# Patient Record
Sex: FEMALE | Race: WHITE | NOT HISPANIC OR LATINO | Employment: OTHER | URBAN - METROPOLITAN AREA
[De-identification: names, ages, dates, MRNs, and addresses within clinical notes are randomized per-mention and may not be internally consistent; named-entity substitution may affect disease eponyms.]

---

## 2017-01-11 ENCOUNTER — HOSPITAL ENCOUNTER (OUTPATIENT)
Dept: FAMILY MEDICINE CLINIC | Facility: CLINIC | Age: 67
Setting detail: SPECIMEN
Discharge: HOME | End: 2017-01-11
Attending: PREVENTIVE MEDICINE | Admitting: PREVENTIVE MEDICINE

## 2017-01-11 LAB
ALBUMIN SERPL-MCNC: 3.8 G/DL (ref 3.5–4.8)
ALBUMIN/GLOB SERPL: 1.2 {RATIO} (ref 1–1.7)
ALP SERPL-CCNC: 105 IU/L (ref 32–91)
ALT SERPL-CCNC: 42 IU/L (ref 14–54)
ANION GAP SERPL CALC-SCNC: 11.8 MMOL/L (ref 10–20)
AST SERPL-CCNC: 30 IU/L (ref 15–41)
BASOPHILS # BLD AUTO: 0 10*3/UL (ref 0–0.2)
BASOPHILS NFR BLD AUTO: 0 % (ref 0–2)
BILIRUB SERPL-MCNC: 0.6 MG/DL (ref 0.3–1.2)
BUN SERPL-MCNC: 10 MG/DL (ref 8–20)
BUN/CREAT SERPL: 14.3 (ref 5.4–26.2)
CALCIUM SERPL-MCNC: 9.3 MG/DL (ref 8.9–10.3)
CHLORIDE SERPL-SCNC: 103 MMOL/L (ref 101–111)
CHOLEST SERPL-MCNC: 154 MG/DL
CHOLEST/HDLC SERPL: 2.8 {RATIO}
CONV CO2: 30 MMOL/L (ref 22–32)
CONV LDL CHOLESTEROL DIRECT: 84 MG/DL (ref 0–100)
CONV MICROALBUM.,U,RANDOM: 5 MG/L
CONV TOTAL PROTEIN: 7 G/DL (ref 6.1–7.9)
CREAT 24H UR-MCNC: 49.7 MG/DL
CREAT UR-MCNC: 0.7 MG/DL (ref 0.4–1)
DIFFERENTIAL METHOD BLD: (no result)
EOSINOPHIL # BLD AUTO: 0.1 10*3/UL (ref 0–0.3)
EOSINOPHIL # BLD AUTO: 2 % (ref 0–3)
ERYTHROCYTE [DISTWIDTH] IN BLOOD BY AUTOMATED COUNT: 13.8 % (ref 11.5–14.5)
GLOBULIN UR ELPH-MCNC: 3.2 G/DL (ref 2.5–3.8)
GLUCOSE SERPL-MCNC: 159 MG/DL (ref 65–99)
HCT VFR BLD AUTO: 40.2 % (ref 35–49)
HDLC SERPL-MCNC: 55 MG/DL
HGB BLD-MCNC: 13.2 G/DL (ref 12–15)
LDLC/HDLC SERPL: 1.5 {RATIO}
LIPID INTERPRETATION: NORMAL
LYMPHOCYTES # BLD AUTO: 2.1 10*3/UL (ref 0.8–4.8)
LYMPHOCYTES NFR BLD AUTO: 40 % (ref 18–42)
MCH RBC QN AUTO: 30 PG (ref 26–32)
MCHC RBC AUTO-ENTMCNC: 32.9 G/DL (ref 32–36)
MCV RBC AUTO: 91.2 FL (ref 80–94)
MICROALBUMIN/CREAT UR: 10.1 UG/MG
MONOCYTES # BLD AUTO: 0.5 10*3/UL (ref 0.1–1.3)
MONOCYTES NFR BLD AUTO: 9 % (ref 2–11)
NEUTROPHILS # BLD AUTO: 2.6 10*3/UL (ref 2.3–8.6)
NEUTROPHILS NFR BLD AUTO: 49 % (ref 50–75)
NRBC BLD AUTO-RTO: 0 /100{WBCS}
NRBC/RBC NFR BLD MANUAL: 0 10*3/UL
PLATELET # BLD AUTO: 225 10*3/UL (ref 150–450)
PMV BLD AUTO: 8.6 FL (ref 7.4–10.4)
POTASSIUM SERPL-SCNC: 3.8 MMOL/L (ref 3.6–5.1)
RBC # BLD AUTO: 4.41 10*6/UL (ref 4–5.4)
SODIUM SERPL-SCNC: 141 MMOL/L (ref 136–144)
TRIGL SERPL-MCNC: 61 MG/DL
VIT B12 SERPL-MCNC: >1500 PG/ML (ref 180–914)
VLDLC SERPL CALC-MCNC: 14.9 MG/DL
WBC # BLD AUTO: 5.2 10*3/UL (ref 4.5–11.5)

## 2017-04-12 ENCOUNTER — HOSPITAL ENCOUNTER (OUTPATIENT)
Dept: FAMILY MEDICINE CLINIC | Facility: CLINIC | Age: 67
Setting detail: SPECIMEN
Discharge: HOME | End: 2017-04-12
Attending: PREVENTIVE MEDICINE | Admitting: PREVENTIVE MEDICINE

## 2017-04-12 LAB
ALBUMIN SERPL-MCNC: 3.8 G/DL (ref 3.5–4.8)
ALBUMIN/GLOB SERPL: 1.2 {RATIO} (ref 1–1.7)
ALP SERPL-CCNC: 80 IU/L (ref 32–91)
ALT SERPL-CCNC: 26 IU/L (ref 14–54)
ANION GAP SERPL CALC-SCNC: 14.7 MMOL/L (ref 10–20)
AST SERPL-CCNC: 21 IU/L (ref 15–41)
BASOPHILS # BLD AUTO: 0 10*3/UL (ref 0–0.2)
BASOPHILS NFR BLD AUTO: 1 % (ref 0–2)
BILIRUB SERPL-MCNC: 0.5 MG/DL (ref 0.3–1.2)
BUN SERPL-MCNC: 12 MG/DL (ref 8–20)
BUN/CREAT SERPL: 20 (ref 5.4–26.2)
CALCIUM SERPL-MCNC: 9.6 MG/DL (ref 8.9–10.3)
CHLORIDE SERPL-SCNC: 104 MMOL/L (ref 101–111)
CHOLEST SERPL-MCNC: 150 MG/DL
CHOLEST/HDLC SERPL: 2.9 {RATIO}
CONV CO2: 28 MMOL/L (ref 22–32)
CONV LDL CHOLESTEROL DIRECT: 89 MG/DL (ref 0–100)
CONV MICROALBUM.,U,RANDOM: 4 MG/L
CONV TOTAL PROTEIN: 6.9 G/DL (ref 6.1–7.9)
CREAT 24H UR-MCNC: 48.4 MG/DL
CREAT UR-MCNC: 0.6 MG/DL (ref 0.4–1)
DIFFERENTIAL METHOD BLD: (no result)
EOSINOPHIL # BLD AUTO: 0.1 10*3/UL (ref 0–0.3)
EOSINOPHIL # BLD AUTO: 2 % (ref 0–3)
ERYTHROCYTE [DISTWIDTH] IN BLOOD BY AUTOMATED COUNT: 13.5 % (ref 11.5–14.5)
GLOBULIN UR ELPH-MCNC: 3.1 G/DL (ref 2.5–3.8)
GLUCOSE SERPL-MCNC: 149 MG/DL (ref 65–99)
HCT VFR BLD AUTO: 40.2 % (ref 35–49)
HDLC SERPL-MCNC: 52 MG/DL
HGB BLD-MCNC: 13.5 G/DL (ref 12–15)
LDLC/HDLC SERPL: 1.7 {RATIO}
LIPID INTERPRETATION: NORMAL
LYMPHOCYTES # BLD AUTO: 2 10*3/UL (ref 0.8–4.8)
LYMPHOCYTES NFR BLD AUTO: 39 % (ref 18–42)
MCH RBC QN AUTO: 30.4 PG (ref 26–32)
MCHC RBC AUTO-ENTMCNC: 33.5 G/DL (ref 32–36)
MCV RBC AUTO: 90.8 FL (ref 80–94)
MICROALBUMIN/CREAT UR: 8.3 UG/MG
MONOCYTES # BLD AUTO: 0.5 10*3/UL (ref 0.1–1.3)
MONOCYTES NFR BLD AUTO: 9 % (ref 2–11)
NEUTROPHILS # BLD AUTO: 2.5 10*3/UL (ref 2.3–8.6)
NEUTROPHILS NFR BLD AUTO: 49 % (ref 50–75)
NRBC BLD AUTO-RTO: 0 /100{WBCS}
NRBC/RBC NFR BLD MANUAL: 0 10*3/UL
PLATELET # BLD AUTO: 264 10*3/UL (ref 150–450)
PMV BLD AUTO: 9.3 FL (ref 7.4–10.4)
POTASSIUM SERPL-SCNC: 3.7 MMOL/L (ref 3.6–5.1)
RBC # BLD AUTO: 4.43 10*6/UL (ref 4–5.4)
SODIUM SERPL-SCNC: 143 MMOL/L (ref 136–144)
TRIGL SERPL-MCNC: 53 MG/DL
TSH SERPL-ACNC: 1.31 UIU/ML (ref 0.34–5.6)
VIT B12 SERPL-MCNC: 1080 PG/ML (ref 180–914)
VLDLC SERPL CALC-MCNC: 9.6 MG/DL
WBC # BLD AUTO: 5 10*3/UL (ref 4.5–11.5)

## 2017-07-06 ENCOUNTER — HOSPITAL ENCOUNTER (OUTPATIENT)
Dept: OTHER | Facility: HOSPITAL | Age: 67
Discharge: HOME | End: 2017-07-06
Attending: PREVENTIVE MEDICINE | Admitting: PREVENTIVE MEDICINE

## 2017-08-01 ENCOUNTER — HOSPITAL ENCOUNTER (OUTPATIENT)
Dept: FAMILY MEDICINE CLINIC | Facility: CLINIC | Age: 67
Setting detail: SPECIMEN
Discharge: HOME | End: 2017-08-01
Attending: PREVENTIVE MEDICINE | Admitting: PREVENTIVE MEDICINE

## 2017-08-01 LAB
CHOLEST SERPL-MCNC: 145 MG/DL
CHOLEST/HDLC SERPL: 2.6 {RATIO}
CONV LDL CHOLESTEROL DIRECT: 79 MG/DL (ref 0–100)
GLUCOSE SERPL-MCNC: 155 MG/DL (ref 65–99)
HDLC SERPL-MCNC: 55 MG/DL
LDLC/HDLC SERPL: 1.4 {RATIO}
LIPID INTERPRETATION: NORMAL
TRIGL SERPL-MCNC: 48 MG/DL
VLDLC SERPL CALC-MCNC: 11.1 MG/DL

## 2017-11-13 ENCOUNTER — HOSPITAL ENCOUNTER (OUTPATIENT)
Dept: FAMILY MEDICINE CLINIC | Facility: CLINIC | Age: 67
Setting detail: SPECIMEN
Discharge: HOME | End: 2017-11-13
Attending: PREVENTIVE MEDICINE | Admitting: PREVENTIVE MEDICINE

## 2017-11-13 LAB
ALBUMIN SERPL-MCNC: 3.9 G/DL (ref 3.5–4.8)
ALBUMIN/GLOB SERPL: 1.1 {RATIO} (ref 1–1.7)
ALP SERPL-CCNC: 77 IU/L (ref 32–91)
ALT SERPL-CCNC: 27 IU/L (ref 14–54)
ANION GAP SERPL CALC-SCNC: 10.7 MMOL/L (ref 10–20)
AST SERPL-CCNC: 22 IU/L (ref 15–41)
BASOPHILS # BLD AUTO: 0 10*3/UL (ref 0–0.2)
BASOPHILS NFR BLD AUTO: 1 % (ref 0–2)
BILIRUB SERPL-MCNC: 0.7 MG/DL (ref 0.3–1.2)
BUN SERPL-MCNC: 13 MG/DL (ref 8–20)
BUN/CREAT SERPL: 21.7 (ref 5.4–26.2)
CALCIUM SERPL-MCNC: 9.4 MG/DL (ref 8.9–10.3)
CHLORIDE SERPL-SCNC: 104 MMOL/L (ref 101–111)
CHOLEST SERPL-MCNC: 155 MG/DL
CHOLEST/HDLC SERPL: 2.7 {RATIO}
CONV CO2: 30 MMOL/L (ref 22–32)
CONV LDL CHOLESTEROL DIRECT: 91 MG/DL (ref 0–100)
CONV MICROALBUM.,U,RANDOM: 4 MG/L
CONV TOTAL PROTEIN: 7.3 G/DL (ref 6.1–7.9)
CREAT 24H UR-MCNC: 37.5 MG/DL
CREAT UR-MCNC: 0.6 MG/DL (ref 0.4–1)
DIFFERENTIAL METHOD BLD: (no result)
EOSINOPHIL # BLD AUTO: 0.1 10*3/UL (ref 0–0.3)
EOSINOPHIL # BLD AUTO: 1 % (ref 0–3)
ERYTHROCYTE [DISTWIDTH] IN BLOOD BY AUTOMATED COUNT: 13.3 % (ref 11.5–14.5)
GLOBULIN UR ELPH-MCNC: 3.4 G/DL (ref 2.5–3.8)
GLUCOSE SERPL-MCNC: 156 MG/DL (ref 65–99)
HCT VFR BLD AUTO: 39.3 % (ref 35–49)
HDLC SERPL-MCNC: 57 MG/DL
HGB BLD-MCNC: 13.2 G/DL (ref 12–15)
LDLC/HDLC SERPL: 1.6 {RATIO}
LIPID INTERPRETATION: NORMAL
LYMPHOCYTES # BLD AUTO: 1.7 10*3/UL (ref 0.8–4.8)
LYMPHOCYTES NFR BLD AUTO: 29 % (ref 18–42)
MCH RBC QN AUTO: 31.3 PG (ref 26–32)
MCHC RBC AUTO-ENTMCNC: 33.4 G/DL (ref 32–36)
MCV RBC AUTO: 93.6 FL (ref 80–94)
MICROALBUMIN/CREAT UR: 10.7 UG/MG
MONOCYTES # BLD AUTO: 0.4 10*3/UL (ref 0.1–1.3)
MONOCYTES NFR BLD AUTO: 7 % (ref 2–11)
NEUTROPHILS # BLD AUTO: 3.6 10*3/UL (ref 2.3–8.6)
NEUTROPHILS NFR BLD AUTO: 62 % (ref 50–75)
NRBC BLD AUTO-RTO: 0 /100{WBCS}
NRBC/RBC NFR BLD MANUAL: 0 10*3/UL
PLATELET # BLD AUTO: 275 10*3/UL (ref 150–450)
PMV BLD AUTO: 9.2 FL (ref 7.4–10.4)
POTASSIUM SERPL-SCNC: 3.7 MMOL/L (ref 3.6–5.1)
RBC # BLD AUTO: 4.2 10*6/UL (ref 4–5.4)
SODIUM SERPL-SCNC: 141 MMOL/L (ref 136–144)
TRIGL SERPL-MCNC: 54 MG/DL
VLDLC SERPL CALC-MCNC: 7.1 MG/DL
WBC # BLD AUTO: 5.8 10*3/UL (ref 4.5–11.5)

## 2017-11-15 LAB
HCV AB SER DONR QL: NORMAL
HCV AB SER DONR QL: NORMAL

## 2018-02-16 ENCOUNTER — HOSPITAL ENCOUNTER (OUTPATIENT)
Dept: FAMILY MEDICINE CLINIC | Facility: CLINIC | Age: 68
Setting detail: SPECIMEN
Discharge: HOME | End: 2018-02-16
Attending: PREVENTIVE MEDICINE | Admitting: PREVENTIVE MEDICINE

## 2018-02-16 LAB
ALBUMIN SERPL-MCNC: 4 G/DL (ref 3.5–4.8)
ALBUMIN/GLOB SERPL: 1.3 {RATIO} (ref 1–1.7)
ALP SERPL-CCNC: 74 IU/L (ref 32–91)
ALT SERPL-CCNC: 28 IU/L (ref 14–54)
ANION GAP SERPL CALC-SCNC: 13 MMOL/L (ref 10–20)
AST SERPL-CCNC: 27 IU/L (ref 15–41)
BASOPHILS # BLD AUTO: 0 10*3/UL (ref 0–0.2)
BASOPHILS NFR BLD AUTO: 1 % (ref 0–2)
BILIRUB SERPL-MCNC: 0.7 MG/DL (ref 0.3–1.2)
BUN SERPL-MCNC: 17 MG/DL (ref 8–20)
BUN/CREAT SERPL: 24.3 (ref 5.4–26.2)
CALCIUM SERPL-MCNC: 9.5 MG/DL (ref 8.9–10.3)
CHLORIDE SERPL-SCNC: 105 MMOL/L (ref 101–111)
CHOLEST SERPL-MCNC: 140 MG/DL
CHOLEST/HDLC SERPL: 2.8 {RATIO}
CONV CO2: 26 MMOL/L (ref 22–32)
CONV LDL CHOLESTEROL DIRECT: 77 MG/DL (ref 0–100)
CONV MICROALBUM.,U,RANDOM: 8 MG/L
CONV TOTAL PROTEIN: 7.1 G/DL (ref 6.1–7.9)
CREAT 24H UR-MCNC: 32.4 MG/DL
CREAT UR-MCNC: 0.7 MG/DL (ref 0.4–1)
DIFFERENTIAL METHOD BLD: (no result)
EOSINOPHIL # BLD AUTO: 0.1 10*3/UL (ref 0–0.3)
EOSINOPHIL # BLD AUTO: 2 % (ref 0–3)
ERYTHROCYTE [DISTWIDTH] IN BLOOD BY AUTOMATED COUNT: 13.2 % (ref 11.5–14.5)
GLOBULIN UR ELPH-MCNC: 3.1 G/DL (ref 2.5–3.8)
GLUCOSE SERPL-MCNC: 150 MG/DL (ref 65–99)
HCT VFR BLD AUTO: 39.9 % (ref 35–49)
HDLC SERPL-MCNC: 51 MG/DL
HGB BLD-MCNC: 13.2 G/DL (ref 12–15)
LDLC/HDLC SERPL: 1.5 {RATIO}
LIPID INTERPRETATION: NORMAL
LYMPHOCYTES # BLD AUTO: 2.3 10*3/UL (ref 0.8–4.8)
LYMPHOCYTES NFR BLD AUTO: 41 % (ref 18–42)
MCH RBC QN AUTO: 30.8 PG (ref 26–32)
MCHC RBC AUTO-ENTMCNC: 33.2 G/DL (ref 32–36)
MCV RBC AUTO: 92.7 FL (ref 80–94)
MICROALBUMIN/CREAT UR: 24.7 UG/MG
MONOCYTES # BLD AUTO: 0.4 10*3/UL (ref 0.1–1.3)
MONOCYTES NFR BLD AUTO: 7 % (ref 2–11)
NEUTROPHILS # BLD AUTO: 2.7 10*3/UL (ref 2.3–8.6)
NEUTROPHILS NFR BLD AUTO: 49 % (ref 50–75)
NRBC BLD AUTO-RTO: 0 /100{WBCS}
NRBC/RBC NFR BLD MANUAL: 0 10*3/UL
PLATELET # BLD AUTO: 301 10*3/UL (ref 150–450)
PMV BLD AUTO: 9.1 FL (ref 7.4–10.4)
POTASSIUM SERPL-SCNC: 4 MMOL/L (ref 3.6–5.1)
RBC # BLD AUTO: 4.31 10*6/UL (ref 4–5.4)
SODIUM SERPL-SCNC: 140 MMOL/L (ref 136–144)
TRIGL SERPL-MCNC: 49 MG/DL
VLDLC SERPL CALC-MCNC: 12.4 MG/DL
WBC # BLD AUTO: 5.6 10*3/UL (ref 4.5–11.5)

## 2018-05-17 ENCOUNTER — HOSPITAL ENCOUNTER (OUTPATIENT)
Dept: FAMILY MEDICINE CLINIC | Facility: CLINIC | Age: 68
Setting detail: SPECIMEN
Discharge: HOME | End: 2018-05-17
Attending: PREVENTIVE MEDICINE | Admitting: PREVENTIVE MEDICINE

## 2018-05-17 LAB
ALBUMIN SERPL-MCNC: 3.9 G/DL (ref 3.5–4.8)
ALBUMIN/GLOB SERPL: 1.3 {RATIO} (ref 1–1.7)
ALP SERPL-CCNC: 80 IU/L (ref 32–91)
ALT SERPL-CCNC: 28 IU/L (ref 14–54)
ANION GAP SERPL CALC-SCNC: 9.5 MMOL/L (ref 10–20)
AST SERPL-CCNC: 24 IU/L (ref 15–41)
BASOPHILS # BLD AUTO: 0 10*3/UL (ref 0–0.2)
BASOPHILS NFR BLD AUTO: 1 % (ref 0–2)
BILIRUB SERPL-MCNC: 0.6 MG/DL (ref 0.3–1.2)
BUN SERPL-MCNC: 11 MG/DL (ref 8–20)
BUN/CREAT SERPL: 15.7 (ref 5.4–26.2)
CALCIUM SERPL-MCNC: 9.4 MG/DL (ref 8.9–10.3)
CHLORIDE SERPL-SCNC: 105 MMOL/L (ref 101–111)
CHOLEST SERPL-MCNC: 137 MG/DL
CHOLEST/HDLC SERPL: 2.5 {RATIO}
CONV CO2: 29 MMOL/L (ref 22–32)
CONV LDL CHOLESTEROL DIRECT: 68 MG/DL (ref 0–100)
CONV MICROALBUM.,U,RANDOM: 2 MG/L
CONV TOTAL PROTEIN: 7 G/DL (ref 6.1–7.9)
CREAT 24H UR-MCNC: 49.9 MG/DL
CREAT UR-MCNC: 0.7 MG/DL (ref 0.4–1)
DIFFERENTIAL METHOD BLD: (no result)
EOSINOPHIL # BLD AUTO: 0.1 10*3/UL (ref 0–0.3)
EOSINOPHIL # BLD AUTO: 2 % (ref 0–3)
ERYTHROCYTE [DISTWIDTH] IN BLOOD BY AUTOMATED COUNT: 13.4 % (ref 11.5–14.5)
GLOBULIN UR ELPH-MCNC: 3.1 G/DL (ref 2.5–3.8)
GLUCOSE SERPL-MCNC: 148 MG/DL (ref 65–99)
HCT VFR BLD AUTO: 39.4 % (ref 35–49)
HDLC SERPL-MCNC: 55 MG/DL
HGB BLD-MCNC: 13.1 G/DL (ref 12–15)
LDLC/HDLC SERPL: 1.2 {RATIO}
LIPID INTERPRETATION: NORMAL
LYMPHOCYTES # BLD AUTO: 2.6 10*3/UL (ref 0.8–4.8)
LYMPHOCYTES NFR BLD AUTO: 41 % (ref 18–42)
MCH RBC QN AUTO: 30.8 PG (ref 26–32)
MCHC RBC AUTO-ENTMCNC: 33.2 G/DL (ref 32–36)
MCV RBC AUTO: 92.9 FL (ref 80–94)
MICROALBUMIN/CREAT UR: 4 UG/MG
MONOCYTES # BLD AUTO: 0.5 10*3/UL (ref 0.1–1.3)
MONOCYTES NFR BLD AUTO: 7 % (ref 2–11)
NEUTROPHILS # BLD AUTO: 3.1 10*3/UL (ref 2.3–8.6)
NEUTROPHILS NFR BLD AUTO: 49 % (ref 50–75)
NRBC BLD AUTO-RTO: 0 /100{WBCS}
NRBC/RBC NFR BLD MANUAL: 0 10*3/UL
PLATELET # BLD AUTO: 290 10*3/UL (ref 150–450)
PMV BLD AUTO: 9.4 FL (ref 7.4–10.4)
POTASSIUM SERPL-SCNC: 3.5 MMOL/L (ref 3.6–5.1)
RBC # BLD AUTO: 4.24 10*6/UL (ref 4–5.4)
SODIUM SERPL-SCNC: 140 MMOL/L (ref 136–144)
TRIGL SERPL-MCNC: 45 MG/DL
VLDLC SERPL CALC-MCNC: 14.6 MG/DL
WBC # BLD AUTO: 6.3 10*3/UL (ref 4.5–11.5)

## 2018-05-31 ENCOUNTER — HOSPITAL ENCOUNTER (OUTPATIENT)
Dept: FAMILY MEDICINE CLINIC | Facility: CLINIC | Age: 68
Setting detail: SPECIMEN
Discharge: HOME | End: 2018-05-31
Attending: PREVENTIVE MEDICINE | Admitting: PREVENTIVE MEDICINE

## 2018-05-31 LAB — POTASSIUM SERPL-SCNC: 3.8 MMOL/L (ref 3.6–5.1)

## 2018-06-27 ENCOUNTER — OFFICE (AMBULATORY)
Dept: URBAN - METROPOLITAN AREA PATHOLOGY 4 | Facility: PATHOLOGY | Age: 68
End: 2018-06-27
Payer: MEDICARE

## 2018-06-27 ENCOUNTER — ON CAMPUS - OUTPATIENT (AMBULATORY)
Dept: URBAN - METROPOLITAN AREA HOSPITAL 2 | Facility: HOSPITAL | Age: 68
End: 2018-06-27
Payer: MEDICARE

## 2018-06-27 ENCOUNTER — HOSPITAL ENCOUNTER (OUTPATIENT)
Dept: OTHER | Facility: HOSPITAL | Age: 68
Setting detail: SPECIMEN
Discharge: HOME | End: 2018-06-27
Attending: INTERNAL MEDICINE | Admitting: INTERNAL MEDICINE

## 2018-06-27 VITALS
SYSTOLIC BLOOD PRESSURE: 125 MMHG | SYSTOLIC BLOOD PRESSURE: 133 MMHG | HEART RATE: 67 BPM | OXYGEN SATURATION: 98 % | RESPIRATION RATE: 16 BRPM | TEMPERATURE: 98.5 F | OXYGEN SATURATION: 94 % | DIASTOLIC BLOOD PRESSURE: 66 MMHG | SYSTOLIC BLOOD PRESSURE: 131 MMHG | SYSTOLIC BLOOD PRESSURE: 207 MMHG | WEIGHT: 159 LBS | HEART RATE: 74 BPM | HEART RATE: 72 BPM | OXYGEN SATURATION: 96 % | DIASTOLIC BLOOD PRESSURE: 72 MMHG | SYSTOLIC BLOOD PRESSURE: 139 MMHG | SYSTOLIC BLOOD PRESSURE: 135 MMHG | SYSTOLIC BLOOD PRESSURE: 123 MMHG | HEART RATE: 73 BPM | DIASTOLIC BLOOD PRESSURE: 96 MMHG | HEART RATE: 77 BPM | DIASTOLIC BLOOD PRESSURE: 77 MMHG | DIASTOLIC BLOOD PRESSURE: 93 MMHG | DIASTOLIC BLOOD PRESSURE: 73 MMHG | DIASTOLIC BLOOD PRESSURE: 69 MMHG | DIASTOLIC BLOOD PRESSURE: 63 MMHG | HEART RATE: 75 BPM | RESPIRATION RATE: 18 BRPM | SYSTOLIC BLOOD PRESSURE: 147 MMHG | HEART RATE: 71 BPM | HEIGHT: 66 IN | OXYGEN SATURATION: 99 % | OXYGEN SATURATION: 100 % | HEART RATE: 76 BPM | RESPIRATION RATE: 17 BRPM

## 2018-06-27 DIAGNOSIS — D12.0 BENIGN NEOPLASM OF CECUM: ICD-10-CM

## 2018-06-27 DIAGNOSIS — Z86.010 PERSONAL HISTORY OF COLONIC POLYPS: ICD-10-CM

## 2018-06-27 DIAGNOSIS — K63.5 POLYP OF COLON: ICD-10-CM

## 2018-06-27 DIAGNOSIS — K64.1 SECOND DEGREE HEMORRHOIDS: ICD-10-CM

## 2018-06-27 DIAGNOSIS — K57.30 DIVERTICULOSIS OF LARGE INTESTINE WITHOUT PERFORATION OR ABS: ICD-10-CM

## 2018-06-27 DIAGNOSIS — K62.1 RECTAL POLYP: ICD-10-CM

## 2018-06-27 PROBLEM — D12.5 BENIGN NEOPLASM OF SIGMOID COLON: Status: ACTIVE | Noted: 2018-06-27

## 2018-06-27 LAB
GI HISTOLOGY: A. UNSPECIFIED: (no result)
GI HISTOLOGY: B. SELECT: (no result)
GI HISTOLOGY: PDF REPORT: (no result)

## 2018-06-27 PROCEDURE — 45385 COLONOSCOPY W/LESION REMOVAL: CPT | Mod: PT | Performed by: INTERNAL MEDICINE

## 2018-06-27 PROCEDURE — 45380 COLONOSCOPY AND BIOPSY: CPT | Mod: 59,PT | Performed by: INTERNAL MEDICINE

## 2018-06-27 PROCEDURE — 88305 TISSUE EXAM BY PATHOLOGIST: CPT | Mod: 26 | Performed by: INTERNAL MEDICINE

## 2018-06-27 RX ADMIN — PROPOFOL: 10 INJECTION, EMULSION INTRAVENOUS at 10:28

## 2018-08-29 ENCOUNTER — HOSPITAL ENCOUNTER (OUTPATIENT)
Dept: FAMILY MEDICINE CLINIC | Facility: CLINIC | Age: 68
Setting detail: SPECIMEN
Discharge: HOME | End: 2018-08-29
Attending: PREVENTIVE MEDICINE | Admitting: PREVENTIVE MEDICINE

## 2018-08-29 LAB
ALBUMIN SERPL-MCNC: 3.8 G/DL (ref 3.5–4.8)
ALBUMIN/GLOB SERPL: 1.2 {RATIO} (ref 1–1.7)
ALP SERPL-CCNC: 74 IU/L (ref 32–91)
ALT SERPL-CCNC: 24 IU/L (ref 14–54)
ANION GAP SERPL CALC-SCNC: 12.5 MMOL/L (ref 10–20)
AST SERPL-CCNC: 20 IU/L (ref 15–41)
BASOPHILS # BLD AUTO: 0 10*3/UL (ref 0–0.2)
BASOPHILS NFR BLD AUTO: 1 % (ref 0–2)
BILIRUB SERPL-MCNC: 0.8 MG/DL (ref 0.3–1.2)
BUN SERPL-MCNC: 15 MG/DL (ref 8–20)
BUN/CREAT SERPL: 21.4 (ref 5.4–26.2)
CALCIUM SERPL-MCNC: 9.4 MG/DL (ref 8.9–10.3)
CHLORIDE SERPL-SCNC: 104 MMOL/L (ref 101–111)
CHOLEST SERPL-MCNC: 150 MG/DL
CHOLEST/HDLC SERPL: 2.9 {RATIO}
CONV CO2: 28 MMOL/L (ref 22–32)
CONV LDL CHOLESTEROL DIRECT: 86 MG/DL (ref 0–100)
CONV TOTAL PROTEIN: 6.9 G/DL (ref 6.1–7.9)
CREAT UR-MCNC: 0.7 MG/DL (ref 0.4–1)
DIFFERENTIAL METHOD BLD: (no result)
EOSINOPHIL # BLD AUTO: 0.1 10*3/UL (ref 0–0.3)
EOSINOPHIL # BLD AUTO: 2 % (ref 0–3)
ERYTHROCYTE [DISTWIDTH] IN BLOOD BY AUTOMATED COUNT: 13.3 % (ref 11.5–14.5)
GLOBULIN UR ELPH-MCNC: 3.1 G/DL (ref 2.5–3.8)
GLUCOSE SERPL-MCNC: 158 MG/DL (ref 65–99)
HCT VFR BLD AUTO: 38 % (ref 35–49)
HDLC SERPL-MCNC: 52 MG/DL
HGB BLD-MCNC: 12.6 G/DL (ref 12–15)
LDLC/HDLC SERPL: 1.7 {RATIO}
LIPID INTERPRETATION: NORMAL
LYMPHOCYTES # BLD AUTO: 2.1 10*3/UL (ref 0.8–4.8)
LYMPHOCYTES NFR BLD AUTO: 40 % (ref 18–42)
MCH RBC QN AUTO: 30.6 PG (ref 26–32)
MCHC RBC AUTO-ENTMCNC: 33 G/DL (ref 32–36)
MCV RBC AUTO: 92.7 FL (ref 80–94)
MONOCYTES # BLD AUTO: 0.4 10*3/UL (ref 0.1–1.3)
MONOCYTES NFR BLD AUTO: 7 % (ref 2–11)
NEUTROPHILS # BLD AUTO: 2.6 10*3/UL (ref 2.3–8.6)
NEUTROPHILS NFR BLD AUTO: 50 % (ref 50–75)
NRBC BLD AUTO-RTO: 0 /100{WBCS}
NRBC/RBC NFR BLD MANUAL: 0 10*3/UL
PLATELET # BLD AUTO: 273 10*3/UL (ref 150–450)
PMV BLD AUTO: 9.5 FL (ref 7.4–10.4)
POTASSIUM SERPL-SCNC: 3.5 MMOL/L (ref 3.6–5.1)
RBC # BLD AUTO: 4.1 10*6/UL (ref 4–5.4)
SODIUM SERPL-SCNC: 141 MMOL/L (ref 136–144)
TRIGL SERPL-MCNC: 57 MG/DL
VLDLC SERPL CALC-MCNC: 12.5 MG/DL
WBC # BLD AUTO: 5.3 10*3/UL (ref 4.5–11.5)

## 2018-08-31 ENCOUNTER — HOSPITAL ENCOUNTER (OUTPATIENT)
Dept: GENERAL RADIOLOGY | Facility: HOSPITAL | Age: 68
Discharge: HOME | End: 2018-08-31
Attending: PREVENTIVE MEDICINE | Admitting: PREVENTIVE MEDICINE

## 2018-12-04 ENCOUNTER — HOSPITAL ENCOUNTER (OUTPATIENT)
Dept: FAMILY MEDICINE CLINIC | Facility: CLINIC | Age: 68
Setting detail: SPECIMEN
Discharge: HOME | End: 2018-12-04
Attending: PREVENTIVE MEDICINE | Admitting: PREVENTIVE MEDICINE

## 2018-12-04 LAB
ALBUMIN SERPL-MCNC: 3.8 G/DL (ref 3.5–4.8)
ALBUMIN/GLOB SERPL: 1.3 {RATIO} (ref 1–1.7)
ALP SERPL-CCNC: 84 IU/L (ref 32–91)
ALT SERPL-CCNC: 24 IU/L (ref 14–54)
ANION GAP SERPL CALC-SCNC: 11.3 MMOL/L (ref 10–20)
AST SERPL-CCNC: 20 IU/L (ref 15–41)
BASOPHILS # BLD AUTO: 0 10*3/UL (ref 0–0.2)
BASOPHILS NFR BLD AUTO: 1 % (ref 0–2)
BILIRUB SERPL-MCNC: 1 MG/DL (ref 0.3–1.2)
BUN SERPL-MCNC: 12 MG/DL (ref 8–20)
BUN/CREAT SERPL: 24 (ref 5.4–26.2)
CALCIUM SERPL-MCNC: 8.9 MG/DL (ref 8.9–10.3)
CHLORIDE SERPL-SCNC: 98 MMOL/L (ref 101–111)
CHOLEST SERPL-MCNC: 146 MG/DL
CHOLEST/HDLC SERPL: 2.4 {RATIO}
CONV CO2: 31 MMOL/L (ref 22–32)
CONV LDL CHOLESTEROL DIRECT: 78 MG/DL (ref 0–100)
CONV MICROALBUM.,U,RANDOM: 4 MG/L
CONV TOTAL PROTEIN: 6.8 G/DL (ref 6.1–7.9)
CREAT 24H UR-MCNC: 16.1 MG/DL
CREAT UR-MCNC: 0.5 MG/DL (ref 0.4–1)
DIFFERENTIAL METHOD BLD: (no result)
EOSINOPHIL # BLD AUTO: 0.1 10*3/UL (ref 0–0.3)
EOSINOPHIL # BLD AUTO: 1 % (ref 0–3)
ERYTHROCYTE [DISTWIDTH] IN BLOOD BY AUTOMATED COUNT: 13.7 % (ref 11.5–14.5)
GLOBULIN UR ELPH-MCNC: 3 G/DL (ref 2.5–3.8)
GLUCOSE SERPL-MCNC: 156 MG/DL (ref 65–99)
HCT VFR BLD AUTO: 38.4 % (ref 35–49)
HDLC SERPL-MCNC: 61 MG/DL
HGB BLD-MCNC: 13.2 G/DL (ref 12–15)
LDLC/HDLC SERPL: 1.3 {RATIO}
LIPID INTERPRETATION: NORMAL
LYMPHOCYTES # BLD AUTO: 2.1 10*3/UL (ref 0.8–4.8)
LYMPHOCYTES NFR BLD AUTO: 37 % (ref 18–42)
MCH RBC QN AUTO: 32.3 PG (ref 26–32)
MCHC RBC AUTO-ENTMCNC: 34.4 G/DL (ref 32–36)
MCV RBC AUTO: 93.8 FL (ref 80–94)
MICROALBUMIN/CREAT UR: 24.8 UG/MG
MONOCYTES # BLD AUTO: 0.5 10*3/UL (ref 0.1–1.3)
MONOCYTES NFR BLD AUTO: 8 % (ref 2–11)
NEUTROPHILS # BLD AUTO: 3 10*3/UL (ref 2.3–8.6)
NEUTROPHILS NFR BLD AUTO: 53 % (ref 50–75)
NRBC BLD AUTO-RTO: 0 /100{WBCS}
NRBC/RBC NFR BLD MANUAL: 0 10*3/UL
PLATELET # BLD AUTO: 261 10*3/UL (ref 150–450)
PMV BLD AUTO: 9.2 FL (ref 7.4–10.4)
POTASSIUM SERPL-SCNC: 3.3 MMOL/L (ref 3.6–5.1)
RBC # BLD AUTO: 4.1 10*6/UL (ref 4–5.4)
SODIUM SERPL-SCNC: 137 MMOL/L (ref 136–144)
TRIGL SERPL-MCNC: 57 MG/DL
TSH SERPL-ACNC: 1.26 UIU/ML (ref 0.34–5.6)
VLDLC SERPL CALC-MCNC: 7.4 MG/DL
WBC # BLD AUTO: 5.7 10*3/UL (ref 4.5–11.5)

## 2018-12-05 LAB — HBA1C MFR BLD: 7.6 % (ref 0–5.6)

## 2018-12-19 ENCOUNTER — HOSPITAL ENCOUNTER (OUTPATIENT)
Dept: FAMILY MEDICINE CLINIC | Facility: CLINIC | Age: 68
Setting detail: SPECIMEN
Discharge: HOME | End: 2018-12-19
Attending: PREVENTIVE MEDICINE | Admitting: PREVENTIVE MEDICINE

## 2018-12-19 LAB — POTASSIUM SERPL-SCNC: 4 MMOL/L (ref 3.6–5.1)

## 2019-03-05 ENCOUNTER — HOSPITAL ENCOUNTER (OUTPATIENT)
Dept: FAMILY MEDICINE CLINIC | Facility: CLINIC | Age: 69
Setting detail: SPECIMEN
Discharge: HOME | End: 2019-03-05
Attending: PREVENTIVE MEDICINE | Admitting: PREVENTIVE MEDICINE

## 2019-03-05 LAB
ALBUMIN SERPL-MCNC: 3.9 G/DL (ref 3.5–4.8)
ALBUMIN/GLOB SERPL: 1.2 {RATIO} (ref 1–1.7)
ALP SERPL-CCNC: 70 IU/L (ref 32–91)
ALT SERPL-CCNC: 25 IU/L (ref 14–54)
ANION GAP SERPL CALC-SCNC: 15.1 MMOL/L (ref 10–20)
AST SERPL-CCNC: 22 IU/L (ref 15–41)
BASOPHILS # BLD AUTO: 0 10*3/UL (ref 0–0.2)
BASOPHILS NFR BLD AUTO: 1 % (ref 0–2)
BILIRUB SERPL-MCNC: 0.7 MG/DL (ref 0.3–1.2)
BUN SERPL-MCNC: 15 MG/DL (ref 8–20)
BUN/CREAT SERPL: 25 (ref 5.4–26.2)
CALCIUM SERPL-MCNC: 9.2 MG/DL (ref 8.9–10.3)
CHLORIDE SERPL-SCNC: 101 MMOL/L (ref 101–111)
CHOLEST SERPL-MCNC: 128 MG/DL
CHOLEST/HDLC SERPL: 2.6 {RATIO}
CONV CO2: 26 MMOL/L (ref 22–32)
CONV LDL CHOLESTEROL DIRECT: 70 MG/DL (ref 0–100)
CONV MICROALBUM.,U,RANDOM: 18 MG/L
CONV TOTAL PROTEIN: 7.1 G/DL (ref 6.1–7.9)
CREAT 24H UR-MCNC: 50.9 MG/DL
CREAT UR-MCNC: 0.6 MG/DL (ref 0.4–1)
DIFFERENTIAL METHOD BLD: (no result)
EOSINOPHIL # BLD AUTO: 0.1 10*3/UL (ref 0–0.3)
EOSINOPHIL # BLD AUTO: 2 % (ref 0–3)
ERYTHROCYTE [DISTWIDTH] IN BLOOD BY AUTOMATED COUNT: 13.5 % (ref 11.5–14.5)
GLOBULIN UR ELPH-MCNC: 3.2 G/DL (ref 2.5–3.8)
GLUCOSE SERPL-MCNC: 155 MG/DL (ref 65–99)
HCT VFR BLD AUTO: 39.3 % (ref 35–49)
HDLC SERPL-MCNC: 50 MG/DL
HGB BLD-MCNC: 13 G/DL (ref 12–15)
LDLC/HDLC SERPL: 1.4 {RATIO}
LIPID INTERPRETATION: NORMAL
LYMPHOCYTES # BLD AUTO: 2.1 10*3/UL (ref 0.8–4.8)
LYMPHOCYTES NFR BLD AUTO: 40 % (ref 18–42)
MCH RBC QN AUTO: 30.4 PG (ref 26–32)
MCHC RBC AUTO-ENTMCNC: 33 G/DL (ref 32–36)
MCV RBC AUTO: 92.2 FL (ref 80–94)
MICROALBUMIN/CREAT UR: 35.4 UG/MG
MONOCYTES # BLD AUTO: 0.5 10*3/UL (ref 0.1–1.3)
MONOCYTES NFR BLD AUTO: 9 % (ref 2–11)
NEUTROPHILS # BLD AUTO: 2.6 10*3/UL (ref 2.3–8.6)
NEUTROPHILS NFR BLD AUTO: 48 % (ref 50–75)
NRBC BLD AUTO-RTO: 0 /100{WBCS}
NRBC/RBC NFR BLD MANUAL: 0 10*3/UL
PLATELET # BLD AUTO: 271 10*3/UL (ref 150–450)
PMV BLD AUTO: 9.3 FL (ref 7.4–10.4)
POTASSIUM SERPL-SCNC: 3.1 MMOL/L (ref 3.6–5.1)
RBC # BLD AUTO: 4.26 10*6/UL (ref 4–5.4)
SODIUM SERPL-SCNC: 139 MMOL/L (ref 136–144)
TRIGL SERPL-MCNC: 41 MG/DL
VLDLC SERPL CALC-MCNC: 8.4 MG/DL
WBC # BLD AUTO: 5.3 10*3/UL (ref 4.5–11.5)

## 2019-03-22 ENCOUNTER — HOSPITAL ENCOUNTER (OUTPATIENT)
Dept: FAMILY MEDICINE CLINIC | Facility: CLINIC | Age: 69
Setting detail: SPECIMEN
Discharge: HOME | End: 2019-03-22
Attending: PREVENTIVE MEDICINE | Admitting: PREVENTIVE MEDICINE

## 2019-03-22 LAB — POTASSIUM SERPL-SCNC: 3.6 MMOL/L (ref 3.6–5.1)

## 2019-06-07 ENCOUNTER — HOSPITAL ENCOUNTER (OUTPATIENT)
Dept: FAMILY MEDICINE CLINIC | Facility: CLINIC | Age: 69
Setting detail: SPECIMEN
Discharge: HOME | End: 2019-06-07
Attending: PREVENTIVE MEDICINE | Admitting: PREVENTIVE MEDICINE

## 2019-06-07 LAB
ALBUMIN SERPL-MCNC: 3.8 G/DL (ref 3.5–4.8)
ALBUMIN/GLOB SERPL: 1.2 {RATIO} (ref 1–1.7)
ALP SERPL-CCNC: 69 IU/L (ref 32–91)
ALT SERPL-CCNC: 22 IU/L (ref 14–54)
ANION GAP SERPL CALC-SCNC: 15.5 MMOL/L (ref 10–20)
AST SERPL-CCNC: 20 IU/L (ref 15–41)
BASOPHILS # BLD AUTO: 0 10*3/UL (ref 0–0.2)
BASOPHILS NFR BLD AUTO: 0 % (ref 0–2)
BILIRUB SERPL-MCNC: 0.5 MG/DL (ref 0.3–1.2)
BUN SERPL-MCNC: 11 MG/DL (ref 8–20)
BUN/CREAT SERPL: 15.7 (ref 5.4–26.2)
CALCIUM SERPL-MCNC: 9.2 MG/DL (ref 8.9–10.3)
CHLORIDE SERPL-SCNC: 104 MMOL/L (ref 101–111)
CONV CO2: 24 MMOL/L (ref 22–32)
CONV MICROALBUM.,U,RANDOM: 3 MG/L
CONV TOTAL PROTEIN: 7 G/DL (ref 6.1–7.9)
CREAT 24H UR-MCNC: 18.7 MG/DL
CREAT UR-MCNC: 0.7 MG/DL (ref 0.4–1)
DIFFERENTIAL METHOD BLD: (no result)
EOSINOPHIL # BLD AUTO: 0.1 10*3/UL (ref 0–0.3)
EOSINOPHIL # BLD AUTO: 1 % (ref 0–3)
ERYTHROCYTE [DISTWIDTH] IN BLOOD BY AUTOMATED COUNT: 13.6 % (ref 11.5–14.5)
GLOBULIN UR ELPH-MCNC: 3.2 G/DL (ref 2.5–3.8)
GLUCOSE SERPL-MCNC: 168 MG/DL (ref 65–99)
HCT VFR BLD AUTO: 38 % (ref 35–49)
HGB BLD-MCNC: 12.5 G/DL (ref 12–15)
LYMPHOCYTES # BLD AUTO: 1.8 10*3/UL (ref 0.8–4.8)
LYMPHOCYTES NFR BLD AUTO: 33 % (ref 18–42)
MCH RBC QN AUTO: 30.9 PG (ref 26–32)
MCHC RBC AUTO-ENTMCNC: 33 G/DL (ref 32–36)
MCV RBC AUTO: 93.6 FL (ref 80–94)
MICROALBUMIN/CREAT UR: 16 UG/MG
MONOCYTES # BLD AUTO: 0.5 10*3/UL (ref 0.1–1.3)
MONOCYTES NFR BLD AUTO: 8 % (ref 2–11)
NEUTROPHILS # BLD AUTO: 3.2 10*3/UL (ref 2.3–8.6)
NEUTROPHILS NFR BLD AUTO: 58 % (ref 50–75)
NRBC BLD AUTO-RTO: 0 /100{WBCS}
NRBC/RBC NFR BLD MANUAL: 0 10*3/UL
PLATELET # BLD AUTO: 261 10*3/UL (ref 150–450)
PMV BLD AUTO: 9.8 FL (ref 7.4–10.4)
POTASSIUM SERPL-SCNC: 3.5 MMOL/L (ref 3.6–5.1)
RBC # BLD AUTO: 4.06 10*6/UL (ref 4–5.4)
SODIUM SERPL-SCNC: 140 MMOL/L (ref 136–144)
VIT B12 SERPL-MCNC: 1135 PG/ML (ref 180–914)
WBC # BLD AUTO: 5.5 10*3/UL (ref 4.5–11.5)

## 2019-06-10 LAB — HBA1C MFR BLD: 7.3 % (ref 0–5.6)

## 2019-06-20 RX ORDER — POTASSIUM CHLORIDE 750 MG/1
10 CAPSULE, EXTENDED RELEASE ORAL
Refills: 0 | COMMUNITY
Start: 2019-04-27 | End: 2019-06-20 | Stop reason: SDUPTHER

## 2019-06-20 RX ORDER — POTASSIUM CHLORIDE 750 MG/1
10 CAPSULE, EXTENDED RELEASE ORAL TAKE AS DIRECTED
Qty: 180 CAPSULE | Refills: 3 | Status: SHIPPED | OUTPATIENT
Start: 2019-06-20 | End: 2020-01-08 | Stop reason: SDUPTHER

## 2019-06-27 RX ORDER — POTASSIUM CHLORIDE 750 MG/1
10 CAPSULE, EXTENDED RELEASE ORAL TAKE AS DIRECTED
Qty: 180 CAPSULE | Refills: 3 | Status: CANCELLED | OUTPATIENT
Start: 2019-06-27

## 2019-08-29 RX ORDER — VERAPAMIL HYDROCHLORIDE 240 MG/1
TABLET, FILM COATED, EXTENDED RELEASE ORAL
Qty: 180 TABLET | Refills: 3 | Status: SHIPPED | OUTPATIENT
Start: 2019-08-29 | End: 2020-01-03

## 2019-09-09 PROBLEM — I10 HYPERTENSION: Status: ACTIVE | Noted: 2019-09-09

## 2019-09-09 PROBLEM — E11.9 TYPE 2 DIABETES MELLITUS: Status: ACTIVE | Noted: 2019-09-09

## 2019-09-09 PROBLEM — E53.8 B12 DEFICIENCY: Status: ACTIVE | Noted: 2018-05-17

## 2019-09-09 PROBLEM — R00.0 TACHYCARDIA: Status: ACTIVE | Noted: 2018-12-04

## 2019-09-09 PROBLEM — R59.9 ADENOPATHY: Status: ACTIVE | Noted: 2018-05-17

## 2019-09-09 PROBLEM — D22.9 MELANOCYTIC NEVUS: Status: ACTIVE | Noted: 2019-03-05

## 2019-09-10 ENCOUNTER — OFFICE VISIT (OUTPATIENT)
Dept: FAMILY MEDICINE CLINIC | Facility: CLINIC | Age: 69
End: 2019-09-10

## 2019-09-10 VITALS
TEMPERATURE: 98.4 F | DIASTOLIC BLOOD PRESSURE: 73 MMHG | WEIGHT: 163.2 LBS | RESPIRATION RATE: 16 BRPM | HEIGHT: 64 IN | SYSTOLIC BLOOD PRESSURE: 132 MMHG | OXYGEN SATURATION: 96 % | BODY MASS INDEX: 27.86 KG/M2 | HEART RATE: 71 BPM

## 2019-09-10 DIAGNOSIS — E55.9 VITAMIN D DEFICIENCY: ICD-10-CM

## 2019-09-10 DIAGNOSIS — Z78.0 POSTMENOPAUSAL STATUS: ICD-10-CM

## 2019-09-10 DIAGNOSIS — E78.5 HYPERLIPIDEMIA, UNSPECIFIED HYPERLIPIDEMIA TYPE: ICD-10-CM

## 2019-09-10 DIAGNOSIS — E11.9 TYPE 2 DIABETES MELLITUS WITHOUT COMPLICATION, WITHOUT LONG-TERM CURRENT USE OF INSULIN (HCC): ICD-10-CM

## 2019-09-10 DIAGNOSIS — R00.0 TACHYCARDIA: ICD-10-CM

## 2019-09-10 DIAGNOSIS — I10 ESSENTIAL HYPERTENSION: ICD-10-CM

## 2019-09-10 DIAGNOSIS — E53.8 B12 DEFICIENCY: Primary | ICD-10-CM

## 2019-09-10 LAB
ALBUMIN SERPL-MCNC: 3.9 G/DL (ref 3.5–4.8)
ALBUMIN UR-MCNC: 5 MG/L
ALBUMIN/GLOB SERPL: 1.3 G/DL (ref 1–1.7)
ALP SERPL-CCNC: 73 U/L (ref 32–91)
ALT SERPL W P-5'-P-CCNC: 25 U/L (ref 14–54)
ANION GAP SERPL CALCULATED.3IONS-SCNC: 13.8 MMOL/L (ref 5–15)
ARTICHOKE IGE QN: 88 MG/DL (ref 0–100)
AST SERPL-CCNC: 22 U/L (ref 15–41)
BASOPHILS # BLD AUTO: 0.1 10*3/MM3 (ref 0–0.2)
BASOPHILS NFR BLD AUTO: 1 % (ref 0–1.5)
BILIRUB SERPL-MCNC: 0.7 MG/DL (ref 0.3–1.2)
BUN BLD-MCNC: 13 MG/DL (ref 8–20)
BUN/CREAT SERPL: 16.3 (ref 5.4–26.2)
CALCIUM SPEC-SCNC: 9.7 MG/DL (ref 8.9–10.3)
CHLORIDE SERPL-SCNC: 100 MMOL/L (ref 101–111)
CHOLEST SERPL-MCNC: 155 MG/DL
CO2 SERPL-SCNC: 29 MMOL/L (ref 22–32)
CREAT BLD-MCNC: 0.8 MG/DL (ref 0.4–1)
CREAT UR-MCNC: 41.9 MG/DL
DEPRECATED RDW RBC AUTO: 43.3 FL (ref 37–54)
EOSINOPHIL # BLD AUTO: 0.1 10*3/MM3 (ref 0–0.4)
EOSINOPHIL NFR BLD AUTO: 1.5 % (ref 0.3–6.2)
ERYTHROCYTE [DISTWIDTH] IN BLOOD BY AUTOMATED COUNT: 13.3 % (ref 12.3–15.4)
GFR SERPL CREATININE-BSD FRML MDRD: 71 ML/MIN/1.73
GLOBULIN UR ELPH-MCNC: 2.9 GM/DL (ref 2.5–3.8)
GLUCOSE BLD-MCNC: 162 MG/DL (ref 65–99)
HCT VFR BLD AUTO: 40.3 % (ref 34–46.6)
HDLC SERPL QL: 2.77
HDLC SERPL-MCNC: 56 MG/DL
HGB BLD-MCNC: 13.2 G/DL (ref 12–15.9)
LDLC/HDLC SERPL: 1.56 {RATIO}
LYMPHOCYTES # BLD AUTO: 2.1 10*3/MM3 (ref 0.7–3.1)
LYMPHOCYTES NFR BLD AUTO: 35.8 % (ref 19.6–45.3)
MCH RBC QN AUTO: 30.4 PG (ref 26.6–33)
MCHC RBC AUTO-ENTMCNC: 32.8 G/DL (ref 31.5–35.7)
MCV RBC AUTO: 92.7 FL (ref 79–97)
MICROALBUMIN/CREAT UR: 11.9 UG/MG
MONOCYTES # BLD AUTO: 0.4 10*3/MM3 (ref 0.1–0.9)
MONOCYTES NFR BLD AUTO: 6.1 % (ref 5–12)
NEUTROPHILS # BLD AUTO: 3.3 10*3/MM3 (ref 1.7–7)
NEUTROPHILS NFR BLD AUTO: 55.6 % (ref 42.7–76)
NRBC BLD AUTO-RTO: 0.1 /100 WBC (ref 0–0.2)
PLATELET # BLD AUTO: 277 10*3/MM3 (ref 140–450)
PMV BLD AUTO: 9.7 FL (ref 6–12)
POTASSIUM BLD-SCNC: 3.8 MMOL/L (ref 3.6–5.1)
PROT SERPL-MCNC: 6.8 G/DL (ref 6.1–7.9)
RBC # BLD AUTO: 4.35 10*6/MM3 (ref 3.77–5.28)
SODIUM BLD-SCNC: 139 MMOL/L (ref 136–144)
TRIGL SERPL-MCNC: 57 MG/DL
TSH SERPL DL<=0.05 MIU/L-ACNC: 0.64 UIU/ML (ref 0.34–5.6)
VIT B12 BLD-MCNC: 825 PG/ML (ref 180–914)
VLDLC SERPL-MCNC: 11.4 MG/DL
WBC NRBC COR # BLD: 6 10*3/MM3 (ref 3.4–10.8)

## 2019-09-10 PROCEDURE — 82043 UR ALBUMIN QUANTITATIVE: CPT | Performed by: PREVENTIVE MEDICINE

## 2019-09-10 PROCEDURE — 82306 VITAMIN D 25 HYDROXY: CPT | Performed by: PREVENTIVE MEDICINE

## 2019-09-10 PROCEDURE — 82570 ASSAY OF URINE CREATININE: CPT | Performed by: PREVENTIVE MEDICINE

## 2019-09-10 PROCEDURE — 83036 HEMOGLOBIN GLYCOSYLATED A1C: CPT | Performed by: PREVENTIVE MEDICINE

## 2019-09-10 PROCEDURE — 80061 LIPID PANEL: CPT | Performed by: PREVENTIVE MEDICINE

## 2019-09-10 PROCEDURE — 99213 OFFICE O/P EST LOW 20 MIN: CPT | Performed by: PREVENTIVE MEDICINE

## 2019-09-10 PROCEDURE — 85025 COMPLETE CBC W/AUTO DIFF WBC: CPT | Performed by: PREVENTIVE MEDICINE

## 2019-09-10 PROCEDURE — 82607 VITAMIN B-12: CPT | Performed by: PREVENTIVE MEDICINE

## 2019-09-10 PROCEDURE — 80053 COMPREHEN METABOLIC PANEL: CPT | Performed by: PREVENTIVE MEDICINE

## 2019-09-10 PROCEDURE — 84443 ASSAY THYROID STIM HORMONE: CPT | Performed by: PREVENTIVE MEDICINE

## 2019-09-10 RX ORDER — LISINOPRIL 40 MG/1
40 TABLET ORAL DAILY
COMMUNITY
Start: 2019-07-11 | End: 2020-01-04 | Stop reason: SDUPTHER

## 2019-09-10 RX ORDER — METOPROLOL SUCCINATE 50 MG/1
50 TABLET, EXTENDED RELEASE ORAL DAILY
Refills: 0 | COMMUNITY
Start: 2019-08-01 | End: 2019-11-05 | Stop reason: SDUPTHER

## 2019-09-10 RX ORDER — LORATADINE 10 MG/1
10 TABLET ORAL DAILY
COMMUNITY
Start: 2016-10-07

## 2019-09-10 RX ORDER — METAPROTERENOL SULFATE 10 MG
500 TABLET ORAL DAILY
COMMUNITY
Start: 2016-10-07

## 2019-09-10 RX ORDER — PRAVASTATIN SODIUM 80 MG/1
80 TABLET ORAL NIGHTLY
COMMUNITY
Start: 2019-07-11 | End: 2019-11-17 | Stop reason: SDUPTHER

## 2019-09-10 RX ORDER — HYDROCHLOROTHIAZIDE 25 MG/1
25 TABLET ORAL DAILY
COMMUNITY
Start: 2019-07-11 | End: 2019-11-17 | Stop reason: SDUPTHER

## 2019-09-10 RX ORDER — MULTIVIT,IRON,MINERALS/LUTEIN
1 TABLET ORAL DAILY
COMMUNITY
Start: 2016-10-07

## 2019-09-10 RX ORDER — BLOOD SUGAR DIAGNOSTIC
STRIP MISCELLANEOUS
COMMUNITY
Start: 2019-07-11 | End: 2020-01-04 | Stop reason: SDUPTHER

## 2019-09-10 RX ORDER — PHENOL 1.4 %
10 AEROSOL, SPRAY (ML) MUCOUS MEMBRANE DAILY
COMMUNITY
Start: 2016-10-07

## 2019-09-10 RX ORDER — MELATONIN
1000 EVERY OTHER DAY
COMMUNITY
Start: 2016-10-07

## 2019-09-10 RX ORDER — GLIPIZIDE 10 MG/1
20 TABLET ORAL 2 TIMES DAILY
COMMUNITY
Start: 2019-07-11 | End: 2019-11-17 | Stop reason: SDUPTHER

## 2019-09-10 RX ORDER — ASPIRIN 81 MG/1
81 TABLET ORAL DAILY
COMMUNITY
Start: 2016-10-07

## 2019-09-10 RX ORDER — HYDRALAZINE HYDROCHLORIDE 100 MG/1
100 TABLET, FILM COATED ORAL 3 TIMES DAILY
COMMUNITY
Start: 2019-08-05 | End: 2020-01-04 | Stop reason: SDUPTHER

## 2019-09-10 NOTE — PROGRESS NOTES
"Subjective   Celsa Brown is a 69 y.o. female presents for   Chief Complaint   Patient presents with   • Diabetes     fasting    • Hypertension   • Hyperlipidemia     Blood sugar .  Not taking Glimepride in morning if knows being active and no more low blood sugars.  Health Maintenance Due   Topic Date Due   • TDAP/TD VACCINES (1 - Tdap) 08/02/2017   • INFLUENZA VACCINE  08/01/2019       History of Present Illness     Vitals:    09/10/19 0856 09/10/19 0908   BP: 141/89 132/73   BP Location: Right arm Left arm   Patient Position: Sitting Sitting   Cuff Size: Adult Adult   Pulse: 71    Resp: 16    Temp: 98.4 °F (36.9 °C)    TempSrc: Oral    SpO2: 96%    Weight: 74 kg (163 lb 3.2 oz)    Height: 162.6 cm (64\")        Current Outpatient Medications on File Prior to Visit   Medication Sig Dispense Refill   • ACCU-CHEK LINDA PLUS test strip      • aspirin 81 MG EC tablet Take 81 mg by mouth Daily.     • cholecalciferol (VITAMIN D3) 1000 units tablet Take 1,000 Units by mouth Daily.     • Cyanocobalamin (VITAMIN B12) 1000 MCG tablet controlled-release Take 1,000 mcg by mouth Every Other Day.     • glipiZIDE (GLUCOTROL) 10 MG tablet Take 20 mg by mouth 2 (Two) Times a Day.     • hydrALAZINE (APRESOLINE) 100 MG tablet Take 100 mg by mouth 3 (Three) Times a Day.     • hydrochlorothiazide (HYDRODIURIL) 25 MG tablet Take 25 mg by mouth Daily.       • lisinopril (PRINIVIL,ZESTRIL) 40 MG tablet Take 40 mg by mouth Daily.     • loratadine (CLARITIN) 10 MG tablet Take 10 mg by mouth Daily.     • Melatonin 10 MG tablet Take 10 mg by mouth Daily.     • metFORMIN (GLUCOPHAGE) 500 MG tablet Take 1,000 mg by mouth Every Morning.     • metoprolol succinate XL (TOPROL-XL) 50 MG 24 hr tablet Take 50 mg by mouth Daily.    0   • Multiple Vitamins-Minerals (CENTRUM SILVER ULTRA WOMENS) tablet Take 1 tablet by mouth Daily.     • Omega-3 Fatty Acids (OMEGA-3 FISH OIL) 500 MG capsule Take 500 mg by mouth Daily.     • potassium chloride " (MICRO-K) 10 MEQ CR capsule Take 1 capsule by mouth Take As Directed. Take one tablet in the am and pm and 2 tablets in the am on the weekend and one it the pm 180 capsule 3   • pravastatin (PRAVACHOL) 80 MG tablet Take 80 mg by mouth Every Night.     • verapamil SR (CALAN-SR) 240 MG CR tablet TAKE 1 TABLET TWICE DAILY 180 tablet 3     No current facility-administered medications on file prior to visit.        The following portions of the patient's history were reviewed and updated as appropriate: allergies, current medications, past family history, past medical history, past social history, past surgical history and problem list.    Review of Systems   Constitutional: Negative.    HENT: Negative.  Negative for sinus pressure and sore throat.    Eyes: Negative.    Respiratory: Negative.  Negative for cough.    Cardiovascular: Negative.    Gastrointestinal: Positive for constipation.   Endocrine: Negative.    Genitourinary: Negative.    Musculoskeletal: Negative.    Skin: Negative.    Allergic/Immunologic: Positive for environmental allergies.   Neurological: Negative.    Hematological: Negative.    Psychiatric/Behavioral: Negative.        Objective   Physical Exam   Constitutional: She is oriented to person, place, and time. She appears well-developed.   HENT:   Head: Normocephalic and atraumatic.   Eyes: Conjunctivae and EOM are normal. Pupils are equal, round, and reactive to light.   Neck: Normal range of motion.   Cardiovascular: Normal rate and regular rhythm.   Pulmonary/Chest: Effort normal and breath sounds normal.   Abdominal: Soft. Bowel sounds are normal.   Musculoskeletal: Normal range of motion.    Celsa had a diabetic foot exam performed today.  Skin Integrity  -  Her right foot skin is not intact.  Her left foot skin is not intact..  Lymphadenopathy:     She has no cervical adenopathy.   Neurological: She is alert and oriented to person, place, and time.   Skin: Skin is warm and dry.   Psychiatric:  She has a normal mood and affect.   Nursing note and vitals reviewed.    PHQ-9 Total Score: 1    Assessment/Plan   Celsa was seen today for diabetes, hypertension and hyperlipidemia.    Diagnoses and all orders for this visit:    B12 deficiency  -     CBC Auto Differential  -     Vitamin B12    Hyperlipidemia, unspecified hyperlipidemia type  -     Lipid Panel    Essential hypertension    Postmenopausal status    Type 2 diabetes mellitus without complication, without long-term current use of insulin (CMS/[facility])  -     Comprehensive Metabolic Panel  -     Hemoglobin A1c  -     Microalbumin / Creatinine Urine Ratio - Urine, Clean Catch    Tachycardia  -     TSH    Vitamin D deficiency  -     Vitamin D 25 Hydroxy        Patient Instructions   Get second SHINGRIX in end of October and get Td and high dose flu same day

## 2019-09-11 LAB
25(OH)D3 SERPL-MCNC: 53.6 NG/ML (ref 30–100)
HBA1C MFR BLD: 7.4 % (ref 3.5–5.6)

## 2019-09-18 ENCOUNTER — HOSPITAL ENCOUNTER (OUTPATIENT)
Dept: BONE DENSITY | Facility: HOSPITAL | Age: 69
Discharge: HOME | End: 2019-09-18
Admitting: PREVENTIVE MEDICINE

## 2019-09-18 DIAGNOSIS — N95.1 POST MENOPAUSAL SYNDROME: ICD-10-CM

## 2019-09-18 PROCEDURE — 77080 DXA BONE DENSITY AXIAL: CPT

## 2019-09-26 ENCOUNTER — HOSPITAL ENCOUNTER (OUTPATIENT)
Dept: MAMMOGRAPHY | Facility: HOSPITAL | Age: 69
Discharge: HOME | End: 2019-09-26
Admitting: PREVENTIVE MEDICINE

## 2019-09-26 DIAGNOSIS — Z12.31 SCREENING MAMMOGRAM, ENCOUNTER FOR: ICD-10-CM

## 2019-09-26 PROCEDURE — 77063 BREAST TOMOSYNTHESIS BI: CPT

## 2019-09-26 PROCEDURE — 77067 SCR MAMMO BI INCL CAD: CPT

## 2019-11-05 RX ORDER — METOPROLOL SUCCINATE 50 MG/1
50 TABLET, EXTENDED RELEASE ORAL DAILY
Qty: 90 TABLET | Refills: 1 | Status: SHIPPED | OUTPATIENT
Start: 2019-11-05 | End: 2019-11-07 | Stop reason: SDUPTHER

## 2019-11-07 RX ORDER — METOPROLOL SUCCINATE 50 MG/1
50 TABLET, EXTENDED RELEASE ORAL DAILY
Qty: 90 TABLET | Refills: 3 | Status: SHIPPED | OUTPATIENT
Start: 2019-11-07 | End: 2019-11-13 | Stop reason: SDUPTHER

## 2019-11-13 RX ORDER — METOPROLOL SUCCINATE 50 MG/1
50 TABLET, EXTENDED RELEASE ORAL DAILY
Qty: 90 TABLET | Refills: 3 | Status: SHIPPED | OUTPATIENT
Start: 2019-11-13 | End: 2020-01-03 | Stop reason: SDUPTHER

## 2019-11-17 RX ORDER — PRAVASTATIN SODIUM 80 MG/1
TABLET ORAL
Qty: 90 TABLET | Refills: 0 | Status: SHIPPED | OUTPATIENT
Start: 2019-11-17 | End: 2020-01-03 | Stop reason: SDUPTHER

## 2019-11-17 RX ORDER — HYDROCHLOROTHIAZIDE 25 MG/1
TABLET ORAL
Qty: 90 TABLET | Refills: 0 | Status: SHIPPED | OUTPATIENT
Start: 2019-11-17 | End: 2020-01-04 | Stop reason: SDUPTHER

## 2019-11-17 RX ORDER — GLIPIZIDE 10 MG/1
TABLET ORAL
Qty: 360 TABLET | Refills: 0 | Status: SHIPPED | OUTPATIENT
Start: 2019-11-17 | End: 2020-01-04 | Stop reason: SDUPTHER

## 2019-12-10 ENCOUNTER — OFFICE VISIT (OUTPATIENT)
Dept: FAMILY MEDICINE CLINIC | Facility: CLINIC | Age: 69
End: 2019-12-10

## 2019-12-10 VITALS
OXYGEN SATURATION: 98 % | HEART RATE: 76 BPM | TEMPERATURE: 98 F | DIASTOLIC BLOOD PRESSURE: 78 MMHG | WEIGHT: 164.8 LBS | HEIGHT: 64 IN | BODY MASS INDEX: 28.13 KG/M2 | SYSTOLIC BLOOD PRESSURE: 152 MMHG | RESPIRATION RATE: 16 BRPM

## 2019-12-10 DIAGNOSIS — R00.0 TACHYCARDIA: ICD-10-CM

## 2019-12-10 DIAGNOSIS — E11.9 TYPE 2 DIABETES MELLITUS WITHOUT COMPLICATION, WITHOUT LONG-TERM CURRENT USE OF INSULIN (HCC): ICD-10-CM

## 2019-12-10 DIAGNOSIS — E55.9 VITAMIN D DEFICIENCY: ICD-10-CM

## 2019-12-10 DIAGNOSIS — Z00.01 ENCOUNTER FOR ANNUAL GENERAL MEDICAL EXAMINATION WITH ABNORMAL FINDINGS IN ADULT: Primary | ICD-10-CM

## 2019-12-10 DIAGNOSIS — I10 ESSENTIAL HYPERTENSION: ICD-10-CM

## 2019-12-10 DIAGNOSIS — E78.5 HYPERLIPIDEMIA, UNSPECIFIED HYPERLIPIDEMIA TYPE: ICD-10-CM

## 2019-12-10 DIAGNOSIS — E53.8 B12 DEFICIENCY: ICD-10-CM

## 2019-12-10 DIAGNOSIS — R59.9 ADENOPATHY: ICD-10-CM

## 2019-12-10 LAB
25(OH)D3 SERPL-MCNC: 64.2 NG/ML (ref 30–100)
ALBUMIN SERPL-MCNC: 4.2 G/DL (ref 3.5–5.2)
ALBUMIN/GLOB SERPL: 1.3 G/DL
ALP SERPL-CCNC: 80 U/L (ref 39–117)
ALT SERPL W P-5'-P-CCNC: 21 U/L (ref 1–33)
ANION GAP SERPL CALCULATED.3IONS-SCNC: 13.3 MMOL/L (ref 5–15)
AST SERPL-CCNC: 17 U/L (ref 1–32)
BASOPHILS # BLD AUTO: 0.05 10*3/MM3 (ref 0–0.2)
BASOPHILS NFR BLD AUTO: 0.9 % (ref 0–1.5)
BILIRUB SERPL-MCNC: 0.4 MG/DL (ref 0.2–1.2)
BUN BLD-MCNC: 16 MG/DL (ref 8–23)
BUN/CREAT SERPL: 25.8 (ref 7–25)
CALCIUM SPEC-SCNC: 9.6 MG/DL (ref 8.6–10.5)
CHLORIDE SERPL-SCNC: 103 MMOL/L (ref 98–107)
CHOLEST SERPL-MCNC: 136 MG/DL (ref 0–200)
CO2 SERPL-SCNC: 29.7 MMOL/L (ref 22–29)
CREAT BLD-MCNC: 0.62 MG/DL (ref 0.57–1)
DEPRECATED RDW RBC AUTO: 41.5 FL (ref 37–54)
EOSINOPHIL # BLD AUTO: 0.06 10*3/MM3 (ref 0–0.4)
EOSINOPHIL NFR BLD AUTO: 1 % (ref 0.3–6.2)
ERYTHROCYTE [DISTWIDTH] IN BLOOD BY AUTOMATED COUNT: 12.4 % (ref 12.3–15.4)
GFR SERPL CREATININE-BSD FRML MDRD: 95 ML/MIN/1.73
GLOBULIN UR ELPH-MCNC: 3.2 GM/DL
GLUCOSE BLD-MCNC: 172 MG/DL (ref 65–99)
HBA1C MFR BLD: 7.4 % (ref 3.5–5.6)
HCT VFR BLD AUTO: 38.1 % (ref 34–46.6)
HDLC SERPL-MCNC: 55 MG/DL (ref 40–60)
HGB BLD-MCNC: 12.3 G/DL (ref 12–15.9)
IMM GRANULOCYTES # BLD AUTO: 0.01 10*3/MM3 (ref 0–0.05)
IMM GRANULOCYTES NFR BLD AUTO: 0.2 % (ref 0–0.5)
LDLC SERPL CALC-MCNC: 68 MG/DL (ref 0–100)
LDLC/HDLC SERPL: 1.23 {RATIO}
LYMPHOCYTES # BLD AUTO: 2.24 10*3/MM3 (ref 0.7–3.1)
LYMPHOCYTES NFR BLD AUTO: 38.4 % (ref 19.6–45.3)
MCH RBC QN AUTO: 29.4 PG (ref 26.6–33)
MCHC RBC AUTO-ENTMCNC: 32.3 G/DL (ref 31.5–35.7)
MCV RBC AUTO: 91.1 FL (ref 79–97)
MONOCYTES # BLD AUTO: 0.46 10*3/MM3 (ref 0.1–0.9)
MONOCYTES NFR BLD AUTO: 7.9 % (ref 5–12)
NEUTROPHILS # BLD AUTO: 3.01 10*3/MM3 (ref 1.7–7)
NEUTROPHILS NFR BLD AUTO: 51.6 % (ref 42.7–76)
NRBC BLD AUTO-RTO: 0 /100 WBC (ref 0–0.2)
PLATELET # BLD AUTO: 291 10*3/MM3 (ref 140–450)
PMV BLD AUTO: 11.2 FL (ref 6–12)
POTASSIUM BLD-SCNC: 4.2 MMOL/L (ref 3.5–5.2)
PROT SERPL-MCNC: 7.4 G/DL (ref 6–8.5)
RBC # BLD AUTO: 4.18 10*6/MM3 (ref 3.77–5.28)
SODIUM BLD-SCNC: 146 MMOL/L (ref 136–145)
TRIGL SERPL-MCNC: 67 MG/DL (ref 0–150)
TSH SERPL DL<=0.05 MIU/L-ACNC: 0.86 UIU/ML (ref 0.27–4.2)
VIT B12 BLD-MCNC: 1568 PG/ML (ref 211–946)
VLDLC SERPL-MCNC: 13.4 MG/DL (ref 5–40)
WBC NRBC COR # BLD: 5.83 10*3/MM3 (ref 3.4–10.8)

## 2019-12-10 PROCEDURE — 82043 UR ALBUMIN QUANTITATIVE: CPT | Performed by: PREVENTIVE MEDICINE

## 2019-12-10 PROCEDURE — 85025 COMPLETE CBC W/AUTO DIFF WBC: CPT | Performed by: PREVENTIVE MEDICINE

## 2019-12-10 PROCEDURE — 83036 HEMOGLOBIN GLYCOSYLATED A1C: CPT | Performed by: PREVENTIVE MEDICINE

## 2019-12-10 PROCEDURE — 82570 ASSAY OF URINE CREATININE: CPT | Performed by: PREVENTIVE MEDICINE

## 2019-12-10 PROCEDURE — G0439 PPPS, SUBSEQ VISIT: HCPCS | Performed by: PREVENTIVE MEDICINE

## 2019-12-10 PROCEDURE — 82607 VITAMIN B-12: CPT | Performed by: PREVENTIVE MEDICINE

## 2019-12-10 PROCEDURE — 82306 VITAMIN D 25 HYDROXY: CPT | Performed by: PREVENTIVE MEDICINE

## 2019-12-10 PROCEDURE — 96160 PT-FOCUSED HLTH RISK ASSMT: CPT | Performed by: PREVENTIVE MEDICINE

## 2019-12-10 PROCEDURE — 84443 ASSAY THYROID STIM HORMONE: CPT | Performed by: PREVENTIVE MEDICINE

## 2019-12-10 PROCEDURE — 99213 OFFICE O/P EST LOW 20 MIN: CPT | Performed by: PREVENTIVE MEDICINE

## 2019-12-10 PROCEDURE — 80061 LIPID PANEL: CPT | Performed by: PREVENTIVE MEDICINE

## 2019-12-10 PROCEDURE — 80053 COMPREHEN METABOLIC PANEL: CPT | Performed by: PREVENTIVE MEDICINE

## 2019-12-10 NOTE — PROGRESS NOTES
"Subjective   Celsa Brown is a 69 y.o. female presents for   Chief Complaint   Patient presents with   • Medicare Wellness-subsequent     fasting   • Diabetes   • Hypertension   • Hyperlipidemia   Blood sugars running good.  No more lows.  Some visual blurring and going to eye doctor Friday. No chest pain or headaches.    Health Maintenance Due   Topic Date Due   • MEDICARE ANNUAL WELLNESS  12/04/2019       History of Present Illness     Vitals:    12/10/19 0829 12/10/19 0833   BP: 135/72 152/78   BP Location: Left arm Right arm   Patient Position: Sitting Sitting   Cuff Size: Adult Adult   Pulse: 76    Resp: 16    Temp: 98 °F (36.7 °C)    TempSrc: Oral    SpO2: 98%    Weight: 74.8 kg (164 lb 12.8 oz)    Height: 162.6 cm (64\")      Body mass index is 28.29 kg/m².    Current Outpatient Medications on File Prior to Visit   Medication Sig Dispense Refill   • ACCU-CHEK LINDA PLUS test strip      • aspirin 81 MG EC tablet Take 81 mg by mouth Daily.     • cholecalciferol (VITAMIN D3) 1000 units tablet Take 1,000 Units by mouth Daily.     • Cyanocobalamin (VITAMIN B12) 1000 MCG tablet controlled-release Take 1,000 mcg by mouth Every Other Day.     • glipizide (GLUCOTROL) 10 MG tablet TAKE 2 TABLETS TWICE DAILY 360 tablet 0   • hydrALAZINE (APRESOLINE) 100 MG tablet Take 100 mg by mouth 3 (Three) Times a Day.     • hydroCHLOROthiazide (HYDRODIURIL) 25 MG tablet TAKE 1 TABLET EVERY DAY 90 tablet 0   • lisinopril (PRINIVIL,ZESTRIL) 40 MG tablet Take 40 mg by mouth Daily.     • loratadine (CLARITIN) 10 MG tablet Take 10 mg by mouth Daily.     • Melatonin 10 MG tablet Take 10 mg by mouth Daily.     • metFORMIN (GLUCOPHAGE) 500 MG tablet TAKE 2 TABLETS IN THE MORNING AND EVENING  AND TAKE 1 TABLET AT LUNCH 450 tablet 3   • metoprolol succinate XL (TOPROL-XL) 50 MG 24 hr tablet Take 1 tablet by mouth Daily. 90 tablet 3   • Multiple Vitamins-Minerals (CENTRUM SILVER ULTRA WOMENS) tablet Take 1 tablet by mouth Daily.     • Omega-3 " Fatty Acids (OMEGA-3 FISH OIL) 500 MG capsule Take 500 mg by mouth Daily.     • potassium chloride (MICRO-K) 10 MEQ CR capsule Take 1 capsule by mouth Take As Directed. Take one tablet in the am and pm and 2 tablets in the am on the weekend and one it the pm 180 capsule 3   • pravastatin (PRAVACHOL) 80 MG tablet TAKE 1 TABLET AT BEDTIME 90 tablet 0   • verapamil SR (CALAN-SR) 240 MG CR tablet TAKE 1 TABLET TWICE DAILY 180 tablet 3     No current facility-administered medications on file prior to visit.        The following portions of the patient's history were reviewed and updated as appropriate: allergies, current medications, past family history, past medical history, past social history, past surgical history and problem list.    Review of Systems   Constitutional: Negative.    HENT: Negative.  Negative for sinus pressure and sore throat.    Eyes: Positive for blurred vision.   Respiratory: Negative.  Negative for cough.    Cardiovascular: Negative.    Gastrointestinal: Negative.    Endocrine: Negative.    Genitourinary: Negative.    Musculoskeletal: Negative.    Skin: Negative.    Allergic/Immunologic: Positive for environmental allergies.   Neurological: Negative.    Hematological: Negative.    Psychiatric/Behavioral: Negative.        Objective   Physical Exam   Constitutional: She is oriented to person, place, and time. She appears well-developed.   HENT:   Head: Normocephalic and atraumatic.   Eyes: Pupils are equal, round, and reactive to light. Conjunctivae and EOM are normal.   Neck: Normal range of motion.   Cardiovascular: Normal rate and regular rhythm.   Pulmonary/Chest: Effort normal and breath sounds normal.   Abdominal: Soft. Bowel sounds are normal.   Musculoskeletal: Normal range of motion.     Skin Integrity  -  Right foot skin intact: r 2nd and 3rd.-no infection..  Lymphadenopathy:     She has no cervical adenopathy.   Neurological: She is alert and oriented to person, place, and time.   Skin:  Skin is warm and dry.   Psychiatric: She has a normal mood and affect.   Nursing note and vitals reviewed.    PHQ-9 Total Score: 0    Assessment/Plan   Celsa was seen today for medicare wellness-subsequent, diabetes, hypertension and hyperlipidemia.    Diagnoses and all orders for this visit:    Encounter for annual general medical examination with abnormal findings in adult/eye doctor Friday.  Vaccinations UTD    Adenopathy    B12 deficiency  -     CBC Auto Differential  -     Vitamin B12    Hyperlipidemia, unspecified hyperlipidemia type  -     Lipid Panel    Essential hypertension    Tachycardia  -     TSH    Type 2 diabetes mellitus without complication, without long-term current use of insulin (CMS/[facility])  -     Comprehensive Metabolic Panel  -     Hemoglobin A1c  -     Microalbumin / Creatinine Urine Ratio - Urine, Clean Catch    Vitamin D deficiency  -     Vitamin D 25 Hydroxy        Patient Instructions   Preventing Osteoporosis, Adult  Osteoporosis is a condition that causes the bones to get weaker. With osteoporosis, the bones become thinner, and the normal spaces in bone tissue become larger. This can make the bones weak and cause them to break more easily. People who have osteoporosis are more likely to break their wrist, spine, or hip. Even a minor accident or injury can be enough to break weak bones.  Osteoporosis can occur with aging. Your body constantly replaces old bone tissue with new tissue. As you get older, you may lose bone tissue more quickly, or it may be replaced more slowly. Osteoporosis is more likely to develop if you have poor nutrition or do not get enough calcium or vitamin D. Other lifestyle factors can also play a role. By making some diet and lifestyle changes, you can help to keep your bones healthy and help to prevent osteoporosis.  What nutrition changes can be made?  Nutrition plays an important role in maintaining healthy, strong bones.  · Make sure you get enough calcium every  day from food or from calcium supplements.  ? If you are age 50 or younger, aim to get 1,000 mg of calcium every day.  ? If you are older than age 50, aim to get 1,200 mg of calcium every day.  · Try to get enough vitamin D every day.  ? If you are age 70 or younger, aim to get 600 international units (IU) every day.  ? If you are older than age 70, aim to get 800 international units every day.  · Follow a healthy diet. Eat plenty of foods that contain calcium and vitamin D.  ? Calcium is in milk, cheese, yogurt, and other dairy products. Some fish and vegetables are also good sources of calcium. Many foods such as cereals and breads have had calcium added to them (are fortified). Check nutrition labels to see how much calcium is in a food or drink.  ? Foods that contain vitamin D include milk, cereals, salmon, and tuna. Your body also makes vitamin D when you are out in the sun. Bare skin exposure to the sun on your face, arms, legs, or back for no more than 30 minutes a day, 2 times per week is more than enough. Beyond that, it is important to use sunblock to protect your skin from sunburn, which increases your risk for skin cancer.  What lifestyle changes can be made?  Making changes in your everyday life can also play an important role in preventing osteoporosis.  · Stay active and get exercise every day. Ask your health care provider what types of exercise are best for you.  · Do not use any products that contain nicotine or tobacco, such as cigarettes and e-cigarettes. If you need help quitting, ask your health care provider.  · Limit alcohol intake to no more than 1 drink a day for nonpregnant women and 2 drinks a day for men. One drink equals 12 oz of beer, 5 oz of wine, or 1½ oz of hard liquor.  Why are these changes important?  Making these nutrition and lifestyle changes can:  · Help you develop and maintain healthy, strong bones.  · Prevent loss of bone mass and the problems that are caused by that loss,  such as broken bones and delayed healing.  · Make you feel better mentally and physically.  What can happen if changes are not made?  Problems that can result from osteoporosis can be very serious. These may include:  · A higher risk of broken bones that are painful and do not heal well.  · Physical malformations, such as a collapsed spine or a hunched back.  · Problems with movement.  Where to find support  If you need help making changes to prevent osteoporosis, talk with your health care provider. You can ask for a referral to a diet and nutrition specialist (dietitian) and a physical therapist.  Where to find more information  Learn more about osteoporosis from:  · NIH Osteoporosis and Related Bone Diseases National Resource Center: www.niams.nih.gov/health_info/bone/osteoporosis/osteoporosis_ff.asp  · U.S. Office on Women’s Health: www.womenshealth.gov/publications/our-publications/fact-sheet/osteoporosis.html  · National Osteoporosis Foundation: www.nof.org/patients/what-is-osteoporosis/  Summary  · Osteoporosis is a condition that causes weak bones that are more likely to break.  · Eating a healthy diet and making sure you get enough calcium and vitamin D can help prevent osteoporosis.  · Other ways to reduce your risk of osteoporosis include getting regular exercise and avoiding alcohol and products that contain nicotine or tobacco.  This information is not intended to replace advice given to you by your health care provider. Make sure you discuss any questions you have with your health care provider.  Document Released: 01/01/2017 Document Revised: 09/25/2018 Document Reviewed: 08/28/2017  "[employer] Interactive Patient Education © 2019 [employer]

## 2019-12-10 NOTE — PROGRESS NOTES
The ABCs of the Annual Wellness Visit  Subsequent Medicare Wellness Visit    Chief Complaint   Patient presents with   • Medicare Wellness-subsequent     fasting   • Diabetes   • Hypertension   • Hyperlipidemia       Subjective   History of Present Illness:  Celsa Brown is a 69 y.o. female who presents for a Subsequent Medicare Wellness Visit.    HEALTH RISK ASSESSMENT    Recent Hospitalizations:  No hospitalization(s) within the last year.    Current Medical Providers:  Patient Care Team:  Jaelyn Iverson MD as PCP - General (Family Medicine)    Smoking Status:  Social History     Tobacco Use   Smoking Status Never Smoker   Smokeless Tobacco Never Used       Alcohol Consumption:  Social History     Substance and Sexual Activity   Alcohol Use No   • Frequency: Never       Depression Screen:   PHQ-2/PHQ-9 Depression Screening 12/10/2019   Little interest or pleasure in doing things 0   Feeling down, depressed, or hopeless 0   Total Score 0       Fall Risk Screen:  STEADI Fall Risk Assessment was completed, and patient is at LOW risk for falls.Assessment completed on:9/10/2019    Health Habits and Functional and Cognitive Screening:  Functional & Cognitive Status 12/10/2019   Do you have difficulty preparing food and eating? No   Do you have difficulty bathing yourself, getting dressed or grooming yourself? No   Do you have difficulty using the toilet? No   Do you have difficulty moving around from place to place? No   Do you have trouble with steps or getting out of a bed or a chair? No   Current Diet Well Balanced Diet   Dental Exam Up to date   Eye Exam Up to date   Exercise (times per week) 7 times per week   Current Exercise Activities Include Walking   Do you need help using the phone?  No   Are you deaf or do you have serious difficulty hearing?  No   Do you need help with transportation? No   Do you need help shopping? No   Do you need help preparing meals?  No   Do you need help with housework?  No    Do you need help with laundry? No   Do you need help taking your medications? No   Do you need help managing money? No   Do you ever drive or ride in a car without wearing a seat belt? No   Have you felt unusual stress, anger or loneliness in the last month? No   Who do you live with? Spouse   If you need help, do you have trouble finding someone available to you? No   Have you been bothered in the last four weeks by sexual problems? No   Do you have difficulty concentrating, remembering or making decisions? No         Does the patient have evidence of cognitive impairment? Yes    Asprin use counseling:Taking ASA appropriately as indicated    Age-appropriate Screening Schedule:  Refer to the list below for future screening recommendations based on patient's age, sex and/or medical conditions. Orders for these recommended tests are listed in the plan section. The patient has been provided with a written plan.    Health Maintenance   Topic Date Due   • TDAP/TD VACCINES (1 - Tdap) 08/02/2027 (Originally 5/20/1961)   • DIABETIC EYE EXAM  12/14/2019   • HEMOGLOBIN A1C  03/10/2020   • DIABETIC FOOT EXAM  09/10/2020   • LIPID PANEL  09/10/2020   • URINE MICROALBUMIN  09/10/2020   • MAMMOGRAM  09/26/2020   • DXA SCAN  09/18/2021   • COLONOSCOPY  06/27/2023   • INFLUENZA VACCINE  Completed   • PNEUMOCOCCAL VACCINES (65+ LOW/MEDIUM RISK)  Completed   • ZOSTER VACCINE  Completed          The following portions of the patient's history were reviewed and updated as appropriate: allergies, current medications, past family history, past medical history, past social history, past surgical history and problem list.    Outpatient Medications Prior to Visit   Medication Sig Dispense Refill   • ACCU-CHEK LINDA PLUS test strip      • aspirin 81 MG EC tablet Take 81 mg by mouth Daily.     • cholecalciferol (VITAMIN D3) 1000 units tablet Take 1,000 Units by mouth Daily.     • Cyanocobalamin (VITAMIN B12) 1000 MCG tablet  controlled-release Take 1,000 mcg by mouth Every Other Day.     • glipizide (GLUCOTROL) 10 MG tablet TAKE 2 TABLETS TWICE DAILY 360 tablet 0   • hydrALAZINE (APRESOLINE) 100 MG tablet Take 100 mg by mouth 3 (Three) Times a Day.     • hydroCHLOROthiazide (HYDRODIURIL) 25 MG tablet TAKE 1 TABLET EVERY DAY 90 tablet 0   • lisinopril (PRINIVIL,ZESTRIL) 40 MG tablet Take 40 mg by mouth Daily.     • loratadine (CLARITIN) 10 MG tablet Take 10 mg by mouth Daily.     • Melatonin 10 MG tablet Take 10 mg by mouth Daily.     • metFORMIN (GLUCOPHAGE) 500 MG tablet TAKE 2 TABLETS IN THE MORNING AND EVENING  AND TAKE 1 TABLET AT LUNCH 450 tablet 3   • metoprolol succinate XL (TOPROL-XL) 50 MG 24 hr tablet Take 1 tablet by mouth Daily. 90 tablet 3   • Multiple Vitamins-Minerals (CENTRUM SILVER ULTRA WOMENS) tablet Take 1 tablet by mouth Daily.     • Omega-3 Fatty Acids (OMEGA-3 FISH OIL) 500 MG capsule Take 500 mg by mouth Daily.     • potassium chloride (MICRO-K) 10 MEQ CR capsule Take 1 capsule by mouth Take As Directed. Take one tablet in the am and pm and 2 tablets in the am on the weekend and one it the pm 180 capsule 3   • pravastatin (PRAVACHOL) 80 MG tablet TAKE 1 TABLET AT BEDTIME 90 tablet 0   • verapamil SR (CALAN-SR) 240 MG CR tablet TAKE 1 TABLET TWICE DAILY 180 tablet 3     No facility-administered medications prior to visit.        Patient Active Problem List   Diagnosis   • Adenopathy   • B12 deficiency   • Body mass index (BMI) of 27.0-27.9 in adult   • Degenerative joint disease   • Hyperlipidemia   • Hypertension   • Melanocytic nevus   • Postmenopausal status   • Tachycardia   • Type 2 diabetes mellitus (CMS/HCC)   • Vitamin D deficiency       Advanced Care Planning:  Patient has an advance directive - a copy has been provided and is visible in patient header    Review of Systems    Compared to one year ago, the patient feels her physical health is the same.  Compared to one year ago, the patient feels her  "mental health is the same.    Reviewed chart for potential of high risk medication in the elderly: yes  Reviewed chart for potential of harmful drug interactions in the elderly:yes    Objective         Vitals:    12/10/19 0829 12/10/19 0833   BP: 135/72 152/78   BP Location: Left arm Right arm   Patient Position: Sitting Sitting   Cuff Size: Adult Adult   Pulse: 76    Resp: 16    Temp: 98 °F (36.7 °C)    TempSrc: Oral    SpO2: 98%    Weight: 74.8 kg (164 lb 12.8 oz)    Height: 162.6 cm (64\")    PainSc: 0-No pain        Body mass index is 28.29 kg/m².  Discussed the patient's BMI with her. The BMI is in the acceptable range.    Physical Exam          Assessment/Plan   Medicare Risks and Personalized Health Plan  CMS Preventative Services Quick Reference  Osteoprorosis Risk    The above risks/problems have been discussed with the patient.  Pertinent information has been shared with the patient in the After Visit Summary.  Follow up plans and orders are seen below in the Assessment/Plan Section.    Diagnoses and all orders for this visit:    1. Encounter for annual general medical examination with abnormal findings in adult (Primary)    2. Adenopathy    3. B12 deficiency  -     CBC Auto Differential  -     Vitamin B12    4. Hyperlipidemia, unspecified hyperlipidemia type  -     Lipid Panel    5. Essential hypertension    6. Tachycardia  -     TSH    7. Type 2 diabetes mellitus without complication, without long-term current use of insulin (CMS/[facility])  -     Comprehensive Metabolic Panel  -     Hemoglobin A1c  -     Microalbumin / Creatinine Urine Ratio - Urine, Clean Catch    8. Vitamin D deficiency  -     Vitamin D 25 Hydroxy      Follow Up:  No follow-ups on file.     An After Visit Summary and PPPS were given to the patient.             "

## 2019-12-10 NOTE — PATIENT INSTRUCTIONS
Preventing Osteoporosis, Adult  Osteoporosis is a condition that causes the bones to get weaker. With osteoporosis, the bones become thinner, and the normal spaces in bone tissue become larger. This can make the bones weak and cause them to break more easily. People who have osteoporosis are more likely to break their wrist, spine, or hip. Even a minor accident or injury can be enough to break weak bones.  Osteoporosis can occur with aging. Your body constantly replaces old bone tissue with new tissue. As you get older, you may lose bone tissue more quickly, or it may be replaced more slowly. Osteoporosis is more likely to develop if you have poor nutrition or do not get enough calcium or vitamin D. Other lifestyle factors can also play a role. By making some diet and lifestyle changes, you can help to keep your bones healthy and help to prevent osteoporosis.  What nutrition changes can be made?  Nutrition plays an important role in maintaining healthy, strong bones.  · Make sure you get enough calcium every day from food or from calcium supplements.  ? If you are age 50 or younger, aim to get 1,000 mg of calcium every day.  ? If you are older than age 50, aim to get 1,200 mg of calcium every day.  · Try to get enough vitamin D every day.  ? If you are age 70 or younger, aim to get 600 international units (IU) every day.  ? If you are older than age 70, aim to get 800 international units every day.  · Follow a healthy diet. Eat plenty of foods that contain calcium and vitamin D.  ? Calcium is in milk, cheese, yogurt, and other dairy products. Some fish and vegetables are also good sources of calcium. Many foods such as cereals and breads have had calcium added to them (are fortified). Check nutrition labels to see how much calcium is in a food or drink.  ? Foods that contain vitamin D include milk, cereals, salmon, and tuna. Your body also makes vitamin D when you are out in the sun. Bare skin exposure to the sun on  your face, arms, legs, or back for no more than 30 minutes a day, 2 times per week is more than enough. Beyond that, it is important to use sunblock to protect your skin from sunburn, which increases your risk for skin cancer.  What lifestyle changes can be made?  Making changes in your everyday life can also play an important role in preventing osteoporosis.  · Stay active and get exercise every day. Ask your health care provider what types of exercise are best for you.  · Do not use any products that contain nicotine or tobacco, such as cigarettes and e-cigarettes. If you need help quitting, ask your health care provider.  · Limit alcohol intake to no more than 1 drink a day for nonpregnant women and 2 drinks a day for men. One drink equals 12 oz of beer, 5 oz of wine, or 1½ oz of hard liquor.  Why are these changes important?  Making these nutrition and lifestyle changes can:  · Help you develop and maintain healthy, strong bones.  · Prevent loss of bone mass and the problems that are caused by that loss, such as broken bones and delayed healing.  · Make you feel better mentally and physically.  What can happen if changes are not made?  Problems that can result from osteoporosis can be very serious. These may include:  · A higher risk of broken bones that are painful and do not heal well.  · Physical malformations, such as a collapsed spine or a hunched back.  · Problems with movement.  Where to find support  If you need help making changes to prevent osteoporosis, talk with your health care provider. You can ask for a referral to a diet and nutrition specialist (dietitian) and a physical therapist.  Where to find more information  Learn more about osteoporosis from:  · NIH Osteoporosis and Related Bone Diseases National Resource Center: www.niams.nih.gov/health_info/bone/osteoporosis/osteoporosis_ff.asp  · U.[place]. Office on Women’s Health:  www.womenshealth.gov/publications/our-publications/fact-sheet/osteoporosis.html  · National Osteoporosis Foundation: www.nof.org/patients/what-is-osteoporosis/  Summary  · Osteoporosis is a condition that causes weak bones that are more likely to break.  · Eating a healthy diet and making sure you get enough calcium and vitamin D can help prevent osteoporosis.  · Other ways to reduce your risk of osteoporosis include getting regular exercise and avoiding alcohol and products that contain nicotine or tobacco.  This information is not intended to replace advice given to you by your health care provider. Make sure you discuss any questions you have with your health care provider.  Document Released: 01/01/2017 Document Revised: 09/25/2018 Document Reviewed: 08/28/2017  Elsevier Interactive Patient Education © 2019 [employer]

## 2019-12-11 LAB
ALBUMIN UR-MCNC: <1.2 MG/DL
CREAT UR-MCNC: 24.8 MG/DL
MICROALBUMIN/CREAT UR: NORMAL MG/G{CREAT}

## 2020-01-03 RX ORDER — AMLODIPINE BESYLATE 10 MG/1
10 TABLET ORAL DAILY
Qty: 30 TABLET | Refills: 1 | Status: SHIPPED | OUTPATIENT
Start: 2020-01-03 | End: 2020-01-04 | Stop reason: SDUPTHER

## 2020-01-03 RX ORDER — METOPROLOL SUCCINATE 50 MG/1
50 TABLET, EXTENDED RELEASE ORAL DAILY
Qty: 90 TABLET | Refills: 3 | Status: SHIPPED | OUTPATIENT
Start: 2020-01-03 | End: 2020-01-08 | Stop reason: SDUPTHER

## 2020-01-03 RX ORDER — PRAVASTATIN SODIUM 80 MG/1
80 TABLET ORAL
Qty: 90 TABLET | Refills: 0 | Status: SHIPPED | OUTPATIENT
Start: 2020-01-03 | End: 2020-08-17 | Stop reason: SDUPTHER

## 2020-01-03 NOTE — TELEPHONE ENCOUNTER
We'll switch to amlodipine and after 3 days start checking blood pressure to make sure it is controlled--after collects 10 BP's call to us

## 2020-01-03 NOTE — TELEPHONE ENCOUNTER
Pt called stating the  Verapamil sr  On her new ins is too expensive. Wanted to know if you would change it. Please advise.

## 2020-01-06 RX ORDER — GLIPIZIDE 10 MG/1
TABLET ORAL
Qty: 360 TABLET | Refills: 0 | Status: SHIPPED | OUTPATIENT
Start: 2020-01-06 | End: 2020-03-31

## 2020-01-06 RX ORDER — BLOOD SUGAR DIAGNOSTIC
STRIP MISCELLANEOUS
Qty: 100 EACH | Refills: 3 | Status: SHIPPED | OUTPATIENT
Start: 2020-01-06 | End: 2020-10-06

## 2020-01-06 RX ORDER — HYDROCHLOROTHIAZIDE 25 MG/1
25 TABLET ORAL DAILY
Qty: 90 TABLET | Refills: 0 | Status: SHIPPED | OUTPATIENT
Start: 2020-01-06 | End: 2020-03-31

## 2020-01-06 RX ORDER — AMLODIPINE BESYLATE 10 MG/1
10 TABLET ORAL DAILY
Qty: 30 TABLET | Refills: 1 | Status: SHIPPED | OUTPATIENT
Start: 2020-01-06 | End: 2020-02-19 | Stop reason: SINTOL

## 2020-01-06 RX ORDER — LISINOPRIL 40 MG/1
40 TABLET ORAL DAILY
Qty: 90 TABLET | Refills: 3 | Status: SHIPPED | OUTPATIENT
Start: 2020-01-06 | End: 2020-10-06

## 2020-01-06 RX ORDER — HYDRALAZINE HYDROCHLORIDE 100 MG/1
100 TABLET, FILM COATED ORAL 3 TIMES DAILY
Qty: 270 TABLET | Refills: 3 | Status: SHIPPED | OUTPATIENT
Start: 2020-01-06 | End: 2020-10-06

## 2020-01-08 RX ORDER — METOPROLOL SUCCINATE 50 MG/1
50 TABLET, EXTENDED RELEASE ORAL DAILY
Qty: 90 TABLET | Refills: 3 | Status: SHIPPED | OUTPATIENT
Start: 2020-01-08 | End: 2020-04-20 | Stop reason: SDUPTHER

## 2020-01-08 RX ORDER — POTASSIUM CHLORIDE 750 MG/1
10 CAPSULE, EXTENDED RELEASE ORAL TAKE AS DIRECTED
Qty: 180 CAPSULE | Refills: 3 | Status: SHIPPED | OUTPATIENT
Start: 2020-01-08 | End: 2020-07-30

## 2020-02-18 ENCOUNTER — TELEPHONE (OUTPATIENT)
Dept: FAMILY MEDICINE CLINIC | Facility: CLINIC | Age: 70
End: 2020-02-18

## 2020-02-18 NOTE — TELEPHONE ENCOUNTER
Patient called and wants to but put back on the verapamil 240mg bid. She states the insurance quoted her the wrong price. She also states  The amplodine was causing side effects. She was getting dizzy, heart rate was increasing over 100 and causing stomach pain.     She would like it called in to [facility].

## 2020-02-19 RX ORDER — DILTIAZEM HYDROCHLORIDE 240 MG/1
CAPSULE, EXTENDED RELEASE ORAL
Qty: 180 CAPSULE | Refills: 3 | Status: SHIPPED | OUTPATIENT
Start: 2020-02-19 | End: 2020-11-28 | Stop reason: SDUPTHER

## 2020-03-31 RX ORDER — GLIPIZIDE 10 MG/1
TABLET ORAL
Qty: 360 TABLET | Refills: 0 | Status: SHIPPED | OUTPATIENT
Start: 2020-03-31 | End: 2020-06-01

## 2020-03-31 RX ORDER — HYDROCHLOROTHIAZIDE 25 MG/1
25 TABLET ORAL DAILY
Qty: 90 TABLET | Refills: 0 | Status: SHIPPED | OUTPATIENT
Start: 2020-03-31 | End: 2020-06-01

## 2020-04-20 ENCOUNTER — TELEPHONE (OUTPATIENT)
Dept: FAMILY MEDICINE CLINIC | Facility: CLINIC | Age: 70
End: 2020-04-20

## 2020-04-20 RX ORDER — METOPROLOL SUCCINATE 50 MG/1
50 TABLET, EXTENDED RELEASE ORAL DAILY
Qty: 90 TABLET | Refills: 3 | Status: SHIPPED | OUTPATIENT
Start: 2020-04-20 | End: 2020-08-17

## 2020-04-20 NOTE — TELEPHONE ENCOUNTER
PATIENT CALLING TO REFILL:  - metoprolol succinate XL (TOPROL-XL) 50 MG 24 hr tablet    PATIENT REQUESTS A 90 DAY SUPPLY.    VERIFIED [facility].    CALL BACK NUMBER IS: [phone]

## 2020-06-01 RX ORDER — HYDROCHLOROTHIAZIDE 25 MG/1
25 TABLET ORAL DAILY
Qty: 90 TABLET | Refills: 0 | Status: SHIPPED | OUTPATIENT
Start: 2020-06-01 | End: 2020-07-30

## 2020-06-01 RX ORDER — GLIPIZIDE 10 MG/1
TABLET ORAL
Qty: 360 TABLET | Refills: 0 | Status: SHIPPED | OUTPATIENT
Start: 2020-06-01 | End: 2020-07-30

## 2020-07-30 ENCOUNTER — TELEPHONE (OUTPATIENT)
Dept: FAMILY MEDICINE CLINIC | Facility: CLINIC | Age: 70
End: 2020-07-30

## 2020-07-30 RX ORDER — GLIPIZIDE 10 MG/1
TABLET ORAL
Qty: 360 TABLET | Refills: 3 | Status: SHIPPED | OUTPATIENT
Start: 2020-07-30 | End: 2021-05-24

## 2020-07-30 RX ORDER — POTASSIUM CHLORIDE 750 MG/1
CAPSULE, EXTENDED RELEASE ORAL
Qty: 208 CAPSULE | Refills: 3 | Status: SHIPPED | OUTPATIENT
Start: 2020-07-30 | End: 2021-08-23

## 2020-07-30 RX ORDER — HYDROCHLOROTHIAZIDE 25 MG/1
25 TABLET ORAL DAILY
Qty: 90 TABLET | Refills: 3 | Status: SHIPPED | OUTPATIENT
Start: 2020-07-30 | End: 2021-05-24

## 2020-08-16 NOTE — PATIENT INSTRUCTIONS
Advise flu shot as soon as available  Take two Metoprolol 50 daily.  After 2 weeks, send blood pressures and pulse #10.

## 2020-08-17 ENCOUNTER — OFFICE VISIT (OUTPATIENT)
Dept: FAMILY MEDICINE CLINIC | Facility: CLINIC | Age: 70
End: 2020-08-17

## 2020-08-17 VITALS
HEIGHT: 64 IN | RESPIRATION RATE: 16 BRPM | BODY MASS INDEX: 26.91 KG/M2 | TEMPERATURE: 98.2 F | OXYGEN SATURATION: 95 % | HEART RATE: 80 BPM | SYSTOLIC BLOOD PRESSURE: 145 MMHG | WEIGHT: 157.6 LBS | DIASTOLIC BLOOD PRESSURE: 71 MMHG

## 2020-08-17 DIAGNOSIS — E53.8 B12 DEFICIENCY: ICD-10-CM

## 2020-08-17 DIAGNOSIS — E11.9 TYPE 2 DIABETES MELLITUS WITHOUT COMPLICATION, WITHOUT LONG-TERM CURRENT USE OF INSULIN (HCC): Primary | ICD-10-CM

## 2020-08-17 DIAGNOSIS — I10 ESSENTIAL HYPERTENSION: ICD-10-CM

## 2020-08-17 DIAGNOSIS — R59.9 ADENOPATHY: ICD-10-CM

## 2020-08-17 DIAGNOSIS — E55.9 VITAMIN D DEFICIENCY: ICD-10-CM

## 2020-08-17 DIAGNOSIS — R00.0 TACHYCARDIA: ICD-10-CM

## 2020-08-17 DIAGNOSIS — E78.5 HYPERLIPIDEMIA, UNSPECIFIED HYPERLIPIDEMIA TYPE: ICD-10-CM

## 2020-08-17 PROBLEM — D22.9 MELANOCYTIC NEVUS: Status: RESOLVED | Noted: 2019-03-05 | Resolved: 2020-08-17

## 2020-08-17 LAB
25(OH)D3 SERPL-MCNC: 63 NG/ML (ref 30–100)
ALBUMIN SERPL-MCNC: 4.5 G/DL (ref 3.5–5.2)
ALBUMIN UR-MCNC: <1.2 MG/DL
ALBUMIN/GLOB SERPL: 1.7 G/DL
ALP SERPL-CCNC: 74 U/L (ref 39–117)
ALT SERPL W P-5'-P-CCNC: 23 U/L (ref 1–33)
ANION GAP SERPL CALCULATED.3IONS-SCNC: 12.6 MMOL/L (ref 5–15)
AST SERPL-CCNC: 18 U/L (ref 1–32)
BASOPHILS # BLD AUTO: 0.04 10*3/MM3 (ref 0–0.2)
BASOPHILS NFR BLD AUTO: 0.6 % (ref 0–1.5)
BILIRUB SERPL-MCNC: 0.4 MG/DL (ref 0–1.2)
BUN SERPL-MCNC: 16 MG/DL (ref 8–23)
BUN/CREAT SERPL: 21.9 (ref 7–25)
CALCIUM SPEC-SCNC: 10.1 MG/DL (ref 8.6–10.5)
CHLORIDE SERPL-SCNC: 104 MMOL/L (ref 98–107)
CHOLEST SERPL-MCNC: 140 MG/DL (ref 0–200)
CO2 SERPL-SCNC: 27.4 MMOL/L (ref 22–29)
CREAT SERPL-MCNC: 0.73 MG/DL (ref 0.57–1)
CREAT UR-MCNC: 19.6 MG/DL
DEPRECATED RDW RBC AUTO: 40.3 FL (ref 37–54)
EOSINOPHIL # BLD AUTO: 0.11 10*3/MM3 (ref 0–0.4)
EOSINOPHIL NFR BLD AUTO: 1.7 % (ref 0.3–6.2)
ERYTHROCYTE [DISTWIDTH] IN BLOOD BY AUTOMATED COUNT: 12.1 % (ref 12.3–15.4)
GFR SERPL CREATININE-BSD FRML MDRD: 78 ML/MIN/1.73
GLOBULIN UR ELPH-MCNC: 2.7 GM/DL
GLUCOSE SERPL-MCNC: 168 MG/DL (ref 65–99)
HCT VFR BLD AUTO: 39.5 % (ref 34–46.6)
HDLC SERPL-MCNC: 64 MG/DL (ref 40–60)
HGB BLD-MCNC: 13 G/DL (ref 12–15.9)
IMM GRANULOCYTES # BLD AUTO: 0.02 10*3/MM3 (ref 0–0.05)
IMM GRANULOCYTES NFR BLD AUTO: 0.3 % (ref 0–0.5)
LDLC SERPL CALC-MCNC: 64 MG/DL (ref 0–100)
LDLC/HDLC SERPL: 1 {RATIO}
LYMPHOCYTES # BLD AUTO: 2.43 10*3/MM3 (ref 0.7–3.1)
LYMPHOCYTES NFR BLD AUTO: 38.3 % (ref 19.6–45.3)
MAGNESIUM SERPL-MCNC: 2 MG/DL (ref 1.6–2.4)
MCH RBC QN AUTO: 30.4 PG (ref 26.6–33)
MCHC RBC AUTO-ENTMCNC: 32.9 G/DL (ref 31.5–35.7)
MCV RBC AUTO: 92.5 FL (ref 79–97)
MICROALBUMIN/CREAT UR: NORMAL MG/G{CREAT}
MONOCYTES # BLD AUTO: 0.54 10*3/MM3 (ref 0.1–0.9)
MONOCYTES NFR BLD AUTO: 8.5 % (ref 5–12)
NEUTROPHILS NFR BLD AUTO: 3.21 10*3/MM3 (ref 1.7–7)
NEUTROPHILS NFR BLD AUTO: 50.6 % (ref 42.7–76)
NRBC BLD AUTO-RTO: 0 /100 WBC (ref 0–0.2)
PLATELET # BLD AUTO: 304 10*3/MM3 (ref 140–450)
PMV BLD AUTO: 11.6 FL (ref 6–12)
POTASSIUM SERPL-SCNC: 4.3 MMOL/L (ref 3.5–5.2)
PROT SERPL-MCNC: 7.2 G/DL (ref 6–8.5)
RBC # BLD AUTO: 4.27 10*6/MM3 (ref 3.77–5.28)
SODIUM SERPL-SCNC: 144 MMOL/L (ref 136–145)
TRIGL SERPL-MCNC: 59 MG/DL (ref 0–150)
TSH SERPL DL<=0.05 MIU/L-ACNC: 0.62 UIU/ML (ref 0.27–4.2)
VIT B12 BLD-MCNC: 1269 PG/ML (ref 211–946)
VLDLC SERPL-MCNC: 11.8 MG/DL (ref 5–40)
WBC # BLD AUTO: 6.35 10*3/MM3 (ref 3.4–10.8)

## 2020-08-17 PROCEDURE — 82607 VITAMIN B-12: CPT | Performed by: PREVENTIVE MEDICINE

## 2020-08-17 PROCEDURE — 99214 OFFICE O/P EST MOD 30 MIN: CPT | Performed by: PREVENTIVE MEDICINE

## 2020-08-17 PROCEDURE — 80053 COMPREHEN METABOLIC PANEL: CPT | Performed by: PREVENTIVE MEDICINE

## 2020-08-17 PROCEDURE — 84443 ASSAY THYROID STIM HORMONE: CPT | Performed by: PREVENTIVE MEDICINE

## 2020-08-17 PROCEDURE — 83036 HEMOGLOBIN GLYCOSYLATED A1C: CPT | Performed by: PREVENTIVE MEDICINE

## 2020-08-17 PROCEDURE — 83735 ASSAY OF MAGNESIUM: CPT | Performed by: PREVENTIVE MEDICINE

## 2020-08-17 PROCEDURE — 80061 LIPID PANEL: CPT | Performed by: PREVENTIVE MEDICINE

## 2020-08-17 PROCEDURE — 82043 UR ALBUMIN QUANTITATIVE: CPT | Performed by: PREVENTIVE MEDICINE

## 2020-08-17 PROCEDURE — 85025 COMPLETE CBC W/AUTO DIFF WBC: CPT | Performed by: PREVENTIVE MEDICINE

## 2020-08-17 PROCEDURE — 36415 COLL VENOUS BLD VENIPUNCTURE: CPT | Performed by: PREVENTIVE MEDICINE

## 2020-08-17 PROCEDURE — 82570 ASSAY OF URINE CREATININE: CPT | Performed by: PREVENTIVE MEDICINE

## 2020-08-17 PROCEDURE — 82306 VITAMIN D 25 HYDROXY: CPT | Performed by: PREVENTIVE MEDICINE

## 2020-08-17 RX ORDER — PRAVASTATIN SODIUM 80 MG/1
80 TABLET ORAL
Qty: 90 TABLET | Refills: 3 | Status: SHIPPED | OUTPATIENT
Start: 2020-08-17 | End: 2021-09-16

## 2020-08-17 RX ORDER — METOPROLOL SUCCINATE 100 MG/1
100 TABLET, EXTENDED RELEASE ORAL DAILY
Qty: 90 TABLET | Refills: 3
Start: 2020-08-17 | End: 2020-08-18 | Stop reason: SDUPTHER

## 2020-08-17 NOTE — PROGRESS NOTES
"Patient presents for follow-up on stable chronic medical problems including diabetes, hypertension and hyperlipidemia. Patient denies adverse effects of medications. Patient complains of no significant issue. Patient is here for monitoring of chronic issues due to need for monitoring of renal function and liver function                         Past Medical History:   Diagnosis Date   • B12 deficiency    • Diabetes mellitus (CMS/HCC)    • Hyperlipidemia    • Hypertension    • Tachycardia    • Vitamin D deficiency      Past Surgical History:   Procedure Laterality Date   • CARDIAC CATHETERIZATION     • COLONOSCOPY     • TONSILLECTOMY     • TUBAL ABDOMINAL LIGATION       Family History   Family history unknown: Yes     Social History     Tobacco Use   • Smoking status: Never Smoker   • Smokeless tobacco: Never Used   Substance Use Topics   • Alcohol use: No     Frequency: Never       PHQ-2 Depression Screening     PHQ-9 Depression Screening       Review of Systems   Constitutional: Negative.    HENT: Negative.  Negative for sinus pressure and sore throat.    Eyes: Negative.    Respiratory: Negative.  Negative for cough.    Cardiovascular: Negative.    Gastrointestinal: Negative.    Endocrine: Negative.    Genitourinary: Negative.    Musculoskeletal: Negative.    Skin: Negative.    Allergic/Immunologic: Positive for environmental allergies.   Neurological: Negative.    Hematological: Negative.    Psychiatric/Behavioral: Negative.      Vitals:    08/17/20 1011 08/17/20 1016 08/17/20 1017   BP: 149/72 153/72 145/71   BP Location: Left arm Right arm Right arm   Patient Position: Sitting Sitting Standing   Cuff Size: Adult Adult Adult   Pulse: 76 75 80   Resp: 16     Temp: 98.2 °F (36.8 °C)     TempSrc: Infrared     SpO2: 95%     Weight: 71.5 kg (157 lb 9.6 oz)     Height: 162.6 cm (64\")       Body mass index is 27.05 kg/m².  Physical Exam   Constitutional: She is oriented to person, place, and time. She appears " well-developed and well-nourished.   HENT:   Head: Normocephalic.   Mouth/Throat: Oropharynx is clear and moist.   Eyes: Pupils are equal, round, and reactive to light. EOM are normal.   Neck: Neck supple.   Cardiovascular: Normal rate, regular rhythm and normal heart sounds.   Pulmonary/Chest: Effort normal and breath sounds normal.   Abdominal: Soft.   Neurological: She is alert and oriented to person, place, and time.   Skin: Skin is warm.   Psychiatric: She has a normal mood and affect.   Vitals reviewed.    Lab Results   Component Value Date    HGBA1C 7.4 (H) 12/10/2019     Lab Results   Component Value Date    CHOL 136 12/10/2019    TRIG 67 12/10/2019    HDL 55 12/10/2019    LDL 68 12/10/2019         Diagnoses and all orders for this visit:    1. Type 2 diabetes mellitus without complication, without long-term current use of insulin (CMS/[facility]) (Primary)  Comments:  No extrememly high and no low  Orders:  -     Comprehensive Metabolic Panel  -     Hemoglobin A1c  -     Microalbumin / Creatinine Urine Ratio - Urine, Clean Catch    2. Vitamin D deficiency  -     Vitamin D 25 Hydroxy    3. Tachycardia  Comments:  no flutters or rapid rate  Orders:  -     Magnesium  -     TSH    4. Essential hypertension  Comments:  Not controlled.  Will increase Toprolol    Orders:  -     CBC Auto Differential    5. Hyperlipidemia, unspecified hyperlipidemia type  -     Lipid Panel    6. Adenopathy  Comments:  Gone    7. B12 deficiency  -     Vitamin B12    8. Body mass index (BMI) of 27.0-27.9 in adult  Comments:  Diet advised    Other orders  -     metoprolol succinate XL (Toprol XL) 100 MG 24 hr tablet; Take 1 tablet by mouth Daily.  Dispense: 90 tablet; Refill: 3  -     pravastatin (PRAVACHOL) 80 MG tablet; Take 1 tablet by mouth every night at bedtime.  Dispense: 90 tablet; Refill: 3

## 2020-08-18 LAB — HBA1C MFR BLD: 7.8 % (ref 3.5–5.6)

## 2020-08-18 RX ORDER — METOPROLOL SUCCINATE 100 MG/1
100 TABLET, EXTENDED RELEASE ORAL DAILY
Qty: 90 TABLET | Refills: 3 | Status: SHIPPED | OUTPATIENT
Start: 2020-08-18 | End: 2021-05-24

## 2020-08-18 NOTE — PROGRESS NOTES
Glucose 168 with a1C=7.8--which shows not controlled.  She is on maximum doses of two meds so we'll need to add a third.  Would she be willing to give herself a shot once weekly with med that may also help with weightloss?  Make sure no thyroid cancer in self or family.  B12 is high so can increase otc dose on weekends.

## 2020-09-08 NOTE — TELEPHONE ENCOUNTER
Please call patient and advise Januvia ordered 3 weeks ago from [facility]-Please have patient or staff see if need to reorder

## 2020-09-08 NOTE — TELEPHONE ENCOUNTER
She states [facility] and it was to expensive. She had not gotten it from [facility] yet if ordered there

## 2020-10-06 RX ORDER — BLOOD SUGAR DIAGNOSTIC
STRIP MISCELLANEOUS
Qty: 90 EACH | Refills: 3 | Status: SHIPPED | OUTPATIENT
Start: 2020-10-06 | End: 2021-11-17 | Stop reason: SDUPTHER

## 2020-10-06 RX ORDER — LISINOPRIL 40 MG/1
40 TABLET ORAL DAILY
Qty: 90 TABLET | Refills: 3 | Status: SHIPPED | OUTPATIENT
Start: 2020-10-06 | End: 2021-08-23

## 2020-10-06 RX ORDER — HYDRALAZINE HYDROCHLORIDE 100 MG/1
TABLET, FILM COATED ORAL
Qty: 270 TABLET | Refills: 3 | Status: SHIPPED | OUTPATIENT
Start: 2020-10-06 | End: 2021-06-22

## 2020-11-16 ENCOUNTER — OFFICE VISIT (OUTPATIENT)
Dept: FAMILY MEDICINE CLINIC | Facility: CLINIC | Age: 70
End: 2020-11-16

## 2020-11-16 VITALS
HEIGHT: 65 IN | TEMPERATURE: 97.3 F | RESPIRATION RATE: 16 BRPM | DIASTOLIC BLOOD PRESSURE: 76 MMHG | HEART RATE: 73 BPM | WEIGHT: 160 LBS | SYSTOLIC BLOOD PRESSURE: 155 MMHG | OXYGEN SATURATION: 97 % | BODY MASS INDEX: 26.66 KG/M2

## 2020-11-16 DIAGNOSIS — E55.9 VITAMIN D DEFICIENCY: ICD-10-CM

## 2020-11-16 DIAGNOSIS — E78.5 HYPERLIPIDEMIA, UNSPECIFIED HYPERLIPIDEMIA TYPE: ICD-10-CM

## 2020-11-16 DIAGNOSIS — E53.8 B12 DEFICIENCY: ICD-10-CM

## 2020-11-16 DIAGNOSIS — E11.9 TYPE 2 DIABETES MELLITUS WITHOUT COMPLICATION, WITHOUT LONG-TERM CURRENT USE OF INSULIN (HCC): Primary | ICD-10-CM

## 2020-11-16 DIAGNOSIS — Z12.31 ENCOUNTER FOR SCREENING MAMMOGRAM FOR MALIGNANT NEOPLASM OF BREAST: ICD-10-CM

## 2020-11-16 DIAGNOSIS — I10 ESSENTIAL HYPERTENSION: ICD-10-CM

## 2020-11-16 LAB
25(OH)D3 SERPL-MCNC: 60 NG/ML (ref 30–100)
ALBUMIN SERPL-MCNC: 4.3 G/DL (ref 3.5–5.2)
ALBUMIN/GLOB SERPL: 1.3 G/DL
ALP SERPL-CCNC: 88 U/L (ref 39–117)
ALT SERPL W P-5'-P-CCNC: 17 U/L (ref 1–33)
ANION GAP SERPL CALCULATED.3IONS-SCNC: 11.8 MMOL/L (ref 5–15)
AST SERPL-CCNC: 15 U/L (ref 1–32)
BASOPHILS # BLD AUTO: 0.04 10*3/MM3 (ref 0–0.2)
BASOPHILS NFR BLD AUTO: 0.4 % (ref 0–1.5)
BILIRUB SERPL-MCNC: 0.3 MG/DL (ref 0–1.2)
BUN SERPL-MCNC: 14 MG/DL (ref 8–23)
BUN/CREAT SERPL: 22.6 (ref 7–25)
CALCIUM SPEC-SCNC: 10 MG/DL (ref 8.6–10.5)
CHLORIDE SERPL-SCNC: 102 MMOL/L (ref 98–107)
CHOLEST SERPL-MCNC: 152 MG/DL (ref 0–200)
CO2 SERPL-SCNC: 27.2 MMOL/L (ref 22–29)
CREAT SERPL-MCNC: 0.62 MG/DL (ref 0.57–1)
DEPRECATED RDW RBC AUTO: 38.5 FL (ref 37–54)
EOSINOPHIL # BLD AUTO: 0.08 10*3/MM3 (ref 0–0.4)
EOSINOPHIL NFR BLD AUTO: 0.8 % (ref 0.3–6.2)
ERYTHROCYTE [DISTWIDTH] IN BLOOD BY AUTOMATED COUNT: 11.8 % (ref 12.3–15.4)
GFR SERPL CREATININE-BSD FRML MDRD: 95 ML/MIN/1.73
GLOBULIN UR ELPH-MCNC: 3.2 GM/DL
GLUCOSE SERPL-MCNC: 172 MG/DL (ref 65–99)
HBA1C MFR BLD: 7.2 % (ref 3.5–5.6)
HCT VFR BLD AUTO: 39 % (ref 34–46.6)
HDLC SERPL-MCNC: 58 MG/DL (ref 40–60)
HGB BLD-MCNC: 13.2 G/DL (ref 12–15.9)
IMM GRANULOCYTES # BLD AUTO: 0.03 10*3/MM3 (ref 0–0.05)
IMM GRANULOCYTES NFR BLD AUTO: 0.3 % (ref 0–0.5)
LDLC SERPL CALC-MCNC: 81 MG/DL (ref 0–100)
LDLC/HDLC SERPL: 1.4 {RATIO}
LYMPHOCYTES # BLD AUTO: 2.05 10*3/MM3 (ref 0.7–3.1)
LYMPHOCYTES NFR BLD AUTO: 21.5 % (ref 19.6–45.3)
MCH RBC QN AUTO: 30.7 PG (ref 26.6–33)
MCHC RBC AUTO-ENTMCNC: 33.8 G/DL (ref 31.5–35.7)
MCV RBC AUTO: 90.7 FL (ref 79–97)
MONOCYTES # BLD AUTO: 0.62 10*3/MM3 (ref 0.1–0.9)
MONOCYTES NFR BLD AUTO: 6.5 % (ref 5–12)
NEUTROPHILS NFR BLD AUTO: 6.7 10*3/MM3 (ref 1.7–7)
NEUTROPHILS NFR BLD AUTO: 70.5 % (ref 42.7–76)
NRBC BLD AUTO-RTO: 0 /100 WBC (ref 0–0.2)
PLATELET # BLD AUTO: 313 10*3/MM3 (ref 140–450)
PMV BLD AUTO: 11.2 FL (ref 6–12)
POTASSIUM SERPL-SCNC: 3.6 MMOL/L (ref 3.5–5.2)
PROT SERPL-MCNC: 7.5 G/DL (ref 6–8.5)
RBC # BLD AUTO: 4.3 10*6/MM3 (ref 3.77–5.28)
SODIUM SERPL-SCNC: 141 MMOL/L (ref 136–145)
TRIGL SERPL-MCNC: 63 MG/DL (ref 0–150)
TSH SERPL DL<=0.05 MIU/L-ACNC: 0.69 UIU/ML (ref 0.27–4.2)
VIT B12 BLD-MCNC: 812 PG/ML (ref 211–946)
VLDLC SERPL-MCNC: 13 MG/DL (ref 5–40)
WBC # BLD AUTO: 9.52 10*3/MM3 (ref 3.4–10.8)

## 2020-11-16 PROCEDURE — 36415 COLL VENOUS BLD VENIPUNCTURE: CPT | Performed by: PREVENTIVE MEDICINE

## 2020-11-16 PROCEDURE — 85025 COMPLETE CBC W/AUTO DIFF WBC: CPT | Performed by: PREVENTIVE MEDICINE

## 2020-11-16 PROCEDURE — 80061 LIPID PANEL: CPT | Performed by: PREVENTIVE MEDICINE

## 2020-11-16 PROCEDURE — 99214 OFFICE O/P EST MOD 30 MIN: CPT | Performed by: PREVENTIVE MEDICINE

## 2020-11-16 PROCEDURE — 82306 VITAMIN D 25 HYDROXY: CPT | Performed by: PREVENTIVE MEDICINE

## 2020-11-16 PROCEDURE — 82607 VITAMIN B-12: CPT | Performed by: PREVENTIVE MEDICINE

## 2020-11-16 PROCEDURE — 83036 HEMOGLOBIN GLYCOSYLATED A1C: CPT | Performed by: PREVENTIVE MEDICINE

## 2020-11-16 PROCEDURE — 82043 UR ALBUMIN QUANTITATIVE: CPT | Performed by: PREVENTIVE MEDICINE

## 2020-11-16 PROCEDURE — 84443 ASSAY THYROID STIM HORMONE: CPT | Performed by: PREVENTIVE MEDICINE

## 2020-11-16 PROCEDURE — 82570 ASSAY OF URINE CREATININE: CPT | Performed by: PREVENTIVE MEDICINE

## 2020-11-16 PROCEDURE — 80053 COMPREHEN METABOLIC PANEL: CPT | Performed by: PREVENTIVE MEDICINE

## 2020-11-16 NOTE — PROGRESS NOTES
Patient presents for follow-up on chronic medical problems including diabetes, hypertension and hyperlipidemia. Patient denies adverse effects of medications, headache, dizziness, chest pain, palpitations, shortness of breath, cough, nausea, vomiting, abdominal pain, muscle aches, joint pain, unexplained change in weight, hair loss and fatigue. Patient complains of no significant issue. Patient is here for monitoring of chronic issues due to need for monitoring of renal function, liver function, blood pressure effects of meds, adverse effects, side effects and interactions    Past Medical History:   Diagnosis Date   • B12 deficiency    • Diabetes mellitus (CMS/HCC)    • Hyperlipidemia    • Hypertension    • Tachycardia    • Vitamin D deficiency      Past Surgical History:   Procedure Laterality Date   • CARDIAC CATHETERIZATION     • COLONOSCOPY     • TONSILLECTOMY     • TUBAL ABDOMINAL LIGATION       Family History   Family history unknown: Yes     Social History     Tobacco Use   • Smoking status: Never Smoker   • Smokeless tobacco: Never Used   Substance Use Topics   • Alcohol use: No     Frequency: Never       PHQ-2 Depression Screening  0  PHQ-9 Depression Screening  0    Review of Systems   Constitutional: Negative.    HENT: Negative.  Negative for sinus pressure and sore throat.    Eyes: Negative.    Respiratory: Negative.  Negative for cough.    Cardiovascular: Negative.    Gastrointestinal: Negative.    Endocrine: Negative.         One episode of hypoglycemia-ate when was 54 due to too much exercise   Genitourinary: Negative.    Musculoskeletal: Negative.    Skin: Negative.    Allergic/Immunologic: Positive for environmental allergies.   Neurological: Negative.    Hematological: Negative.    Psychiatric/Behavioral: Negative.      Vitals:    11/16/20 0906 11/16/20 0912   BP: 154/67 155/76   BP Location: Right arm Left arm   Patient Position: Sitting Sitting   Cuff Size: Adult Adult   Pulse: 70 73   Resp: 16   "  Temp: 97.3 °F (36.3 °C)    TempSrc: Infrared    SpO2: 97%    Weight: 72.6 kg (160 lb)    Height: 165.1 cm (65\")      Body mass index is 26.63 kg/m².  Physical Exam  Vitals signs reviewed.   Constitutional:       General: She is not in acute distress.     Appearance: Normal appearance. She is well-developed. She is not ill-appearing or toxic-appearing.   HENT:      Head: Normocephalic and atraumatic.      Right Ear: Tympanic membrane, ear canal and external ear normal.      Left Ear: Tympanic membrane, ear canal and external ear normal.      Nose: Nose normal.   Eyes:      Extraocular Movements: Extraocular movements intact.      Conjunctiva/sclera: Conjunctivae normal.      Pupils: Pupils are equal, round, and reactive to light.   Neck:      Musculoskeletal: Neck supple.   Cardiovascular:      Rate and Rhythm: Normal rate and regular rhythm.      Pulses:           Dorsalis pedis pulses are 1+ on the right side and 1+ on the left side.        Posterior tibial pulses are 1+ on the right side and 1+ on the left side.      Heart sounds: Normal heart sounds.   Pulmonary:      Effort: Pulmonary effort is normal.      Breath sounds: Normal breath sounds.   Abdominal:      General: Bowel sounds are normal. There is no distension.      Palpations: Abdomen is soft. There is no mass.      Tenderness: There is no abdominal tenderness.   Musculoskeletal: Normal range of motion.      Right foot: Bunion present.      Left foot: Bunion present.   Feet:      Right foot:      Protective Sensation: 10 sites tested. 10 sites sensed.      Skin integrity: Skin integrity normal.      Toenail Condition: Right toenails are normal.      Left foot:      Protective Sensation: 10 sites tested. 10 sites sensed.      Skin integrity: Skin integrity normal.      Toenail Condition: Left toenails are normal.      Comments: Diabetic Foot Exam Performed and Monofilament Test Performed    Skin:     General: Skin is warm.   Neurological:      General: " No focal deficit present.      Mental Status: She is alert and oriented to person, place, and time.   Psychiatric:         Mood and Affect: Mood normal.         Behavior: Behavior normal.       Lab Results   Component Value Date    HGBA1C 7.8 (H) 08/17/2020     Lab Results   Component Value Date    CHOL 140 08/17/2020    TRIG 59 08/17/2020    HDL 64 (H) 08/17/2020    LDL 64 08/17/2020         Diagnoses and all orders for this visit:    1. Type 2 diabetes mellitus without complication, without long-term current use of insulin (CMS/[facility]) (Primary)  Comments:  .  Eye exam soon.  Knows to eat more when exercising  Orders:  -     Comprehensive Metabolic Panel  -     Hemoglobin A1c  -     Microalbumin / Creatinine Urine Ratio - Urine, Clean Catch  -     TSH    2. B12 deficiency  -     CBC Auto Differential  -     Vitamin B12    3. Hyperlipidemia, unspecified hyperlipidemia type  Comments:  Trying to eat less sast fats  Orders:  -     Lipid Panel    4. Essential hypertension  Comments:  High in office.  123/80 with pulse 78 at home with multiple.   No CP or SOA    5. Vitamin D deficiency  -     Vitamin D 25 Hydroxy    6. Encounter for screening mammogram for malignant neoplasm of breast  -     Mammo Screening Digital Tomosynthesis Bilateral With CAD; Future    Other orders  -     metFORMIN (GLUCOPHAGE) 500 MG tablet; Take 2 tablets by mouth 2 (Two) Times a Day With Meals.  Dispense: 450 tablet; Refill: 3

## 2020-11-17 ENCOUNTER — TELEPHONE (OUTPATIENT)
Dept: FAMILY MEDICINE CLINIC | Facility: CLINIC | Age: 70
End: 2020-11-17

## 2020-11-17 LAB
ALBUMIN UR-MCNC: <1.2 MG/DL
CREAT UR-MCNC: 10.6 MG/DL
MICROALBUMIN/CREAT UR: NORMAL MG/G{CREAT}

## 2020-11-17 NOTE — TELEPHONE ENCOUNTER
----- Message from Jaelyn Iverson MD sent at 11/17/2020  7:47 AM EST -----  Glucose 172 and A1C shows loss of control at 7.2-can she do more with diet and exercise ir do we need to add an injectable medicine?  Bad cholesterol 81 with goal of 70-can she do more to watch sat fats or do we need to change to a stronger statin?

## 2020-11-17 NOTE — PROGRESS NOTES
Glucose 172 and A1C shows loss of control at 7.2-can she do more with diet and exercise ir do we need to add an injectable medicine?  Bad cholesterol 81 with goal of 70-can she do more to watch sat fats or do we need to change to a stronger statin?

## 2020-11-28 RX ORDER — DILTIAZEM HYDROCHLORIDE 240 MG/1
CAPSULE, EXTENDED RELEASE ORAL
Qty: 180 CAPSULE | Refills: 3 | Status: SHIPPED | OUTPATIENT
Start: 2020-11-28 | End: 2021-07-14 | Stop reason: HOSPADM

## 2020-11-30 ENCOUNTER — APPOINTMENT (OUTPATIENT)
Dept: MAMMOGRAPHY | Facility: HOSPITAL | Age: 70
End: 2020-11-30

## 2020-12-07 ENCOUNTER — HOSPITAL ENCOUNTER (OUTPATIENT)
Dept: MAMMOGRAPHY | Facility: HOSPITAL | Age: 70
Discharge: HOME | End: 2020-12-07
Admitting: PREVENTIVE MEDICINE

## 2020-12-07 DIAGNOSIS — Z12.31 ENCOUNTER FOR SCREENING MAMMOGRAM FOR MALIGNANT NEOPLASM OF BREAST: ICD-10-CM

## 2020-12-07 PROCEDURE — 77067 SCR MAMMO BI INCL CAD: CPT

## 2020-12-07 PROCEDURE — 77063 BREAST TOMOSYNTHESIS BI: CPT

## 2021-03-05 RX ORDER — SITAGLIPTIN 100 MG/1
TABLET, FILM COATED ORAL
Qty: 90 TABLET | Refills: 3 | Status: SHIPPED | OUTPATIENT
Start: 2021-03-05 | End: 2022-02-15 | Stop reason: SDUPTHER

## 2021-03-25 ENCOUNTER — OFFICE VISIT (OUTPATIENT)
Dept: FAMILY MEDICINE CLINIC | Facility: CLINIC | Age: 71
End: 2021-03-25

## 2021-03-25 VITALS
BODY MASS INDEX: 26.09 KG/M2 | WEIGHT: 156.6 LBS | HEIGHT: 65 IN | TEMPERATURE: 97.1 F | HEART RATE: 69 BPM | DIASTOLIC BLOOD PRESSURE: 72 MMHG | SYSTOLIC BLOOD PRESSURE: 123 MMHG | OXYGEN SATURATION: 97 %

## 2021-03-25 DIAGNOSIS — I10 ESSENTIAL HYPERTENSION: ICD-10-CM

## 2021-03-25 DIAGNOSIS — E78.5 HYPERLIPIDEMIA, UNSPECIFIED HYPERLIPIDEMIA TYPE: ICD-10-CM

## 2021-03-25 DIAGNOSIS — E11.9 TYPE 2 DIABETES MELLITUS WITHOUT COMPLICATION, WITHOUT LONG-TERM CURRENT USE OF INSULIN (HCC): ICD-10-CM

## 2021-03-25 DIAGNOSIS — Z00.01 ENCOUNTER FOR ANNUAL GENERAL MEDICAL EXAMINATION WITH ABNORMAL FINDINGS IN ADULT: Primary | ICD-10-CM

## 2021-03-25 PROCEDURE — G0439 PPPS, SUBSEQ VISIT: HCPCS | Performed by: PREVENTIVE MEDICINE

## 2021-03-25 PROCEDURE — 99213 OFFICE O/P EST LOW 20 MIN: CPT | Performed by: PREVENTIVE MEDICINE

## 2021-03-25 PROCEDURE — 85025 COMPLETE CBC W/AUTO DIFF WBC: CPT | Performed by: PREVENTIVE MEDICINE

## 2021-03-25 PROCEDURE — 82570 ASSAY OF URINE CREATININE: CPT | Performed by: PREVENTIVE MEDICINE

## 2021-03-25 PROCEDURE — 1159F MED LIST DOCD IN RCRD: CPT | Performed by: PREVENTIVE MEDICINE

## 2021-03-25 PROCEDURE — 83036 HEMOGLOBIN GLYCOSYLATED A1C: CPT | Performed by: PREVENTIVE MEDICINE

## 2021-03-25 PROCEDURE — 80053 COMPREHEN METABOLIC PANEL: CPT | Performed by: PREVENTIVE MEDICINE

## 2021-03-25 PROCEDURE — 1170F FXNL STATUS ASSESSED: CPT | Performed by: PREVENTIVE MEDICINE

## 2021-03-25 PROCEDURE — 82043 UR ALBUMIN QUANTITATIVE: CPT | Performed by: PREVENTIVE MEDICINE

## 2021-03-25 PROCEDURE — 80061 LIPID PANEL: CPT | Performed by: PREVENTIVE MEDICINE

## 2021-03-25 NOTE — PROGRESS NOTES
The ABCs of the Annual Wellness Visit  Subsequent Medicare Wellness Visit    Chief Complaint   Patient presents with   • Diabetes     3 month follow up       Subjective   History of Present Illness:  Celsa Brown is a 70 y.o. female who presents for a Subsequent Medicare Wellness Visit.    HEALTH RISK ASSESSMENT    Recent Hospitalizations:  No hospitalization(s) within the last year.    Current Medical Providers:  Patient Care Team:  Jaelyn Iverson MD as PCP - General (Family Medicine)    Smoking Status:  Social History     Tobacco Use   Smoking Status Never Smoker   Smokeless Tobacco Never Used       Alcohol Consumption:  Social History     Substance and Sexual Activity   Alcohol Use No       Depression Screen:   PHQ-2/PHQ-9 Depression Screening 3/25/2021   Little interest or pleasure in doing things 0   Feeling down, depressed, or hopeless 0   Total Score 0       Fall Risk Screen:  STEADI Fall Risk Assessment has not been completed.    Health Habits and Functional and Cognitive Screening:  Functional & Cognitive Status 3/25/2021   Do you have difficulty preparing food and eating? No   Do you have difficulty bathing yourself, getting dressed or grooming yourself? No   Do you have difficulty using the toilet? No   Do you have difficulty moving around from place to place? No   Do you have trouble with steps or getting out of a bed or a chair? No   Current Diet Well Balanced Diet   Dental Exam Up to date   Eye Exam Up to date   Exercise (times per week) 7 times per week   Current Exercises Include Walking   Current Exercise Activities Include -   Do you need help using the phone?  No   Are you deaf or do you have serious difficulty hearing?  No   Do you need help with transportation? No   Do you need help shopping? No   Do you need help preparing meals?  No   Do you need help with housework?  No   Do you need help with laundry? No   Do you need help taking your medications? No   Do you need help managing money?  No   Do you ever drive or ride in a car without wearing a seat belt? No   Have you felt unusual stress, anger or loneliness in the last month? No   Who do you live with? Spouse   If you need help, do you have trouble finding someone available to you? No   Have you been bothered in the last four weeks by sexual problems? -   Do you have difficulty concentrating, remembering or making decisions? No         Does the patient have evidence of cognitive impairment? Yes    Asprin use counseling:Taking ASA appropriately as indicated    Age-appropriate Screening Schedule:  Refer to the list below for future screening recommendations based on patient's age, sex and/or medical conditions. Orders for these recommended tests are listed in the plan section. The patient has been provided with a written plan.    Health Maintenance   Topic Date Due   • TDAP/TD VACCINES (2 - Tdap) 08/02/2027 (Originally 8/1/2027)   • HEMOGLOBIN A1C  05/16/2021   • DXA SCAN  09/18/2021   • DIABETIC FOOT EXAM  11/16/2021   • LIPID PANEL  11/16/2021   • URINE MICROALBUMIN  11/16/2021   • DIABETIC EYE EXAM  11/24/2021   • MAMMOGRAM  12/07/2021   • COLONOSCOPY  06/27/2023   • INFLUENZA VACCINE  Completed   • ZOSTER VACCINE  Completed          The following portions of the patient's history were reviewed and updated as appropriate: allergies, current medications, past family history, past medical history, past social history, past surgical history and problem list.    Outpatient Medications Prior to Visit   Medication Sig Dispense Refill   • Accu-Chek Thelma Plus test strip TESTING BLOOD SUGAR ONCE  DAILY 90 each 3   • aspirin 81 MG EC tablet Take 81 mg by mouth Daily.     • cholecalciferol (VITAMIN D3) 1000 units tablet Take 1,000 Units by mouth Every Other Day.     • Cyanocobalamin (VITAMIN B12) 1000 MCG tablet controlled-release Take 1,000 mcg by mouth Every Other Day.     • dilTIAZem (TIAZAC) 240 MG 24 hr capsule TAKE 2 CAPSULES BY MOUTH  DAILY 180  capsule 3   • glipizide (GLUCOTROL) 10 MG tablet TAKE 2 TABLETS BY MOUTH  TWICE DAILY IN THE MORNING  AND EVENING 360 tablet 3   • hydrALAZINE (APRESOLINE) 100 MG tablet TAKE 1 TABLET BY MOUTH 3  TIMES A  tablet 3   • hydroCHLOROthiazide (HYDRODIURIL) 25 MG tablet TAKE 1 TABLET BY MOUTH  DAILY 90 tablet 3   • Januvia 100 MG tablet TAKE 1 TABLET BY MOUTH  DAILY 90 tablet 3   • lisinopril (PRINIVIL,ZESTRIL) 40 MG tablet TAKE 1 TABLET BY MOUTH  DAILY 90 tablet 3   • loratadine (CLARITIN) 10 MG tablet Take 10 mg by mouth Daily.     • Melatonin 10 MG tablet Take 10 mg by mouth Daily.     • metFORMIN (GLUCOPHAGE) 500 MG tablet TAKE 2 TABLETS BY MOUTH TWO TIMES DAILY WITH MEALS 360 tablet 3   • metoprolol succinate XL (Toprol XL) 100 MG 24 hr tablet Take 1 tablet by mouth Daily. 90 tablet 3   • Multiple Vitamins-Minerals (CENTRUM SILVER ULTRA WOMENS) tablet Take 1 tablet by mouth Daily.     • Omega-3 Fatty Acids (OMEGA-3 FISH OIL) 500 MG capsule Take 500 mg by mouth Daily.     • potassium chloride (MICRO-K) 10 MEQ CR capsule TAKE 1 CAPSULE BY MOUTH IN  THE MORNING AND EVENING ,  ON WEEKENDS 2 CAPSULES IN  THE MORNING AND 1 CAPSULE  IN THE EVENING 208 capsule 3   • pravastatin (PRAVACHOL) 80 MG tablet Take 1 tablet by mouth every night at bedtime. 90 tablet 3     No facility-administered medications prior to visit.       Patient Active Problem List   Diagnosis   • B12 deficiency   • Body mass index (BMI) of 27.0-27.9 in adult   • Degenerative joint disease   • Hyperlipidemia   • Hypertension   • Postmenopausal status   • Type 2 diabetes mellitus (CMS/HCC)   • Vitamin D deficiency       Advanced Care Planning:  ACP discussion was held with the patient during this visit. Patient has an advance directive in EMR which is still valid.     Review of Systems   Constitutional: Negative.    Eyes: Negative.    Respiratory: Negative.    Cardiovascular: Negative.    Gastrointestinal: Negative.    Endocrine: Negative.   "  Genitourinary: Negative.    Musculoskeletal: Negative.    Skin: Negative.    Allergic/Immunologic: Negative.    Neurological: Positive for light-headedness.        Lightheaded when sugar is low.   Hematological: Negative.    Psychiatric/Behavioral: Negative.        Compared to one year ago, the patient feels her physical health is the same.  Compared to one year ago, the patient feels her mental health is the same.    Reviewed chart for potential of high risk medication in the elderly: yes  Reviewed chart for potential of harmful drug interactions in the elderly:yes    Objective         Vitals:    03/25/21 0911 03/25/21 0913 03/25/21 0914   BP: 135/74 150/72 123/72   BP Location: Right arm Left arm Left arm   Patient Position: Sitting Sitting Standing   Cuff Size: Adult Adult Adult   Pulse: 69     Temp: 97.1 °F (36.2 °C)     SpO2: 97%     Weight: 71 kg (156 lb 9.6 oz)     Height: 165.1 cm (65\")     PainSc: 0-No pain         Body mass index is 26.06 kg/m².  Discussed the patient's BMI with her. The BMI is in the acceptable range.    Physical Exam  Vitals reviewed.   Constitutional:       General: She is not in acute distress.     Appearance: Normal appearance. She is well-developed. She is not ill-appearing or toxic-appearing.   HENT:      Head: Normocephalic and atraumatic.      Right Ear: Tympanic membrane, ear canal and external ear normal.      Left Ear: Tympanic membrane, ear canal and external ear normal.      Nose: Nose normal.   Eyes:      Extraocular Movements: Extraocular movements intact.      Conjunctiva/sclera: Conjunctivae normal.      Pupils: Pupils are equal, round, and reactive to light.   Cardiovascular:      Rate and Rhythm: Normal rate and regular rhythm.      Pulses:           Dorsalis pedis pulses are 1+ on the right side and 1+ on the left side.        Posterior tibial pulses are 1+ on the right side and 1+ on the left side.      Heart sounds: Normal heart sounds.   Pulmonary:      Effort: " Pulmonary effort is normal.      Breath sounds: Normal breath sounds.   Abdominal:      General: Bowel sounds are normal. There is no distension.      Palpations: Abdomen is soft. There is no mass.      Tenderness: There is no abdominal tenderness.   Musculoskeletal:         General: Normal range of motion.      Cervical back: Neck supple.   Feet:      Right foot:      Skin integrity: Skin integrity normal.      Left foot:      Skin integrity: Skin integrity normal.      Toenail Condition: Left toenails are abnormally thick.      Comments: Diabetic Foot Exam Performed    Skin:     General: Skin is warm.   Neurological:      General: No focal deficit present.      Mental Status: She is alert and oriented to person, place, and time.   Psychiatric:         Mood and Affect: Mood normal.         Behavior: Behavior normal.               Assessment/Plan   Medicare Risks and Personalized Health Plan  CMS Preventative Services Quick Reference  Diabetic Lab Screening     The above risks/problems have been discussed with the patient.  Pertinent information has been shared with the patient in the After Visit Summary.  Follow up plans and orders are seen below in the Assessment/Plan Section.    Diagnoses and all orders for this visit:    1. Encounter for annual general medical examination with abnormal findings in adult (Primary)  Comments:  po[mibu patient was advised to wear seatbelt and to use sunscreen    2. Hyperlipidemia, unspecified hyperlipidemia type  Comments:  Patient is trying to eat less saturated fats  Orders:  -     Lipid Panel    3. Essential hypertension  Comments:  Patient brought in approximately 30 blood pressure and pulse readings her pressures controlled at home although not controlled in the office no headache or ches  Orders:  -     CBC Auto Differential    4. Type 2 diabetes mellitus without complication, without long-term current use of insulin (CMS/[facility])  Comments:  Patient has had some low blood sugars  in the morning if A1c remains low we will consider stopping 500 mg of Metformin at her nighttime dose.  Orders:  -     Comprehensive Metabolic Panel  -     Hemoglobin A1c  -     Microalbumin / Creatinine Urine Ratio - Urine, Clean Catch      Follow Up:  Return in about 3 months (around 6/25/2021).     An After Visit Summary and PPPS were given to the patient.

## 2021-03-25 NOTE — PATIENT INSTRUCTIONS
Health Maintenance Due   Topic Date Due   • COVID-19 Vaccine (1) Never done   • ANNUAL WELLNESS VISIT  12/10/2020     Fall Prevention in the Home, Adult  Falls can cause injuries. They can happen to people of all ages. There are many things you can do to make your home safe and to help prevent falls. Ask for help when making these changes, if needed.  What actions can I take to prevent falls?  General Instructions  · Use good lighting in all rooms. Replace any light bulbs that burn out.  · Turn on the lights when you go into a dark area. Use night-lights.  · Keep items that you use often in easy-to-reach places. Lower the shelves around your home if necessary.  · Set up your furniture so you have a clear path. Avoid moving your furniture around.  · Do not have throw rugs and other things on the floor that can make you trip.  · Avoid walking on wet floors.  · If any of your floors are uneven, fix them.  · Add color or contrast paint or tape to clearly ivette and help you see:  ? Any grab bars or handrails.  ? First and last steps of stairways.  ? Where the edge of each step is.  · If you use a stepladder:  ? Make sure that it is fully opened. Do not climb a closed stepladder.  ? Make sure that both sides of the stepladder are locked into place.  ? Ask someone to hold the stepladder for you while you use it.  · If there are any pets around you, be aware of where they are.  What can I do in the bathroom?               · Keep the floor dry. Clean up any water that spills onto the floor as soon as it happens.  · Remove soap buildup in the tub or shower regularly.  · Use non-skid mats or decals on the floor of the tub or shower.  · Attach bath mats securely with double-sided, non-slip rug tape.  · If you need to sit down in the shower, use a plastic, non-slip stool.  · Install grab bars by the toilet and in the tub and shower. Do not use towel bars as grab bars.  What can I do in the bedroom?  · Make sure that you have a  light by your bed that is easy to reach.  · Do not use any sheets or blankets that are too big for your bed. They should not hang down onto the floor.  · Have a firm chair that has side arms. You can use this for support while you get dressed.  What can I do in the kitchen?  · Clean up any spills right away.  · If you need to reach something above you, use a strong step stool that has a grab bar.  · Keep electrical cords out of the way.  · Do not use floor polish or wax that makes floors slippery. If you must use wax, use non-skid floor wax.  What can I do with my stairs?  · Do not leave any items on the stairs.  · Make sure that you have a light switch at the top of the stairs and the bottom of the stairs. If you do not have them, ask someone to add them for you.  · Make sure that there are handrails on both sides of the stairs, and use them. Fix handrails that are broken or loose. Make sure that handrails are as long as the stairways.  · Install non-slip stair treads on all stairs in your home.  · Avoid having throw rugs at the top or bottom of the stairs. If you do have throw rugs, attach them to the floor with carpet tape.  · Choose a carpet that does not hide the edge of the steps on the stairway.  · Check any carpeting to make sure that it is firmly attached to the stairs. Fix any carpet that is loose or worn.  What can I do on the outside of my home?  · Use bright outdoor lighting.  · Regularly fix the edges of walkways and driveways and fix any cracks.  · Remove anything that might make you trip as you walk through a door, such as a raised step or threshold.  · Trim any bushes or trees on the path to your home.  · Regularly check to see if handrails are loose or broken. Make sure that both sides of any steps have handrails.  · Install guardrails along the edges of any raised decks and porches.  · Clear walking paths of anything that might make someone trip, such as tools or rocks.  · Have any leaves, snow,  or ice cleared regularly.  · Use sand or salt on walking paths during winter.  · Clean up any spills in your garage right away. This includes grease or oil spills.  What other actions can I take?  · Wear shoes that:  ? Have a low heel. Do not wear high heels.  ? Have rubber bottoms.  ? Are comfortable and fit you well.  ? Are closed at the toe. Do not wear open-toe sandals.  · Use tools that help you move around (mobility aids) if they are needed. These include:  ? Canes.  ? Walkers.  ? Scooters.  ? Crutches.  · Review your medicines with your doctor. Some medicines can make you feel dizzy. This can increase your chance of falling.  Ask your doctor what other things you can do to help prevent falls.  Where to find more information  · Centers for Disease Control and Prevention, STEADI: https://cdc.gov  · National [place] on Aging: https://yk6hwjr.justice.nih.gov  Contact a doctor if:  · You are afraid of falling at home.  · You feel weak, drowsy, or dizzy at home.  · You fall at home.  Summary  · There are many simple things that you can do to make your home safe and to help prevent falls.  · Ways to make your home safe include removing tripping hazards and installing grab bars in the bathroom.  · Ask for help when making these changes in your home.  This information is not intended to replace advice given to you by your health care provider. Make sure you discuss any questions you have with your health care provider.  Document Released: 10/14/2010 Document Revised: 08/02/2018 Document Reviewed: 08/02/2018  [employer] Interactive Patient Education © 2019 [employer]

## 2021-03-26 ENCOUNTER — TELEPHONE (OUTPATIENT)
Dept: FAMILY MEDICINE CLINIC | Facility: CLINIC | Age: 71
End: 2021-03-26

## 2021-03-26 LAB
ALBUMIN SERPL-MCNC: 4.4 G/DL (ref 3.5–5.2)
ALBUMIN UR-MCNC: <1.2 MG/DL
ALBUMIN/GLOB SERPL: 1.7 G/DL
ALP SERPL-CCNC: 89 U/L (ref 39–117)
ALT SERPL W P-5'-P-CCNC: 23 U/L (ref 1–33)
ANION GAP SERPL CALCULATED.3IONS-SCNC: 10.5 MMOL/L (ref 5–15)
AST SERPL-CCNC: 18 U/L (ref 1–32)
BASOPHILS # BLD AUTO: 0.04 10*3/MM3 (ref 0–0.2)
BASOPHILS NFR BLD AUTO: 0.6 % (ref 0–1.5)
BILIRUB SERPL-MCNC: 0.3 MG/DL (ref 0–1.2)
BUN SERPL-MCNC: 12 MG/DL (ref 8–23)
BUN/CREAT SERPL: 16.2 (ref 7–25)
CALCIUM SPEC-SCNC: 9.7 MG/DL (ref 8.6–10.5)
CHLORIDE SERPL-SCNC: 104 MMOL/L (ref 98–107)
CHOLEST SERPL-MCNC: 129 MG/DL (ref 0–200)
CO2 SERPL-SCNC: 27.5 MMOL/L (ref 22–29)
CREAT SERPL-MCNC: 0.74 MG/DL (ref 0.57–1)
CREAT UR-MCNC: 25.9 MG/DL
DEPRECATED RDW RBC AUTO: 40.8 FL (ref 37–54)
EOSINOPHIL # BLD AUTO: 0.05 10*3/MM3 (ref 0–0.4)
EOSINOPHIL NFR BLD AUTO: 0.7 % (ref 0.3–6.2)
ERYTHROCYTE [DISTWIDTH] IN BLOOD BY AUTOMATED COUNT: 11.9 % (ref 12.3–15.4)
GFR SERPL CREATININE-BSD FRML MDRD: 77 ML/MIN/1.73
GLOBULIN UR ELPH-MCNC: 2.6 GM/DL
GLUCOSE SERPL-MCNC: 148 MG/DL (ref 65–99)
HBA1C MFR BLD: 7.6 % (ref 3.5–5.6)
HCT VFR BLD AUTO: 41.6 % (ref 34–46.6)
HDLC SERPL-MCNC: 54 MG/DL (ref 40–60)
HGB BLD-MCNC: 13.7 G/DL (ref 12–15.9)
IMM GRANULOCYTES # BLD AUTO: 0.05 10*3/MM3 (ref 0–0.05)
IMM GRANULOCYTES NFR BLD AUTO: 0.7 % (ref 0–0.5)
LDLC SERPL CALC-MCNC: 61 MG/DL (ref 0–100)
LDLC/HDLC SERPL: 1.14 {RATIO}
LYMPHOCYTES # BLD AUTO: 2.22 10*3/MM3 (ref 0.7–3.1)
LYMPHOCYTES NFR BLD AUTO: 30.7 % (ref 19.6–45.3)
MCH RBC QN AUTO: 30.7 PG (ref 26.6–33)
MCHC RBC AUTO-ENTMCNC: 32.9 G/DL (ref 31.5–35.7)
MCV RBC AUTO: 93.3 FL (ref 79–97)
MICROALBUMIN/CREAT UR: NORMAL MG/G{CREAT}
MONOCYTES # BLD AUTO: 0.6 10*3/MM3 (ref 0.1–0.9)
MONOCYTES NFR BLD AUTO: 8.3 % (ref 5–12)
NEUTROPHILS NFR BLD AUTO: 4.28 10*3/MM3 (ref 1.7–7)
NEUTROPHILS NFR BLD AUTO: 59 % (ref 42.7–76)
NRBC BLD AUTO-RTO: 0 /100 WBC (ref 0–0.2)
PLATELET # BLD AUTO: 323 10*3/MM3 (ref 140–450)
PMV BLD AUTO: 11.3 FL (ref 6–12)
POTASSIUM SERPL-SCNC: 3.9 MMOL/L (ref 3.5–5.2)
PROT SERPL-MCNC: 7 G/DL (ref 6–8.5)
RBC # BLD AUTO: 4.46 10*6/MM3 (ref 3.77–5.28)
SODIUM SERPL-SCNC: 142 MMOL/L (ref 136–145)
TRIGL SERPL-MCNC: 66 MG/DL (ref 0–150)
VLDLC SERPL-MCNC: 14 MG/DL (ref 5–40)
WBC # BLD AUTO: 7.24 10*3/MM3 (ref 3.4–10.8)

## 2021-03-26 NOTE — TELEPHONE ENCOUNTER
----- Message from Jaelyn Iverson MD sent at 3/26/2021 11:17 AM EDT -----  Glucose 148 but A1C shows DM not controlled at 7.6-don't think we should decrease night metformen as DM not yet controlled. Continue walking and see if she can limit carbs and we'll see what looks like in 3 months

## 2021-03-26 NOTE — PROGRESS NOTES
Glucose 148 but A1C shows DM not controlled at 7.6-don't think we should decrease night metformen as DM not yet controlled. Continue walking and see if she can limit carbs and we'll see what looks like in 3 months

## 2021-05-24 RX ORDER — METOPROLOL SUCCINATE 100 MG/1
100 TABLET, EXTENDED RELEASE ORAL DAILY
Qty: 90 TABLET | Refills: 3 | Status: SHIPPED | OUTPATIENT
Start: 2021-05-24 | End: 2021-06-22

## 2021-05-24 RX ORDER — GLIPIZIDE 10 MG/1
TABLET ORAL
Qty: 360 TABLET | Refills: 3 | Status: SHIPPED | OUTPATIENT
Start: 2021-05-24 | End: 2022-02-15 | Stop reason: SDUPTHER

## 2021-05-24 RX ORDER — HYDROCHLOROTHIAZIDE 25 MG/1
25 TABLET ORAL DAILY
Qty: 90 TABLET | Refills: 3 | Status: SHIPPED | OUTPATIENT
Start: 2021-05-24 | End: 2022-02-15 | Stop reason: SDUPTHER

## 2021-06-22 ENCOUNTER — OFFICE VISIT (OUTPATIENT)
Dept: FAMILY MEDICINE CLINIC | Facility: CLINIC | Age: 71
End: 2021-06-22

## 2021-06-22 VITALS
WEIGHT: 152 LBS | HEART RATE: 74 BPM | BODY MASS INDEX: 25.33 KG/M2 | HEIGHT: 65 IN | SYSTOLIC BLOOD PRESSURE: 160 MMHG | DIASTOLIC BLOOD PRESSURE: 75 MMHG | OXYGEN SATURATION: 96 % | TEMPERATURE: 96.6 F

## 2021-06-22 DIAGNOSIS — R07.9 CHEST PAIN, UNSPECIFIED TYPE: Primary | ICD-10-CM

## 2021-06-22 DIAGNOSIS — R00.1 BRADYCARDIA: ICD-10-CM

## 2021-06-22 DIAGNOSIS — E11.9 TYPE 2 DIABETES MELLITUS WITHOUT COMPLICATION, WITHOUT LONG-TERM CURRENT USE OF INSULIN (HCC): ICD-10-CM

## 2021-06-22 DIAGNOSIS — E78.5 HYPERLIPIDEMIA, UNSPECIFIED HYPERLIPIDEMIA TYPE: ICD-10-CM

## 2021-06-22 DIAGNOSIS — I10 ESSENTIAL HYPERTENSION: ICD-10-CM

## 2021-06-22 LAB
ALBUMIN SERPL-MCNC: 4.3 G/DL (ref 3.5–5.2)
ALBUMIN/GLOB SERPL: 1.5 G/DL
ALP SERPL-CCNC: 89 U/L (ref 39–117)
ALT SERPL W P-5'-P-CCNC: 17 U/L (ref 1–33)
ANION GAP SERPL CALCULATED.3IONS-SCNC: 11.8 MMOL/L (ref 5–15)
AST SERPL-CCNC: 17 U/L (ref 1–32)
BASOPHILS # BLD AUTO: 0.05 10*3/MM3 (ref 0–0.2)
BASOPHILS NFR BLD AUTO: 0.6 % (ref 0–1.5)
BILIRUB SERPL-MCNC: 0.3 MG/DL (ref 0–1.2)
BUN SERPL-MCNC: 18 MG/DL (ref 8–23)
BUN/CREAT SERPL: 29.5 (ref 7–25)
CALCIUM SPEC-SCNC: 9.1 MG/DL (ref 8.6–10.5)
CHLORIDE SERPL-SCNC: 102 MMOL/L (ref 98–107)
CHOLEST SERPL-MCNC: 158 MG/DL (ref 0–200)
CO2 SERPL-SCNC: 27.2 MMOL/L (ref 22–29)
CREAT SERPL-MCNC: 0.61 MG/DL (ref 0.57–1)
DEPRECATED RDW RBC AUTO: 39.6 FL (ref 37–54)
EOSINOPHIL # BLD AUTO: 0.05 10*3/MM3 (ref 0–0.4)
EOSINOPHIL NFR BLD AUTO: 0.6 % (ref 0.3–6.2)
ERYTHROCYTE [DISTWIDTH] IN BLOOD BY AUTOMATED COUNT: 12 % (ref 12.3–15.4)
GFR SERPL CREATININE-BSD FRML MDRD: 96 ML/MIN/1.73
GLOBULIN UR ELPH-MCNC: 2.9 GM/DL
GLUCOSE SERPL-MCNC: 170 MG/DL (ref 65–99)
HBA1C MFR BLD: 7.6 % (ref 3.5–5.6)
HCT VFR BLD AUTO: 39 % (ref 34–46.6)
HDLC SERPL-MCNC: 57 MG/DL (ref 40–60)
HGB BLD-MCNC: 13.2 G/DL (ref 12–15.9)
IMM GRANULOCYTES # BLD AUTO: 0.03 10*3/MM3 (ref 0–0.05)
IMM GRANULOCYTES NFR BLD AUTO: 0.4 % (ref 0–0.5)
LDLC SERPL CALC-MCNC: 86 MG/DL (ref 0–100)
LDLC/HDLC SERPL: 1.5 {RATIO}
LYMPHOCYTES # BLD AUTO: 1.79 10*3/MM3 (ref 0.7–3.1)
LYMPHOCYTES NFR BLD AUTO: 22.9 % (ref 19.6–45.3)
MAGNESIUM SERPL-MCNC: 1.8 MG/DL (ref 1.6–2.4)
MCH RBC QN AUTO: 30.8 PG (ref 26.6–33)
MCHC RBC AUTO-ENTMCNC: 33.8 G/DL (ref 31.5–35.7)
MCV RBC AUTO: 90.9 FL (ref 79–97)
MONOCYTES # BLD AUTO: 0.38 10*3/MM3 (ref 0.1–0.9)
MONOCYTES NFR BLD AUTO: 4.9 % (ref 5–12)
NEUTROPHILS NFR BLD AUTO: 5.52 10*3/MM3 (ref 1.7–7)
NEUTROPHILS NFR BLD AUTO: 70.6 % (ref 42.7–76)
NRBC BLD AUTO-RTO: 0 /100 WBC (ref 0–0.2)
PLATELET # BLD AUTO: 281 10*3/MM3 (ref 140–450)
PMV BLD AUTO: 11.8 FL (ref 6–12)
POTASSIUM SERPL-SCNC: 3.9 MMOL/L (ref 3.5–5.2)
PROT SERPL-MCNC: 7.2 G/DL (ref 6–8.5)
RBC # BLD AUTO: 4.29 10*6/MM3 (ref 3.77–5.28)
SODIUM SERPL-SCNC: 141 MMOL/L (ref 136–145)
TRIGL SERPL-MCNC: 77 MG/DL (ref 0–150)
TSH SERPL DL<=0.05 MIU/L-ACNC: 0.89 UIU/ML (ref 0.27–4.2)
VIT B12 BLD-MCNC: 838 PG/ML (ref 211–946)
VLDLC SERPL-MCNC: 15 MG/DL (ref 5–40)
WBC # BLD AUTO: 7.82 10*3/MM3 (ref 3.4–10.8)

## 2021-06-22 PROCEDURE — 83735 ASSAY OF MAGNESIUM: CPT | Performed by: PREVENTIVE MEDICINE

## 2021-06-22 PROCEDURE — 99214 OFFICE O/P EST MOD 30 MIN: CPT | Performed by: PREVENTIVE MEDICINE

## 2021-06-22 PROCEDURE — 85025 COMPLETE CBC W/AUTO DIFF WBC: CPT | Performed by: PREVENTIVE MEDICINE

## 2021-06-22 PROCEDURE — 82570 ASSAY OF URINE CREATININE: CPT | Performed by: PREVENTIVE MEDICINE

## 2021-06-22 PROCEDURE — 80053 COMPREHEN METABOLIC PANEL: CPT | Performed by: PREVENTIVE MEDICINE

## 2021-06-22 PROCEDURE — 84443 ASSAY THYROID STIM HORMONE: CPT | Performed by: PREVENTIVE MEDICINE

## 2021-06-22 PROCEDURE — 93000 ELECTROCARDIOGRAM COMPLETE: CPT | Performed by: PREVENTIVE MEDICINE

## 2021-06-22 PROCEDURE — 83036 HEMOGLOBIN GLYCOSYLATED A1C: CPT | Performed by: PREVENTIVE MEDICINE

## 2021-06-22 PROCEDURE — 80061 LIPID PANEL: CPT | Performed by: PREVENTIVE MEDICINE

## 2021-06-22 PROCEDURE — 36415 COLL VENOUS BLD VENIPUNCTURE: CPT | Performed by: PREVENTIVE MEDICINE

## 2021-06-22 PROCEDURE — 82607 VITAMIN B-12: CPT | Performed by: PREVENTIVE MEDICINE

## 2021-06-22 PROCEDURE — 82043 UR ALBUMIN QUANTITATIVE: CPT | Performed by: PREVENTIVE MEDICINE

## 2021-06-22 RX ORDER — HYDRALAZINE HYDROCHLORIDE 100 MG/1
100 TABLET, FILM COATED ORAL 2 TIMES DAILY
Qty: 270 TABLET | Refills: 3
Start: 2021-06-22 | End: 2021-08-23

## 2021-06-22 RX ORDER — METOPROLOL SUCCINATE 100 MG/1
TABLET, EXTENDED RELEASE ORAL
Qty: 90 TABLET | Refills: 3 | Status: SHIPPED | OUTPATIENT
Start: 2021-06-22 | End: 2022-02-15 | Stop reason: SDUPTHER

## 2021-06-22 NOTE — PATIENT INSTRUCTIONS
Stop lunch Hydralazine.  Cut metoprolol in 1/2 at night.  Stay hydrated and followup Dr. Lynch.  holter moniter when available.  Call if pulse low por blood pressure problems.  911 is chest pain persists and returns

## 2021-06-22 NOTE — PROGRESS NOTES
Procedure     ECG 12 Lead    Date/Time: 6/22/2021 1:57 PM  Performed by: Jaelyn Iverson MD  Authorized by: Jaelyn Iverson MD   Comparison: not compared with previous ECG   Rhythm: sinus rhythm  Rate: normal  Conduction: right bundle branch block  ST Segments: ST segments normal  T Waves: T waves normal  QRS axis: normal  Other: no other findings    Clinical impression: abnormal EKG

## 2021-06-22 NOTE — PROGRESS NOTES
"Subjective   Celas Brown is a 71 y.o. female presents for   Chief Complaint   Patient presents with   • Diabetes     3 month fu   • Hypertension   • Hyperlipidemia     71-year-old white female presents today with history of chest pain and bradycardia after starting hydralazine 100 mg 3 times a day.  She states that her pulse rate got down into the low 40s she did develop some right-sided and left-sided chest discomfort without radiation did occur while she was activity and no diaphoresis.  It has been rather warm and patient does not hydrate like she should we did explain to her that possibly the metoprolol was more concentrated and that could be the cause of her bradycardia however we will refer her to the cardiologist for evaluation after obtaining an EKG today.  EKG did show a new right bundle branch block.  Patient has been feeling fine since stopping the hydralazine pulse rate has been in the 60s to 70s she has had no more chest discomfort.  There are no preventive care reminders to display for this patient.    History of Present Illness     Vitals:    06/22/21 0846 06/22/21 0852   BP: 125/72 160/75   BP Location: Left arm Left arm   Patient Position: Sitting Sitting   Cuff Size: Adult Adult   Pulse: 74    Temp: 96.6 °F (35.9 °C)    SpO2: 96%    Weight: 68.9 kg (152 lb)    Height: 165.1 cm (65\")      Body mass index is 25.29 kg/m².    Current Outpatient Medications on File Prior to Visit   Medication Sig Dispense Refill   • Accu-Chek Thelma Plus test strip TESTING BLOOD SUGAR ONCE  DAILY 90 each 3   • aspirin 81 MG EC tablet Take 81 mg by mouth Daily.     • cholecalciferol (VITAMIN D3) 1000 units tablet Take 1,000 Units by mouth Every Other Day.     • Cyanocobalamin (VITAMIN B12) 1000 MCG tablet controlled-release Take 1,000 mcg by mouth Every Other Day.     • dilTIAZem (TIAZAC) 240 MG 24 hr capsule TAKE 2 CAPSULES BY MOUTH  DAILY 180 capsule 3   • glipizide (GLUCOTROL) 10 MG tablet TAKE 2 TABLETS BY MOUTH  " TWICE DAILY IN THE MORNING  AND EVENING 360 tablet 3   • hydroCHLOROthiazide (HYDRODIURIL) 25 MG tablet TAKE 1 TABLET BY MOUTH  DAILY 90 tablet 3   • Januvia 100 MG tablet TAKE 1 TABLET BY MOUTH  DAILY 90 tablet 3   • lisinopril (PRINIVIL,ZESTRIL) 40 MG tablet TAKE 1 TABLET BY MOUTH  DAILY 90 tablet 3   • loratadine (CLARITIN) 10 MG tablet Take 10 mg by mouth Daily.     • Melatonin 10 MG tablet Take 10 mg by mouth Daily.     • metFORMIN (GLUCOPHAGE) 500 MG tablet TAKE 2 TABLETS BY MOUTH TWO TIMES DAILY WITH MEALS 360 tablet 3   • Multiple Vitamins-Minerals (CENTRUM SILVER ULTRA WOMENS) tablet Take 1 tablet by mouth Daily.     • Omega-3 Fatty Acids (OMEGA-3 FISH OIL) 500 MG capsule Take 500 mg by mouth Daily.     • potassium chloride (MICRO-K) 10 MEQ CR capsule TAKE 1 CAPSULE BY MOUTH IN  THE MORNING AND EVENING ,  ON WEEKENDS 2 CAPSULES IN  THE MORNING AND 1 CAPSULE  IN THE EVENING 208 capsule 3   • pravastatin (PRAVACHOL) 80 MG tablet Take 1 tablet by mouth every night at bedtime. 90 tablet 3   • [DISCONTINUED] hydrALAZINE (APRESOLINE) 100 MG tablet TAKE 1 TABLET BY MOUTH 3  TIMES A  tablet 3   • [DISCONTINUED] metoprolol succinate XL (TOPROL-XL) 100 MG 24 hr tablet TAKE 1 TABLET BY MOUTH  DAILY 90 tablet 3   • [DISCONTINUED] dilTIAZem (TIAZAC) 240 MG 24 hr capsule Two capsules daily 180 capsule 3   • [DISCONTINUED] metFORMIN (GLUCOPHAGE) 500 MG tablet Take 2 tablets by mouth 2 (Two) Times a Day With Meals. 450 tablet 3     No current facility-administered medications on file prior to visit.       The following portions of the patient's history were reviewed and updated as appropriate: allergies, current medications, past family history, past medical history, past social history, past surgical history and problem list.    Review of Systems   Cardiovascular: Positive for chest pain.   Neurological: Positive for light-headedness.       Objective   Physical Exam  Vitals reviewed.   Constitutional:        General: She is not in acute distress.     Appearance: Normal appearance. She is well-developed. She is not ill-appearing or toxic-appearing.   HENT:      Head: Normocephalic and atraumatic.      Right Ear: Tympanic membrane, ear canal and external ear normal.      Left Ear: Tympanic membrane, ear canal and external ear normal.      Nose: Nose normal.   Eyes:      Extraocular Movements: Extraocular movements intact.      Conjunctiva/sclera: Conjunctivae normal.      Pupils: Pupils are equal, round, and reactive to light.   Cardiovascular:      Rate and Rhythm: Normal rate and regular rhythm.      Pulses:           Dorsalis pedis pulses are 2+ on the right side and 2+ on the left side.        Posterior tibial pulses are 2+ on the right side and 2+ on the left side.      Heart sounds: Normal heart sounds.   Pulmonary:      Effort: Pulmonary effort is normal.      Breath sounds: Normal breath sounds.   Abdominal:      General: Bowel sounds are normal. There is no distension.      Palpations: Abdomen is soft. There is no mass.      Tenderness: There is no abdominal tenderness.   Musculoskeletal:         General: Normal range of motion.      Cervical back: Neck supple.   Feet:      Right foot:      Skin integrity: Skin integrity normal.      Toenail Condition: Right toenails are normal.      Left foot:      Skin integrity: Skin integrity normal.      Toenail Condition: Left toenails are normal.      Comments: Diabetic Foot Exam Performed    Skin:     General: Skin is warm.   Neurological:      General: No focal deficit present.      Mental Status: She is alert and oriented to person, place, and time.   Psychiatric:         Mood and Affect: Mood normal.         Behavior: Behavior normal.       PHQ-9 Total Score:      Assessment/Plan   Diagnoses and all orders for this visit:    1. Chest pain, unspecified type (Primary)  Comments:  Pulse rate was low she felt short of breath and had right-sided was spread to the left side  anterior chest discomfort no diaphoresis the pain did not move anywh  Orders:  -     Ambulatory Referral to Cardiology  -     ECG 12 Lead    2. Bradycardia  Comments:  Heart rate down into the low 40s when she was on hydralazine 100 mg 3 times a day she decreased the dose to twice a day has had no more bradycardia or chest lisseth  Orders:  -     CBC Auto Differential  -     Magnesium  -     TSH  -     Ambulatory Referral to Cardiology  -     Holter Monitor - 72 Hour Up To 15 Days; Future  -     ECG 12 Lead    3. Type 2 diabetes mellitus without complication, without long-term current use of insulin (CMS/[facility])  Comments:  Blood sugars have been stable.  Orders:  -     Comprehensive Metabolic Panel  -     Vitamin B12  -     Microalbumin / Creatinine Urine Ratio - Urine, Clean Catch  -     Hemoglobin A1c    4. Hyperlipidemia, unspecified hyperlipidemia type  Comments:  Trying to eat less saturated fats.  Orders:  -     Lipid Panel    5. Essential hypertension  Comments:  Home readings on hydralazine were 121/78 average with a pulse of 56 on 100 hydralazine twice daily was 112/72 with a pulse of 69.    Other orders  -     hydrALAZINE (APRESOLINE) 100 MG tablet; Take 1 tablet by mouth 2 (Two) Times a Day.  Dispense: 270 tablet; Refill: 3  -     metoprolol succinate XL (TOPROL-XL) 100 MG 24 hr tablet; Take 1/2 tablet at night.  Dispense: 90 tablet; Refill: 3        Patient Instructions   Stop lunch Hydralazine.  Cut metoprolol in 1/2 at night.  Stay hydrated and followup Dr. Lynch.  holter moniter when available.  Call if pulse low por blood pressure problems.  911 is chest pain persists and returns

## 2021-06-23 ENCOUNTER — TELEPHONE (OUTPATIENT)
Dept: FAMILY MEDICINE CLINIC | Facility: CLINIC | Age: 71
End: 2021-06-23

## 2021-06-23 LAB
ALBUMIN UR-MCNC: <1.2 MG/DL
CREAT UR-MCNC: 48.4 MG/DL
MICROALBUMIN/CREAT UR: NORMAL MG/G{CREAT}

## 2021-06-23 NOTE — PROGRESS NOTES
Electrolytes all look good so don't think that was cause of low heart rate.  Has she noted any more symptoms?  Bad cholesterol 86 and goal is below 70-she is on maximum Pravastatin-so would have to change to stronger statin if she doesn't think she can do more to reduce sat fats in diet-let me know.  Glucose 170 and A1C shows DM not controlled at 7.6-she is on maximum of 3 meds-would have to consider Third med or injectable if can't do more to reduce carbs-let me know.

## 2021-06-23 NOTE — TELEPHONE ENCOUNTER
HUB TO READ  ----- Message from Jaelyn Iverson MD sent at 6/23/2021  9:23 AM EDT -----  Electrolytes all look good so don't think that was cause of low heart rate.  Has she noted any more symptoms?  Bad cholesterol 86 and goal is below 70-she is on maximum Pravastatin-so would have to change to stronger statin if she doesn't think she can do more to reduce sat fats in diet-let me know.  Glucose 170 and A1C shows DM not controlled at 7.6-she is on maximum of 3 meds-would have to consider Third med or injectable if can't do more to reduce carbs-let me know.

## 2021-06-24 ENCOUNTER — OFFICE VISIT (OUTPATIENT)
Dept: CARDIOLOGY | Facility: CLINIC | Age: 71
End: 2021-06-24

## 2021-06-24 ENCOUNTER — HOSPITAL ENCOUNTER (OUTPATIENT)
Dept: RESPIRATORY THERAPY | Facility: HOSPITAL | Age: 71
Discharge: HOME | End: 2021-06-24
Admitting: PREVENTIVE MEDICINE

## 2021-06-24 VITALS
HEART RATE: 49 BPM | BODY MASS INDEX: 25.58 KG/M2 | SYSTOLIC BLOOD PRESSURE: 150 MMHG | WEIGHT: 153.5 LBS | OXYGEN SATURATION: 95 % | DIASTOLIC BLOOD PRESSURE: 64 MMHG | HEIGHT: 65 IN

## 2021-06-24 DIAGNOSIS — R07.89 CHEST DISCOMFORT: ICD-10-CM

## 2021-06-24 DIAGNOSIS — R42 DIZZINESS: ICD-10-CM

## 2021-06-24 DIAGNOSIS — R42 DIZZINESS: Primary | ICD-10-CM

## 2021-06-24 DIAGNOSIS — I10 ESSENTIAL HYPERTENSION: ICD-10-CM

## 2021-06-24 DIAGNOSIS — E78.5 DYSLIPIDEMIA: ICD-10-CM

## 2021-06-24 DIAGNOSIS — R06.02 SHORTNESS OF BREATH: Primary | ICD-10-CM

## 2021-06-24 DIAGNOSIS — R00.1 BRADYCARDIA: ICD-10-CM

## 2021-06-24 DIAGNOSIS — R00.1 BRADYCARDIA, SINUS: ICD-10-CM

## 2021-06-24 DIAGNOSIS — E08.9 DIABETES MELLITUS DUE TO UNDERLYING CONDITION WITHOUT COMPLICATION, WITHOUT LONG-TERM CURRENT USE OF INSULIN (HCC): ICD-10-CM

## 2021-06-24 PROCEDURE — 93246 EXT ECG>7D<15D RECORDING: CPT

## 2021-06-24 PROCEDURE — 99204 OFFICE O/P NEW MOD 45 MIN: CPT | Performed by: INTERNAL MEDICINE

## 2021-06-24 NOTE — PROGRESS NOTES
Encounter Date:06/24/2021      Patient ID: Celsa Brown is a 71 y.o. female.    Chief Complaint:  Shortness of breath  Dizziness  Near syncope  Sinus bradycardia  Chest discomfort    History of Present Illness  The patient is a pleasant 71-year-old white female is here for evaluation of above problems.  Lately patient has been having exertional shortness of breath and having chest discomfort associated with low pulse with radiation of the discomfort from the right side of the chest to the left side. Apparently she has been having slow heart rhythm with taking hydralazine. Patient also has some dizziness and near syncopal feeling. Patient has right bundle branch block.  Patient is seen for further evaluation.    Patient is not having any palpitation or syncope.  Denies having any headache ,abdominal pain ,nausea, vomiting , diarrhea constipation, loss of weight or loss of appetite.  Denies having any excessive bruising ,hematuria or blood in the stool.    Review of all systems negative except as indicated.    Reviewed ROS.      Assessment and Plan     ]]]]]]]]]]]]]]]]]]]  Impression  ==========  -Chest discomfort and shortness of breath with or without exertion.    -Dizziness and near syncope    -Right bundle branch block    -Sinus bradycardia    -Diabetes dyslipidemia hypertension    -Status post tonsillectomy abdominal tubal ligation    -Family history of coronary artery disease    -Non-smoker    -Allergic to sulfa  =============  Plan  ==========  Patient has shortness of breath and chest discomfort.  Recent EKG from 6/21/2021 was reviewed and interpreted independently showing sinus rhythm right bundle branch block  Recent labs were reviewed-as below. Normal CBC CMP except for blood sugar of 170 cholesterol 158 triglycerides 77 LDL 86.  Ischemic cardiac work-up.  Stress Cardiolite test  Echocardiogram.  30-day event monitor to assess for any bradycardia arrhythmias to explain dizziness and near syncopal  feeling.  Medications were reviewed and updated.  Follow-up in the office on the same day.  Further plan will depend on patient's progress.  ]]]]]]]]]]]]]]           Diagnosis Plan   1. Shortness of breath  Adult Transthoracic Echo Complete W/ Cont if Necessary Per Protocol    Stress Test With Myocardial Perfusion One Day    Mobile Cardiac Outpatient Telemetry   2. Dizziness  Adult Transthoracic Echo Complete W/ Cont if Necessary Per Protocol    Stress Test With Myocardial Perfusion One Day    Mobile Cardiac Outpatient Telemetry   3. Essential hypertension  Adult Transthoracic Echo Complete W/ Cont if Necessary Per Protocol    Stress Test With Myocardial Perfusion One Day    Mobile Cardiac Outpatient Telemetry   4. Dyslipidemia  Adult Transthoracic Echo Complete W/ Cont if Necessary Per Protocol    Stress Test With Myocardial Perfusion One Day    Mobile Cardiac Outpatient Telemetry   5. Diabetes mellitus due to underlying condition without complication, without long-term current use of insulin (CMS/[facility])  Adult Transthoracic Echo Complete W/ Cont if Necessary Per Protocol    Stress Test With Myocardial Perfusion One Day    Mobile Cardiac Outpatient Telemetry   6. Chest discomfort  Adult Transthoracic Echo Complete W/ Cont if Necessary Per Protocol    Stress Test With Myocardial Perfusion One Day    Mobile Cardiac Outpatient Telemetry   7. Bradycardia, sinus     LAB RESULTS (LAST 7 DAYS)    CBC  Results from last 7 days   Lab Units 06/22/21  0955   WBC 10*3/mm3 7.82   RBC 10*6/mm3 4.29   HEMOGLOBIN g/dL 13.2   HEMATOCRIT % 39.0   MCV fL 90.9   PLATELETS 10*3/mm3 281       BMP  Results from last 7 days   Lab Units 06/22/21  0955   SODIUM mmol/L 141   POTASSIUM mmol/L 3.9   CHLORIDE mmol/L 102   CO2 mmol/L 27.2   BUN mg/dL 18   CREATININE mg/dL 0.61   GLUCOSE mg/dL 170*   MAGNESIUM mg/dL 1.8       CMP   Results from last 7 days   Lab Units 06/22/21  0955   SODIUM mmol/L 141   POTASSIUM mmol/L 3.9   CHLORIDE mmol/L 102    CO2 mmol/L 27.2   BUN mg/dL 18   CREATININE mg/dL 0.61   GLUCOSE mg/dL 170*   ALBUMIN g/dL 4.30   BILIRUBIN mg/dL 0.3   ALK PHOS U/L 89   AST (SGOT) U/L 17   ALT (SGPT) U/L 17         BNP        TROPONIN        CoAg        Creatinine Clearance  Estimated Creatinine Clearance: 61.3 mL/min (by C-G formula based on SCr of 0.61 mg/dL).    ABG        Radiology  No radiology results for the last day                The following portions of the patient's history were reviewed and updated as appropriate: allergies, current medications, past family history, past medical history, past social history, past surgical history and problem list.    Review of Systems   Constitutional: Positive for malaise/fatigue.   Cardiovascular: Positive for chest pain and near-syncope. Negative for leg swelling, palpitations and syncope.   Respiratory: Positive for shortness of breath.    Skin: Negative for rash.   Gastrointestinal: Negative for nausea and vomiting.   Neurological: Positive for weakness. Negative for dizziness, light-headedness and numbness.         Current Outpatient Medications:   •  Accu-Chek Thelma Plus test strip, TESTING BLOOD SUGAR ONCE  DAILY, Disp: 90 each, Rfl: 3  •  aspirin 81 MG EC tablet, Take 81 mg by mouth Daily. 1 tablet every other day, Disp: , Rfl:   •  cholecalciferol (VITAMIN D3) 1000 units tablet, Take 1,000 Units by mouth Every Other Day., Disp: , Rfl:   •  Cyanocobalamin (VITAMIN B12) 1000 MCG tablet controlled-release, Take 1,000 mcg by mouth Every Other Day., Disp: , Rfl:   •  dilTIAZem (TIAZAC) 240 MG 24 hr capsule, TAKE 2 CAPSULES BY MOUTH  DAILY, Disp: 180 capsule, Rfl: 3  •  glipizide (GLUCOTROL) 10 MG tablet, TAKE 2 TABLETS BY MOUTH  TWICE DAILY IN THE MORNING  AND EVENING, Disp: 360 tablet, Rfl: 3  •  hydrALAZINE (APRESOLINE) 100 MG tablet, Take 1 tablet by mouth 2 (Two) Times a Day., Disp: 270 tablet, Rfl: 3  •  hydroCHLOROthiazide (HYDRODIURIL) 25 MG tablet, TAKE 1 TABLET BY MOUTH  DAILY, Disp:  90 tablet, Rfl: 3  •  Januvia 100 MG tablet, TAKE 1 TABLET BY MOUTH  DAILY, Disp: 90 tablet, Rfl: 3  •  lisinopril (PRINIVIL,ZESTRIL) 40 MG tablet, TAKE 1 TABLET BY MOUTH  DAILY, Disp: 90 tablet, Rfl: 3  •  loratadine (CLARITIN) 10 MG tablet, Take 10 mg by mouth Daily., Disp: , Rfl:   •  Melatonin 10 MG tablet, Take 10 mg by mouth Daily., Disp: , Rfl:   •  metFORMIN (GLUCOPHAGE) 500 MG tablet, TAKE 2 TABLETS BY MOUTH TWO TIMES DAILY WITH MEALS, Disp: 360 tablet, Rfl: 3  •  metoprolol succinate XL (TOPROL-XL) 100 MG 24 hr tablet, Take 1/2 tablet at night., Disp: 90 tablet, Rfl: 3  •  Multiple Vitamins-Minerals (CENTRUM SILVER ULTRA WOMENS) tablet, Take 1 tablet by mouth Daily., Disp: , Rfl:   •  Omega-3 Fatty Acids (OMEGA-3 FISH OIL) 500 MG capsule, Take 500 mg by mouth Daily., Disp: , Rfl:   •  potassium chloride (MICRO-K) 10 MEQ CR capsule, TAKE 1 CAPSULE BY MOUTH IN  THE MORNING AND EVENING ,  ON WEEKENDS 2 CAPSULES IN  THE MORNING AND 1 CAPSULE  IN THE EVENING, Disp: 208 capsule, Rfl: 3  •  pravastatin (PRAVACHOL) 80 MG tablet, Take 1 tablet by mouth every night at bedtime., Disp: 90 tablet, Rfl: 3    Allergies   Allergen Reactions   • Sulfa Antibiotics Hives       Family History   Problem Relation Age of Onset   • Heart failure Mother    • Diabetes Mother    • Breast cancer Neg Hx    • Ovarian cancer Neg Hx        Past Surgical History:   Procedure Laterality Date   • CARDIAC CATHETERIZATION     • COLONOSCOPY     • TONSILLECTOMY     • TUBAL ABDOMINAL LIGATION         Past Medical History:   Diagnosis Date   • B12 deficiency    • Hyperlipidemia    • Hypertension    • Tachycardia    • Vitamin D deficiency        Family History   Problem Relation Age of Onset   • Heart failure Mother    • Diabetes Mother    • Breast cancer Neg Hx    • Ovarian cancer Neg Hx        Social History     Socioeconomic History   • Marital status:      Spouse name: Not on file   • Number of children: Not on file   • Years of  "education: Not on file   • Highest education level: Not on file   Tobacco Use   • Smoking status: Never Smoker   • Smokeless tobacco: Never Used   Substance and Sexual Activity   • Alcohol use: No   • Drug use: No   • Sexual activity: Yes     Partners: Male     Birth control/protection: None         Procedures      Objective:       Physical Exam    /64   Pulse (!) 49   Ht 165.1 cm (65\")   Wt 69.6 kg (153 lb 8 oz)   LMP  (LMP Unknown)   SpO2 95%   BMI 25.54 kg/m²   The patient is alert, oriented and in no distress.    Vital signs as noted above.    Head and neck revealed no carotid bruits or jugular venous distension.  No thyromegaly or lymphadenopathy is present.    Lungs clear.  No wheezing.  Breath sounds are normal bilaterally.    Heart normal first and second heart sounds.  No murmur..  No pericardial rub is present.  No gallop is present.    Abdomen soft and nontender.  No organomegaly is present.    Extremities revealed good peripheral pulses without any pedal edema.    Skin warm and dry.    Musculoskeletal system is grossly normal.    CNS grossly normal.    Reviewed and updated.        "

## 2021-06-25 NOTE — TELEPHONE ENCOUNTER
PATIENT RETURNED PHONE CALL AND HUB RELAYED INFORMATION. PATIENT EXPRESSED UNDERSTANDING OF THE INFORMATION PROVIDED.    PATIENT STATED THAT SHE HAS NO MORE SYMPTOMS OTHER THAN WHAT WAS TOLD TO DR. MCGHEE    PATIENT WILL TRY TO REDUCE SATURATED FATS ON HER OWN    PATIENT WILL TRY TO REDUCE CARBS ON HER OWN.    PATIENT WANTED TO INFORM DR. MCGHEE THAT SHE WAS ABLE TO SEE DR. SALEEM MCKEON, CARDIOLOGIST, ON 06/24/21, AND HE HAS SET UP SOME HEART TESTS ON 07/12/21. PATIENT WAS UNABLE TO PROVIDE SPECIFIC INFORMATION AS TO WHAT TESTS WERE SCHEDULED.    CALL BACK IF NEEDED:  [phone]

## 2021-07-12 ENCOUNTER — OFFICE VISIT (OUTPATIENT)
Dept: CARDIOLOGY | Facility: CLINIC | Age: 71
End: 2021-07-12

## 2021-07-12 ENCOUNTER — HOSPITAL ENCOUNTER (OUTPATIENT)
Dept: CARDIOLOGY | Facility: HOSPITAL | Age: 71
Discharge: HOME | End: 2021-07-12

## 2021-07-12 ENCOUNTER — HOSPITAL ENCOUNTER (OUTPATIENT)
Facility: HOSPITAL | Age: 71
Discharge: HOME | End: 2021-07-14
Attending: INTERNAL MEDICINE | Admitting: INTERNAL MEDICINE

## 2021-07-12 VITALS
BODY MASS INDEX: 25.49 KG/M2 | HEIGHT: 65 IN | HEART RATE: 40 BPM | DIASTOLIC BLOOD PRESSURE: 70 MMHG | SYSTOLIC BLOOD PRESSURE: 130 MMHG | WEIGHT: 153 LBS

## 2021-07-12 VITALS
DIASTOLIC BLOOD PRESSURE: 58 MMHG | SYSTOLIC BLOOD PRESSURE: 156 MMHG | HEIGHT: 65 IN | BODY MASS INDEX: 25.49 KG/M2 | WEIGHT: 153 LBS

## 2021-07-12 DIAGNOSIS — R00.1 BRADYCARDIA, SINUS: ICD-10-CM

## 2021-07-12 DIAGNOSIS — R06.02 SHORTNESS OF BREATH: ICD-10-CM

## 2021-07-12 DIAGNOSIS — R00.1 BRADYCARDIA: ICD-10-CM

## 2021-07-12 DIAGNOSIS — Z95.0 PACEMAKER: ICD-10-CM

## 2021-07-12 DIAGNOSIS — R55 NEAR SYNCOPE: ICD-10-CM

## 2021-07-12 DIAGNOSIS — I10 ESSENTIAL HYPERTENSION: Primary | ICD-10-CM

## 2021-07-12 DIAGNOSIS — R07.89 CHEST DISCOMFORT: ICD-10-CM

## 2021-07-12 DIAGNOSIS — E08.9 DIABETES MELLITUS DUE TO UNDERLYING CONDITION WITHOUT COMPLICATION, WITHOUT LONG-TERM CURRENT USE OF INSULIN (HCC): ICD-10-CM

## 2021-07-12 DIAGNOSIS — I45.10 RIGHT BUNDLE BRANCH BLOCK: ICD-10-CM

## 2021-07-12 DIAGNOSIS — I44.1 SECOND DEGREE AV BLOCK, MOBITZ TYPE II: Primary | ICD-10-CM

## 2021-07-12 DIAGNOSIS — I10 ESSENTIAL HYPERTENSION: ICD-10-CM

## 2021-07-12 DIAGNOSIS — R42 DIZZINESS: ICD-10-CM

## 2021-07-12 DIAGNOSIS — E78.5 DYSLIPIDEMIA: ICD-10-CM

## 2021-07-12 DIAGNOSIS — I44.1 SECOND DEGREE AV BLOCK, MOBITZ TYPE II: ICD-10-CM

## 2021-07-12 LAB
ANION GAP SERPL CALCULATED.3IONS-SCNC: 10 MMOL/L (ref 5–15)
BASOPHILS # BLD AUTO: 0 10*3/MM3 (ref 0–0.2)
BASOPHILS NFR BLD AUTO: 0.5 % (ref 0–1.5)
BH CV ECHO MEAS - ACS: 1.3 CM
BH CV ECHO MEAS - AO MAX PG (FULL): 2.1 MMHG
BH CV ECHO MEAS - AO MAX PG: 8.8 MMHG
BH CV ECHO MEAS - AO MEAN PG (FULL): 2.3 MMHG
BH CV ECHO MEAS - AO MEAN PG: 4.9 MMHG
BH CV ECHO MEAS - AO ROOT AREA (BSA CORRECTED): 1.5
BH CV ECHO MEAS - AO ROOT AREA: 5.3 CM^2
BH CV ECHO MEAS - AO ROOT DIAM: 2.6 CM
BH CV ECHO MEAS - AO V2 MAX: 147.9 CM/SEC
BH CV ECHO MEAS - AO V2 MEAN: 104 CM/SEC
BH CV ECHO MEAS - AO V2 VTI: 38.9 CM
BH CV ECHO MEAS - ASC AORTA: 1.8 CM
BH CV ECHO MEAS - AVA(I,A): 1.7 CM^2
BH CV ECHO MEAS - AVA(I,D): 1.7 CM^2
BH CV ECHO MEAS - AVA(V,A): 2.1 CM^2
BH CV ECHO MEAS - AVA(V,D): 2.1 CM^2
BH CV ECHO MEAS - BSA(HAYCOCK): 1.8 M^2
BH CV ECHO MEAS - BSA: 1.8 M^2
BH CV ECHO MEAS - BZI_BMI: 25.5 KILOGRAMS/M^2
BH CV ECHO MEAS - BZI_METRIC_HEIGHT: 165.1 CM
BH CV ECHO MEAS - BZI_METRIC_WEIGHT: 69.4 KG
BH CV ECHO MEAS - EDV(CUBED): 86 ML
BH CV ECHO MEAS - EDV(MOD-SP4): 73.8 ML
BH CV ECHO MEAS - EDV(TEICH): 88.4 ML
BH CV ECHO MEAS - EF(CUBED): 79 %
BH CV ECHO MEAS - EF(MOD-SP4): 68.6 %
BH CV ECHO MEAS - EF(TEICH): 71.5 %
BH CV ECHO MEAS - ESV(CUBED): 18.1 ML
BH CV ECHO MEAS - ESV(MOD-SP4): 23.2 ML
BH CV ECHO MEAS - ESV(TEICH): 25.2 ML
BH CV ECHO MEAS - FS: 40.5 %
BH CV ECHO MEAS - IVS/LVPW: 1.3
BH CV ECHO MEAS - IVSD: 1.1 CM
BH CV ECHO MEAS - LV DIASTOLIC VOL/BSA (35-75): 41.8 ML/M^2
BH CV ECHO MEAS - LV MASS(C)D: 148.4 GRAMS
BH CV ECHO MEAS - LV MASS(C)DI: 84.1 GRAMS/M^2
BH CV ECHO MEAS - LV MAX PG: 6.6 MMHG
BH CV ECHO MEAS - LV MEAN PG: 2.6 MMHG
BH CV ECHO MEAS - LV SYSTOLIC VOL/BSA (12-30): 13.1 ML/M^2
BH CV ECHO MEAS - LV V1 MAX: 128.9 CM/SEC
BH CV ECHO MEAS - LV V1 MEAN: 70.8 CM/SEC
BH CV ECHO MEAS - LV V1 VTI: 28.4 CM
BH CV ECHO MEAS - LVIDD: 4.4 CM
BH CV ECHO MEAS - LVIDS: 2.6 CM
BH CV ECHO MEAS - LVOT AREA: 2.4 CM^2
BH CV ECHO MEAS - LVOT DIAM: 1.7 CM
BH CV ECHO MEAS - LVPWD: 0.86 CM
BH CV ECHO MEAS - MV A MAX VEL: 100 CM/SEC
BH CV ECHO MEAS - MV DEC SLOPE: 1365 CM/SEC^2
BH CV ECHO MEAS - MV DEC TIME: 0.13 SEC
BH CV ECHO MEAS - MV E MAX VEL: 174 CM/SEC
BH CV ECHO MEAS - MV E/A: 1.7
BH CV ECHO MEAS - MV MAX PG: 20 MMHG
BH CV ECHO MEAS - MV MEAN PG: 3.3 MMHG
BH CV ECHO MEAS - MV V2 MAX: 223.6 CM/SEC
BH CV ECHO MEAS - MV V2 MEAN: 68.8 CM/SEC
BH CV ECHO MEAS - MV V2 VTI: 57.5 CM
BH CV ECHO MEAS - MVA(VTI): 1.2 CM^2
BH CV ECHO MEAS - PA ACC TIME: 0.15 SEC
BH CV ECHO MEAS - PA MAX PG (FULL): -0.31 MMHG
BH CV ECHO MEAS - PA MAX PG: 3.1 MMHG
BH CV ECHO MEAS - PA MEAN PG (FULL): 0.1 MMHG
BH CV ECHO MEAS - PA MEAN PG: 1.6 MMHG
BH CV ECHO MEAS - PA PR(ACCEL): 10.5 MMHG
BH CV ECHO MEAS - PA V2 MAX: 88.4 CM/SEC
BH CV ECHO MEAS - PA V2 MEAN: 57.8 CM/SEC
BH CV ECHO MEAS - PA V2 VTI: 20.8 CM
BH CV ECHO MEAS - RAP SYSTOLE: 3 MMHG
BH CV ECHO MEAS - RV MAX PG: 3.4 MMHG
BH CV ECHO MEAS - RV MEAN PG: 1.5 MMHG
BH CV ECHO MEAS - RV V1 MAX: 92.6 CM/SEC
BH CV ECHO MEAS - RV V1 MEAN: 54.5 CM/SEC
BH CV ECHO MEAS - RV V1 VTI: 20.9 CM
BH CV ECHO MEAS - RVDD: 2.5 CM
BH CV ECHO MEAS - RVSP: 34.3 MMHG
BH CV ECHO MEAS - SI(AO): 116.5 ML/M^2
BH CV ECHO MEAS - SI(CUBED): 38.5 ML/M^2
BH CV ECHO MEAS - SI(LVOT): 38.4 ML/M^2
BH CV ECHO MEAS - SI(MOD-SP4): 28.7 ML/M^2
BH CV ECHO MEAS - SI(TEICH): 35.8 ML/M^2
BH CV ECHO MEAS - SV(AO): 205.7 ML
BH CV ECHO MEAS - SV(CUBED): 67.9 ML
BH CV ECHO MEAS - SV(LVOT): 67.7 ML
BH CV ECHO MEAS - SV(MOD-SP4): 50.7 ML
BH CV ECHO MEAS - SV(TEICH): 63.2 ML
BH CV ECHO MEAS - TR MAX VEL: 278.6 CM/SEC
BH CV REST NUCLEAR ISOTOPE DOSE: 10.7 MCI
BH CV STRESS BP STAGE 1: NORMAL
BH CV STRESS DURATION MIN STAGE 1: 1
BH CV STRESS DURATION SEC STAGE 1: 48
BH CV STRESS GRADE STAGE 1: 10
BH CV STRESS HR STAGE 1: 53
BH CV STRESS METS STAGE 1: 5
BH CV STRESS NUCLEAR ISOTOPE DOSE: 31.8 MCI
BH CV STRESS PROTOCOL 1: NORMAL
BH CV STRESS RECOVERY BP: NORMAL MMHG
BH CV STRESS RECOVERY HR: 44 BPM
BH CV STRESS SPEED STAGE 1: 1.7
BH CV STRESS STAGE 1: 1
BUN SERPL-MCNC: 16 MG/DL (ref 8–23)
BUN/CREAT SERPL: 25 (ref 7–25)
CA-I SERPL ISE-MCNC: 1.24 MMOL/L (ref 1.2–1.3)
CALCIUM SPEC-SCNC: 9.6 MG/DL (ref 8.6–10.5)
CHLORIDE SERPL-SCNC: 106 MMOL/L (ref 98–107)
CO2 SERPL-SCNC: 30 MMOL/L (ref 22–29)
CREAT SERPL-MCNC: 0.64 MG/DL (ref 0.57–1)
DEPRECATED RDW RBC AUTO: 43.3 FL (ref 37–54)
EOSINOPHIL # BLD AUTO: 0.1 10*3/MM3 (ref 0–0.4)
EOSINOPHIL NFR BLD AUTO: 1.2 % (ref 0.3–6.2)
ERYTHROCYTE [DISTWIDTH] IN BLOOD BY AUTOMATED COUNT: 13.6 % (ref 12.3–15.4)
GFR SERPL CREATININE-BSD FRML MDRD: 91 ML/MIN/1.73
GLUCOSE BLDC GLUCOMTR-MCNC: 183 MG/DL (ref 70–105)
GLUCOSE BLDC GLUCOMTR-MCNC: 71 MG/DL (ref 70–105)
GLUCOSE SERPL-MCNC: 124 MG/DL (ref 65–99)
HBA1C MFR BLD: 7.7 % (ref 3.5–5.6)
HCT VFR BLD AUTO: 37.7 % (ref 34–46.6)
HGB BLD-MCNC: 12.7 G/DL (ref 12–15.9)
LV EF 2D ECHO EST: 60 %
LYMPHOCYTES # BLD AUTO: 2.9 10*3/MM3 (ref 0.7–3.1)
LYMPHOCYTES NFR BLD AUTO: 38.6 % (ref 19.6–45.3)
MAGNESIUM SERPL-MCNC: 1.9 MG/DL (ref 1.6–2.4)
MAXIMAL PREDICTED HEART RATE: 147 BPM
MAXIMAL PREDICTED HEART RATE: 147 BPM
MCH RBC QN AUTO: 31 PG (ref 26.6–33)
MCHC RBC AUTO-ENTMCNC: 33.7 G/DL (ref 31.5–35.7)
MCV RBC AUTO: 92 FL (ref 79–97)
MONOCYTES # BLD AUTO: 0.7 10*3/MM3 (ref 0.1–0.9)
MONOCYTES NFR BLD AUTO: 9.4 % (ref 5–12)
NEUTROPHILS NFR BLD AUTO: 3.8 10*3/MM3 (ref 1.7–7)
NEUTROPHILS NFR BLD AUTO: 50.3 % (ref 42.7–76)
NRBC BLD AUTO-RTO: 0.1 /100 WBC (ref 0–0.2)
PERCENT MAX PREDICTED HR: 36.05 %
PLATELET # BLD AUTO: 271 10*3/MM3 (ref 140–450)
PMV BLD AUTO: 8.6 FL (ref 6–12)
POTASSIUM SERPL-SCNC: 3.8 MMOL/L (ref 3.5–5.2)
RBC # BLD AUTO: 4.1 10*6/MM3 (ref 3.77–5.28)
SODIUM SERPL-SCNC: 146 MMOL/L (ref 136–145)
STRESS BASELINE BP: NORMAL MMHG
STRESS BASELINE HR: 40 BPM
STRESS PERCENT HR: 42 %
STRESS POST EXERCISE DUR MIN: 1 MIN
STRESS POST EXERCISE DUR SEC: 48 SEC
STRESS POST PEAK BP: NORMAL MMHG
STRESS POST PEAK HR: 53 BPM
STRESS TARGET HR: 125 BPM
STRESS TARGET HR: 125 BPM
T4 FREE SERPL-MCNC: 1.1 NG/DL (ref 0.93–1.7)
TSH SERPL DL<=0.05 MIU/L-ACNC: 0.99 UIU/ML (ref 0.27–4.2)
WBC # BLD AUTO: 7.6 10*3/MM3 (ref 3.4–10.8)

## 2021-07-12 PROCEDURE — 93306 TTE W/DOPPLER COMPLETE: CPT

## 2021-07-12 PROCEDURE — 93017 CV STRESS TEST TRACING ONLY: CPT

## 2021-07-12 PROCEDURE — 82330 ASSAY OF CALCIUM: CPT | Performed by: INTERNAL MEDICINE

## 2021-07-12 PROCEDURE — A9500 TC99M SESTAMIBI: HCPCS | Performed by: INTERNAL MEDICINE

## 2021-07-12 PROCEDURE — 85025 COMPLETE CBC W/AUTO DIFF WBC: CPT | Performed by: INTERNAL MEDICINE

## 2021-07-12 PROCEDURE — 83036 HEMOGLOBIN GLYCOSYLATED A1C: CPT | Performed by: INTERNAL MEDICINE

## 2021-07-12 PROCEDURE — G0378 HOSPITAL OBSERVATION PER HR: HCPCS

## 2021-07-12 PROCEDURE — 99220 PR INITIAL OBSERVATION CARE/DAY 70 MINUTES: CPT | Performed by: INTERNAL MEDICINE

## 2021-07-12 PROCEDURE — 78452 HT MUSCLE IMAGE SPECT MULT: CPT

## 2021-07-12 PROCEDURE — 99215 OFFICE O/P EST HI 40 MIN: CPT | Performed by: INTERNAL MEDICINE

## 2021-07-12 PROCEDURE — 78452 HT MUSCLE IMAGE SPECT MULT: CPT | Performed by: INTERNAL MEDICINE

## 2021-07-12 PROCEDURE — 80048 BASIC METABOLIC PNL TOTAL CA: CPT | Performed by: INTERNAL MEDICINE

## 2021-07-12 PROCEDURE — 93010 ELECTROCARDIOGRAM REPORT: CPT | Performed by: INTERNAL MEDICINE

## 2021-07-12 PROCEDURE — 93016 CV STRESS TEST SUPVJ ONLY: CPT | Performed by: INTERNAL MEDICINE

## 2021-07-12 PROCEDURE — 0 TECHNETIUM SESTAMIBI: Performed by: INTERNAL MEDICINE

## 2021-07-12 PROCEDURE — 93306 TTE W/DOPPLER COMPLETE: CPT | Performed by: INTERNAL MEDICINE

## 2021-07-12 PROCEDURE — 83735 ASSAY OF MAGNESIUM: CPT | Performed by: INTERNAL MEDICINE

## 2021-07-12 PROCEDURE — 63710000001 INSULIN LISPRO (HUMAN) PER 5 UNITS: Performed by: INTERNAL MEDICINE

## 2021-07-12 PROCEDURE — 93005 ELECTROCARDIOGRAM TRACING: CPT | Performed by: INTERNAL MEDICINE

## 2021-07-12 PROCEDURE — 82962 GLUCOSE BLOOD TEST: CPT

## 2021-07-12 PROCEDURE — 93018 CV STRESS TEST I&R ONLY: CPT | Performed by: INTERNAL MEDICINE

## 2021-07-12 PROCEDURE — 84443 ASSAY THYROID STIM HORMONE: CPT | Performed by: INTERNAL MEDICINE

## 2021-07-12 PROCEDURE — 84439 ASSAY OF FREE THYROXINE: CPT | Performed by: INTERNAL MEDICINE

## 2021-07-12 RX ORDER — HYDRALAZINE HYDROCHLORIDE 25 MG/1
75 TABLET, FILM COATED ORAL 2 TIMES DAILY
Status: DISCONTINUED | OUTPATIENT
Start: 2021-07-13 | End: 2021-07-14

## 2021-07-12 RX ORDER — INSULIN LISPRO 100 [IU]/ML
0-7 INJECTION, SOLUTION INTRAVENOUS; SUBCUTANEOUS
Status: DISCONTINUED | OUTPATIENT
Start: 2021-07-12 | End: 2021-07-14 | Stop reason: HOSPADM

## 2021-07-12 RX ORDER — CETIRIZINE HYDROCHLORIDE 10 MG/1
10 TABLET ORAL DAILY
Status: DISCONTINUED | OUTPATIENT
Start: 2021-07-13 | End: 2021-07-14 | Stop reason: HOSPADM

## 2021-07-12 RX ORDER — ACETAMINOPHEN 325 MG/1
650 TABLET ORAL EVERY 4 HOURS PRN
Status: DISCONTINUED | OUTPATIENT
Start: 2021-07-12 | End: 2021-07-14 | Stop reason: HOSPADM

## 2021-07-12 RX ORDER — SODIUM CHLORIDE 0.9 % (FLUSH) 0.9 %
10 SYRINGE (ML) INJECTION EVERY 12 HOURS SCHEDULED
Status: DISCONTINUED | OUTPATIENT
Start: 2021-07-12 | End: 2021-07-14 | Stop reason: HOSPADM

## 2021-07-12 RX ORDER — MELATONIN
1000 EVERY OTHER DAY
Status: DISCONTINUED | OUTPATIENT
Start: 2021-07-13 | End: 2021-07-14 | Stop reason: HOSPADM

## 2021-07-12 RX ORDER — CETIRIZINE HYDROCHLORIDE 10 MG/1
10 TABLET ORAL DAILY
Status: DISCONTINUED | OUTPATIENT
Start: 2021-07-12 | End: 2021-07-12

## 2021-07-12 RX ORDER — ACETAMINOPHEN 650 MG/1
650 SUPPOSITORY RECTAL EVERY 4 HOURS PRN
Status: DISCONTINUED | OUTPATIENT
Start: 2021-07-12 | End: 2021-07-14 | Stop reason: HOSPADM

## 2021-07-12 RX ORDER — SODIUM CHLORIDE 0.9 % (FLUSH) 0.9 %
10 SYRINGE (ML) INJECTION AS NEEDED
Status: DISCONTINUED | OUTPATIENT
Start: 2021-07-12 | End: 2021-07-14 | Stop reason: HOSPADM

## 2021-07-12 RX ORDER — MULTIPLE VITAMINS W/ MINERALS TAB 9MG-400MCG
1 TAB ORAL DAILY
Status: DISCONTINUED | OUTPATIENT
Start: 2021-07-13 | End: 2021-07-14 | Stop reason: HOSPADM

## 2021-07-12 RX ORDER — ASPIRIN 81 MG/1
81 TABLET ORAL DAILY
Status: DISCONTINUED | OUTPATIENT
Start: 2021-07-12 | End: 2021-07-12

## 2021-07-12 RX ORDER — ACETAMINOPHEN 160 MG/5ML
650 SOLUTION ORAL EVERY 4 HOURS PRN
Status: DISCONTINUED | OUTPATIENT
Start: 2021-07-12 | End: 2021-07-14 | Stop reason: HOSPADM

## 2021-07-12 RX ORDER — DEXTROSE MONOHYDRATE 25 G/50ML
25 INJECTION, SOLUTION INTRAVENOUS
Status: DISCONTINUED | OUTPATIENT
Start: 2021-07-12 | End: 2021-07-14 | Stop reason: HOSPADM

## 2021-07-12 RX ORDER — ONDANSETRON 2 MG/ML
4 INJECTION INTRAMUSCULAR; INTRAVENOUS EVERY 6 HOURS PRN
Status: DISCONTINUED | OUTPATIENT
Start: 2021-07-12 | End: 2021-07-14 | Stop reason: HOSPADM

## 2021-07-12 RX ORDER — ATORVASTATIN CALCIUM 20 MG/1
20 TABLET, FILM COATED ORAL DAILY
Refills: 3 | Status: DISCONTINUED | OUTPATIENT
Start: 2021-07-13 | End: 2021-07-14 | Stop reason: HOSPADM

## 2021-07-12 RX ORDER — INSULIN LISPRO 100 [IU]/ML
0-7 INJECTION, SOLUTION INTRAVENOUS; SUBCUTANEOUS AS NEEDED
Status: DISCONTINUED | OUTPATIENT
Start: 2021-07-12 | End: 2021-07-14 | Stop reason: HOSPADM

## 2021-07-12 RX ORDER — CHOLECALCIFEROL (VITAMIN D3) 125 MCG
5 CAPSULE ORAL NIGHTLY PRN
Status: DISCONTINUED | OUTPATIENT
Start: 2021-07-12 | End: 2021-07-14 | Stop reason: HOSPADM

## 2021-07-12 RX ORDER — LANOLIN ALCOHOL/MO/W.PET/CERES
1000 CREAM (GRAM) TOPICAL DAILY
Status: DISCONTINUED | OUTPATIENT
Start: 2021-07-13 | End: 2021-07-14 | Stop reason: HOSPADM

## 2021-07-12 RX ORDER — ASPIRIN 81 MG/1
81 TABLET ORAL DAILY
Status: DISCONTINUED | OUTPATIENT
Start: 2021-07-13 | End: 2021-07-14 | Stop reason: HOSPADM

## 2021-07-12 RX ORDER — LISINOPRIL 20 MG/1
40 TABLET ORAL DAILY
Status: DISCONTINUED | OUTPATIENT
Start: 2021-07-13 | End: 2021-07-14 | Stop reason: HOSPADM

## 2021-07-12 RX ORDER — ONDANSETRON 4 MG/1
4 TABLET, FILM COATED ORAL EVERY 6 HOURS PRN
Status: DISCONTINUED | OUTPATIENT
Start: 2021-07-12 | End: 2021-07-14 | Stop reason: HOSPADM

## 2021-07-12 RX ORDER — NICOTINE POLACRILEX 4 MG
15 LOZENGE BUCCAL
Status: DISCONTINUED | OUTPATIENT
Start: 2021-07-12 | End: 2021-07-14 | Stop reason: HOSPADM

## 2021-07-12 RX ORDER — HYDROCHLOROTHIAZIDE 25 MG/1
25 TABLET ORAL DAILY
Status: DISCONTINUED | OUTPATIENT
Start: 2021-07-13 | End: 2021-07-14 | Stop reason: HOSPADM

## 2021-07-12 RX ADMIN — TECHNETIUM TC 99M SESTAMIBI 1 DOSE: 1 INJECTION INTRAVENOUS at 08:13

## 2021-07-12 RX ADMIN — INSULIN LISPRO 2 UNITS: 100 INJECTION, SOLUTION INTRAVENOUS; SUBCUTANEOUS at 19:57

## 2021-07-12 RX ADMIN — METFORMIN HYDROCHLORIDE 1000 MG: 500 TABLET ORAL at 18:03

## 2021-07-12 RX ADMIN — Medication 10 ML: at 20:00

## 2021-07-12 RX ADMIN — TECHNETIUM TC 99M SESTAMIBI 1 DOSE: 1 INJECTION INTRAVENOUS at 10:45

## 2021-07-12 NOTE — PROGRESS NOTES
Encounter Date:07/12/2021  Last seen 6/24/2021      Patient ID: Celsa Brown is a 71 y.o. female.    Chief Complaint:  Shortness of breath  Dizziness  Near syncope  Sinus bradycardia  Chest discomfort     History of Present Illness  Patient was recently seen with following history.  The patient is a pleasant 71-year-old white female is here for evaluation of above problems.  Lately patient has been having exertional shortness of breath and having chest discomfort associated with low pulse with radiation of the discomfort from the right side of the chest to the left side. Apparently she has been having slow heart rhythm with taking hydralazine. Patient also has some dizziness and near syncopal feeling. Patient has right bundle branch block.  Patient is seen for further evaluation.     Patient is not having any palpitation or syncope.  Denies having any headache ,abdominal pain ,nausea, vomiting , diarrhea constipation, loss of weight or loss of appetite.  Denies having any excessive bruising ,hematuria or blood in the stool.     Review of all systems negative except as indicated.     Reviewed ROS.        Assessment and Plan      ]]]]]]]]]]]]]]]]]]]  Impression  ==========  -Chest discomfort and shortness of breath with or without exertion.     -Dizziness and near syncope  Echocardiogram-normal 7/12/2021  Stress Cardiolite test-normal except patient has second-degree 2-1 AV block and right bundle branch block.  Very limited exercise tolerance.     -Right bundle branch block     -Sinus bradycardia     -Diabetes dyslipidemia hypertension     -Status post tonsillectomy abdominal tubal ligation     -Family history of coronary artery disease     -Non-smoker     -Allergic to sulfa  =============  Plan  ==========  Patient has shortness of breath and chest discomfort.  Recent EKG from 6/21/2021 was reviewed and interpreted independently showing sinus rhythm right bundle branch block  Recent labs were reviewed-as below.  Normal CBC CMP except for blood sugar of 170 cholesterol 158 triglycerides 77 LDL 86.  Ischemic cardiac work-up.  Stress Cardiolite test-abnormal as above.  Resting ECG showed second-degree AV block with a heart rate of 40/min.  Nuclear images showed uniform distribution of radionuclide.  Echocardiogram.-Normal as above  Patient is not on any medications to cause bradycardia  Medications were reviewed and updated.  Patient was advised admission to the hospital in view of second-degree AV block right bundle branch block and probably having advanced AV block was causing dizziness and near syncope.  Patient was advised permanent pacemaker implantation.  I have contacted hospitalist for admission to the hospital and coordination of care.  Further plan will depend on patient's progress.  ]]]]]]]]]]]]]]                         Diagnosis Plan   1. Essential hypertension     2. Dyslipidemia     3. Shortness of breath     4. Dizziness     5. Diabetes mellitus due to underlying condition without complication, without long-term current use of insulin (CMS/[facility])     6. Bradycardia, sinus     7. Chest discomfort     8. Second degree AV block, Mobitz type II     LAB RESULTS (LAST 7 DAYS)    CBC        BMP        CMP         BNP        TROPONIN        CoAg        Creatinine Clearance  Estimated Creatinine Clearance: 61.3 mL/min (by C-G formula based on SCr of 0.61 mg/dL).    ABG        Radiology  No radiology results for the last day                The following portions of the patient's history were reviewed and updated as appropriate: allergies, current medications, past family history, past medical history, past social history, past surgical history and problem list.    Review of Systems   Constitutional: Negative for malaise/fatigue.   Cardiovascular: Negative for chest pain, leg swelling, palpitations and syncope.   Respiratory: Negative for shortness of breath.    Skin: Negative for rash.   Gastrointestinal: Negative for  nausea and vomiting.   Neurological: Negative for dizziness, light-headedness and numbness.       No current facility-administered medications for this visit.  No current outpatient medications on file.    Facility-Administered Medications Ordered in Other Visits:   •  acetaminophen (TYLENOL) tablet 650 mg, 650 mg, Oral, Q4H PRN **OR** acetaminophen (TYLENOL) 160 MG/5ML solution 650 mg, 650 mg, Oral, Q4H PRN **OR** acetaminophen (TYLENOL) suppository 650 mg, 650 mg, Rectal, Q4H PRN, Lionel Siddiqui MD  •  [START ON 7/13/2021] aspirin EC tablet 81 mg, 81 mg, Oral, Daily, Lionel Siddiqui MD  •  [START ON 7/13/2021] atorvastatin (LIPITOR) tablet 20 mg, 20 mg, Oral, Daily, Lionel Siddiqui MD  •  [START ON 7/13/2021] cetirizine (zyrTEC) tablet 10 mg, 10 mg, Oral, Daily, Lionel Siddiqui MD  •  [START ON 7/13/2021] cholecalciferol (VITAMIN D3) tablet 1,000 Units, 1,000 Units, Oral, Every Other Day, Lionel Siddiqui MD  •  dextrose (D50W) 25 g/ 50mL Intravenous Solution 25 g, 25 g, Intravenous, Q15 Min PRN, Lionel Siddiqui MD  •  dextrose (GLUTOSE) oral gel 15 g, 15 g, Oral, Q15 Min PRN, Lionel Siddiqui MD  •  glucagon (human recombinant) (GLUCAGEN DIAGNOSTIC) injection 1 mg, 1 mg, Subcutaneous, Q15 Min PRN, Lionel Siddiqui MD  •  [START ON 7/13/2021] hydrALAZINE (APRESOLINE) tablet 75 mg, 75 mg, Oral, BID, Lionel Siddiqui MD  •  [START ON 7/13/2021] hydroCHLOROthiazide (HYDRODIURIL) tablet 25 mg, 25 mg, Oral, Daily, Lionel Siddiqui MD  •  insulin lispro (ADMELOG) injection 0-7 Units, 0-7 Units, Subcutaneous, TID AC **AND** insulin lispro (ADMELOG) injection 0-7 Units, 0-7 Units, Subcutaneous, PRN, Lionel Siddiqui MD  •  [START ON 7/13/2021] lisinopril (PRINIVIL,ZESTRIL) tablet 40 mg, 40 mg, Oral, Daily, Lionel Siddiqui  MD Meli  •  melatonin tablet 5 mg, 5 mg, Oral, Nightly PRN, Lionel Siddiqui MD  •  metFORMIN (GLUCOPHAGE) tablet 1,000 mg, 1,000 mg, Oral, BID With Meals, Lionel Siddiqui MD  •  [START ON 7/13/2021] multivitamin with minerals 1 tablet, 1 tablet, Oral, Daily, Lionel Siddiqui MD  •  ondansetron (ZOFRAN) tablet 4 mg, 4 mg, Oral, Q6H PRN **OR** ondansetron (ZOFRAN) injection 4 mg, 4 mg, Intravenous, Q6H PRN, Lionel Siddiqui MD  •  sodium chloride 0.9 % flush 10 mL, 10 mL, Intravenous, Q12H, Lionel Siddiqui MD  •  sodium chloride 0.9 % flush 10 mL, 10 mL, Intravenous, PRN, Lionel Siddiqui MD  •  [START ON 7/13/2021] vitamin B-12 (CYANOCOBALAMIN) tablet 1,000 mcg, 1,000 mcg, Oral, Daily, Lionel Siddiqui MD    Allergies   Allergen Reactions   • Sulfa Antibiotics Hives       Family History   Problem Relation Age of Onset   • Heart failure Mother    • Diabetes Mother    • Breast cancer Neg Hx    • Ovarian cancer Neg Hx        Past Surgical History:   Procedure Laterality Date   • CARDIAC CATHETERIZATION     • COLONOSCOPY     • TONSILLECTOMY     • TUBAL ABDOMINAL LIGATION         Past Medical History:   Diagnosis Date   • B12 deficiency    • Diabetes mellitus (CMS/HCC)    • Hyperlipidemia    • Hypertension    • Tachycardia    • Vitamin D deficiency        Family History   Problem Relation Age of Onset   • Heart failure Mother    • Diabetes Mother    • Breast cancer Neg Hx    • Ovarian cancer Neg Hx        Social History     Socioeconomic History   • Marital status:      Spouse name: Not on file   • Number of children: Not on file   • Years of education: Not on file   • Highest education level: Not on file   Tobacco Use   • Smoking status: Never Smoker   • Smokeless tobacco: Never Used   Substance and Sexual Activity   • Alcohol use: No   • Drug use: No   • Sexual activity: Yes     Partners:  "Male     Birth control/protection: None         Procedures      Objective:       Physical Exam    /70   Pulse (!) 40   Ht 165.1 cm (65\")   Wt 69.4 kg (153 lb)   LMP  (LMP Unknown)   BMI 25.46 kg/m²   The patient is alert, oriented and in no distress.    Vital signs as noted above.    Head and neck revealed no carotid bruits or jugular venous distension.  No thyromegaly or lymphadenopathy is present.    Lungs clear.  No wheezing.  Breath sounds are normal bilaterally.    Heart normal first and second heart sounds.  No murmur..  No pericardial rub is present.  No gallop is present.    Abdomen soft and nontender.  No organomegaly is present.    Extremities revealed good peripheral pulses without any pedal edema.    Skin warm and dry.    Musculoskeletal system is grossly normal.    CNS grossly normal.        "

## 2021-07-12 NOTE — PLAN OF CARE
Problem: Adult Inpatient Plan of Care  Goal: Optimal Comfort and Wellbeing  Intervention: Provide Person-Centered Care  Recent Flowsheet Documentation  Taken 7/12/2021 1434 by Samantha Jnoes RN  Trust Relationship/Rapport:   care explained   reassurance provided   thoughts/feelings acknowledged     Problem: Adult Inpatient Plan of Care  Goal: Absence of Hospital-Acquired Illness or Injury  Intervention: Identify and Manage Fall Risk  Recent Flowsheet Documentation  Taken 7/12/2021 1434 by Samantha Jones RN  Safety Promotion/Fall Prevention:   safety round/check completed   toileting scheduled   room organization consistent  Intervention: Prevent Skin Injury  Recent Flowsheet Documentation  Taken 7/12/2021 1434 by Samantha Jones RN  Body Position: position changed independently  Intervention: Prevent Infection  Recent Flowsheet Documentation  Taken 7/12/2021 1434 by Samantha Jones RN  Infection Prevention:   hand hygiene promoted   equipment surfaces disinfected   rest/sleep promoted   Goal Outcome Evaluation:

## 2021-07-12 NOTE — H&P (VIEW-ONLY)
Encounter Date:07/12/2021  Last seen 6/24/2021      Patient ID: Celsa Brown is a 71 y.o. female.    Chief Complaint:  Shortness of breath  Dizziness  Near syncope  Sinus bradycardia  Chest discomfort     History of Present Illness  Patient was recently seen with following history.  The patient is a pleasant 71-year-old white female is here for evaluation of above problems.  Lately patient has been having exertional shortness of breath and having chest discomfort associated with low pulse with radiation of the discomfort from the right side of the chest to the left side. Apparently she has been having slow heart rhythm with taking hydralazine. Patient also has some dizziness and near syncopal feeling. Patient has right bundle branch block.  Patient is seen for further evaluation.     Patient is not having any palpitation or syncope.  Denies having any headache ,abdominal pain ,nausea, vomiting , diarrhea constipation, loss of weight or loss of appetite.  Denies having any excessive bruising ,hematuria or blood in the stool.     Review of all systems negative except as indicated.     Reviewed ROS.        Assessment and Plan      ]]]]]]]]]]]]]]]]]]]  Impression  ==========  -Chest discomfort and shortness of breath with or without exertion.     -Dizziness and near syncope  Echocardiogram-normal 7/12/2021  Stress Cardiolite test-normal except patient has second-degree 2-1 AV block and right bundle branch block.  Very limited exercise tolerance.     -Right bundle branch block     -Sinus bradycardia     -Diabetes dyslipidemia hypertension     -Status post tonsillectomy abdominal tubal ligation     -Family history of coronary artery disease     -Non-smoker     -Allergic to sulfa  =============  Plan  ==========  Patient has shortness of breath and chest discomfort.  Recent EKG from 6/21/2021 was reviewed and interpreted independently showing sinus rhythm right bundle branch block  Recent labs were reviewed-as below.  Normal CBC CMP except for blood sugar of 170 cholesterol 158 triglycerides 77 LDL 86.  Ischemic cardiac work-up.  Stress Cardiolite test-abnormal as above.  Resting ECG showed second-degree AV block with a heart rate of 40/min.  Nuclear images showed uniform distribution of radionuclide.  Echocardiogram.-Normal as above  Patient is not on any medications to cause bradycardia  Medications were reviewed and updated.  Patient was advised admission to the hospital in view of second-degree AV block right bundle branch block and probably having advanced AV block was causing dizziness and near syncope.  Patient was advised permanent pacemaker implantation.  I have contacted hospitalist for admission to the hospital and coordination of care.  Further plan will depend on patient's progress.  ]]]]]]]]]]]]]]                         Diagnosis Plan   1. Essential hypertension     2. Dyslipidemia     3. Shortness of breath     4. Dizziness     5. Diabetes mellitus due to underlying condition without complication, without long-term current use of insulin (CMS/[facility])     6. Bradycardia, sinus     7. Chest discomfort     8. Second degree AV block, Mobitz type II     LAB RESULTS (LAST 7 DAYS)    CBC        BMP        CMP         BNP        TROPONIN        CoAg        Creatinine Clearance  Estimated Creatinine Clearance: 61.3 mL/min (by C-G formula based on SCr of 0.61 mg/dL).    ABG        Radiology  No radiology results for the last day                The following portions of the patient's history were reviewed and updated as appropriate: allergies, current medications, past family history, past medical history, past social history, past surgical history and problem list.    Review of Systems   Constitutional: Negative for malaise/fatigue.   Cardiovascular: Negative for chest pain, leg swelling, palpitations and syncope.   Respiratory: Negative for shortness of breath.    Skin: Negative for rash.   Gastrointestinal: Negative for  nausea and vomiting.   Neurological: Negative for dizziness, light-headedness and numbness.       No current facility-administered medications for this visit.  No current outpatient medications on file.    Facility-Administered Medications Ordered in Other Visits:   •  acetaminophen (TYLENOL) tablet 650 mg, 650 mg, Oral, Q4H PRN **OR** acetaminophen (TYLENOL) 160 MG/5ML solution 650 mg, 650 mg, Oral, Q4H PRN **OR** acetaminophen (TYLENOL) suppository 650 mg, 650 mg, Rectal, Q4H PRN, Lionel Siddiqui MD  •  [START ON 7/13/2021] aspirin EC tablet 81 mg, 81 mg, Oral, Daily, Lionel Siddiqui MD  •  [START ON 7/13/2021] atorvastatin (LIPITOR) tablet 20 mg, 20 mg, Oral, Daily, Lionel Siddiqui MD  •  [START ON 7/13/2021] cetirizine (zyrTEC) tablet 10 mg, 10 mg, Oral, Daily, Lionel Siddiqui MD  •  [START ON 7/13/2021] cholecalciferol (VITAMIN D3) tablet 1,000 Units, 1,000 Units, Oral, Every Other Day, Lionel Siddiqui MD  •  dextrose (D50W) 25 g/ 50mL Intravenous Solution 25 g, 25 g, Intravenous, Q15 Min PRN, Lionel Siddiqui MD  •  dextrose (GLUTOSE) oral gel 15 g, 15 g, Oral, Q15 Min PRN, Lionel Siddiqui MD  •  glucagon (human recombinant) (GLUCAGEN DIAGNOSTIC) injection 1 mg, 1 mg, Subcutaneous, Q15 Min PRN, Lionel Siddiqui MD  •  [START ON 7/13/2021] hydrALAZINE (APRESOLINE) tablet 75 mg, 75 mg, Oral, BID, Lionel Siddiqui MD  •  [START ON 7/13/2021] hydroCHLOROthiazide (HYDRODIURIL) tablet 25 mg, 25 mg, Oral, Daily, Lionel Siddiqui MD  •  insulin lispro (ADMELOG) injection 0-7 Units, 0-7 Units, Subcutaneous, TID AC **AND** insulin lispro (ADMELOG) injection 0-7 Units, 0-7 Units, Subcutaneous, PRN, Lionel Siddiqui MD  •  [START ON 7/13/2021] lisinopril (PRINIVIL,ZESTRIL) tablet 40 mg, 40 mg, Oral, Daily, Lionel Siddiqui  MD Meli  •  melatonin tablet 5 mg, 5 mg, Oral, Nightly PRN, Lionel Siddiqui MD  •  metFORMIN (GLUCOPHAGE) tablet 1,000 mg, 1,000 mg, Oral, BID With Meals, Lionel Siddiqui MD  •  [START ON 7/13/2021] multivitamin with minerals 1 tablet, 1 tablet, Oral, Daily, Lionel Siddiqui MD  •  ondansetron (ZOFRAN) tablet 4 mg, 4 mg, Oral, Q6H PRN **OR** ondansetron (ZOFRAN) injection 4 mg, 4 mg, Intravenous, Q6H PRN, Lionel Siddiqui MD  •  sodium chloride 0.9 % flush 10 mL, 10 mL, Intravenous, Q12H, Lionel Siddiqui MD  •  sodium chloride 0.9 % flush 10 mL, 10 mL, Intravenous, PRN, Lionel Siddiqui MD  •  [START ON 7/13/2021] vitamin B-12 (CYANOCOBALAMIN) tablet 1,000 mcg, 1,000 mcg, Oral, Daily, Lionel Siddiqui MD    Allergies   Allergen Reactions   • Sulfa Antibiotics Hives       Family History   Problem Relation Age of Onset   • Heart failure Mother    • Diabetes Mother    • Breast cancer Neg Hx    • Ovarian cancer Neg Hx        Past Surgical History:   Procedure Laterality Date   • CARDIAC CATHETERIZATION     • COLONOSCOPY     • TONSILLECTOMY     • TUBAL ABDOMINAL LIGATION         Past Medical History:   Diagnosis Date   • B12 deficiency    • Diabetes mellitus (CMS/HCC)    • Hyperlipidemia    • Hypertension    • Tachycardia    • Vitamin D deficiency        Family History   Problem Relation Age of Onset   • Heart failure Mother    • Diabetes Mother    • Breast cancer Neg Hx    • Ovarian cancer Neg Hx        Social History     Socioeconomic History   • Marital status:      Spouse name: Not on file   • Number of children: Not on file   • Years of education: Not on file   • Highest education level: Not on file   Tobacco Use   • Smoking status: Never Smoker   • Smokeless tobacco: Never Used   Substance and Sexual Activity   • Alcohol use: No   • Drug use: No   • Sexual activity: Yes     Partners:  "Male     Birth control/protection: None         Procedures      Objective:       Physical Exam    /70   Pulse (!) 40   Ht 165.1 cm (65\")   Wt 69.4 kg (153 lb)   LMP  (LMP Unknown)   BMI 25.46 kg/m²   The patient is alert, oriented and in no distress.    Vital signs as noted above.    Head and neck revealed no carotid bruits or jugular venous distension.  No thyromegaly or lymphadenopathy is present.    Lungs clear.  No wheezing.  Breath sounds are normal bilaterally.    Heart normal first and second heart sounds.  No murmur..  No pericardial rub is present.  No gallop is present.    Abdomen soft and nontender.  No organomegaly is present.    Extremities revealed good peripheral pulses without any pedal edema.    Skin warm and dry.    Musculoskeletal system is grossly normal.    CNS grossly normal.        "

## 2021-07-12 NOTE — H&P
[facility]   HISTORY AND PHYSICAL    Patient Name: Celsa Brown  : 1950  MRN: [id number]  Primary Care Physician:  Jaelyn Iverson MD  Date of admission: 2021    Subjective   Subjective     Chief Complaint:   Dizziness    HPI:    71-year-old female with chronic medical problems including    DM2  Hypertension  Dyslipidemia  Status post tonsillectomy and tubal ligation who has been complaining of worsening dizziness lightheadedness for 3 weeks duration.  She reports some chest discomfort intermittently.  She denies loss of consciousness  She was noted to have bradycardia down to 30s at some point  She was referred today by Dr. Lynch, cardiologist for above problems and had stress test and echocardiogram that were normal and due to bradycardia and dizziness there was concern from patient needing pacemaker placement.  Dr. Lynch requested to admission for assessment and possible pacemaker placement          Review of Systems   All systems were reviewed and negative except for: Dizziness    Personal History     Past Medical History:   Diagnosis Date   • B12 deficiency    • Diabetes mellitus (CMS/HCC)    • Hyperlipidemia    • Hypertension    • Tachycardia    • Vitamin D deficiency        Past Surgical History:   Procedure Laterality Date   • CARDIAC CATHETERIZATION     • COLONOSCOPY     • TONSILLECTOMY     • TUBAL ABDOMINAL LIGATION         Family History: family history includes Diabetes in her mother; Heart failure in her mother. Otherwise pertinent FHx was reviewed and not pertinent to current issue.    Social History:  reports that she has never smoked. She has never used smokeless tobacco. She reports that she does not drink alcohol and does not use drugs.    Home Medications:  Melatonin, Omega-3 Fish Oil, SITagliptin, Vitamin B12, aspirin, cholecalciferol, dilTIAZem, glipizide, glucose blood, hydrALAZINE, hydroCHLOROthiazide, lisinopril, loratadine, metFORMIN, metoprolol succinate XL,  multivitamin with minerals, potassium chloride, and pravastatin    Allergies:  Allergies   Allergen Reactions   • Sulfa Antibiotics Hives       Objective   Objective     Vitals:   Temp:  [98.7 °F (37.1 °C)] 98.7 °F (37.1 °C)  Heart Rate:  [40-47] 47  Resp:  [16] 16  BP: (127-156)/(45-70) 127/45  Physical Exam    Constitutional: Awake, alert   Eyes: PERRLA, sclerae anicteric, no conjunctival injection   HENT: NCAT, mucous membranes moist   Neck: Supple, no thyromegaly, no lymphadenopathy, trachea midline   Respiratory: Clear to auscultation bilaterally, nonlabored respirations    Cardiovascular: Bradycardia, no murmurs, rubs, or gallops, palpable pedal pulses bilaterally   Gastrointestinal: Positive bowel sounds, soft, nontender, nondistended   Musculoskeletal: No bilateral ankle edema, no clubbing or cyanosis to extremities   Psychiatric: Appropriate affect, cooperative   Neurologic: Oriented x 3, strength symmetric in all extremities, Cranial Nerves grossly intact to confrontation, speech clear   Skin: No rashes     Result Review    Result Review:  I have personally reviewed the results from the time of this admission to 7/12/2021 15:34 EDT and agree with these findings:  [x]  Laboratory  []  Microbiology  []  Radiology  [x]  EKG/Telemetry   [x]  Cardiology/Vascular   []  Pathology  [x]  Old records  []  Other:  Most notable findings include: Bradycardia    Assessment/Plan   Assessment / Plan     Brief Patient Summary:  Celsa Brown is a 71 y.o. female who *  Admitted directly from Dr. Lynch's office for bradycardia    Bradycardia  Second-degree AV block  Right bundle branch block  May need pacemaker  Dr. Lynch requesting admission following the patient  Negative outpatient work-up with stress test and echo cardiogram.  Dr. Lynch  Check thyroid function and electrolytes    DM2 on Januvia and Metformin and glipizide  Hold oral medications add correction insulin    Hypertension  Awaiting medication  reconciliation  Hold beta-blocke and Cardizem given bradycardia    Dyslipidemia on statin      Overweight  BMI 25.7      Active Hospital Problems:  Active Hospital Problems    Diagnosis    • **Bradycardia    • Type 2 diabetes mellitus (CMS/HCC)    • Hypertension    • B12 deficiency    • Hyperlipidemia      Plan:   As outlined above    DVT prophylaxis:  No DVT prophylaxis order currently exists.  SCD    CODE STATUS:       Admission Status:  I believe this patient meets observation status.    Electronically signed by Lionel Siddiqui MD, 07/12/21, 3:29 PM EDT.

## 2021-07-13 ENCOUNTER — APPOINTMENT (OUTPATIENT)
Dept: GENERAL RADIOLOGY | Facility: HOSPITAL | Age: 71
End: 2021-07-13

## 2021-07-13 LAB
GLUCOSE BLDC GLUCOMTR-MCNC: 162 MG/DL (ref 70–105)
GLUCOSE BLDC GLUCOMTR-MCNC: 177 MG/DL (ref 70–105)
GLUCOSE BLDC GLUCOMTR-MCNC: 190 MG/DL (ref 70–105)
GLUCOSE BLDC GLUCOMTR-MCNC: 222 MG/DL (ref 70–105)
QT INTERVAL: 612 MS

## 2021-07-13 PROCEDURE — 63710000001 INSULIN LISPRO (HUMAN) PER 5 UNITS: Performed by: INTERNAL MEDICINE

## 2021-07-13 PROCEDURE — C1898 LEAD, PMKR, OTHER THAN TRANS: HCPCS | Performed by: INTERNAL MEDICINE

## 2021-07-13 PROCEDURE — 71045 X-RAY EXAM CHEST 1 VIEW: CPT

## 2021-07-13 PROCEDURE — 99225 PR SBSQ OBSERVATION CARE/DAY 25 MINUTES: CPT | Performed by: INTERNAL MEDICINE

## 2021-07-13 PROCEDURE — 82962 GLUCOSE BLOOD TEST: CPT

## 2021-07-13 PROCEDURE — 25010000002 FENTANYL CITRATE (PF) 50 MCG/ML SOLUTION: Performed by: INTERNAL MEDICINE

## 2021-07-13 PROCEDURE — C1894 INTRO/SHEATH, NON-LASER: HCPCS | Performed by: INTERNAL MEDICINE

## 2021-07-13 PROCEDURE — 33208 INSRT HEART PM ATRIAL & VENT: CPT | Performed by: INTERNAL MEDICINE

## 2021-07-13 PROCEDURE — 0 IOPAMIDOL PER 1 ML: Performed by: INTERNAL MEDICINE

## 2021-07-13 PROCEDURE — 99152 MOD SED SAME PHYS/QHP 5/>YRS: CPT | Performed by: INTERNAL MEDICINE

## 2021-07-13 PROCEDURE — 25010000002 MIDAZOLAM PER 1 MG: Performed by: INTERNAL MEDICINE

## 2021-07-13 PROCEDURE — 25010000002 VANCOMYCIN 1 G RECONSTITUTED SOLUTION 1 EACH VIAL: Performed by: INTERNAL MEDICINE

## 2021-07-13 PROCEDURE — G0378 HOSPITAL OBSERVATION PER HR: HCPCS

## 2021-07-13 PROCEDURE — 99214 OFFICE O/P EST MOD 30 MIN: CPT | Performed by: INTERNAL MEDICINE

## 2021-07-13 PROCEDURE — 93005 ELECTROCARDIOGRAM TRACING: CPT | Performed by: INTERNAL MEDICINE

## 2021-07-13 PROCEDURE — C1785 PMKR, DUAL, RATE-RESP: HCPCS | Performed by: INTERNAL MEDICINE

## 2021-07-13 PROCEDURE — 99153 MOD SED SAME PHYS/QHP EA: CPT | Performed by: INTERNAL MEDICINE

## 2021-07-13 DEVICE — PACEMAKER
Type: IMPLANTABLE DEVICE | Status: FUNCTIONAL
Brand: ACCOLADE™ MRI DR

## 2021-07-13 DEVICE — PACE/SENSE LEAD
Type: IMPLANTABLE DEVICE | Status: FUNCTIONAL
Brand: INGEVITY™+

## 2021-07-13 RX ORDER — SODIUM CHLORIDE 0.9 % (FLUSH) 0.9 %
10 SYRINGE (ML) INJECTION AS NEEDED
Status: DISCONTINUED | OUTPATIENT
Start: 2021-07-13 | End: 2021-07-14 | Stop reason: HOSPADM

## 2021-07-13 RX ORDER — HYDROCODONE BITARTRATE AND ACETAMINOPHEN 5; 325 MG/1; MG/1
1 TABLET ORAL EVERY 4 HOURS PRN
Status: DISCONTINUED | OUTPATIENT
Start: 2021-07-13 | End: 2021-07-14 | Stop reason: HOSPADM

## 2021-07-13 RX ORDER — LIDOCAINE HYDROCHLORIDE AND EPINEPHRINE BITARTRATE 20; .01 MG/ML; MG/ML
INJECTION, SOLUTION SUBCUTANEOUS AS NEEDED
Status: DISCONTINUED | OUTPATIENT
Start: 2021-07-13 | End: 2021-07-13 | Stop reason: HOSPADM

## 2021-07-13 RX ORDER — SODIUM CHLORIDE 0.9 % (FLUSH) 0.9 %
3 SYRINGE (ML) INJECTION EVERY 12 HOURS SCHEDULED
Status: DISCONTINUED | OUTPATIENT
Start: 2021-07-13 | End: 2021-07-14 | Stop reason: HOSPADM

## 2021-07-13 RX ORDER — MIDAZOLAM HYDROCHLORIDE 1 MG/ML
INJECTION INTRAMUSCULAR; INTRAVENOUS AS NEEDED
Status: DISCONTINUED | OUTPATIENT
Start: 2021-07-13 | End: 2021-07-13 | Stop reason: HOSPADM

## 2021-07-13 RX ORDER — FENTANYL CITRATE 50 UG/ML
INJECTION, SOLUTION INTRAMUSCULAR; INTRAVENOUS AS NEEDED
Status: DISCONTINUED | OUTPATIENT
Start: 2021-07-13 | End: 2021-07-13 | Stop reason: HOSPADM

## 2021-07-13 RX ADMIN — VANCOMYCIN HYDROCHLORIDE 1000 MG: 1 INJECTION, POWDER, LYOPHILIZED, FOR SOLUTION INTRAVENOUS at 08:14

## 2021-07-13 RX ADMIN — ACETAMINOPHEN 650 MG: 325 TABLET, FILM COATED ORAL at 13:53

## 2021-07-13 RX ADMIN — Medication 3 ML: at 20:06

## 2021-07-13 RX ADMIN — HYDRALAZINE HYDROCHLORIDE 75 MG: 25 TABLET, FILM COATED ORAL at 20:04

## 2021-07-13 RX ADMIN — INSULIN LISPRO 3 UNITS: 100 INJECTION, SOLUTION INTRAVENOUS; SUBCUTANEOUS at 17:37

## 2021-07-13 RX ADMIN — CETIRIZINE HYDROCHLORIDE 10 MG: 10 TABLET, FILM COATED ORAL at 10:02

## 2021-07-13 RX ADMIN — ASPIRIN 81 MG: 81 TABLET, COATED ORAL at 10:01

## 2021-07-13 RX ADMIN — Medication 3 ML: at 10:02

## 2021-07-13 RX ADMIN — VANCOMYCIN HYDROCHLORIDE 1000 MG: 1 INJECTION, POWDER, LYOPHILIZED, FOR SOLUTION INTRAVENOUS at 20:04

## 2021-07-13 RX ADMIN — ATORVASTATIN CALCIUM 20 MG: 20 TABLET, FILM COATED ORAL at 10:00

## 2021-07-13 RX ADMIN — HYDROCHLOROTHIAZIDE 25 MG: 25 TABLET ORAL at 10:01

## 2021-07-13 RX ADMIN — Medication 1000 UNITS: at 10:01

## 2021-07-13 RX ADMIN — CYANOCOBALAMIN TAB 1000 MCG 1000 MCG: 1000 TAB at 10:00

## 2021-07-13 RX ADMIN — ACETAMINOPHEN 650 MG: 325 TABLET, FILM COATED ORAL at 10:02

## 2021-07-13 RX ADMIN — INSULIN LISPRO 2 UNITS: 100 INJECTION, SOLUTION INTRAVENOUS; SUBCUTANEOUS at 12:50

## 2021-07-13 RX ADMIN — ACETAMINOPHEN 650 MG: 325 TABLET, FILM COATED ORAL at 20:16

## 2021-07-13 RX ADMIN — LISINOPRIL 40 MG: 20 TABLET ORAL at 10:01

## 2021-07-13 RX ADMIN — MULTIPLE VITAMINS W/ MINERALS TAB 1 TABLET: TAB at 10:01

## 2021-07-13 RX ADMIN — HYDRALAZINE HYDROCHLORIDE 75 MG: 25 TABLET, FILM COATED ORAL at 10:00

## 2021-07-13 RX ADMIN — Medication 10 ML: at 20:04

## 2021-07-13 NOTE — PROGRESS NOTES
[facility] Medicine Services Daily Progress Note    Patient Name: Celsa Brown  : 1950  MRN: [id number]  Primary Care Physician:  Jaelyn Iverson MD  Date of admission: 2021      Subjective      Chief Complaint:  Dizziness    HPI  71-year-old female with chronic medical problems including     DM2  Hypertension  Dyslipidemia  Status post tonsillectomy and tubal ligation who has been complaining of worsening dizziness lightheadedness for 3 weeks duration.  She reports some chest discomfort intermittently.  She denies loss of consciousness  She was noted to have bradycardia down to 30s at some point  She was referred today by Dr. Lynch, cardiologist for above problems and had stress test and echocardiogram that were normal and due to bradycardia and dizziness there was concern from patient needing pacemaker placement.  Dr. Lynch requested to admission for assessment and possible pacemaker placement     Patient Reports    Patient seen post operatively  Mildly tachycardic but denies chest pain.   at the bedside    Review of Systems   All other systems reviewed and are negative.   *      Objective      Vitals:   Temp:  [97.6 °F (36.4 °C)-98.8 °F (37.1 °C)] 97.6 °F (36.4 °C)  Heart Rate:  [47-93] 92  Resp:  [16-23] 20  BP: (136-207)/(55-94) 151/71    Physical Exam  Vitals and nursing note reviewed.   Constitutional:       General: She is not in acute distress.     Appearance: Normal appearance. She is well-developed. She is not ill-appearing, toxic-appearing or diaphoretic.   HENT:      Head: Normocephalic and atraumatic.      Right Ear: Ear canal and external ear normal.      Left Ear: Ear canal and external ear normal.      Nose: Nose normal. No congestion or rhinorrhea.      Mouth/Throat:      Mouth: Mucous membranes are moist.      Pharynx: No oropharyngeal exudate.   Eyes:      General: No scleral icterus.        Right eye: No discharge.         Left eye: No  discharge.      Extraocular Movements: Extraocular movements intact.      Conjunctiva/sclera: Conjunctivae normal.      Pupils: Pupils are equal, round, and reactive to light.   Neck:      Thyroid: No thyromegaly.      Vascular: No carotid bruit or JVD.      Trachea: No tracheal deviation.   Cardiovascular:      Rate and Rhythm: Normal rate and regular rhythm.      Pulses: Normal pulses.      Heart sounds: Normal heart sounds. No murmur heard.   No friction rub. No gallop.    Pulmonary:      Effort: Pulmonary effort is normal. No respiratory distress.      Breath sounds: Normal breath sounds. No stridor. No wheezing, rhonchi or rales.   Chest:      Chest wall: No tenderness.   Abdominal:      General: Bowel sounds are normal. There is no distension.      Palpations: Abdomen is soft. There is no mass.      Tenderness: There is no abdominal tenderness. There is no guarding or rebound.      Hernia: No hernia is present.   Musculoskeletal:         General: No swelling, tenderness, deformity or signs of injury. Normal range of motion.      Cervical back: Normal range of motion and neck supple. No rigidity. No muscular tenderness.      Right lower leg: No edema.      Left lower leg: No edema.   Lymphadenopathy:      Cervical: No cervical adenopathy.   Skin:     General: Skin is warm and dry.      Coloration: Skin is not jaundiced or pale.      Findings: No bruising, erythema or rash.   Neurological:      General: No focal deficit present.      Mental Status: She is alert and oriented to person, place, and time. Mental status is at baseline.      Cranial Nerves: No cranial nerve deficit.      Sensory: No sensory deficit.      Motor: No weakness or abnormal muscle tone.      Coordination: Coordination normal.   Psychiatric:         Mood and Affect: Mood normal.         Behavior: Behavior normal.         Thought Content: Thought content normal.         Judgment: Judgment normal.             Result Review    Result Review:  I  have personally reviewed the results from the time of this admission to 7/13/2021 14:14 EDT and agree with these findings:  [x]  Laboratory  [x]  Microbiology  []  Radiology  []  EKG/Telemetry   []  Cardiology/Vascular   []  Pathology  []  Old records  []  Other:  Most notable findings include: Abnormal EKG    Wound 07/13/21 0836 Left upper chest Incision (Active)   Placement Date/Time: 07/13/21 0836   Present on Hospital Admission: No  Side: Left  Orientation: upper  Location: chest  Primary Wound Type: Incision      Assessments 7/13/2021  9:00 AM 7/13/2021 12:51 PM   Dressing Appearance dry;intact dry;intact   Closure Adhesive closure strips;Sutures;Liquid skin adhesive Unable to assess   Base -- dressing in place, unable to visualize   Periwound -- dry;ecchymotic   Periwound Temperature -- warm   Periwound Skin Turgor -- soft   Drainage Amount -- none   Periwound Care -- dry periwound area maintained       No Linked orders to display         Assessment/Plan      Brief Patient Summary:  Celsa Brown is a 71 y.o. female who bradycardia admitted for pacemaker placement by Dr. Hitchcock    Active Hospital Problems:  Active Hospital Problems    Diagnosis    • **Bradycardia    • Near syncope      Added automatically from request for surgery [id number]     • Second degree AV block, Mobitz type II      Added automatically from request for surgery [id number]     • Right bundle branch block      Added automatically from request for surgery [id number]     • Type 2 diabetes mellitus (CMS/HCC)    • Hypertension    • B12 deficiency    • Hyperlipidemia      Plan:   Bradycardia  Second-degree AV block Mobitz type II  Right bundle branch block  Dr. Lynch requesting admission following the patient  Negative outpatient work-up with stress test and echo cardiogram.  Dr. Lynch  Normal thyroid function and electrolytes  S/p pacemaker placement 7/13/2021        DM2 on Januvia and Metformin and glipizide  Hold oral medications add correction  insulin  A1c 7.7    Hypertension  Awaiting medication reconciliation  Hold beta-blocke and Cardizem given bradycardia     Dyslipidemia on statin        Overweight  BMI 25.7    DVT prophylaxis:  Mechanical DVT prophylaxis orders are present.    CODE STATUS:       Disposition:  I expect patient to be discharged *Home  Signature: Electronically signed by Lionel Siddiqui MD, 07/13/21, 2:14 PM EDT.  [facility] Hospitalist Team

## 2021-07-13 NOTE — CONSULTS
Referring Provider: Lionel Siddiqui *  Reason for Consultation:  Second-degree AV block  Right bundle branch block  Dizziness  Near syncope    Patient Care Team:  Jaelyn Iverson MD as PCP - General (Family Medicine)  Dung Lynch MD as Consulting Physician (Cardiology)    Chief complaint  Dizziness and near syncope    Subjective .     History of present illness:  Celsa Brown is a 71 y.o. female who presents with  .     The patient is a pleasant 71-year-old white female is here for evaluation of above problems.  Lately patient has been having exertional shortness of breath and having chest discomfort associated with low pulse with radiation of the discomfort from the right side of the chest to the left side. Apparently she has been having slow heart rhythm with taking hydralazine. Patient also has some dizziness and near syncopal feeling. Patient has right bundle branch block.  Patient is seen for further evaluation recently.  Patient was in the office yesterday and was found to be in second-degree Mobitz type II AV block.  Patient has an underlying right bundle branch block.  Patient was admitted to the hospital for further evaluation and possible permanent pacemaker implantation.        ROS      Since I have last seen, the patient has been without any chest discomfort ,shortness of breath, palpitations, or syncope.  Denies having any headache ,abdominal pain ,nausea, vomiting , diarrhea constipation, loss of weight or loss of appetite.  Denies having any excessive bruising ,hematuria or blood in the stool.    Review of all systems negative except as indicated      History  Past Medical History:   Diagnosis Date   • B12 deficiency    • Diabetes mellitus (CMS/HCC)    • Hyperlipidemia    • Hypertension    • Tachycardia    • Vitamin D deficiency        Past Surgical History:   Procedure Laterality Date   • CARDIAC CATHETERIZATION     • COLONOSCOPY     • TONSILLECTOMY     • TUBAL ABDOMINAL  LIGATION         Family History   Problem Relation Age of Onset   • Heart failure Mother    • Diabetes Mother    • Breast cancer Neg Hx    • Ovarian cancer Neg Hx        Social History     Tobacco Use   • Smoking status: Never Smoker   • Smokeless tobacco: Never Used   Substance Use Topics   • Alcohol use: No   • Drug use: No        Medications Prior to Admission   Medication Sig Dispense Refill Last Dose   • Accu-Chek Thelma Plus test strip TESTING BLOOD SUGAR ONCE  DAILY 90 each 3 7/12/2021 at Unknown time   • aspirin 81 MG EC tablet Take 81 mg by mouth Daily. 1 tablet every other day   7/12/2021 at Unknown time   • cholecalciferol (VITAMIN D3) 1000 units tablet Take 1,000 Units by mouth Every Other Day.   7/12/2021 at Unknown time   • Cyanocobalamin (VITAMIN B12) 1000 MCG tablet controlled-release Take 1,000 mcg by mouth Every Other Day.   7/12/2021 at Unknown time   • dilTIAZem (TIAZAC) 240 MG 24 hr capsule TAKE 2 CAPSULES BY MOUTH  DAILY 180 capsule 3 7/12/2021 at Unknown time   • glipizide (GLUCOTROL) 10 MG tablet TAKE 2 TABLETS BY MOUTH  TWICE DAILY IN THE MORNING  AND EVENING 360 tablet 3 7/12/2021 at Unknown time   • hydrALAZINE (APRESOLINE) 100 MG tablet Take 1 tablet by mouth 2 (Two) Times a Day. 270 tablet 3 7/12/2021 at Unknown time   • hydroCHLOROthiazide (HYDRODIURIL) 25 MG tablet TAKE 1 TABLET BY MOUTH  DAILY 90 tablet 3 7/12/2021 at Unknown time   • Januvia 100 MG tablet TAKE 1 TABLET BY MOUTH  DAILY 90 tablet 3 7/12/2021 at Unknown time   • lisinopril (PRINIVIL,ZESTRIL) 40 MG tablet TAKE 1 TABLET BY MOUTH  DAILY 90 tablet 3 7/12/2021 at Unknown time   • loratadine (CLARITIN) 10 MG tablet Take 10 mg by mouth Daily.   7/12/2021 at Unknown time   • Melatonin 10 MG tablet Take 10 mg by mouth Daily.   7/12/2021 at Unknown time   • metFORMIN (GLUCOPHAGE) 500 MG tablet TAKE 2 TABLETS BY MOUTH TWO TIMES DAILY WITH MEALS 360 tablet 3 7/12/2021 at Unknown time   • metoprolol succinate XL (TOPROL-XL) 100 MG 24  "hr tablet Take 1/2 tablet at night. 90 tablet 3 7/12/2021 at Unknown time   • Multiple Vitamins-Minerals (CENTRUM SILVER ULTRA WOMENS) tablet Take 1 tablet by mouth Daily.   7/12/2021 at Unknown time   • Omega-3 Fatty Acids (OMEGA-3 FISH OIL) 500 MG capsule Take 500 mg by mouth Daily.   7/12/2021 at Unknown time   • potassium chloride (MICRO-K) 10 MEQ CR capsule TAKE 1 CAPSULE BY MOUTH IN  THE MORNING AND EVENING ,  ON WEEKENDS 2 CAPSULES IN  THE MORNING AND 1 CAPSULE  IN THE EVENING 208 capsule 3 7/12/2021 at Unknown time   • pravastatin (PRAVACHOL) 80 MG tablet Take 1 tablet by mouth every night at bedtime. 90 tablet 3 7/12/2021 at Unknown time         Sulfa antibiotics    Scheduled Meds:aspirin, 81 mg, Oral, Daily  atorvastatin, 20 mg, Oral, Daily  cetirizine, 10 mg, Oral, Daily  cholecalciferol, 1,000 Units, Oral, Every Other Day  hydrALAZINE, 75 mg, Oral, BID  hydroCHLOROthiazide, 25 mg, Oral, Daily  insulin lispro, 0-7 Units, Subcutaneous, TID AC  lisinopril, 40 mg, Oral, Daily  metFORMIN, 1,000 mg, Oral, BID With Meals  multivitamin with minerals, 1 tablet, Oral, Daily  sodium chloride, 10 mL, Intravenous, Q12H  vitamin B-12, 1,000 mcg, Oral, Daily      Continuous Infusions:   PRN Meds:.•  acetaminophen **OR** acetaminophen **OR** acetaminophen  •  dextrose  •  dextrose  •  glucagon (human recombinant)  •  insulin lispro **AND** insulin lispro  •  melatonin  •  ondansetron **OR** ondansetron  •  sodium chloride    Objective     VITAL SIGNS  Vitals:    07/12/21 1749 07/12/21 2234 07/13/21 0227 07/13/21 0610   BP:  156/57 147/69 172/82   BP Location:  Left arm Left arm Left arm   Patient Position:  Lying Lying Lying   Pulse: 50 90 91 83   Resp:  16 18 23   Temp:  98.8 °F (37.1 °C) 98.5 °F (36.9 °C) 98.1 °F (36.7 °C)   TempSrc:  Oral Oral Oral   SpO2: 95% 94% 92% 97%   Weight: 70.3 kg (155 lb)      Height: 165.1 cm (65\")          Flowsheet Rows      First Filed Value   Admission Height  165.1 cm (65\") Documented " at 07/12/2021 1749   Admission Weight  70.3 kg (155 lb) Documented at 07/12/2021 1408          No intake or output data in the 24 hours ending 07/13/21 0613     TELEMETRY: Sinus rhythm with second-degree AV block Mobitz type II    Physical Exam:  The patient is alert, oriented and in no distress.  Vital signs as noted above.  Head and neck revealed no carotid bruits or jugular venous distention.  No thyromegaly or lymph adenopathy is present  Lungs clear.  No wheezing.  Breath sounds are normal bilaterally.  Heart normal first and second heart sounds.No murmur.  No precordial rub is present.  No gallop is present.  Abdomen soft and nontender.  No organomegaly is present.  Extremities with good peripheral pulses without any pedal edema.  Skin warm and dry.  Musculoskeletal system is grossly normal  CNS grossly normal      Results Review:   I reviewed the patient's new clinical results.  Lab Results (last 24 hours)     Procedure Component Value Units Date/Time    POC Glucose Once [[id number]]  (Abnormal) Collected: 07/12/21 1926    Specimen: Blood Updated: 07/12/21 1927     Glucose 183 mg/dL      Comment: Serial Number: [id number]:  [id number]       Hemoglobin A1c [[id number]]  (Abnormal) Collected: 07/12/21 1652    Specimen: Blood Updated: 07/12/21 1754     Hemoglobin A1C 7.7 %     Narrative:      Hemoglobin A1C Reference Range:    <5.7 %        Normal  5.7-6.4 %     Increased risk for diabetes  > 6.4 %        Diabetes       These guidelines have been recommended by the American Diabetic Association for Hgb A1c.      The following 2010 guidelines have been recommended by the American Diabetes Association for Hemoglobin A1c.    HBA1c 5.7-6.4% Increased risk for future diabetes (pre-diabetes)  HBA1c     >6.4% Diabetes      POC Glucose Once [[id number]]  (Normal) Collected: 07/12/21 1727    Specimen: Blood Updated: 07/12/21 1728     Glucose 71 mg/dL      Comment: Serial Number: [id number]:  [id number]       TSH  [[id number]]  (Normal) Collected: 07/12/21 1652    Specimen: Blood Updated: 07/12/21 1728     TSH 0.988 uIU/mL     T4, Free [[id number]]  (Normal) Collected: 07/12/21 1652    Specimen: Blood Updated: 07/12/21 1727     Free T4 1.10 ng/dL     Narrative:      Results may be falsely increased if patient taking Biotin.      Basic Metabolic Panel [[id number]]  (Abnormal) Collected: 07/12/21 1652    Specimen: Blood Updated: 07/12/21 1723     Glucose 124 mg/dL      BUN 16 mg/dL      Creatinine 0.64 mg/dL      Sodium 146 mmol/L      Potassium 3.8 mmol/L      Chloride 106 mmol/L      CO2 30.0 mmol/L      Calcium 9.6 mg/dL      eGFR Non African Amer 91 mL/min/1.73      BUN/Creatinine Ratio 25.0     Anion Gap 10.0 mmol/L     Narrative:      GFR Normal >60  Chronic Kidney Disease <60  Kidney Failure <15      Magnesium [[id number]]  (Normal) Collected: 07/12/21 1652    Specimen: Blood Updated: 07/12/21 1723     Magnesium 1.9 mg/dL     Calcium, Ionized [[id number]]  (Normal) Collected: 07/12/21 1652    Specimen: Blood Updated: 07/12/21 1708     Ionized Calcium 1.24 mmol/L     CBC Auto Differential [[id number]]  (Normal) Collected: 07/12/21 1652    Specimen: Blood Updated: 07/12/21 1704     WBC 7.60 10*3/mm3      RBC 4.10 10*6/mm3      Hemoglobin 12.7 g/dL      Hematocrit 37.7 %      MCV 92.0 fL      MCH 31.0 pg      MCHC 33.7 g/dL      RDW 13.6 %      RDW-SD 43.3 fl      MPV 8.6 fL      Platelets 271 10*3/mm3      Neutrophil % 50.3 %      Lymphocyte % 38.6 %      Monocyte % 9.4 %      Eosinophil % 1.2 %      Basophil % 0.5 %      Neutrophils, Absolute 3.80 10*3/mm3      Lymphocytes, Absolute 2.90 10*3/mm3      Monocytes, Absolute 0.70 10*3/mm3      Eosinophils, Absolute 0.10 10*3/mm3      Basophils, Absolute 0.00 10*3/mm3      nRBC 0.1 /100 WBC           Imaging Results (Last 24 Hours)     ** No results found for the last 24 hours. **      LAB RESULTS (LAST 7 DAYS)    CBC  Results from last 7 days   Lab Units 07/12/21  1652   WBC  10*3/mm3 7.60   RBC 10*6/mm3 4.10   HEMOGLOBIN g/dL 12.7   HEMATOCRIT % 37.7   MCV fL 92.0   PLATELETS 10*3/mm3 271       BMP  Results from last 7 days   Lab Units 07/12/21  1652   SODIUM mmol/L 146*   POTASSIUM mmol/L 3.8   CHLORIDE mmol/L 106   CO2 mmol/L 30.0*   BUN mg/dL 16   CREATININE mg/dL 0.64   GLUCOSE mg/dL 124*   MAGNESIUM mg/dL 1.9       CMP   Results from last 7 days   Lab Units 07/12/21  1652   SODIUM mmol/L 146*   POTASSIUM mmol/L 3.8   CHLORIDE mmol/L 106   CO2 mmol/L 30.0*   BUN mg/dL 16   CREATININE mg/dL 0.64   GLUCOSE mg/dL 124*         BNP        TROPONIN        CoAg        Creatinine Clearance  Estimated Creatinine Clearance: 61.6 mL/min (by C-G formula based on SCr of 0.64 mg/dL).    ABG        Radiology  No radiology results for the last day            EKG                  I personally viewed and interpreted the patient's EKG/Telemetry data: Sinus rhythm right bundle branch block    ECHOCARDIOGRAM:    Results for orders placed during the hospital encounter of 07/12/21    Adult Transthoracic Echo Complete W/ Cont if Necessary Per Protocol    Interpretation Summary  · Estimated left ventricular EF = 60% Left ventricular systolic function is normal.    Indications  Dizziness    Technically satisfactory study.  Mitral valve is structurally normal.  Tricuspid valve is structurally normal.  Aortic valve is structurally normal.  Pulmonic valve could not be well visualized.  No evidence for mitral tricuspid or aortic regurgitation is seen by Doppler study.  Left atrium is normal in size.  Right atrium is normal in size.  Left ventricle is normal in size and contractility with ejection fraction of 60%.  Right ventricle is normal in size.  Atrial septum is intact.  Aorta is normal.  No pericardial effusion or intracardiac thrombus is seen.    Impression  Structurally and functionally normal cardiac valves.  Left ventricular size and contractility is normal with ejection fraction of  60%.              Cardiolite (Tc-99m Sestamibi) stress test      OTHER:     Assessment/Plan     Principal Problem:    Bradycardia  Active Problems:    Near syncope    Second degree AV block, Mobitz type II    Right bundle branch block    B12 deficiency    Hyperlipidemia    Hypertension    Type 2 diabetes mellitus (CMS/formerly [facility])          Assessment and Plan      ]]]]]]]]]]]]]]]]]]]  Impression  ==========  -Second-degree Mobitz type II AV block.  Right bundle branch block    -Chest discomfort and shortness of breath with or without exertion.     -Dizziness and near syncope  Echocardiogram-normal 7/12/2021  Stress Cardiolite test-normal except patient has second-degree 2-1 AV block and right bundle branch block.  Very limited exercise tolerance.     -Right bundle branch block     -Sinus bradycardia     -Diabetes dyslipidemia hypertension     -Status post tonsillectomy abdominal tubal ligation     -Family history of coronary artery disease     -Non-smoker     -Allergic to sulfa  =============  Plan  ==========  Patient has shortness of breath and chest discomfort.  Recent EKG from 6/21/2021 was reviewed and interpreted independently showing sinus rhythm right bundle branch block  Recent labs were reviewed-as below. Normal CBC CMP except for blood sugar of 170 cholesterol 158 triglycerides 77 LDL 86.  Ischemic cardiac work-up.  Stress Cardiolite test-abnormal as above.  Resting ECG showed second-degree AV block with a heart rate of 40/min.  Nuclear images showed uniform distribution of radionuclide.  Echocardiogram.-Normal as above  Metoprolol and Cardizem were discontinued.  Patient has continued AV blocks  Medications were reviewed and updated.  Patient was advised permanent pacemaker implantation.  Risks and benefits pros and cons of procedure were discussed with patient including infection bleeding pneumothorax dysrhythmia anesthetic risk etc.  Further plan will depend on patient's  progress.  ]]]]]]]]]]]]]]            Dung Lynch MD  07/13/21  06:13 EDT

## 2021-07-13 NOTE — PLAN OF CARE
Goal Outcome Evaluation:  Plan of Care Reviewed With: patient        Progress: improving   Patient had pacemaker placed this morning. Dressing dry and intact. Some small bruising below site, patient complains of some soreness treated with prn medications. Vitals have been stable, no other complaints of pain.    Problem: Adult Inpatient Plan of Care  Goal: Plan of Care Review  Outcome: Ongoing, Progressing  Flowsheets (Taken 7/13/2021 1427)  Progress: improving  Plan of Care Reviewed With: patient  Goal: Patient-Specific Goal (Individualized)  Outcome: Ongoing, Progressing  Goal: Absence of Hospital-Acquired Illness or Injury  Outcome: Ongoing, Progressing  Intervention: Identify and Manage Fall Risk  Recent Flowsheet Documentation  Taken 7/13/2021 1400 by Suzan Hair RN  Safety Promotion/Fall Prevention:   safety round/check completed   nonskid shoes/slippers when out of bed   fall prevention program maintained   clutter free environment maintained   assistive device/personal items within reach   activity supervised  Taken 7/13/2021 1251 by Suzan Hair RN  Safety Promotion/Fall Prevention:   safety round/check completed   nonskid shoes/slippers when out of bed   fall prevention program maintained   clutter free environment maintained   assistive device/personal items within reach   activity supervised  Taken 7/13/2021 1000 by Suzan Hair RN  Safety Promotion/Fall Prevention:   safety round/check completed   nonskid shoes/slippers when out of bed   fall prevention program maintained   clutter free environment maintained   assistive device/personal items within reach   activity supervised  Taken 7/13/2021 0745 by Suzan Hair RN  Safety Promotion/Fall Prevention:   safety round/check completed   nonskid shoes/slippers when out of bed   clutter free environment maintained   activity supervised   assistive device/personal items within reach  Intervention: Prevent Skin Injury  Recent Flowsheet Documentation  Taken  7/13/2021 1251 by Suzan Hair RN  Skin Protection:   adhesive use limited   tubing/devices free from skin contact   transparent dressing maintained  Taken 7/13/2021 1000 by Suzan Hair RN  Body Position:   position changed independently   sitting up in bed  Taken 7/13/2021 0745 by Suzan Hair RN  Body Position: position changed independently  Skin Protection:   adhesive use limited   tubing/devices free from skin contact   transparent dressing maintained  Intervention: Prevent Infection  Recent Flowsheet Documentation  Taken 7/13/2021 1400 by Suzan Hair RN  Infection Prevention:   single patient room provided   rest/sleep promoted   personal protective equipment utilized   hand hygiene promoted  Taken 7/13/2021 1251 by Suzan Hair RN  Infection Prevention:   single patient room provided   rest/sleep promoted   personal protective equipment utilized   hand hygiene promoted  Taken 7/13/2021 1000 by Suzan Hair RN  Infection Prevention:   single patient room provided   rest/sleep promoted   personal protective equipment utilized   hand hygiene promoted  Taken 7/13/2021 0745 by Suzan Hair RN  Infection Prevention:   single patient room provided   rest/sleep promoted   personal protective equipment utilized   hand hygiene promoted  Goal: Optimal Comfort and Wellbeing  Outcome: Ongoing, Progressing  Intervention: Provide Person-Centered Care  Recent Flowsheet Documentation  Taken 7/13/2021 1251 by Suzan Hair RN  Trust Relationship/Rapport:   care explained   thoughts/feelings acknowledged   emotional support provided  Taken 7/13/2021 0745 by Suzan Hair RN  Trust Relationship/Rapport:   care explained   thoughts/feelings acknowledged   emotional support provided  Goal: Readiness for Transition of Care  Outcome: Ongoing, Progressing     Problem: Diabetes Comorbidity  Goal: Blood Glucose Level Within Desired Range  Outcome: Ongoing, Progressing  Intervention: Maintain Glycemic Control  Recent Flowsheet  Documentation  Taken 7/13/2021 1251 by Suzan Hair RN  Glycemic Management: blood glucose monitoring  Taken 7/13/2021 0745 by Suzan Hair RN  Glycemic Management: blood glucose monitoring     Problem: Hypertension Comorbidity  Goal: Blood Pressure in Desired Range  Outcome: Ongoing, Progressing  Intervention: Maintain Hypertension-Management Strategies  Recent Flowsheet Documentation  Taken 7/13/2021 1400 by Suzan Hair RN  Medication Review/Management: medications reviewed  Taken 7/13/2021 1251 by Suzan Hair RN  Syncope Management: position changed slowly  Medication Review/Management: medications reviewed  Taken 7/13/2021 1000 by Suzan Hair RN  Medication Review/Management: medications reviewed  Taken 7/13/2021 0745 by Suzan Hair RN  Medication Review/Management: medications reviewed     Problem: Adjustment to Illness (Cardiac Rhythm Management Device)  Goal: Optimal Adjustment to Device Presence  Outcome: Ongoing, Progressing  Intervention: Optimize Psychosocial Response to Device Implantation  Recent Flowsheet Documentation  Taken 7/13/2021 1251 by Suzan Hair RN  Supportive Measures: active listening utilized  Taken 7/13/2021 0745 by Suzan Hair RN  Supportive Measures: active listening utilized     Problem: Bleeding (Cardiac Rhythm Management Device)  Goal: Absence of Bleeding  Outcome: Ongoing, Progressing     Problem: Device-Related Complication Risk (Cardiac Rhythm Management Device)  Goal: Effective Device Function  Outcome: Ongoing, Progressing     Problem: Infection (Cardiac Rhythm Management Device)  Goal: Absence of Infection Signs and Symptoms  Outcome: Ongoing, Progressing     Problem: Pain (Cardiac Rhythm Management Device)  Goal: Acceptable Pain Level  Outcome: Ongoing, Progressing  Intervention: Prevent or Manage Pain  Recent Flowsheet Documentation  Taken 7/13/2021 1353 by Suzan Hair RN  Pain Management Interventions: see MAR  Taken 7/13/2021 1251 by Suzan Hair RN  Pain  Management Interventions: pain management plan reviewed with patient/caregiver  Taken 7/13/2021 1000 by Suzan Hair RN  Pain Management Interventions: see MAR     Problem: Respiratory Compromise (Cardiac Rhythm Management Device)  Goal: Effective Respiratory Effort and Oxygenation  Outcome: Ongoing, Progressing     Problem: Fall Injury Risk  Goal: Absence of Fall and Fall-Related Injury  Outcome: Ongoing, Progressing  Intervention: Identify and Manage Contributors to Fall Injury Risk  Recent Flowsheet Documentation  Taken 7/13/2021 1400 by Suzan Hair RN  Medication Review/Management: medications reviewed  Taken 7/13/2021 1251 by Suzan Hair RN  Medication Review/Management: medications reviewed  Taken 7/13/2021 1000 by Suzan Hair RN  Medication Review/Management: medications reviewed  Taken 7/13/2021 0745 by Suzan Hair RN  Medication Review/Management: medications reviewed  Intervention: Promote Injury-Free Environment  Recent Flowsheet Documentation  Taken 7/13/2021 1400 by Suzan Hair RN  Safety Promotion/Fall Prevention:   safety round/check completed   nonskid shoes/slippers when out of bed   fall prevention program maintained   clutter free environment maintained   assistive device/personal items within reach   activity supervised  Taken 7/13/2021 1251 by Suzan Hair RN  Safety Promotion/Fall Prevention:   safety round/check completed   nonskid shoes/slippers when out of bed   fall prevention program maintained   clutter free environment maintained   assistive device/personal items within reach   activity supervised  Taken 7/13/2021 1000 by Suzan Hair RN  Safety Promotion/Fall Prevention:   safety round/check completed   nonskid shoes/slippers when out of bed   fall prevention program maintained   clutter free environment maintained   assistive device/personal items within reach   activity supervised  Taken 7/13/2021 0745 by Suzan Hair RN  Safety Promotion/Fall Prevention:   safety  round/check completed   nonskid shoes/slippers when out of bed   clutter free environment maintained   activity supervised   assistive device/personal items within reach

## 2021-07-13 NOTE — PLAN OF CARE
Goal Outcome Evaluation:   Patient NPO for pacemaker placement today. VS stable at this time. Call light in reach, will continue to monitor.     Problem: Adult Inpatient Plan of Care  Goal: Plan of Care Review  Outcome: Ongoing, Progressing  Goal: Patient-Specific Goal (Individualized)  Outcome: Ongoing, Progressing  Goal: Absence of Hospital-Acquired Illness or Injury  Outcome: Ongoing, Progressing  Goal: Optimal Comfort and Wellbeing  Outcome: Ongoing, Progressing  Goal: Readiness for Transition of Care  Outcome: Ongoing, Progressing     Problem: Diabetes Comorbidity  Goal: Blood Glucose Level Within Desired Range  Outcome: Ongoing, Progressing     Problem: Hypertension Comorbidity  Goal: Blood Pressure in Desired Range  Outcome: Ongoing, Progressing     Problem: Adjustment to Illness (Cardiac Rhythm Management Device)  Goal: Optimal Adjustment to Device Presence  Outcome: Ongoing, Progressing     Problem: Bleeding (Cardiac Rhythm Management Device)  Goal: Absence of Bleeding  Outcome: Ongoing, Progressing     Problem: Device-Related Complication Risk (Cardiac Rhythm Management Device)  Goal: Effective Device Function  Outcome: Ongoing, Progressing     Problem: Infection (Cardiac Rhythm Management Device)  Goal: Absence of Infection Signs and Symptoms  Outcome: Ongoing, Progressing     Problem: Pain (Cardiac Rhythm Management Device)  Goal: Acceptable Pain Level  Outcome: Ongoing, Progressing     Problem: Respiratory Compromise (Cardiac Rhythm Management Device)  Goal: Effective Respiratory Effort and Oxygenation  Outcome: Ongoing, Progressing

## 2021-07-13 NOTE — CASE MANAGEMENT/SOCIAL WORK
Discharge Planning Assessment  [facility]     Patient Name: Celsa Brown  MRN: [id number]  Today's Date: 7/13/2021    Admit Date: 7/12/2021    Discharge Needs Assessment     Row Name 07/13/21 1234       Living Environment    Lives With  spouse    Current Living Arrangements  home/apartment/condo    Primary Care Provided by  self    Provides Primary Care For  no one    Family Caregiver if Needed  spouse    Quality of Family Relationships  helpful    Able to Return to Prior Arrangements  yes       Resource/Environmental Concerns    Resource/Environmental Concerns  none    Transportation Concerns  car, none       Transition Planning    Patient/Family Anticipates Transition to  home with family    Patient/Family Anticipated Services at Transition  none    Transportation Anticipated  family or friend will provide       Discharge Needs Assessment    Readmission Within the Last 30 Days  no previous admission in last 30 days    Equipment Currently Used at Home  none    Concerns to be Addressed  denies needs/concerns at this time    Anticipated Changes Related to Illness  none    Equipment Needed After Discharge  none        Discharge Plan     Row Name 07/13/21 0086       Plan    Plan  D/C Plan: Home with family.    Patient/Family in Agreement with Plan  yes    Plan Comments   spoke to patient at bedside wearing mask and goggles and keeping distance greater than 6 feet and spent less than 15 minutes in room. Patient lives with spouse, is IADLs and drives. PCP and pharmacy verified-denies any dificulty affording meds. Patient denies any d/c needs at this time and spouse will provide transport at d/c. Barrier to D/C: POD#0 pacemaker placement, IV abx.          Expected Discharge Date and Time     Expected Discharge Date Expected Discharge Time    Jul 14, 2021         Demographic Summary     Row Name 07/13/21 1234       General Information    Admission Type  observation    Arrived From  emergency department     Referral Source  admission list    Reason for Consult  discharge planning    Preferred Language  English     Used During This Interaction  no        Functional Status     Row Name 07/13/21 1234       Functional Status    Usual Activity Tolerance  good    Current Activity Tolerance  good       Functional Status, IADL    Medications  independent    Meal Preparation  independent    Housekeeping  independent    Laundry  independent    Shopping  independent       Mental Status    General Appearance WDL  WDL       Mental Status Summary    Recent Changes in Mental Status/Cognitive Functioning  no changes            Patient Forms     Row Name 07/13/21 1236       Patient Forms    Important Message from Medicare ([facility])  -- SHIPMAN 7/12/21 by registration            Jordyn Lofton

## 2021-07-14 ENCOUNTER — READMISSION MANAGEMENT (OUTPATIENT)
Dept: CALL CENTER | Facility: HOSPITAL | Age: 71
End: 2021-07-14

## 2021-07-14 VITALS
HEIGHT: 65 IN | WEIGHT: 155 LBS | OXYGEN SATURATION: 95 % | SYSTOLIC BLOOD PRESSURE: 176 MMHG | RESPIRATION RATE: 19 BRPM | DIASTOLIC BLOOD PRESSURE: 83 MMHG | BODY MASS INDEX: 25.83 KG/M2 | HEART RATE: 102 BPM | TEMPERATURE: 99.3 F

## 2021-07-14 PROBLEM — R00.1 BRADYCARDIA: Status: RESOLVED | Noted: 2021-07-12 | Resolved: 2021-07-14

## 2021-07-14 LAB — GLUCOSE BLDC GLUCOMTR-MCNC: 191 MG/DL (ref 70–105)

## 2021-07-14 PROCEDURE — 99217 PR OBSERVATION CARE DISCHARGE MANAGEMENT: CPT | Performed by: INTERNAL MEDICINE

## 2021-07-14 PROCEDURE — 99024 POSTOP FOLLOW-UP VISIT: CPT | Performed by: INTERNAL MEDICINE

## 2021-07-14 PROCEDURE — 82962 GLUCOSE BLOOD TEST: CPT

## 2021-07-14 PROCEDURE — G0378 HOSPITAL OBSERVATION PER HR: HCPCS

## 2021-07-14 PROCEDURE — 63710000001 INSULIN LISPRO (HUMAN) PER 5 UNITS: Performed by: INTERNAL MEDICINE

## 2021-07-14 RX ORDER — AMLODIPINE BESYLATE 5 MG/1
5 TABLET ORAL DAILY
Qty: 30 TABLET | Refills: 0 | Status: SHIPPED | OUTPATIENT
Start: 2021-07-14 | End: 2021-07-26 | Stop reason: SDUPTHER

## 2021-07-14 RX ORDER — AMLODIPINE BESYLATE 5 MG/1
5 TABLET ORAL
Status: DISCONTINUED | OUTPATIENT
Start: 2021-07-14 | End: 2021-07-14 | Stop reason: HOSPADM

## 2021-07-14 RX ORDER — METOPROLOL SUCCINATE 50 MG/1
50 TABLET, EXTENDED RELEASE ORAL ONCE
Status: COMPLETED | OUTPATIENT
Start: 2021-07-14 | End: 2021-07-14

## 2021-07-14 RX ORDER — HYDRALAZINE HYDROCHLORIDE 25 MG/1
100 TABLET, FILM COATED ORAL 2 TIMES DAILY
Status: DISCONTINUED | OUTPATIENT
Start: 2021-07-14 | End: 2021-07-14 | Stop reason: HOSPADM

## 2021-07-14 RX ORDER — HYDROCODONE BITARTRATE AND ACETAMINOPHEN 5; 325 MG/1; MG/1
1 TABLET ORAL EVERY 4 HOURS PRN
Qty: 10 TABLET | Refills: 0 | Status: SHIPPED | OUTPATIENT
Start: 2021-07-14 | End: 2021-07-20

## 2021-07-14 RX ADMIN — Medication 3 ML: at 08:09

## 2021-07-14 RX ADMIN — METOPROLOL SUCCINATE 50 MG: 50 TABLET, EXTENDED RELEASE ORAL at 10:47

## 2021-07-14 RX ADMIN — CYANOCOBALAMIN TAB 1000 MCG 1000 MCG: 1000 TAB at 08:09

## 2021-07-14 RX ADMIN — INSULIN LISPRO 2 UNITS: 100 INJECTION, SOLUTION INTRAVENOUS; SUBCUTANEOUS at 08:08

## 2021-07-14 RX ADMIN — ATORVASTATIN CALCIUM 20 MG: 20 TABLET, FILM COATED ORAL at 08:08

## 2021-07-14 RX ADMIN — MULTIPLE VITAMINS W/ MINERALS TAB 1 TABLET: TAB at 08:09

## 2021-07-14 RX ADMIN — ACETAMINOPHEN 650 MG: 325 TABLET, FILM COATED ORAL at 07:04

## 2021-07-14 RX ADMIN — HYDROCHLOROTHIAZIDE 25 MG: 25 TABLET ORAL at 08:09

## 2021-07-14 RX ADMIN — ASPIRIN 81 MG: 81 TABLET, COATED ORAL at 08:09

## 2021-07-14 RX ADMIN — CETIRIZINE HYDROCHLORIDE 10 MG: 10 TABLET, FILM COATED ORAL at 08:09

## 2021-07-14 RX ADMIN — AMLODIPINE BESYLATE 5 MG: 5 TABLET ORAL at 10:47

## 2021-07-14 RX ADMIN — LISINOPRIL 40 MG: 20 TABLET ORAL at 08:09

## 2021-07-14 RX ADMIN — HYDRALAZINE HYDROCHLORIDE 75 MG: 25 TABLET, FILM COATED ORAL at 08:09

## 2021-07-14 RX ADMIN — Medication 10 ML: at 08:09

## 2021-07-14 NOTE — PLAN OF CARE
Problem: Adult Inpatient Plan of Care  Goal: Plan of Care Review  Outcome: Ongoing, Progressing  Flowsheets (Taken 7/14/2021 0402)  Plan of Care Reviewed With: patient  Goal: Patient-Specific Goal (Individualized)  Outcome: Ongoing, Progressing  Goal: Absence of Hospital-Acquired Illness or Injury  Outcome: Ongoing, Progressing  Intervention: Identify and Manage Fall Risk  Recent Flowsheet Documentation  Taken 7/14/2021 0401 by Debra Santana RN  Safety Promotion/Fall Prevention:   activity supervised   assistive device/personal items within reach   clutter free environment maintained   fall prevention program maintained   gait belt   lighting adjusted   nonskid shoes/slippers when out of bed   room organization consistent   safety round/check completed  Taken 7/14/2021 0200 by Debra Satnana RN  Safety Promotion/Fall Prevention:   activity supervised   assistive device/personal items within reach   clutter free environment maintained   fall prevention program maintained   gait belt   lighting adjusted   nonskid shoes/slippers when out of bed   room organization consistent   safety round/check completed  Taken 7/14/2021 0012 by Debra Santana RN  Safety Promotion/Fall Prevention:   activity supervised   assistive device/personal items within reach   clutter free environment maintained   fall prevention program maintained   gait belt   lighting adjusted   nonskid shoes/slippers when out of bed   room organization consistent   safety round/check completed  Taken 7/13/2021 2200 by Debra Santana RN  Safety Promotion/Fall Prevention:   activity supervised   assistive device/personal items within reach   clutter free environment maintained   fall prevention program maintained   gait belt   lighting adjusted   nonskid shoes/slippers when out of bed   room organization consistent   safety round/check completed  Intervention: Prevent Skin Injury  Recent Flowsheet Documentation  Taken 7/13/2021 2010 by Esther  Debra MORENO RN  Body Position: position changed independently  Skin Protection:   adhesive use limited   tubing/devices free from skin contact  Intervention: Prevent and Manage VTE (venous thromboembolism) Risk  Recent Flowsheet Documentation  Taken 7/13/2021 2010 by Debra Santana RN  VTE Prevention/Management:   bilateral   sequential compression devices on  Intervention: Prevent Infection  Recent Flowsheet Documentation  Taken 7/14/2021 0401 by Debra Santana RN  Infection Prevention:   visitors restricted/screened   single patient room provided   rest/sleep promoted   personal protective equipment utilized   hand hygiene promoted   equipment surfaces disinfected  Taken 7/14/2021 0012 by Debra Santana RN  Infection Prevention:   visitors restricted/screened   single patient room provided   rest/sleep promoted   personal protective equipment utilized   hand hygiene promoted   equipment surfaces disinfected  Goal: Optimal Comfort and Wellbeing  Outcome: Ongoing, Progressing  Intervention: Provide Person-Centered Care  Recent Flowsheet Documentation  Taken 7/14/2021 0012 by Debra Santana RN  Trust Relationship/Rapport:   care explained   choices provided   emotional support provided   empathic listening provided   questions answered   questions encouraged   reassurance provided   thoughts/feelings acknowledged  Taken 7/13/2021 2010 by Debra Santana RN  Trust Relationship/Rapport:   care explained   choices provided   emotional support provided   empathic listening provided   questions answered   questions encouraged   thoughts/feelings acknowledged   reassurance provided  Goal: Readiness for Transition of Care  Outcome: Ongoing, Progressing   Goal Outcome Evaluation:  Plan of Care Reviewed With: patient

## 2021-07-14 NOTE — DISCHARGE SUMMARY
[facility] Medicine Services  DISCHARGE SUMMARY        Prepared For PCP:  Jaelyn Iverson MD    Patient Name: Celsa Brown  : 1950  MRN: [id number]      Date of Admission:   2021    Date of Discharge:  2021    Length of stay:  LOS: 1 day     Hospital Course     Presenting Problem:   Bradycardia [R00.1]      Active Hospital Problems    Diagnosis  POA   • Near syncope [R55]  Unknown   • Second degree AV block, Mobitz type II [I44.1]  Unknown   • Right bundle branch block [I45.10]  Unknown   • Type 2 diabetes mellitus (CMS/[facility]) [E11.9]  Yes   • Hypertension [I10]  Yes   • B12 deficiency [E53.8]  Yes   • Hyperlipidemia [E78.5]  Yes      Resolved Hospital Problems    Diagnosis Date Resolved POA   • **Bradycardia [R00.1] 2021 Yes     Symptomatic severe bradycardia  Second-degree AV block Mobitz type II  Right bundle branch block  Dr. Lynch requesting admission following the patient  Negative outpatient work-up with stress test and echo cardiogram.  Dr. Lynch  Normal thyroid function and electrolytes  S/p pacemaker placement 2021           DM2 on Januvia and Metformin and glipizide  Hold oral medications add correction insulin  A1c 7.7     Hypertension  Resume metoprolol discharge  Norvasc added per Dr. Lynch's  Resume home hydralazine dose       Dyslipidemia on statin        Overweight  BMI 25.7     DVT prophylaxis:  Mechanical DVT prophylaxis orders are present.      Hospital Course:  Celsa Brown is a 71 y.o. female *      71-year-old female with chronic medical problems including     DM2  Hypertension  Dyslipidemia  Status post tonsillectomy and tubal ligation who has been complaining of worsening dizziness lightheadedness for 3 weeks duration.  She reports some chest discomfort intermittently.  She denies loss of consciousness  She was noted to have bradycardia down to 30s at some point  She was referred today by Dr. Lynch, cardiologist for above problems and  had stress test and echocardiogram that were normal and due to bradycardia and dizziness there was concern from patient needing pacemaker placement.  Dr. Lynch requested to admission for assessment and possible pacemaker placement     Patient Reports  7/13  Patient seen post operatively  Mildly tachycardic but denies chest pain.   at the bedside    7/14  I discussed care with Dr. Lynch who recommended starting back home metoprolol XL 50 mg daily which was started today in addition to Norvasc 5 mg for blood pressure control and Cardizem will be discontinued.  Patient reports minimal pain in her arm/chest and slept well yesterday  Cleared for discharge by Dr. Hitchcock.      Recommendation for Outpatient Providers:       Left upper extreme precautions  Sling left upper extremity and avoid weightbearing/tissues left upper extremity until cleared by Dr. Gallegos  Follow-up with Dr. Lynch in 1 week  Pain medication provided on discharge        Reasons For Change In Medications and Indications for New Medications:        Day of Discharge     HPI:       Vital Signs:   Temp:  [97.6 °F (36.4 °C)-99.2 °F (37.3 °C)] 98.4 °F (36.9 °C)  Heart Rate:  [] 89  Resp:  [14-23] 18  BP: (135-183)/(65-85) 183/85     Physical Exam:  Physical Exam  Vitals and nursing note reviewed.   Constitutional:       General: She is not in acute distress.     Appearance: Normal appearance. She is well-developed. She is not ill-appearing, toxic-appearing or diaphoretic.   HENT:      Head: Normocephalic and atraumatic.      Right Ear: Ear canal and external ear normal.      Left Ear: Ear canal and external ear normal.      Nose: Nose normal. No congestion or rhinorrhea.      Mouth/Throat:      Mouth: Mucous membranes are moist.      Pharynx: No oropharyngeal exudate.   Eyes:      General: No scleral icterus.        Right eye: No discharge.         Left eye: No discharge.      Extraocular Movements: Extraocular movements intact.       Conjunctiva/sclera: Conjunctivae normal.      Pupils: Pupils are equal, round, and reactive to light.   Neck:      Thyroid: No thyromegaly.      Vascular: No carotid bruit or JVD.      Trachea: No tracheal deviation.   Cardiovascular:      Rate and Rhythm: Normal rate and regular rhythm.      Pulses: Normal pulses.      Heart sounds: Normal heart sounds. No murmur heard.   No friction rub. No gallop.    Pulmonary:      Effort: Pulmonary effort is normal. No respiratory distress.      Breath sounds: Normal breath sounds. No stridor. No wheezing, rhonchi or rales.   Chest:      Chest wall: No tenderness.   Abdominal:      General: Bowel sounds are normal. There is no distension.      Palpations: Abdomen is soft. There is no mass.      Tenderness: There is no abdominal tenderness. There is no guarding or rebound.      Hernia: No hernia is present.   Musculoskeletal:         General: No swelling, tenderness, deformity or signs of injury. Normal range of motion.      Cervical back: Normal range of motion and neck supple. No rigidity. No muscular tenderness.      Right lower leg: No edema.      Left lower leg: No edema.   Lymphadenopathy:      Cervical: No cervical adenopathy.   Skin:     General: Skin is warm and dry.      Coloration: Skin is not jaundiced or pale.      Findings: No bruising, erythema or rash.   Neurological:      General: No focal deficit present.      Mental Status: She is alert and oriented to person, place, and time. Mental status is at baseline.      Cranial Nerves: No cranial nerve deficit.      Sensory: No sensory deficit.      Motor: No weakness or abnormal muscle tone.      Coordination: Coordination normal.   Psychiatric:         Mood and Affect: Mood normal.         Behavior: Behavior normal.         Thought Content: Thought content normal.         Judgment: Judgment normal.         Pertinent  and/or Most Recent Results     Results from last 7 days   Lab Units 07/12/21  1652   WBC 10*3/mm3  7.60   HEMOGLOBIN g/dL 12.7   HEMATOCRIT % 37.7   PLATELETS 10*3/mm3 271   SODIUM mmol/L 146*   POTASSIUM mmol/L 3.8   CHLORIDE mmol/L 106   CO2 mmol/L 30.0*   BUN mg/dL 16   CREATININE mg/dL 0.64   GLUCOSE mg/dL 124*   CALCIUM mg/dL 9.6           Invalid input(s): PROT, LABALBU        Invalid input(s): TG, LDLCALC, LDLREALC  Results from last 7 days   Lab Units 07/12/21  1652   TSH uIU/mL 0.988   HEMOGLOBIN A1C % 7.7*       Brief Urine Lab Results  (Last result in the past 365 days)      Color   Clarity   Blood   Leuk Est   Nitrite   Protein   CREAT   Urine HCG        06/22/21 1000             48.4             Microbiology Results Abnormal     None          XR Chest 1 View    Result Date: 7/13/2021  Impression: 1.An acute pulmonary process is not apparent.  Electronically Signed By-Andrés Duque MD On:7/13/2021 11:12 AM This report was finalized on [id number] by  Andrés Duque MD.              Results for orders placed during the hospital encounter of 07/12/21    Adult Transthoracic Echo Complete W/ Cont if Necessary Per Protocol    Interpretation Summary  · Estimated left ventricular EF = 60% Left ventricular systolic function is normal.    Indications  Dizziness    Technically satisfactory study.  Mitral valve is structurally normal.  Tricuspid valve is structurally normal.  Aortic valve is structurally normal.  Pulmonic valve could not be well visualized.  No evidence for mitral tricuspid or aortic regurgitation is seen by Doppler study.  Left atrium is normal in size.  Right atrium is normal in size.  Left ventricle is normal in size and contractility with ejection fraction of 60%.  Right ventricle is normal in size.  Atrial septum is intact.  Aorta is normal.  No pericardial effusion or intracardiac thrombus is seen.    Impression  Structurally and functionally normal cardiac valves.  Left ventricular size and contractility is normal with ejection fraction of 60%.              Test Results Pending at  Discharge        Procedures Performed  Procedure(s):  Pacemaker DC new         Consults:   Consults     Date and Time Order Name Status Description    7/12/2021  3:26 PM Inpatient Cardiology Consult Completed             Discharge Details        Discharge Medications      New Medications      Instructions Start Date   amLODIPine 5 MG tablet  Commonly known as: Norvasc   5 mg, Oral, Daily      HYDROcodone-acetaminophen 5-325 MG per tablet  Commonly known as: NORCO   1 tablet, Oral, Every 4 Hours PRN         Continue These Medications      Instructions Start Date   Accu-Chek Thelma Plus test strip  Generic drug: glucose blood   TESTING BLOOD SUGAR ONCE  DAILY      aspirin 81 MG EC tablet   81 mg, Oral, Daily, 1 tablet every other day      Centrum Silver Ultra Womens tablet tablet  Generic drug: multivitamin with minerals   1 tablet, Oral, Daily      cholecalciferol 25 MCG (1000 UT) tablet  Commonly known as: VITAMIN D3   1,000 Units, Oral, Every Other Day      glipizide 10 MG tablet  Commonly known as: GLUCOTROL   TAKE 2 TABLETS BY MOUTH  TWICE DAILY IN THE MORNING  AND EVENING      hydrALAZINE 100 MG tablet  Commonly known as: APRESOLINE   100 mg, Oral, 2 Times Daily      hydroCHLOROthiazide 25 MG tablet  Commonly known as: HYDRODIURIL   25 mg, Oral, Daily      Januvia 100 MG tablet  Generic drug: SITagliptin   TAKE 1 TABLET BY MOUTH  DAILY      lisinopril 40 MG tablet  Commonly known as: PRINIVIL,ZESTRIL   40 mg, Oral, Daily      loratadine 10 MG tablet  Commonly known as: CLARITIN   10 mg, Oral, Daily      Melatonin 10 MG tablet   10 mg, Oral, Daily      metFORMIN 500 MG tablet  Commonly known as: GLUCOPHAGE   TAKE 2 TABLETS BY MOUTH TWO TIMES DAILY WITH MEALS      metoprolol succinate  MG 24 hr tablet  Commonly known as: TOPROL-XL   Take 1/2 tablet at night.      Omega-3 Fish Oil 500 MG capsule   500 mg, Oral, Daily      potassium chloride 10 MEQ CR capsule  Commonly known as: MICRO-K   TAKE 1 CAPSULE BY  MOUTH IN  THE MORNING AND EVENING ,  ON WEEKENDS 2 CAPSULES IN  THE MORNING AND 1 CAPSULE  IN THE EVENING      pravastatin 80 MG tablet  Commonly known as: PRAVACHOL   80 mg, Oral, Every Night at Bedtime      Vitamin B12 1000 MCG tablet controlled-release   1,000 mcg, Oral, Every Other Day         Stop These Medications    dilTIAZem 240 MG 24 hr capsule  Commonly known as: TIAZAC            Allergies   Allergen Reactions   • Sulfa Antibiotics Hives         Discharge Disposition:  Home or Self Care    Diet:  Hospital:  Diet Order   Procedures   • Diet Cardiac, Diabetic/Consistent Carbs; Healthy Heart; Diabetic - Consistent Carb         Discharge Activity:         CODE STATUS:    There are no questions and answers to display.         Follow-up Appointments  Future Appointments   Date Time Provider Department Center   10/4/2021  8:15 AM Jaelyn Iverson MD [facility]             Condition on Discharge:      Stable      This patient has been examined wearing appropriate Personal Protective Equipment and discussed with hospital infection control department. 07/14/21      Electronically signed by Lionel Siddiqui MD, 07/14/21, 9:49 AM EDT.      Time: I spent  35 minutes on this discharge activity which included face-to-face encounter with the patient/reviewing the data in the system/coordination of the care with the nursing staff as well as consultants/documentation/entering orders.

## 2021-07-14 NOTE — OUTREACH NOTE
Prep Survey      Responses   [facility] patient discharged from?  [facility]   Is LACE score < 7 ?  Yes   Emergency Room discharge w/ pulse ox?  No   Eligibility  [facility]   Date of Admission  07/12/21   Date of Discharge  07/14/21   Discharge Disposition  Home or Self Care   Discharge diagnosis  HTN, bradycardia,  DM2   Does the patient have one of the following disease processes/diagnoses(primary or secondary)?  Other   Does the patient have Home health ordered?  No   Is there a DME ordered?  No   Prep survey completed?  Yes          Kierra Yang RN

## 2021-07-14 NOTE — PLAN OF CARE
Goal Outcome Evaluation:  Plan of Care Reviewed With: patient   PT rested well this shift, heart rate in the 80s when resting. Pressure dressing remains intact, will continue to monitor.       Problem: Adult Inpatient Plan of Care  Goal: Plan of Care Review  7/14/2021 0406 by Debra Santana RN  Outcome: Ongoing, Progressing  7/14/2021 0402 by Debra Santana RN  Outcome: Ongoing, Progressing  Flowsheets (Taken 7/14/2021 0402)  Plan of Care Reviewed With: patient  Goal: Patient-Specific Goal (Individualized)  7/14/2021 0406 by Debra Santana RN  Outcome: Ongoing, Progressing  7/14/2021 0402 by Debra Santana RN  Outcome: Ongoing, Progressing  Goal: Absence of Hospital-Acquired Illness or Injury  7/14/2021 0406 by Debra Santana RN  Outcome: Ongoing, Progressing  7/14/2021 0402 by Debra Santana RN  Outcome: Ongoing, Progressing  Intervention: Identify and Manage Fall Risk  Recent Flowsheet Documentation  Taken 7/14/2021 0401 by Debra Santana RN  Safety Promotion/Fall Prevention:  • activity supervised  • assistive device/personal items within reach  • clutter free environment maintained  • fall prevention program maintained  • gait belt  • lighting adjusted  • nonskid shoes/slippers when out of bed  • room organization consistent  • safety round/check completed  Taken 7/14/2021 0200 by Debra Santana RN  Safety Promotion/Fall Prevention:  • activity supervised  • assistive device/personal items within reach  • clutter free environment maintained  • fall prevention program maintained  • gait belt  • lighting adjusted  • nonskid shoes/slippers when out of bed  • room organization consistent  • safety round/check completed  Taken 7/14/2021 0012 by Debra Santana RN  Safety Promotion/Fall Prevention:  • activity supervised  • assistive device/personal items within reach  • clutter free environment maintained  • fall prevention program maintained  • gait belt  • lighting adjusted  • nonskid  shoes/slippers when out of bed  • room organization consistent  • safety round/check completed  Taken 7/13/2021 2200 by Debra Santana RN  Safety Promotion/Fall Prevention:  • activity supervised  • assistive device/personal items within reach  • clutter free environment maintained  • fall prevention program maintained  • gait belt  • lighting adjusted  • nonskid shoes/slippers when out of bed  • room organization consistent  • safety round/check completed  Intervention: Prevent Skin Injury  Recent Flowsheet Documentation  Taken 7/13/2021 2010 by Debra Santana RN  Body Position: position changed independently  Skin Protection:  • adhesive use limited  • tubing/devices free from skin contact  Intervention: Prevent and Manage VTE (venous thromboembolism) Risk  Recent Flowsheet Documentation  Taken 7/13/2021 2010 by Debra Santana RN  VTE Prevention/Management:  • bilateral  • sequential compression devices on  Intervention: Prevent Infection  Recent Flowsheet Documentation  Taken 7/14/2021 0401 by Debra Santana RN  Infection Prevention:  • visitors restricted/screened  • single patient room provided  • rest/sleep promoted  • personal protective equipment utilized  • hand hygiene promoted  • equipment surfaces disinfected  Taken 7/14/2021 0012 by Debra Santana RN  Infection Prevention:  • visitors restricted/screened  • single patient room provided  • rest/sleep promoted  • personal protective equipment utilized  • hand hygiene promoted  • equipment surfaces disinfected  Goal: Optimal Comfort and Wellbeing  7/14/2021 0406 by Debra Santana RN  Outcome: Ongoing, Progressing  7/14/2021 0402 by Debra Santana RN  Outcome: Ongoing, Progressing  Intervention: Provide Person-Centered Care  Recent Flowsheet Documentation  Taken 7/14/2021 0012 by Debra Santana RN  Trust Relationship/Rapport:  • care explained  • choices provided  • emotional support provided  • empathic listening provided  • questions  answered  • questions encouraged  • reassurance provided  • thoughts/feelings acknowledged  Taken 7/13/2021 2010 by Debra Santana, RN  Trust Relationship/Rapport:  • care explained  • choices provided  • emotional support provided  • empathic listening provided  • questions answered  • questions encouraged  • thoughts/feelings acknowledged  • reassurance provided  Goal: Readiness for Transition of Care  7/14/2021 0406 by Debra Santana, RN  Outcome: Ongoing, Progressing  7/14/2021 0402 by Debra Santana, [place]  Outcome: Ongoing, Progressing

## 2021-07-14 NOTE — PLAN OF CARE
Goal Outcome Evaluation:  Plan of Care Reviewed With: patient        Progress: improving   Plans to discharge home today. Pacemaker site dry and intact. Vital signs stable. Minimal complaints of soreness around site, treated with prn medication and ice pack.   Problem: Adult Inpatient Plan of Care  Goal: Plan of Care Review  Outcome: Ongoing, Progressing  Flowsheets (Taken 7/14/2021 1031)  Progress: improving  Plan of Care Reviewed With: patient  Goal: Patient-Specific Goal (Individualized)  Outcome: Ongoing, Progressing  Goal: Absence of Hospital-Acquired Illness or Injury  Outcome: Ongoing, Progressing  Intervention: Identify and Manage Fall Risk  Recent Flowsheet Documentation  Taken 7/14/2021 0800 by Suzan Hair RN  Safety Promotion/Fall Prevention:   safety round/check completed   nonskid shoes/slippers when out of bed   fall prevention program maintained   clutter free environment maintained   assistive device/personal items within reach   activity supervised  Intervention: Prevent Skin Injury  Recent Flowsheet Documentation  Taken 7/14/2021 0800 by Suzan Hair RN  Body Position: position changed independently  Skin Protection:   adhesive use limited   tubing/devices free from skin contact   transparent dressing maintained  Intervention: Prevent Infection  Recent Flowsheet Documentation  Taken 7/14/2021 0800 by Suzan Hair RN  Infection Prevention:   single patient room provided   rest/sleep promoted   personal protective equipment utilized   hand hygiene promoted  Goal: Optimal Comfort and Wellbeing  Outcome: Ongoing, Progressing  Intervention: Provide Person-Centered Care  Recent Flowsheet Documentation  Taken 7/14/2021 0800 by Suzan Hair RN  Trust Relationship/Rapport:   care explained   thoughts/feelings acknowledged   emotional support provided  Goal: Readiness for Transition of Care  Outcome: Ongoing, Progressing     Problem: Diabetes Comorbidity  Goal: Blood Glucose Level Within Desired  Range  Outcome: Ongoing, Progressing  Intervention: Maintain Glycemic Control  Recent Flowsheet Documentation  Taken 7/14/2021 0800 by Suzan Hair RN  Glycemic Management: blood glucose monitoring     Problem: Hypertension Comorbidity  Goal: Blood Pressure in Desired Range  Outcome: Ongoing, Progressing  Intervention: Maintain Hypertension-Management Strategies  Recent Flowsheet Documentation  Taken 7/14/2021 0800 by Suzan Hair RN  Medication Review/Management: medications reviewed     Problem: Adjustment to Illness (Cardiac Rhythm Management Device)  Goal: Optimal Adjustment to Device Presence  Outcome: Ongoing, Progressing  Intervention: Optimize Psychosocial Response to Device Implantation  Recent Flowsheet Documentation  Taken 7/14/2021 0800 by Suzan Hair RN  Supportive Measures: active listening utilized  Family/Support System Care: support provided     Problem: Bleeding (Cardiac Rhythm Management Device)  Goal: Absence of Bleeding  Outcome: Ongoing, Progressing  Intervention: Monitor and Manage Bleeding  Recent Flowsheet Documentation  Taken 7/14/2021 0800 by Suzan Hair RN  Bleeding Precautions: monitored for signs of bleeding     Problem: Device-Related Complication Risk (Cardiac Rhythm Management Device)  Goal: Effective Device Function  Outcome: Ongoing, Progressing     Problem: Infection (Cardiac Rhythm Management Device)  Goal: Absence of Infection Signs and Symptoms  Outcome: Ongoing, Progressing     Problem: Pain (Cardiac Rhythm Management Device)  Goal: Acceptable Pain Level  Outcome: Ongoing, Progressing  Intervention: Prevent or Manage Pain  Recent Flowsheet Documentation  Taken 7/14/2021 0704 by Suzan Hair RN  Pain Management Interventions:   see MAR   cold applied     Problem: Respiratory Compromise (Cardiac Rhythm Management Device)  Goal: Effective Respiratory Effort and Oxygenation  Outcome: Ongoing, Progressing     Problem: Fall Injury Risk  Goal: Absence of Fall and Fall-Related  Injury  Outcome: Ongoing, Progressing  Intervention: Identify and Manage Contributors to Fall Injury Risk  Recent Flowsheet Documentation  Taken 7/14/2021 0800 by Suzan Hair RN  Medication Review/Management: medications reviewed  Intervention: Promote Injury-Free Environment  Recent Flowsheet Documentation  Taken 7/14/2021 0800 by Suzan Hair RN  Safety Promotion/Fall Prevention:   safety round/check completed   nonskid shoes/slippers when out of bed   fall prevention program maintained   clutter free environment maintained   assistive device/personal items within reach   activity supervised

## 2021-07-14 NOTE — DISCHARGE INSTRUCTIONS
Left upper extreme precautions  Sling left upper extremity and avoid weightbearing/tissues left upper extremity until cleared by Dr. Gallegos  Follow-up with Dr. Lynch in 1 week  Pain medication provided on discharge

## 2021-07-14 NOTE — PROGRESS NOTES
Referring Provider: Lionel Siddiqui *    Reason for follow-up:  Second-degree Mobitz type II AV block.  Right bundle branch block  Status post pacemaker 7/13/2021     Patient Care Team:  Jaelyn Iverson MD as PCP - General (Family Medicine)  Dung Lynch MD as Consulting Physician (Cardiology)    Subjective .  Feeling better     ROS    Since I have last seen, the patient has been without any chest discomfort ,shortness of breath, palpitations, dizziness or syncope.  Denies having any headache ,abdominal pain ,nausea, vomiting , diarrhea constipation, loss of weight or loss of appetite.  Denies having any excessive bruising ,hematuria or blood in the stool.    Review of all systems negative except as indicated    History  Past Medical History:   Diagnosis Date   • B12 deficiency    • Diabetes mellitus (CMS/HCC)    • Hyperlipidemia    • Hypertension    • Tachycardia    • Vitamin D deficiency        Past Surgical History:   Procedure Laterality Date   • CARDIAC CATHETERIZATION     • CARDIAC ELECTROPHYSIOLOGY PROCEDURE N/A 7/13/2021    Procedure: Pacemaker DC new;  Surgeon: Dung Lynch MD;  Location: [facility] INVASIVE LOCATION;  Service: Cardiovascular;  Laterality: N/A;   • COLONOSCOPY     • TONSILLECTOMY     • TUBAL ABDOMINAL LIGATION         Family History   Problem Relation Age of Onset   • Heart failure Mother    • Diabetes Mother    • Breast cancer Neg Hx    • Ovarian cancer Neg Hx        Social History     Tobacco Use   • Smoking status: Never Smoker   • Smokeless tobacco: Never Used   Substance Use Topics   • Alcohol use: No   • Drug use: No        Medications Prior to Admission   Medication Sig Dispense Refill Last Dose   • Accu-Chek Thelma Plus test strip TESTING BLOOD SUGAR ONCE  DAILY 90 each 3 7/12/2021 at Unknown time   • aspirin 81 MG EC tablet Take 81 mg by mouth Daily. 1 tablet every other day   7/12/2021 at Unknown time   • cholecalciferol (VITAMIN D3) 1000 units tablet Take  1,000 Units by mouth Every Other Day.   7/12/2021 at Unknown time   • Cyanocobalamin (VITAMIN B12) 1000 MCG tablet controlled-release Take 1,000 mcg by mouth Every Other Day.   7/12/2021 at Unknown time   • dilTIAZem (TIAZAC) 240 MG 24 hr capsule TAKE 2 CAPSULES BY MOUTH  DAILY 180 capsule 3 7/12/2021 at Unknown time   • glipizide (GLUCOTROL) 10 MG tablet TAKE 2 TABLETS BY MOUTH  TWICE DAILY IN THE MORNING  AND EVENING 360 tablet 3 7/12/2021 at Unknown time   • hydrALAZINE (APRESOLINE) 100 MG tablet Take 1 tablet by mouth 2 (Two) Times a Day. 270 tablet 3 7/12/2021 at Unknown time   • hydroCHLOROthiazide (HYDRODIURIL) 25 MG tablet TAKE 1 TABLET BY MOUTH  DAILY 90 tablet 3 7/12/2021 at Unknown time   • Januvia 100 MG tablet TAKE 1 TABLET BY MOUTH  DAILY 90 tablet 3 7/12/2021 at Unknown time   • lisinopril (PRINIVIL,ZESTRIL) 40 MG tablet TAKE 1 TABLET BY MOUTH  DAILY 90 tablet 3 7/12/2021 at Unknown time   • loratadine (CLARITIN) 10 MG tablet Take 10 mg by mouth Daily.   7/12/2021 at Unknown time   • Melatonin 10 MG tablet Take 10 mg by mouth Daily.   7/12/2021 at Unknown time   • metFORMIN (GLUCOPHAGE) 500 MG tablet TAKE 2 TABLETS BY MOUTH TWO TIMES DAILY WITH MEALS 360 tablet 3 7/12/2021 at Unknown time   • metoprolol succinate XL (TOPROL-XL) 100 MG 24 hr tablet Take 1/2 tablet at night. 90 tablet 3 7/12/2021 at Unknown time   • Multiple Vitamins-Minerals (CENTRUM SILVER ULTRA WOMENS) tablet Take 1 tablet by mouth Daily.   7/12/2021 at Unknown time   • Omega-3 Fatty Acids (OMEGA-3 FISH OIL) 500 MG capsule Take 500 mg by mouth Daily.   7/12/2021 at Unknown time   • potassium chloride (MICRO-K) 10 MEQ CR capsule TAKE 1 CAPSULE BY MOUTH IN  THE MORNING AND EVENING ,  ON WEEKENDS 2 CAPSULES IN  THE MORNING AND 1 CAPSULE  IN THE EVENING 208 capsule 3 7/12/2021 at Unknown time   • pravastatin (PRAVACHOL) 80 MG tablet Take 1 tablet by mouth every night at bedtime. 90 tablet 3 7/12/2021 at Unknown time  "      Allergies  Sulfa antibiotics    Scheduled Meds:aspirin, 81 mg, Oral, Daily  atorvastatin, 20 mg, Oral, Daily  cetirizine, 10 mg, Oral, Daily  cholecalciferol, 1,000 Units, Oral, Every Other Day  hydrALAZINE, 75 mg, Oral, BID  hydroCHLOROthiazide, 25 mg, Oral, Daily  insulin lispro, 0-7 Units, Subcutaneous, TID AC  lisinopril, 40 mg, Oral, Daily  metFORMIN, 1,000 mg, Oral, BID With Meals  multivitamin with minerals, 1 tablet, Oral, Daily  sodium chloride, 10 mL, Intravenous, Q12H  sodium chloride, 3 mL, Intravenous, Q12H  vitamin B-12, 1,000 mcg, Oral, Daily      Continuous Infusions:   PRN Meds:.•  acetaminophen **OR** acetaminophen **OR** acetaminophen  •  dextrose  •  dextrose  •  glucagon (human recombinant)  •  HYDROcodone-acetaminophen  •  insulin lispro **AND** insulin lispro  •  melatonin  •  ondansetron **OR** ondansetron  •  sodium chloride  •  sodium chloride    Objective     VITAL SIGNS  Vitals:    07/14/21 0204 07/14/21 0403 07/14/21 0631 07/14/21 0635   BP: 151/69 151/76 (!) 181/80 (!) 183/85   BP Location: Right arm  Right arm Right arm   Patient Position: Lying  Lying    Pulse: 86 91 89 89   Resp: 15  18    Temp: 97.9 °F (36.6 °C)  98.4 °F (36.9 °C)    TempSrc: Oral  Oral    SpO2: 94% 94% 96% 96%   Weight:       Height:           Flowsheet Rows      First Filed Value   Admission Height  165.1 cm (65\") Documented at 07/12/2021 1749   Admission Weight  70.3 kg (155 lb) Documented at 07/12/2021 1408            Intake/Output Summary (Last 24 hours) at 7/14/2021 0653  Last data filed at 7/13/2021 2010  Gross per 24 hour   Intake 730 ml   Output --   Net 730 ml        TELEMETRY: Sinus rhythm right bundle branch block    Physical Exam:  The patient is alert, oriented and in no distress.  Vital signs as noted above.  Head and neck revealed no carotid bruits or jugular venous distention.  No thyromegaly or lymphadenopathy is present  Lungs clear.  No wheezing.  Breath sounds are normal " bilaterally.  Heart normal first and second heart sounds.  No murmur. No precordial rub is present.  No gallop is present.  Abdomen soft and nontender.  No organomegaly is present.  Extremities with good peripheral pulses without any pedal edema.  Skin warm and dry.  Pacemaker site looks normal.  Musculoskeletal system is grossly normal  CNS grossly normal      Results Review:   I reviewed the patient's new clinical results.  Lab Results (last 24 hours)     Procedure Component Value Units Date/Time    POC Glucose Once [[id number]]  (Abnormal) Collected: 07/14/21 0632    Specimen: Blood Updated: 07/14/21 0633     Glucose 191 mg/dL      Comment: Serial Number: [id number]:  [id number]       POC Glucose Once [[id number]]  (Abnormal) Collected: 07/13/21 1937    Specimen: Blood Updated: 07/13/21 1938     Glucose 190 mg/dL      Comment: Serial Number: [id number]:  [id number]       POC Glucose Once [[id number]]  (Abnormal) Collected: 07/13/21 1611    Specimen: Blood Updated: 07/13/21 1612     Glucose 222 mg/dL      Comment: Serial Number: [id number]:  [id number]       POC Glucose Once [[id number]]  (Abnormal) Collected: 07/13/21 1138    Specimen: Blood Updated: 07/13/21 1141     Glucose 177 mg/dL      Comment: Serial Number: [id number]:  [id number]             Imaging Results (Last 24 Hours)     Procedure Component Value Units Date/Time    XR Chest 1 View [[id number]] Collected: 07/13/21 1111     Updated: 07/13/21 1114    Narrative:      DATE OF EXAM:  7/13/2021 10:20 AM     PROCEDURE:  XR CHEST 1 VW-     INDICATIONS:  Post ICD / Pacer Implant; I44.1-Atrioventricular block, second degree;  R00.1-Bradycardia, unspecified; R55-Syncope and collapse;  I45.10-Unspecified right bundle-branch block     COMPARISON:  No Comparisons Available     TECHNIQUE:   Single radiographic AP view of the chest was obtained.     FINDINGS:  A cardiac pacemaker device is present. The heart is enlarged. The lungs  seem relatively  clear. There are no pleural effusions. There are  calcifications in the AP window. This is compatible with prior  granulomatous exposure.       Impression:      1.An acute pulmonary process is not apparent.     Electronically Signed By-Andrés Duque MD On:7/13/2021 11:12 AM  This report was finalized on [id number] by  Andrés Duque MD.      LAB RESULTS (LAST 7 DAYS)    CBC  Results from last 7 days   Lab Units 07/12/21  1652   WBC 10*3/mm3 7.60   RBC 10*6/mm3 4.10   HEMOGLOBIN g/dL 12.7   HEMATOCRIT % 37.7   MCV fL 92.0   PLATELETS 10*3/mm3 271       BMP  Results from last 7 days   Lab Units 07/12/21  1652   SODIUM mmol/L 146*   POTASSIUM mmol/L 3.8   CHLORIDE mmol/L 106   CO2 mmol/L 30.0*   BUN mg/dL 16   CREATININE mg/dL 0.64   GLUCOSE mg/dL 124*   MAGNESIUM mg/dL 1.9       CMP   Results from last 7 days   Lab Units 07/12/21  1652   SODIUM mmol/L 146*   POTASSIUM mmol/L 3.8   CHLORIDE mmol/L 106   CO2 mmol/L 30.0*   BUN mg/dL 16   CREATININE mg/dL 0.64   GLUCOSE mg/dL 124*         BNP        TROPONIN        CoAg        Creatinine Clearance  Estimated Creatinine Clearance: 61.6 mL/min (by C-G formula based on SCr of 0.64 mg/dL).    ABG        Radiology  XR Chest 1 View    Result Date: 7/13/2021  1.An acute pulmonary process is not apparent.  Electronically Signed By-Andrés Duque MD On:7/13/2021 11:12 AM This report was finalized on [id number] by  Andrés Duque MD.              EKG                  I personally viewed and interpreted the patient's EKG/Telemetry data:    ECHOCARDIOGRAM:    Results for orders placed during the hospital encounter of 07/12/21    Adult Transthoracic Echo Complete W/ Cont if Necessary Per Protocol    Interpretation Summary  · Estimated left ventricular EF = 60% Left ventricular systolic function is normal.    Indications  Dizziness    Technically satisfactory study.  Mitral valve is structurally normal.  Tricuspid valve is structurally normal.  Aortic valve is structurally  normal.  Pulmonic valve could not be well visualized.  No evidence for mitral tricuspid or aortic regurgitation is seen by Doppler study.  Left atrium is normal in size.  Right atrium is normal in size.  Left ventricle is normal in size and contractility with ejection fraction of 60%.  Right ventricle is normal in size.  Atrial septum is intact.  Aorta is normal.  No pericardial effusion or intracardiac thrombus is seen.    Impression  Structurally and functionally normal cardiac valves.  Left ventricular size and contractility is normal with ejection fraction of 60%.          STRESS MYOVIEW:    Cardiolite (Tc-99m Sestamibi) stress test    CARDIAC CATHETERIZATION:            OTHER:         Assessment/Plan     Principal Problem:    Bradycardia  Active Problems:    Near syncope    Second degree AV block, Mobitz type II    Right bundle branch block    B12 deficiency    Hyperlipidemia    Hypertension    Type 2 diabetes mellitus (CMS/HCC)      ]]]]]]]]]]]]]]]]]]]  Impression  ==========  -Status post permanent dual-chamber pacemaker implantation ([employer] MRI compatible).    -Second-degree Mobitz type II AV block.  Right bundle branch block     -Chest discomfort and shortness of breath with or without exertion.     -Dizziness and near syncope  Echocardiogram-normal 7/12/2021  Stress Cardiolite test-normal except patient has second-degree 2-1 AV block and right bundle branch block.  Very limited exercise tolerance.     -Right bundle branch block     -Sinus bradycardia     -Diabetes dyslipidemia hypertension     -Status post tonsillectomy abdominal tubal ligation     -Family history of coronary artery disease     -Non-smoker     -Allergic to sulfa  =============  Plan  ==========  Patient has shortness of breath and chest discomfort.  Recent EKG from 6/21/2021 was reviewed and interpreted independently showing sinus rhythm right bundle branch block  Recent labs were reviewed-as below. Normal CBC CMP except for  blood sugar of 170 cholesterol 158 triglycerides 77 LDL 86.  Ischemic cardiac work-up.  Stress Cardiolite test-abnormal as above.  Resting ECG showed second-degree AV block with a heart rate of 40/min.  Nuclear images showed uniform distribution of radionuclide.  Echocardiogram.-Normal as above  Metoprolol and Cardizem were discontinued.  Patient has continued AV blocks  Medications were reviewed and updated.    Status post permanent dual-chamber pacemaker implantation 7/13/2021 ([employer] MRI compatible).  Pacemaker site and function is normal.  Interrogation of the pacemaker revealed excellent pacing parameters.    Okay with the discharge plans  Follow-up in the office in 2 weeks.    Further plan will depend on patient's progress.  ]]]]]]]]]]]]]]              Dung Lynch MD  07/14/21  06:53 EDT

## 2021-07-15 ENCOUNTER — TRANSITIONAL CARE MANAGEMENT TELEPHONE ENCOUNTER (OUTPATIENT)
Dept: CALL CENTER | Facility: HOSPITAL | Age: 71
End: 2021-07-15

## 2021-07-15 NOTE — OUTREACH NOTE
Call Center TCM Note      Responses   [facility] patient discharged from?  [facility]   Does the patient have one of the following disease processes/diagnoses(primary or secondary)?  Other   TCM attempt successful?  Yes   Call start time  1138   Call end time  1140   Discharge diagnosis  HTN, bradycardia,  DM2   Does the patient have all medications ordered at discharge?  Yes   Is the patient taking all medications as directed (includes completed medication regime)?  Yes   Does the patient have a primary care provider?   Yes   Does the patient have an appointment with their PCP within 7 days of discharge?  No   Comments regarding PCP  patient declined f/u appt with PCP to be made during call   Nursing Interventions  Advised patient to make appointment   Has the patient kept scheduled appointments due by today?  N/A   Has home health visited the patient within 72 hours of discharge?  N/A   Psychosocial issues?  No   Did the patient receive a copy of their discharge instructions?  Yes   Nursing interventions  Reviewed instructions with patient   What is the patient's perception of their health status since discharge?  Improving   Is the patient/caregiver able to teach back the hierarchy of who to call/visit for symptoms/problems? PCP, Specialist, Home health nurse, Urgent Care, ED, 911  Yes   TCM call completed?  Yes   Wrap up additional comments  Says she is doing well, no questions or concerns at this time, declined for f/u appt to be made with Dr. Iverson at this time.          Suyapa Giraldo RN    7/15/2021, 11:40 EDT

## 2021-07-15 NOTE — CASE MANAGEMENT/SOCIAL WORK
Case Management Discharge Note                Selected Continued Care - Discharged on 7/14/2021 Admission date: 7/12/2021 - Discharge disposition: Home or Self Care                 Final Discharge Disposition Code: 01 - home or self-care

## 2021-07-17 LAB — QT INTERVAL: 402 MS

## 2021-07-21 PROCEDURE — 93248 EXT ECG>7D<15D REV&INTERPJ: CPT | Performed by: INTERNAL MEDICINE

## 2021-07-26 ENCOUNTER — OFFICE VISIT (OUTPATIENT)
Dept: CARDIOLOGY | Facility: CLINIC | Age: 71
End: 2021-07-26

## 2021-07-26 ENCOUNTER — CLINICAL SUPPORT NO REQUIREMENTS (OUTPATIENT)
Dept: CARDIOLOGY | Facility: CLINIC | Age: 71
End: 2021-07-26

## 2021-07-26 VITALS
SYSTOLIC BLOOD PRESSURE: 146 MMHG | WEIGHT: 153 LBS | HEIGHT: 65 IN | DIASTOLIC BLOOD PRESSURE: 71 MMHG | OXYGEN SATURATION: 96 % | HEART RATE: 80 BPM | BODY MASS INDEX: 25.49 KG/M2

## 2021-07-26 DIAGNOSIS — Z95.0 PACEMAKER: ICD-10-CM

## 2021-07-26 DIAGNOSIS — E08.9 DIABETES MELLITUS DUE TO UNDERLYING CONDITION WITHOUT COMPLICATION, WITHOUT LONG-TERM CURRENT USE OF INSULIN (HCC): ICD-10-CM

## 2021-07-26 DIAGNOSIS — I10 ESSENTIAL HYPERTENSION: ICD-10-CM

## 2021-07-26 DIAGNOSIS — I44.1 SECOND DEGREE AV BLOCK, MOBITZ TYPE II: Primary | ICD-10-CM

## 2021-07-26 DIAGNOSIS — E78.5 DYSLIPIDEMIA: ICD-10-CM

## 2021-07-26 DIAGNOSIS — R00.1 BRADYCARDIA: ICD-10-CM

## 2021-07-26 DIAGNOSIS — R55 NEAR SYNCOPE: ICD-10-CM

## 2021-07-26 DIAGNOSIS — I45.10 RIGHT BUNDLE BRANCH BLOCK: ICD-10-CM

## 2021-07-26 PROCEDURE — 93280 PM DEVICE PROGR EVAL DUAL: CPT | Performed by: INTERNAL MEDICINE

## 2021-07-26 PROCEDURE — 99024 POSTOP FOLLOW-UP VISIT: CPT | Performed by: INTERNAL MEDICINE

## 2021-07-26 RX ORDER — AMLODIPINE BESYLATE 5 MG/1
5 TABLET ORAL DAILY
Qty: 90 TABLET | Refills: 3 | Status: SHIPPED | OUTPATIENT
Start: 2021-07-26 | End: 2021-08-06 | Stop reason: SDUPTHER

## 2021-07-26 NOTE — PROGRESS NOTES
Encounter Date:07/26/2021  Last seen 7/14/2021      Patient ID: Celsa Brown is a 71 y.o. female.    Chief Complaint:  Status post pacemaker.  Second-degree AV block  Hypertension  Dyslipidemia  Diabetes  Right bundle branch block    History of Present Illness  Patient recently had permanent dual-chamber pacemaker implantation and was discharged home.    Since I have last seen, the patient has been without any chest discomfort ,shortness of breath, palpitations, dizziness or syncope.  Denies having any headache ,abdominal pain ,nausea, vomiting , diarrhea constipation, loss of weight or loss of appetite.  Denies having any excessive bruising ,hematuria or blood in the stool.    Review of all systems negative except as indicated.    Reviewed ROS.    Assessment and Plan       ]]]]]]]]]]]]]]]]]]]  Impression  ==========  -Status post permanent dual-chamber pacemaker implantation ([employer] MRI compatible).  7/13/2021     -Second-degree Mobitz type II AV block.  Right bundle branch block (prior to pacemaker implantation     -Chest discomfort and shortness of breath with or without exertion.     -Dizziness and near syncope-improved since patient had pacemaker implantation  Echocardiogram-normal 7/12/2021  Stress Cardiolite test-normal except patient has second-degree 2-1 AV block and right bundle branch block.  Very limited exercise tolerance.     -Right bundle branch block     -Sinus bradycardia     -Diabetes dyslipidemia hypertension     -Status post tonsillectomy abdominal tubal ligation     -Family history of coronary artery disease     -Non-smoker     -Allergic to sulfa  =============  Plan  ==========    Status post permanent dual-chamber pacemaker implantation 7/13/2021 ([employer] MRI compatible).  Pacemaker site and function is normal.  Interrogation of the pacemaker revealed excellent pacing parameters.  Battery status is 11 years.    Hypertension-146/70.  Patient is on amlodipine hydralazine  hydrochlorothiazide lisinopril 40 mg a day.  Metoprolol is on hold as well as Cardizem.     Follow-up in the office in 3 months with pacemaker interrogation.    Further plan will depend on patient's progress.  ]]]]]]]]]]]]]]            Diagnosis Plan   1. Second degree AV block, Mobitz type II     2. Pacemaker     3. Bradycardia     4. Near syncope     5. Right bundle branch block     6. Essential hypertension     7. Dyslipidemia     8. Diabetes mellitus due to underlying condition without complication, without long-term current use of insulin (CMS/[facility])     LAB RESULTS (LAST 7 DAYS)    CBC        BMP        CMP         BNP        TROPONIN        CoAg        Creatinine Clearance  Estimated Creatinine Clearance: 61.3 mL/min (by C-G formula based on SCr of 0.64 mg/dL).    ABG        Radiology  No radiology results for the last day                The following portions of the patient's history were reviewed and updated as appropriate: allergies, current medications, past family history, past medical history, past social history, past surgical history and problem list.    Review of Systems   Constitutional: Negative for malaise/fatigue.   Cardiovascular: Negative for chest pain, leg swelling, palpitations and syncope.   Respiratory: Negative for shortness of breath.    Skin: Negative for rash.   Gastrointestinal: Negative for nausea and vomiting.   Neurological: Negative for dizziness, light-headedness and numbness.         Current Outpatient Medications:   •  Accu-Chek Thelma Plus test strip, TESTING BLOOD SUGAR ONCE  DAILY, Disp: 90 each, Rfl: 3  •  amLODIPine (Norvasc) 5 MG tablet, Take 1 tablet by mouth Daily for 30 days., Disp: 30 tablet, Rfl: 0  •  aspirin 81 MG EC tablet, Take 81 mg by mouth Daily. 1 tablet every other day, Disp: , Rfl:   •  cholecalciferol (VITAMIN D3) 1000 units tablet, Take 1,000 Units by mouth Every Other Day., Disp: , Rfl:   •  Cyanocobalamin (VITAMIN B12) 1000 MCG tablet controlled-release,  Take 1,000 mcg by mouth Every Other Day., Disp: , Rfl:   •  glipizide (GLUCOTROL) 10 MG tablet, TAKE 2 TABLETS BY MOUTH  TWICE DAILY IN THE MORNING  AND EVENING, Disp: 360 tablet, Rfl: 3  •  hydrALAZINE (APRESOLINE) 100 MG tablet, Take 1 tablet by mouth 2 (Two) Times a Day., Disp: 270 tablet, Rfl: 3  •  hydroCHLOROthiazide (HYDRODIURIL) 25 MG tablet, TAKE 1 TABLET BY MOUTH  DAILY, Disp: 90 tablet, Rfl: 3  •  Januvia 100 MG tablet, TAKE 1 TABLET BY MOUTH  DAILY, Disp: 90 tablet, Rfl: 3  •  lisinopril (PRINIVIL,ZESTRIL) 40 MG tablet, TAKE 1 TABLET BY MOUTH  DAILY, Disp: 90 tablet, Rfl: 3  •  loratadine (CLARITIN) 10 MG tablet, Take 10 mg by mouth Daily., Disp: , Rfl:   •  Melatonin 10 MG tablet, Take 10 mg by mouth Daily., Disp: , Rfl:   •  metFORMIN (GLUCOPHAGE) 500 MG tablet, TAKE 2 TABLETS BY MOUTH TWO TIMES DAILY WITH MEALS, Disp: 360 tablet, Rfl: 3  •  metoprolol succinate XL (TOPROL-XL) 100 MG 24 hr tablet, Take 1/2 tablet at night., Disp: 90 tablet, Rfl: 3  •  Multiple Vitamins-Minerals (CENTRUM SILVER ULTRA WOMENS) tablet, Take 1 tablet by mouth Daily., Disp: , Rfl:   •  Omega-3 Fatty Acids (OMEGA-3 FISH OIL) 500 MG capsule, Take 500 mg by mouth Daily., Disp: , Rfl:   •  potassium chloride (MICRO-K) 10 MEQ CR capsule, TAKE 1 CAPSULE BY MOUTH IN  THE MORNING AND EVENING ,  ON WEEKENDS 2 CAPSULES IN  THE MORNING AND 1 CAPSULE  IN THE EVENING, Disp: 208 capsule, Rfl: 3  •  pravastatin (PRAVACHOL) 80 MG tablet, Take 1 tablet by mouth every night at bedtime., Disp: 90 tablet, Rfl: 3    Allergies   Allergen Reactions   • Sulfa Antibiotics Hives       Family History   Problem Relation Age of Onset   • Heart failure Mother    • Diabetes Mother    • Breast cancer Neg Hx    • Ovarian cancer Neg Hx        Past Surgical History:   Procedure Laterality Date   • CARDIAC CATHETERIZATION     • CARDIAC ELECTROPHYSIOLOGY PROCEDURE N/A 7/13/2021    Procedure: Pacemaker DC new;  Surgeon: Dung Lynch MD;  Location: [facility]  "CATH INVASIVE LOCATION;  Service: Cardiovascular;  Laterality: N/A;   • COLONOSCOPY     • TONSILLECTOMY     • TUBAL ABDOMINAL LIGATION         Past Medical History:   Diagnosis Date   • B12 deficiency    • Diabetes mellitus (CMS/HCC)    • Hyperlipidemia    • Hypertension    • Tachycardia    • Vitamin D deficiency        Family History   Problem Relation Age of Onset   • Heart failure Mother    • Diabetes Mother    • Breast cancer Neg Hx    • Ovarian cancer Neg Hx        Social History     Socioeconomic History   • Marital status:      Spouse name: Not on file   • Number of children: Not on file   • Years of education: Not on file   • Highest education level: Not on file   Tobacco Use   • Smoking status: Never Smoker   • Smokeless tobacco: Never Used   Substance and Sexual Activity   • Alcohol use: No   • Drug use: No   • Sexual activity: Yes     Partners: Male     Birth control/protection: None         Procedures      Objective:       Physical Exam    /71   Pulse 80   Ht 165.1 cm (65\")   Wt 69.4 kg (153 lb)   LMP  (LMP Unknown)   SpO2 96%   BMI 25.46 kg/m²   The patient is alert, oriented and in no distress.    Vital signs as noted above.    Head and neck revealed no carotid bruits or jugular venous distension.  No thyromegaly or lymphadenopathy is present.    Lungs clear.  No wheezing.  Breath sounds are normal bilaterally.    Heart normal first and second heart sounds.  No murmur..  No pericardial rub is present.  No gallop is present.    Abdomen soft and nontender.  No organomegaly is present.    Extremities revealed good peripheral pulses without any pedal edema.    Skin warm and dry.  Pacemaker site looks normal.    Musculoskeletal system is grossly normal.    CNS grossly normal.        "

## 2021-08-06 ENCOUNTER — TELEPHONE (OUTPATIENT)
Dept: CARDIOLOGY | Facility: CLINIC | Age: 71
End: 2021-08-06

## 2021-08-06 RX ORDER — AMLODIPINE BESYLATE 5 MG/1
5 TABLET ORAL DAILY
Qty: 90 TABLET | Refills: 3 | Status: SHIPPED | OUTPATIENT
Start: 2021-08-06 | End: 2021-09-05

## 2021-08-06 NOTE — TELEPHONE ENCOUNTER
Caller: Celsa Brown    Relationship: Self    Best call back number: [phone]    Medication needed:   Requested Prescriptions     Pending Prescriptions Disp Refills   • amLODIPine (Norvasc) 5 MG tablet 90 tablet 3     Sig: Take 1 tablet by mouth Daily for 30 days.       When do you need the refill by: ASAP      90 DAY SUPPLY     Does the patient have less than a 3 day supply:  [] Yes  [x] No    What is the patient's preferred pharmacy: [facility] - [facility] [address] [place] [address], [address] - [phone] [facility] [phone]

## 2021-08-06 NOTE — TELEPHONE ENCOUNTER
Pharmacy calling to verify if pt is taking diltiazem or not. JANESSA Garcia pharmacist pt is holding diltiazem. Now they can ship out amlodipine

## 2021-08-23 RX ORDER — HYDRALAZINE HYDROCHLORIDE 100 MG/1
TABLET, FILM COATED ORAL
Qty: 270 TABLET | Refills: 3 | Status: SHIPPED | OUTPATIENT
Start: 2021-08-23 | End: 2022-02-15 | Stop reason: SDUPTHER

## 2021-08-23 RX ORDER — LISINOPRIL 40 MG/1
40 TABLET ORAL DAILY
Qty: 90 TABLET | Refills: 3 | Status: SHIPPED | OUTPATIENT
Start: 2021-08-23 | End: 2022-02-15 | Stop reason: SDUPTHER

## 2021-08-23 RX ORDER — POTASSIUM CHLORIDE 750 MG/1
CAPSULE, EXTENDED RELEASE ORAL
Qty: 208 CAPSULE | Refills: 3 | Status: SHIPPED | OUTPATIENT
Start: 2021-08-23 | End: 2022-02-15 | Stop reason: SDUPTHER

## 2021-09-16 RX ORDER — PRAVASTATIN SODIUM 80 MG/1
TABLET ORAL
Qty: 90 TABLET | Refills: 3 | Status: SHIPPED | OUTPATIENT
Start: 2021-09-16 | End: 2022-02-15 | Stop reason: SDUPTHER

## 2021-10-01 PROBLEM — H35.3190 NONEXUDATIVE AGE-RELATED MACULAR DEGENERATION: Status: ACTIVE | Noted: 2017-12-01

## 2021-10-01 NOTE — PATIENT INSTRUCTIONS
Health Maintenance Due   Topic Date Due   • DXA SCAN  09/18/2021   • INFLUENZA VACCINE  10/01/2021     Get eye exam before end ofyear.

## 2021-10-04 ENCOUNTER — OFFICE VISIT (OUTPATIENT)
Dept: FAMILY MEDICINE CLINIC | Facility: CLINIC | Age: 71
End: 2021-10-04

## 2021-10-04 VITALS
HEIGHT: 65 IN | HEART RATE: 83 BPM | BODY MASS INDEX: 25.69 KG/M2 | SYSTOLIC BLOOD PRESSURE: 159 MMHG | WEIGHT: 154.2 LBS | TEMPERATURE: 99.6 F | DIASTOLIC BLOOD PRESSURE: 73 MMHG | OXYGEN SATURATION: 95 %

## 2021-10-04 DIAGNOSIS — Z78.0 POST-MENOPAUSAL: ICD-10-CM

## 2021-10-04 DIAGNOSIS — E55.9 VITAMIN D DEFICIENCY: ICD-10-CM

## 2021-10-04 DIAGNOSIS — E78.5 HYPERLIPIDEMIA, UNSPECIFIED HYPERLIPIDEMIA TYPE: ICD-10-CM

## 2021-10-04 DIAGNOSIS — R55 NEAR SYNCOPE: ICD-10-CM

## 2021-10-04 DIAGNOSIS — E53.8 B12 DEFICIENCY: ICD-10-CM

## 2021-10-04 DIAGNOSIS — E11.9 TYPE 2 DIABETES MELLITUS WITHOUT COMPLICATION, WITHOUT LONG-TERM CURRENT USE OF INSULIN (HCC): Primary | ICD-10-CM

## 2021-10-04 DIAGNOSIS — I10 PRIMARY HYPERTENSION: ICD-10-CM

## 2021-10-04 LAB
25(OH)D3 SERPL-MCNC: 73.7 NG/ML
ALBUMIN SERPL-MCNC: 4.3 G/DL (ref 3.5–5.2)
ALBUMIN/GLOB SERPL: 1.4 G/DL
ALP SERPL-CCNC: 88 U/L (ref 39–117)
ALT SERPL W P-5'-P-CCNC: 21 U/L (ref 1–33)
ANION GAP SERPL CALCULATED.3IONS-SCNC: 12.3 MMOL/L (ref 5–15)
AST SERPL-CCNC: 19 U/L (ref 1–32)
BASOPHILS # BLD AUTO: 0.04 10*3/MM3 (ref 0–0.2)
BASOPHILS NFR BLD AUTO: 0.7 % (ref 0–1.5)
BILIRUB SERPL-MCNC: 0.2 MG/DL (ref 0–1.2)
BUN SERPL-MCNC: 15 MG/DL (ref 8–23)
BUN/CREAT SERPL: 25 (ref 7–25)
CALCIUM SPEC-SCNC: 9.5 MG/DL (ref 8.6–10.5)
CHLORIDE SERPL-SCNC: 103 MMOL/L (ref 98–107)
CHOLEST SERPL-MCNC: 158 MG/DL (ref 0–200)
CO2 SERPL-SCNC: 28.7 MMOL/L (ref 22–29)
CREAT SERPL-MCNC: 0.6 MG/DL (ref 0.57–1)
DEPRECATED RDW RBC AUTO: 39.3 FL (ref 37–54)
EOSINOPHIL # BLD AUTO: 0.11 10*3/MM3 (ref 0–0.4)
EOSINOPHIL NFR BLD AUTO: 2 % (ref 0.3–6.2)
ERYTHROCYTE [DISTWIDTH] IN BLOOD BY AUTOMATED COUNT: 12 % (ref 12.3–15.4)
GFR SERPL CREATININE-BSD FRML MDRD: 98 ML/MIN/1.73
GLOBULIN UR ELPH-MCNC: 3 GM/DL
GLUCOSE SERPL-MCNC: 140 MG/DL (ref 65–99)
HBA1C MFR BLD: 7.5 % (ref 3.5–5.6)
HCT VFR BLD AUTO: 38.2 % (ref 34–46.6)
HDLC SERPL-MCNC: 55 MG/DL (ref 40–60)
HGB BLD-MCNC: 12.6 G/DL (ref 12–15.9)
IMM GRANULOCYTES # BLD AUTO: 0.01 10*3/MM3 (ref 0–0.05)
IMM GRANULOCYTES NFR BLD AUTO: 0.2 % (ref 0–0.5)
LDLC SERPL CALC-MCNC: 92 MG/DL (ref 0–100)
LDLC/HDLC SERPL: 1.68 {RATIO}
LYMPHOCYTES # BLD AUTO: 2.07 10*3/MM3 (ref 0.7–3.1)
LYMPHOCYTES NFR BLD AUTO: 37.7 % (ref 19.6–45.3)
MCH RBC QN AUTO: 29.9 PG (ref 26.6–33)
MCHC RBC AUTO-ENTMCNC: 33 G/DL (ref 31.5–35.7)
MCV RBC AUTO: 90.7 FL (ref 79–97)
MONOCYTES # BLD AUTO: 0.41 10*3/MM3 (ref 0.1–0.9)
MONOCYTES NFR BLD AUTO: 7.5 % (ref 5–12)
NEUTROPHILS NFR BLD AUTO: 2.85 10*3/MM3 (ref 1.7–7)
NEUTROPHILS NFR BLD AUTO: 51.9 % (ref 42.7–76)
NRBC BLD AUTO-RTO: 0 /100 WBC (ref 0–0.2)
PLATELET # BLD AUTO: 283 10*3/MM3 (ref 140–450)
PMV BLD AUTO: 11.2 FL (ref 6–12)
POTASSIUM SERPL-SCNC: 3.7 MMOL/L (ref 3.5–5.2)
PROT SERPL-MCNC: 7.3 G/DL (ref 6–8.5)
RBC # BLD AUTO: 4.21 10*6/MM3 (ref 3.77–5.28)
SODIUM SERPL-SCNC: 144 MMOL/L (ref 136–145)
TRIGL SERPL-MCNC: 52 MG/DL (ref 0–150)
VIT B12 BLD-MCNC: 792 PG/ML (ref 211–946)
VLDLC SERPL-MCNC: 11 MG/DL (ref 5–40)
WBC # BLD AUTO: 5.49 10*3/MM3 (ref 3.4–10.8)

## 2021-10-04 PROCEDURE — 85025 COMPLETE CBC W/AUTO DIFF WBC: CPT | Performed by: PREVENTIVE MEDICINE

## 2021-10-04 PROCEDURE — 82306 VITAMIN D 25 HYDROXY: CPT | Performed by: PREVENTIVE MEDICINE

## 2021-10-04 PROCEDURE — 80053 COMPREHEN METABOLIC PANEL: CPT | Performed by: PREVENTIVE MEDICINE

## 2021-10-04 PROCEDURE — 99213 OFFICE O/P EST LOW 20 MIN: CPT | Performed by: PREVENTIVE MEDICINE

## 2021-10-04 PROCEDURE — 36415 COLL VENOUS BLD VENIPUNCTURE: CPT | Performed by: PREVENTIVE MEDICINE

## 2021-10-04 PROCEDURE — 83036 HEMOGLOBIN GLYCOSYLATED A1C: CPT | Performed by: PREVENTIVE MEDICINE

## 2021-10-04 PROCEDURE — 82570 ASSAY OF URINE CREATININE: CPT | Performed by: PREVENTIVE MEDICINE

## 2021-10-04 PROCEDURE — 82043 UR ALBUMIN QUANTITATIVE: CPT | Performed by: PREVENTIVE MEDICINE

## 2021-10-04 PROCEDURE — 80061 LIPID PANEL: CPT | Performed by: PREVENTIVE MEDICINE

## 2021-10-04 PROCEDURE — 82607 VITAMIN B-12: CPT | Performed by: PREVENTIVE MEDICINE

## 2021-10-04 RX ORDER — AMLODIPINE BESYLATE 5 MG/1
5 TABLET ORAL DAILY
COMMUNITY
End: 2022-02-15 | Stop reason: SDUPTHER

## 2021-10-04 NOTE — PROGRESS NOTES
"Subjective   Celsa Brown is a 71 y.o. female presents for   Chief Complaint   Patient presents with   • Hypertension     3 mth FU fasting    • Hyperlipidemia   • Diabetes   Patient presents today to follow-up on multiple chronic health conditions most of which are stable.  She has not had any more symptoms of lightheadedness since pacemaker was put in blood sugars are doing well she does need to schedule an eye exam toward the end of the year.  Patient has had her Covid vaccinations and her booster.    Health Maintenance Due   Topic Date Due   • DXA SCAN  09/18/2021   • INFLUENZA VACCINE  10/01/2021       History of Present Illness     Vitals:    10/04/21 0814 10/04/21 0815 10/04/21 0816   BP: 171/79 174/72 159/73   BP Location: Left arm Right arm Right arm   Patient Position: Sitting Sitting Standing   Cuff Size: Adult Adult Adult   Pulse: 83     Temp: 99.6 °F (37.6 °C)     TempSrc: Temporal     SpO2: 95%     Weight: 69.9 kg (154 lb 3.2 oz)     Height: 165.1 cm (65\")       Body mass index is 25.66 kg/m².    Current Outpatient Medications on File Prior to Visit   Medication Sig Dispense Refill   • Accu-Chek Thelma Plus test strip TESTING BLOOD SUGAR ONCE  DAILY 90 each 3   • amLODIPine (NORVASC) 5 MG tablet Take 5 mg by mouth Daily.     • aspirin 81 MG EC tablet Take 81 mg by mouth Daily. 1 tablet every other day     • cholecalciferol (VITAMIN D3) 1000 units tablet Take 1,000 Units by mouth Every Other Day.     • Cyanocobalamin (VITAMIN B12) 1000 MCG tablet controlled-release Take 1,000 mcg by mouth Every Other Day.     • glipizide (GLUCOTROL) 10 MG tablet TAKE 2 TABLETS BY MOUTH  TWICE DAILY IN THE MORNING  AND EVENING 360 tablet 3   • hydrALAZINE (APRESOLINE) 100 MG tablet TAKE 1 TABLET BY MOUTH 3  TIMES DAILY 270 tablet 3   • hydroCHLOROthiazide (HYDRODIURIL) 25 MG tablet TAKE 1 TABLET BY MOUTH  DAILY 90 tablet 3   • Januvia 100 MG tablet TAKE 1 TABLET BY MOUTH  DAILY 90 tablet 3   • lisinopril (PRINIVIL,ZESTRIL) " 40 MG tablet TAKE 1 TABLET BY MOUTH  DAILY 90 tablet 3   • loratadine (CLARITIN) 10 MG tablet Take 10 mg by mouth Daily.     • Melatonin 10 MG tablet Take 10 mg by mouth Daily.     • metFORMIN (GLUCOPHAGE) 500 MG tablet TAKE 2 TABLETS BY MOUTH TWO TIMES DAILY WITH MEALS 360 tablet 3   • metoprolol succinate XL (TOPROL-XL) 100 MG 24 hr tablet Take 1/2 tablet at night. 90 tablet 3   • Multiple Vitamins-Minerals (CENTRUM SILVER ULTRA WOMENS) tablet Take 1 tablet by mouth Daily.     • Omega-3 Fatty Acids (OMEGA-3 FISH OIL) 500 MG capsule Take 500 mg by mouth Daily.     • potassium chloride (MICRO-K) 10 MEQ CR capsule TAKE 1 CAPSULE BY MOUTH IN  THE MORNING AND EVENING, ON WEEKENDS 2 CAPSULES IN THE  MORNING AND 1 CAPSULE IN  THE EVENING 208 capsule 3   • pravastatin (PRAVACHOL) 80 MG tablet TAKE 1 TABLET BY MOUTH AT  BEDTIME 90 tablet 3   • [DISCONTINUED] dilTIAZem (TIAZAC) 240 MG 24 hr capsule Two capsules daily 180 capsule 3   • [DISCONTINUED] metFORMIN (GLUCOPHAGE) 500 MG tablet Take 2 tablets by mouth 2 (Two) Times a Day With Meals. 450 tablet 3     No current facility-administered medications on file prior to visit.       The following portions of the patient's history were reviewed and updated as appropriate: allergies, current medications, past family history, past medical history, past social history, past surgical history and problem list.    Review of Systems   Constitutional: Negative.    HENT: Negative.  Negative for sinus pressure and sore throat.    Eyes: Negative.    Respiratory: Negative.  Negative for cough.    Cardiovascular: Negative.    Gastrointestinal: Negative.    Endocrine: Negative.    Genitourinary: Negative.    Musculoskeletal: Negative.    Skin: Negative.    Allergic/Immunologic: Positive for environmental allergies.   Neurological: Negative.    Hematological: Negative.    Psychiatric/Behavioral: Negative.        Objective   Physical Exam  Vitals reviewed.   Constitutional:       General: She  is not in acute distress.     Appearance: Normal appearance. She is well-developed. She is not ill-appearing or toxic-appearing.   HENT:      Head: Normocephalic and atraumatic.      Right Ear: Tympanic membrane, ear canal and external ear normal.      Left Ear: Tympanic membrane, ear canal and external ear normal.      Nose: Nose normal.   Eyes:      Extraocular Movements: Extraocular movements intact.      Conjunctiva/sclera: Conjunctivae normal.      Pupils: Pupils are equal, round, and reactive to light.   Cardiovascular:      Rate and Rhythm: Normal rate and regular rhythm.      Pulses:           Dorsalis pedis pulses are 1+ on the right side and 1+ on the left side.        Posterior tibial pulses are 1+ on the right side and 1+ on the left side.      Heart sounds: Normal heart sounds.   Pulmonary:      Effort: Pulmonary effort is normal.      Breath sounds: Normal breath sounds.   Abdominal:      General: Bowel sounds are normal. There is no distension.      Palpations: Abdomen is soft. There is no mass.      Tenderness: There is no abdominal tenderness.   Musculoskeletal:         General: Normal range of motion.      Cervical back: Neck supple.      Right foot: Bunion present.   Feet:      Right foot:      Skin integrity: Skin integrity normal.      Left foot:      Skin integrity: Skin integrity normal.      Toenail Condition: Left toenails are abnormally thick.      Comments: Diabetic Foot Exam Performed    Skin:     General: Skin is warm.   Neurological:      General: No focal deficit present.      Mental Status: She is alert and oriented to person, place, and time.   Psychiatric:         Mood and Affect: Mood normal.         Behavior: Behavior normal.       PHQ-9 Total Score:      Assessment/Plan   Diagnoses and all orders for this visit:    1. Type 2 diabetes mellitus without complication, without long-term current use of insulin (HCC) (Primary)  Comments:  No high or low sugars patient needs eye  exam  Orders:  -     Comprehensive Metabolic Panel  -     Hemoglobin A1c  -     Microalbumin / Creatinine Urine Ratio - Urine, Clean Catch    2. Post-menopausal  Comments:  Patient walks daily  Orders:  -     DEXA Bone Density Axial; Future    3. Vitamin D deficiency  Comments:  Takes vitamin D regularly  Orders:  -     Vitamin D 25 Hydroxy    4. Primary hypertension  Comments:  Home pressures controlled 129/82with pulse 87  Orders:  -     CBC Auto Differential    5. Hyperlipidemia, unspecified hyperlipidemia type  Comments:  Like to eat less saturated fats  Orders:  -     Lipid Panel    6. B12 deficiency  Comments:  B12 regularly  Orders:  -     Vitamin B12    7. Near syncope  Comments:  No syncope since pacemaker.        Patient Instructions     Health Maintenance Due   Topic Date Due   • DXA SCAN  09/18/2021   • INFLUENZA VACCINE  10/01/2021     Get eye exam before end ofyear.

## 2021-10-04 NOTE — PROGRESS NOTES
Venipuncture Blood Specimen Collection  Venipuncture performed in left arm by Elizabeth Penn MA with good hemostasis. Patient tolerated the procedure well without complications.   10/04/21   JCARLOS Cunningham MD

## 2021-10-05 ENCOUNTER — TELEPHONE (OUTPATIENT)
Dept: FAMILY MEDICINE CLINIC | Facility: CLINIC | Age: 71
End: 2021-10-05

## 2021-10-05 LAB
ALBUMIN UR-MCNC: <1.2 MG/DL
CREAT UR-MCNC: 26.8 MG/DL
MICROALBUMIN/CREAT UR: NORMAL MG/G{CREAT}

## 2021-10-05 NOTE — PROGRESS NOTES
Glucose 140 with A1C still not showing control at 7.5 on Maximum januvia, Metformen and glipzide-would she consider an injection or can she do more with diet and exercise?  She is on maximum pravastatin and bad cholesterol 92-can she do more to decrease sat fats and walk or do we need to consider stronger statin or adding Zetia-let me know

## 2021-10-05 NOTE — TELEPHONE ENCOUNTER
HUB TO READ    ----- Message from Jaelyn Iverson MD sent at 10/5/2021 10:54 AM EDT -----    Glucose 140 with A1C still not showing control at 7.5 on Maximum januvia, Metformen and glipzide-would she consider an injection or can she do more with diet and exercise?  She is on maximum pravastatin and bad cholesterol 92-can she do more to decrease sat fats and walk or do we need to consider stronger statin or adding Zetia-let me know

## 2021-10-06 NOTE — TELEPHONE ENCOUNTER
Patient states that she will do more with diet and exercise and does not want to change any meds at this time

## 2021-10-11 ENCOUNTER — TELEPHONE (OUTPATIENT)
Dept: FAMILY MEDICINE CLINIC | Facility: CLINIC | Age: 71
End: 2021-10-11

## 2021-10-11 ENCOUNTER — HOSPITAL ENCOUNTER (OUTPATIENT)
Dept: BONE DENSITY | Facility: HOSPITAL | Age: 71
Discharge: HOME | End: 2021-10-11
Admitting: PREVENTIVE MEDICINE

## 2021-10-11 DIAGNOSIS — Z78.0 POST-MENOPAUSAL: ICD-10-CM

## 2021-10-11 PROCEDURE — 77080 DXA BONE DENSITY AXIAL: CPT

## 2021-10-11 NOTE — TELEPHONE ENCOUNTER
HUB TO READ  ----- Message from Jaelyn Iverson MD sent at 10/11/2021  5:10 PM EDT -----  Bone density is normal but has decreased since last checked so keep walking!!!

## 2021-10-12 PROCEDURE — 93294 REM INTERROG EVL PM/LDLS PM: CPT | Performed by: INTERNAL MEDICINE

## 2021-10-12 PROCEDURE — 93296 REM INTERROG EVL PM/IDS: CPT | Performed by: INTERNAL MEDICINE

## 2021-11-01 ENCOUNTER — HOSPITAL ENCOUNTER (OUTPATIENT)
Facility: HOSPITAL | Age: 71
Setting detail: OBSERVATION
Discharge: HOME | End: 2021-11-02
Attending: EMERGENCY MEDICINE | Admitting: EMERGENCY MEDICINE

## 2021-11-01 ENCOUNTER — TELEPHONE (OUTPATIENT)
Dept: CARDIOLOGY | Facility: CLINIC | Age: 71
End: 2021-11-01

## 2021-11-01 ENCOUNTER — APPOINTMENT (OUTPATIENT)
Dept: GENERAL RADIOLOGY | Facility: HOSPITAL | Age: 71
End: 2021-11-01

## 2021-11-01 DIAGNOSIS — R00.2 PALPITATION: Primary | ICD-10-CM

## 2021-11-01 LAB
ALBUMIN SERPL-MCNC: 3.6 G/DL (ref 3.5–5.2)
ALBUMIN/GLOB SERPL: 1.4 G/DL
ALP SERPL-CCNC: 75 U/L (ref 39–117)
ALT SERPL W P-5'-P-CCNC: 15 U/L (ref 1–33)
ANION GAP SERPL CALCULATED.3IONS-SCNC: 12 MMOL/L (ref 5–15)
AST SERPL-CCNC: 16 U/L (ref 1–32)
BASOPHILS # BLD AUTO: 0 10*3/MM3 (ref 0–0.2)
BASOPHILS NFR BLD AUTO: 0.6 % (ref 0–1.5)
BILIRUB SERPL-MCNC: 0.2 MG/DL (ref 0–1.2)
BUN SERPL-MCNC: 15 MG/DL (ref 8–23)
BUN/CREAT SERPL: 23.4 (ref 7–25)
CALCIUM SPEC-SCNC: 9 MG/DL (ref 8.6–10.5)
CHLORIDE SERPL-SCNC: 105 MMOL/L (ref 98–107)
CO2 SERPL-SCNC: 24 MMOL/L (ref 22–29)
CREAT SERPL-MCNC: 0.64 MG/DL (ref 0.57–1)
DEPRECATED RDW RBC AUTO: 44.2 FL (ref 37–54)
EOSINOPHIL # BLD AUTO: 0.1 10*3/MM3 (ref 0–0.4)
EOSINOPHIL NFR BLD AUTO: 1.4 % (ref 0.3–6.2)
ERYTHROCYTE [DISTWIDTH] IN BLOOD BY AUTOMATED COUNT: 13.8 % (ref 12.3–15.4)
GFR SERPL CREATININE-BSD FRML MDRD: 91 ML/MIN/1.73
GLOBULIN UR ELPH-MCNC: 2.6 GM/DL
GLUCOSE SERPL-MCNC: 228 MG/DL (ref 65–99)
HCT VFR BLD AUTO: 36.6 % (ref 34–46.6)
HGB BLD-MCNC: 12.2 G/DL (ref 12–15.9)
INR PPP: <0.93 (ref 0.93–1.1)
LYMPHOCYTES # BLD AUTO: 2.3 10*3/MM3 (ref 0.7–3.1)
LYMPHOCYTES NFR BLD AUTO: 44 % (ref 19.6–45.3)
MAGNESIUM SERPL-MCNC: 2 MG/DL (ref 1.6–2.4)
MCH RBC QN AUTO: 30.2 PG (ref 26.6–33)
MCHC RBC AUTO-ENTMCNC: 33.3 G/DL (ref 31.5–35.7)
MCV RBC AUTO: 90.7 FL (ref 79–97)
MONOCYTES # BLD AUTO: 0.5 10*3/MM3 (ref 0.1–0.9)
MONOCYTES NFR BLD AUTO: 8.9 % (ref 5–12)
NEUTROPHILS NFR BLD AUTO: 2.4 10*3/MM3 (ref 1.7–7)
NEUTROPHILS NFR BLD AUTO: 45.1 % (ref 42.7–76)
NRBC BLD AUTO-RTO: 0.1 /100 WBC (ref 0–0.2)
NT-PROBNP SERPL-MCNC: 504.7 PG/ML (ref 0–900)
PLATELET # BLD AUTO: 257 10*3/MM3 (ref 140–450)
PMV BLD AUTO: 8.4 FL (ref 6–12)
POTASSIUM SERPL-SCNC: 3.6 MMOL/L (ref 3.5–5.2)
PROT SERPL-MCNC: 6.2 G/DL (ref 6–8.5)
PROTHROMBIN TIME: 10.3 SECONDS (ref 9.6–11.7)
QT INTERVAL: 362 MS
RBC # BLD AUTO: 4.04 10*6/MM3 (ref 3.77–5.28)
SARS-COV-2 RNA PNL SPEC NAA+PROBE: NOT DETECTED
SODIUM SERPL-SCNC: 141 MMOL/L (ref 136–145)
TROPONIN T SERPL-MCNC: <0.01 NG/ML (ref 0–0.03)
TROPONIN T SERPL-MCNC: <0.01 NG/ML (ref 0–0.03)
WBC # BLD AUTO: 5.3 10*3/MM3 (ref 3.4–10.8)

## 2021-11-01 PROCEDURE — 71045 X-RAY EXAM CHEST 1 VIEW: CPT

## 2021-11-01 PROCEDURE — G0378 HOSPITAL OBSERVATION PER HR: HCPCS

## 2021-11-01 PROCEDURE — 87635 SARS-COV-2 COVID-19 AMP PRB: CPT | Performed by: EMERGENCY MEDICINE

## 2021-11-01 PROCEDURE — C9803 HOPD COVID-19 SPEC COLLECT: HCPCS

## 2021-11-01 PROCEDURE — 93005 ELECTROCARDIOGRAM TRACING: CPT

## 2021-11-01 PROCEDURE — 93005 ELECTROCARDIOGRAM TRACING: CPT | Performed by: EMERGENCY MEDICINE

## 2021-11-01 PROCEDURE — 83735 ASSAY OF MAGNESIUM: CPT | Performed by: NURSE PRACTITIONER

## 2021-11-01 PROCEDURE — 80053 COMPREHEN METABOLIC PANEL: CPT | Performed by: NURSE PRACTITIONER

## 2021-11-01 PROCEDURE — 99284 EMERGENCY DEPT VISIT MOD MDM: CPT

## 2021-11-01 PROCEDURE — 36415 COLL VENOUS BLD VENIPUNCTURE: CPT | Performed by: NURSE PRACTITIONER

## 2021-11-01 PROCEDURE — 83880 ASSAY OF NATRIURETIC PEPTIDE: CPT | Performed by: NURSE PRACTITIONER

## 2021-11-01 PROCEDURE — 85610 PROTHROMBIN TIME: CPT | Performed by: NURSE PRACTITIONER

## 2021-11-01 PROCEDURE — 84484 ASSAY OF TROPONIN QUANT: CPT | Performed by: NURSE PRACTITIONER

## 2021-11-01 PROCEDURE — 85025 COMPLETE CBC W/AUTO DIFF WBC: CPT | Performed by: NURSE PRACTITIONER

## 2021-11-01 RX ORDER — GLIPIZIDE 5 MG/1
20 TABLET ORAL
Status: DISCONTINUED | OUTPATIENT
Start: 2021-11-02 | End: 2021-11-02 | Stop reason: HOSPADM

## 2021-11-01 RX ORDER — SODIUM CHLORIDE 0.9 % (FLUSH) 0.9 %
10 SYRINGE (ML) INJECTION EVERY 12 HOURS SCHEDULED
Status: DISCONTINUED | OUTPATIENT
Start: 2021-11-01 | End: 2021-11-02 | Stop reason: HOSPADM

## 2021-11-01 RX ORDER — NALOXONE HCL 0.4 MG/ML
0.4 VIAL (ML) INJECTION
Status: DISCONTINUED | OUTPATIENT
Start: 2021-11-01 | End: 2021-11-02 | Stop reason: HOSPADM

## 2021-11-01 RX ORDER — LISINOPRIL 20 MG/1
40 TABLET ORAL DAILY
Status: DISCONTINUED | OUTPATIENT
Start: 2021-11-02 | End: 2021-11-02 | Stop reason: HOSPADM

## 2021-11-01 RX ORDER — HYDROCHLOROTHIAZIDE 25 MG/1
25 TABLET ORAL DAILY
Status: DISCONTINUED | OUTPATIENT
Start: 2021-11-02 | End: 2021-11-02 | Stop reason: HOSPADM

## 2021-11-01 RX ORDER — METOPROLOL SUCCINATE 50 MG/1
50 TABLET, EXTENDED RELEASE ORAL NIGHTLY
Status: DISCONTINUED | OUTPATIENT
Start: 2021-11-01 | End: 2021-11-02 | Stop reason: HOSPADM

## 2021-11-01 RX ORDER — SODIUM CHLORIDE 0.9 % (FLUSH) 0.9 %
10 SYRINGE (ML) INJECTION AS NEEDED
Status: DISCONTINUED | OUTPATIENT
Start: 2021-11-01 | End: 2021-11-02 | Stop reason: HOSPADM

## 2021-11-01 RX ORDER — ASPIRIN 81 MG/1
81 TABLET ORAL EVERY OTHER DAY
Status: DISCONTINUED | OUTPATIENT
Start: 2021-11-02 | End: 2021-11-02 | Stop reason: HOSPADM

## 2021-11-01 RX ORDER — AMLODIPINE BESYLATE 5 MG/1
5 TABLET ORAL DAILY
Status: DISCONTINUED | OUTPATIENT
Start: 2021-11-02 | End: 2021-11-02 | Stop reason: HOSPADM

## 2021-11-01 RX ORDER — MORPHINE SULFATE 2 MG/ML
1 INJECTION, SOLUTION INTRAMUSCULAR; INTRAVENOUS EVERY 4 HOURS PRN
Status: DISCONTINUED | OUTPATIENT
Start: 2021-11-01 | End: 2021-11-02 | Stop reason: HOSPADM

## 2021-11-01 RX ORDER — POTASSIUM CHLORIDE 750 MG/1
10 TABLET, FILM COATED, EXTENDED RELEASE ORAL 2 TIMES DAILY WITH MEALS
Status: DISCONTINUED | OUTPATIENT
Start: 2021-11-01 | End: 2021-11-02 | Stop reason: HOSPADM

## 2021-11-01 RX ORDER — ATORVASTATIN CALCIUM 20 MG/1
20 TABLET, FILM COATED ORAL DAILY
Refills: 3 | Status: DISCONTINUED | OUTPATIENT
Start: 2021-11-02 | End: 2021-11-02 | Stop reason: HOSPADM

## 2021-11-01 RX ORDER — ASPIRIN 81 MG/1
324 TABLET, CHEWABLE ORAL ONCE
Status: COMPLETED | OUTPATIENT
Start: 2021-11-01 | End: 2021-11-01

## 2021-11-01 RX ORDER — CHOLECALCIFEROL (VITAMIN D3) 125 MCG
10 CAPSULE ORAL NIGHTLY PRN
Status: DISCONTINUED | OUTPATIENT
Start: 2021-11-01 | End: 2021-11-02 | Stop reason: HOSPADM

## 2021-11-01 RX ORDER — HYDRALAZINE HYDROCHLORIDE 25 MG/1
100 TABLET, FILM COATED ORAL 3 TIMES DAILY
Status: DISCONTINUED | OUTPATIENT
Start: 2021-11-01 | End: 2021-11-02 | Stop reason: HOSPADM

## 2021-11-01 RX ORDER — NITROGLYCERIN 0.4 MG/1
0.4 TABLET SUBLINGUAL
Status: DISCONTINUED | OUTPATIENT
Start: 2021-11-01 | End: 2021-11-02 | Stop reason: HOSPADM

## 2021-11-01 RX ADMIN — METFORMIN HYDROCHLORIDE 1000 MG: 500 TABLET ORAL at 22:06

## 2021-11-01 RX ADMIN — ASPIRIN 81 MG CHEWABLE TABLET 324 MG: 81 TABLET CHEWABLE at 17:19

## 2021-11-01 RX ADMIN — HYDRALAZINE HYDROCHLORIDE 100 MG: 25 TABLET, FILM COATED ORAL at 22:06

## 2021-11-01 RX ADMIN — POTASSIUM CHLORIDE 10 MEQ: 750 TABLET, EXTENDED RELEASE ORAL at 22:06

## 2021-11-01 RX ADMIN — Medication 10 ML: at 22:06

## 2021-11-01 RX ADMIN — METOPROLOL SUCCINATE 50 MG: 50 TABLET, EXTENDED RELEASE ORAL at 22:06

## 2021-11-01 NOTE — ED NOTES
Went to take pt to [facility] for admission. Pt wanted nurse to retreive her  from waiting room so she could give him an update.      Kylee Alfonso LPN  11/01/21 1931

## 2021-11-01 NOTE — TELEPHONE ENCOUNTER
PT HAD PM PUT IN July, THE PAST 3 WEEKS SHE HAS BEEN HAVING ELEVATED HR, PASSED OUT ONCE, STOMACH PAIN AND SOME CHEST PAIN. HR . PLEASE ADVISE

## 2021-11-01 NOTE — TELEPHONE ENCOUNTER
Spoke with pt advised with symptoms and HR she needs to go to ER.   Lat episode was yesterday   Understood

## 2021-11-01 NOTE — ED PROVIDER NOTES
Subjective   Patient is a 71-year-old female who states she has had palpitations for a couple of weeks now.  She states that she had a pacemaker placed back in the summer by Dr. Lynch she states that since that time she has had episodes of tachycardia she states today it has been persistent she states that she does not really have pain she just feels like her heart is racing from time to time.          Review of Systems   Constitutional: Negative for chills, fatigue and fever.   HENT: Negative for congestion, tinnitus and trouble swallowing.    Eyes: Negative for photophobia, discharge and redness.   Respiratory: Negative for cough and shortness of breath.    Cardiovascular: Positive for palpitations. Negative for chest pain and leg swelling.   Gastrointestinal: Negative for abdominal pain, diarrhea, nausea and vomiting.   Genitourinary: Negative for dysuria, frequency and urgency.   Musculoskeletal: Negative for back pain, joint swelling and myalgias.   Skin: Negative for rash.   Neurological: Negative for dizziness and headaches.   Psychiatric/Behavioral: Negative for confusion.   All other systems reviewed and are negative.      Past Medical History:   Diagnosis Date   • B12 deficiency    • Hyperlipidemia    • Hypertension    • Tachycardia    • Vitamin D deficiency        Allergies   Allergen Reactions   • Sulfa Antibiotics Hives       Past Surgical History:   Procedure Laterality Date   • CARDIAC CATHETERIZATION     • CARDIAC ELECTROPHYSIOLOGY PROCEDURE N/A 7/13/2021    Procedure: Pacemaker DC new;  Surgeon: Dung Lynch MD;  Location: [facility] INVASIVE LOCATION;  Service: Cardiovascular;  Laterality: N/A;   • COLONOSCOPY     • TONSILLECTOMY     • TUBAL ABDOMINAL LIGATION         Family History   Problem Relation Age of Onset   • Heart failure Mother    • Diabetes Mother    • Breast cancer Neg Hx    • Ovarian cancer Neg Hx        Social History     Socioeconomic History   • Marital status:     Tobacco Use   • Smoking status: Never Smoker   • Smokeless tobacco: Never Used   Substance and Sexual Activity   • Alcohol use: No   • Drug use: No   • Sexual activity: Yes     Partners: Male     Birth control/protection: None           Objective   Physical Exam  Vitals reviewed.   Constitutional:       Appearance: She is well-developed.   HENT:      Head: Normocephalic and atraumatic.      Right Ear: Tympanic membrane and external ear normal.      Left Ear: Tympanic membrane and external ear normal.      Nose: Nose normal.      Mouth/Throat:      Mouth: Mucous membranes are moist.   Eyes:      Conjunctiva/sclera: Conjunctivae normal.      Pupils: Pupils are equal, round, and reactive to light.   Cardiovascular:      Rate and Rhythm: Normal rate and regular rhythm.      Pulses: Normal pulses.      Heart sounds: Normal heart sounds.   Pulmonary:      Effort: Pulmonary effort is normal. No respiratory distress.      Breath sounds: Normal breath sounds. No wheezing.   Abdominal:      General: Bowel sounds are normal. There is no distension.      Palpations: Abdomen is soft. There is no mass.      Tenderness: There is no abdominal tenderness. There is no guarding or rebound.   Musculoskeletal:         General: No swelling or deformity. Normal range of motion.      Cervical back: Normal range of motion and neck supple.      Right lower leg: No edema.      Left lower leg: No edema.   Skin:     General: Skin is warm and dry.      Capillary Refill: Capillary refill takes less than 2 seconds.   Neurological:      General: No focal deficit present.      Mental Status: She is alert and oriented to person, place, and time.      GCS: GCS eye subscore is 4. GCS verbal subscore is 5. GCS motor subscore is 6.      Cranial Nerves: No cranial nerve deficit.      Sensory: No sensory deficit.      Deep Tendon Reflexes: Reflexes normal.   Psychiatric:         Mood and Affect: Mood normal.         Behavior: Behavior normal.          "Thought Content: Thought content normal.         Judgment: Judgment normal.         Procedures             EKG was reviewed by myself and read by Dr. Villalpando as no acute findings.  ED Course      /81   Pulse (!) 126   Temp 97.8 °F (36.6 °C)   Resp 18   Ht 165.1 cm (65\")   Wt 68.9 kg (152 lb)   LMP  (LMP Unknown)   SpO2 99%   BMI 25.29 kg/m²   Labs Reviewed   COMPREHENSIVE METABOLIC PANEL - Abnormal; Notable for the following components:       Result Value    Glucose 228 (*)     All other components within normal limits    Narrative:     GFR Normal >60  Chronic Kidney Disease <60  Kidney Failure <15     PROTIME-INR - Abnormal; Notable for the following components:    INR <0.93 (*)     All other components within normal limits   TROPONIN (IN-HOUSE) - Normal    Narrative:     Troponin T Reference Range:  <= 0.03 ng/mL-   Negative for AMI  >0.03 ng/mL-     Abnormal for myocardial necrosis.  Clinicians would have to utilize clinical acumen, EKG, Troponin and serial changes to determine if it is an Acute Myocardial Infarction or myocardial injury due to an underlying chronic condition.       Results may be falsely decreased if patient taking Biotin.     CBC WITH AUTO DIFFERENTIAL - Normal   TROPONIN (IN-HOUSE)   CBC AND DIFFERENTIAL    Narrative:     The following orders were created for panel order CBC & Differential.  Procedure                               Abnormality         Status                     ---------                               -----------         ------                     CBC Auto Differential[[id number]]        Normal              Final result                 Please view results for these tests on the individual orders.   EXTRA TUBES    Narrative:     The following orders were created for panel order Extra Tubes.  Procedure                               Abnormality         Status                     ---------                               -----------         ------                     Gold " Top - SST[[id number]]                                   In process                   Please view results for these tests on the individual orders.   GOLD TOP - SST     Medications   sodium chloride 0.9 % flush 10 mL (has no administration in time range)   aspirin chewable tablet 324 mg (324 mg Oral Given 11/1/21 5551)                                        MDM  Number of Diagnoses or Management Options  Diagnosis management comments: Chart review:  Stress Myoview from July 2021  Impression  ========  Resting ECG with sinus rhythm second-degree Mobitz type II AV block and right bundle branch block     Poor exercise tolerance.     Normal stress Cardiolite test.     Gated SPECT images revealed normal left ventricular size and contractility with ejection fraction of 75%.    Patient had IV established and blood work was obtained EKG chest x-ray and troponin was all within normal limits as well as CBC and chemistry    ECHO 7/2021  · Estimated left ventricular EF = 60% Left ventricular systolic function is normal.     Indications  Dizziness     Technically satisfactory study.  Mitral valve is structurally normal.  Tricuspid valve is structurally normal.  Aortic valve is structurally normal.  Pulmonic valve could not be well visualized.  No evidence for mitral tricuspid or aortic regurgitation is seen by Doppler study.  Left atrium is normal in size.  Right atrium is normal in size.  Left ventricle is normal in size and contractility with ejection fraction of 60%.  Right ventricle is normal in size.  Atrial septum is intact.  Aorta is normal.  No pericardial effusion or intracardiac thrombus is seen.     Impression  Structurally and functionally normal cardiac valves.  Left ventricular size and contractility is normal with ejection fraction of 60%.    Patient will be admitted to the ED observation unit for serial troponins and evaluation by Dr. Lynch in the morning.  The patient was agreeable this plan of care and was stable  at the time of admission       Amount and/or Complexity of Data Reviewed  Clinical lab tests: reviewed  Tests in the medicine section of CPT®: reviewed    Risk of Complications, Morbidity, and/or Mortality  Presenting problems: low  Diagnostic procedures: low  Management options: low    Patient Progress  Patient progress: stable      Final diagnoses:   Palpitation       ED Disposition  ED Disposition     ED Disposition Condition Comment    Decision to Admit            No follow-up provider specified.       Medication List      No changes were made to your prescriptions during this visit.          Krista Jacinto, APRN  11/01/21 1823

## 2021-11-02 ENCOUNTER — READMISSION MANAGEMENT (OUTPATIENT)
Dept: CALL CENTER | Facility: HOSPITAL | Age: 71
End: 2021-11-02

## 2021-11-02 VITALS
OXYGEN SATURATION: 96 % | SYSTOLIC BLOOD PRESSURE: 146 MMHG | HEIGHT: 65 IN | DIASTOLIC BLOOD PRESSURE: 73 MMHG | BODY MASS INDEX: 25.16 KG/M2 | RESPIRATION RATE: 16 BRPM | WEIGHT: 151.01 LBS | TEMPERATURE: 98.2 F | HEART RATE: 86 BPM

## 2021-11-02 PROBLEM — R00.2 PALPITATIONS: Status: RESOLVED | Noted: 2021-11-01 | Resolved: 2021-11-02

## 2021-11-02 LAB
ANION GAP SERPL CALCULATED.3IONS-SCNC: 13 MMOL/L (ref 5–15)
BUN SERPL-MCNC: 15 MG/DL (ref 8–23)
BUN/CREAT SERPL: 21.4 (ref 7–25)
CALCIUM SPEC-SCNC: 8.5 MG/DL (ref 8.6–10.5)
CHLORIDE SERPL-SCNC: 108 MMOL/L (ref 98–107)
CO2 SERPL-SCNC: 24 MMOL/L (ref 22–29)
CREAT SERPL-MCNC: 0.7 MG/DL (ref 0.57–1)
DEPRECATED RDW RBC AUTO: 46.8 FL (ref 37–54)
ERYTHROCYTE [DISTWIDTH] IN BLOOD BY AUTOMATED COUNT: 14.1 % (ref 12.3–15.4)
GFR SERPL CREATININE-BSD FRML MDRD: 82 ML/MIN/1.73
GLUCOSE BLDC GLUCOMTR-MCNC: 140 MG/DL (ref 70–105)
GLUCOSE BLDC GLUCOMTR-MCNC: 189 MG/DL (ref 70–105)
GLUCOSE SERPL-MCNC: 244 MG/DL (ref 65–99)
HCT VFR BLD AUTO: 38 % (ref 34–46.6)
HGB BLD-MCNC: 12.4 G/DL (ref 12–15.9)
MAGNESIUM SERPL-MCNC: 1.8 MG/DL (ref 1.6–2.4)
MCH RBC QN AUTO: 30.5 PG (ref 26.6–33)
MCHC RBC AUTO-ENTMCNC: 32.6 G/DL (ref 31.5–35.7)
MCV RBC AUTO: 93.4 FL (ref 79–97)
PLATELET # BLD AUTO: 260 10*3/MM3 (ref 140–450)
PMV BLD AUTO: 8.8 FL (ref 6–12)
POTASSIUM SERPL-SCNC: 3.8 MMOL/L (ref 3.5–5.2)
QT INTERVAL: 456 MS
RBC # BLD AUTO: 4.06 10*6/MM3 (ref 3.77–5.28)
SODIUM SERPL-SCNC: 145 MMOL/L (ref 136–145)
TROPONIN T SERPL-MCNC: <0.01 NG/ML (ref 0–0.03)
WBC # BLD AUTO: 5.9 10*3/MM3 (ref 3.4–10.8)

## 2021-11-02 PROCEDURE — 80048 BASIC METABOLIC PNL TOTAL CA: CPT | Performed by: NURSE PRACTITIONER

## 2021-11-02 PROCEDURE — 83735 ASSAY OF MAGNESIUM: CPT | Performed by: NURSE PRACTITIONER

## 2021-11-02 PROCEDURE — 85027 COMPLETE CBC AUTOMATED: CPT | Performed by: NURSE PRACTITIONER

## 2021-11-02 PROCEDURE — 84484 ASSAY OF TROPONIN QUANT: CPT | Performed by: NURSE PRACTITIONER

## 2021-11-02 PROCEDURE — G0378 HOSPITAL OBSERVATION PER HR: HCPCS

## 2021-11-02 PROCEDURE — 99214 OFFICE O/P EST MOD 30 MIN: CPT | Performed by: INTERNAL MEDICINE

## 2021-11-02 PROCEDURE — 93005 ELECTROCARDIOGRAM TRACING: CPT | Performed by: NURSE PRACTITIONER

## 2021-11-02 PROCEDURE — 93010 ELECTROCARDIOGRAM REPORT: CPT | Performed by: INTERNAL MEDICINE

## 2021-11-02 PROCEDURE — 82962 GLUCOSE BLOOD TEST: CPT

## 2021-11-02 RX ORDER — NICOTINE POLACRILEX 4 MG
15 LOZENGE BUCCAL
Status: DISCONTINUED | OUTPATIENT
Start: 2021-11-02 | End: 2021-11-02 | Stop reason: HOSPADM

## 2021-11-02 RX ORDER — INSULIN LISPRO 100 [IU]/ML
0-9 INJECTION, SOLUTION INTRAVENOUS; SUBCUTANEOUS AS NEEDED
Status: DISCONTINUED | OUTPATIENT
Start: 2021-11-02 | End: 2021-11-02 | Stop reason: HOSPADM

## 2021-11-02 RX ORDER — INSULIN LISPRO 100 [IU]/ML
0-9 INJECTION, SOLUTION INTRAVENOUS; SUBCUTANEOUS
Status: DISCONTINUED | OUTPATIENT
Start: 2021-11-02 | End: 2021-11-02 | Stop reason: HOSPADM

## 2021-11-02 RX ORDER — OLANZAPINE 10 MG/2ML
1 INJECTION, POWDER, LYOPHILIZED, FOR SOLUTION INTRAMUSCULAR
Status: DISCONTINUED | OUTPATIENT
Start: 2021-11-02 | End: 2021-11-02 | Stop reason: HOSPADM

## 2021-11-02 RX ORDER — DEXTROSE MONOHYDRATE 25 G/50ML
25 INJECTION, SOLUTION INTRAVENOUS
Status: DISCONTINUED | OUTPATIENT
Start: 2021-11-02 | End: 2021-11-02 | Stop reason: HOSPADM

## 2021-11-02 RX ADMIN — ATORVASTATIN CALCIUM 20 MG: 20 TABLET, FILM COATED ORAL at 08:07

## 2021-11-02 RX ADMIN — Medication 10 ML: at 08:08

## 2021-11-02 RX ADMIN — GLIPIZIDE 20 MG: 5 TABLET ORAL at 08:06

## 2021-11-02 RX ADMIN — AMLODIPINE BESYLATE 5 MG: 5 TABLET ORAL at 08:07

## 2021-11-02 RX ADMIN — LISINOPRIL 40 MG: 20 TABLET ORAL at 08:07

## 2021-11-02 RX ADMIN — ASPIRIN 81 MG: 81 TABLET, COATED ORAL at 08:06

## 2021-11-02 RX ADMIN — LINAGLIPTIN 5 MG: 5 TABLET, FILM COATED ORAL at 08:07

## 2021-11-02 RX ADMIN — POTASSIUM CHLORIDE 10 MEQ: 750 TABLET, EXTENDED RELEASE ORAL at 08:07

## 2021-11-02 RX ADMIN — HYDROCHLOROTHIAZIDE 25 MG: 25 TABLET ORAL at 08:07

## 2021-11-02 RX ADMIN — HYDRALAZINE HYDROCHLORIDE 100 MG: 25 TABLET, FILM COATED ORAL at 08:06

## 2021-11-02 NOTE — H&P
Critical access hospital Observation Unit H&P    Patient Name: Celsa Brown  : 1950  MRN: [id number]  Primary Care Physician: Jaelyn Iverson MD  Date of admission: 2021     Patient Care Team:  Jaelyn Iverson MD as PCP - General (Family Medicine)  Dung Lynch MD as Consulting Physician (Cardiology)          Subjective   History Present Illness     Chief Complaint:   Chief Complaint   Patient presents with   • Palpitations       Ms. Brown is a 71 y.o.  presents to [facility] complaining of palpitations with shortness of breath.      71-year-old female presents to the ER with a chief complaint of palpitations described as her heart beating really fast with associated shortness of breath.  Patient denies chest pain or nausea with the symptoms.  However, about a week and a half ago the patient did have a syncopal episode preceded by diaphoresis which she attributed to her diabetes.  She did not seek medical attention at that time.    Review of records with summary: Stress test 2021 showing poor exercise tolerance, normal stress Cardiolite test, second-degree Mobitz 2 AV block, right bundle branch block, patient had pacemaker, [employer] MRI compatable, normal echo 2021.        Review of Systems   Constitutional: Positive for diaphoresis.   Cardiovascular: Negative for chest pain and dyspnea on exertion.   Respiratory: Positive for shortness of breath.    Gastrointestinal: Negative for nausea.   All other systems reviewed and are negative.          Personal History     Past Medical History:   Past Medical History:   Diagnosis Date   • B12 deficiency    • Hyperlipidemia    • Hypertension    • Tachycardia    • Vitamin D deficiency        Surgical History:      Past Surgical History:   Procedure Laterality Date   • CARDIAC CATHETERIZATION     • CARDIAC ELECTROPHYSIOLOGY PROCEDURE N/A 2021    Procedure: Pacemaker DC new;  Surgeon: Dung Lynch MD;  Location: [facility] INVASIVE  LOCATION;  Service: Cardiovascular;  Laterality: N/A;   • COLONOSCOPY     • TONSILLECTOMY     • TUBAL ABDOMINAL LIGATION             Family History: family history includes Diabetes in her mother; Heart failure in her mother. Otherwise pertinent FHx was reviewed and unremarkable.     Social History:  reports that she has never smoked. She has never used smokeless tobacco. She reports that she does not drink alcohol and does not use drugs.      Medications:  Prior to Admission medications    Medication Sig Start Date End Date Taking? Authorizing Provider   aspirin 81 MG EC tablet Take 81 mg by mouth Daily. 1 tablet every other day 10/7/16  Yes Moe Sosa MD   cholecalciferol (VITAMIN D3) 1000 units tablet Take 1,000 Units by mouth Every Other Day. 10/7/16  Yes Moe Sosa MD   glipizide (GLUCOTROL) 10 MG tablet TAKE 2 TABLETS BY MOUTH  TWICE DAILY IN THE MORNING  AND EVENING 5/24/21  Yes Jaelyn Iverson MD   hydrALAZINE (APRESOLINE) 100 MG tablet TAKE 1 TABLET BY MOUTH 3  TIMES DAILY 8/23/21  Yes Jaelyn Iverson MD   hydroCHLOROthiazide (HYDRODIURIL) 25 MG tablet TAKE 1 TABLET BY MOUTH  DAILY 5/24/21  Yes Jaelyn Iverson MD   Januvia 100 MG tablet TAKE 1 TABLET BY MOUTH  DAILY 3/5/21  Yes Jaelyn Iverson MD   lisinopril (PRINIVIL,ZESTRIL) 40 MG tablet TAKE 1 TABLET BY MOUTH  DAILY 8/23/21  Yes Jaelyn Iverson MD   loratadine (CLARITIN) 10 MG tablet Take 10 mg by mouth Daily. 10/7/16  Yes Moe Sosa MD   Melatonin 10 MG tablet Take 10 mg by mouth Daily. 10/7/16  Yes Moe Sosa MD   metFORMIN (GLUCOPHAGE) 500 MG tablet TAKE 2 TABLETS BY MOUTH TWO TIMES DAILY WITH MEALS 11/28/20  Yes Jaelyn Iverson MD   metoprolol succinate XL (TOPROL-XL) 100 MG 24 hr tablet Take 1/2 tablet at night. 6/22/21  Yes Jaelyn Iverson MD   Multiple Vitamins-Minerals (CENTRUM SILVER ULTRA WOMENS) tablet Take 1 tablet by mouth Daily. 10/7/16  Yes Ian  MD Moe   potassium chloride (MICRO-K) 10 MEQ CR capsule TAKE 1 CAPSULE BY MOUTH IN  THE MORNING AND EVENING, ON WEEKENDS 2 CAPSULES IN THE  MORNING AND 1 CAPSULE IN  THE EVENING 8/23/21  Yes Jaelyn Iverson MD   pravastatin (PRAVACHOL) 80 MG tablet TAKE 1 TABLET BY MOUTH AT  BEDTIME 9/16/21  Yes Jaelyn Iverson MD   Accu-Chek Thelma Plus test strip TESTING BLOOD SUGAR ONCE  DAILY 10/6/20   Jaelyn Iverson MD   amLODIPine (NORVASC) 5 MG tablet Take 5 mg by mouth Daily.    Dung Lynch MD   Cyanocobalamin (VITAMIN B12) 1000 MCG tablet controlled-release Take 1,000 mcg by mouth Every Other Day. 8/1/17   Moe Sosa MD   Omega-3 Fatty Acids (OMEGA-3 FISH OIL) 500 MG capsule Take 500 mg by mouth Daily. 10/7/16   Moe Sosa MD   dilTIAZem (TIAZAC) 240 MG 24 hr capsule Two capsules daily 2/19/20   Jaelyn Iverson MD   metFORMIN (GLUCOPHAGE) 500 MG tablet Take 2 tablets by mouth 2 (Two) Times a Day With Meals. 11/16/20   Jaelyn Iverson MD       Allergies:    Allergies   Allergen Reactions   • Sulfa Antibiotics Hives       Objective   Objective     Vital Signs  Temp:  [97.3 °F (36.3 °C)-97.8 °F (36.6 °C)] 97.3 °F (36.3 °C)  Heart Rate:  [] 89  Resp:  [16-18] 16  BP: (116-169)/(59-93) 144/90  SpO2:  [96 %-100 %] 96 %  on   ;   Device (Oxygen Therapy): room air  Body mass index is 25.13 kg/m².    Physical Exam  Vitals and nursing note reviewed.   Constitutional:       Appearance: Normal appearance.   HENT:      Head: Normocephalic and atraumatic.      Right Ear: External ear normal.      Left Ear: External ear normal.      Nose: Nose normal. No congestion or rhinorrhea.      Mouth/Throat:      Mouth: Mucous membranes are moist.   Eyes:      Extraocular Movements: Extraocular movements intact.      Conjunctiva/sclera: Conjunctivae normal.      Pupils: Pupils are equal, round, and reactive to light.   Cardiovascular:      Rate and Rhythm: Normal rate and  regular rhythm.      Pulses: Normal pulses.      Heart sounds: Normal heart sounds. No murmur heard.      Pulmonary:      Effort: Pulmonary effort is normal.      Breath sounds: Normal breath sounds.   Abdominal:      General: Bowel sounds are normal.      Palpations: Abdomen is soft.   Musculoskeletal:         General: Normal range of motion.      Cervical back: Normal range of motion and neck supple.      Right lower leg: No edema.      Left lower leg: No edema.   Skin:     General: Skin is warm and dry.      Capillary Refill: Capillary refill takes less than 2 seconds.   Neurological:      General: No focal deficit present.      Mental Status: She is alert and oriented to person, place, and time.   Psychiatric:         Mood and Affect: Mood normal.         Behavior: Behavior normal.         Thought Content: Thought content normal.         Judgment: Judgment normal.           Results Review:  I have personally reviewed most recent cardiac tracings, lab results and radiology images and interpretations and agree with findings.    Results from last 7 days   Lab Units 11/02/21 0154 11/01/21 1652 11/01/21  1652   WBC 10*3/mm3 5.90   < > 5.30   HEMOGLOBIN g/dL 12.4   < > 12.2   HEMATOCRIT % 38.0   < > 36.6   PLATELETS 10*3/mm3 260   < > 257   INR   --   --  <0.93*    < > = values in this interval not displayed.     Results from last 7 days   Lab Units 11/02/21 0154 11/01/21 1956 11/01/21 1652 11/01/21 1652   SODIUM mmol/L 145  --    < > 141   POTASSIUM mmol/L 3.8  --    < > 3.6   CHLORIDE mmol/L 108*  --    < > 105   CO2 mmol/L 24.0  --    < > 24.0   BUN mg/dL 15  --    < > 15   CREATININE mg/dL 0.70  --    < > 0.64   GLUCOSE mg/dL 244*  --    < > 228*   CALCIUM mg/dL 8.5*  --    < > 9.0   ALT (SGPT) U/L  --   --   --  15   AST (SGOT) U/L  --   --   --  16   TROPONIN T ng/mL <0.010 <0.010  --  <0.010   PROBNP pg/mL  --  504.7  --   --     < > = values in this interval not displayed.     Estimated Creatinine  Clearance: 60.9 mL/min (by C-G formula based on SCr of 0.7 mg/dL).  Brief Urine Lab Results  (Last result in the past 365 days)      Color   Clarity   Blood   Leuk Est   Nitrite   Protein   CREAT   Urine HCG        10/04/21 0846             26.8               Microbiology Results (last 10 days)     Procedure Component Value - Date/Time    COVID PRE-OP / PRE-PROCEDURE SCREENING ORDER (NO ISOLATION) - Swab, Nasopharynx [[id number]]  (Normal) Collected: 11/01/21 1839    Lab Status: Final result Specimen: Swab from Nasopharynx Updated: 11/01/21 1927    Narrative:      The following orders were created for panel order COVID PRE-OP / PRE-PROCEDURE SCREENING ORDER (NO ISOLATION) - Swab, Nasopharynx.  Procedure                               Abnormality         Status                     ---------                               -----------         ------                     COVID-19,CEPHEID/KYLE/BD...[[id number]]  Normal              Final result                 Please view results for these tests on the individual orders.    COVID-19,CEPHEID/KYLE/BDMAX,COR/EDUARDO/PAD/ARI IN-HOUSE(OR EMERGENT/ADD-ON),NP SWAB IN TRANSPORT MEDIA 3-4 HR TAT, RT-PCR - Swab, Nasopharynx [[id number]]  (Normal) Collected: 11/01/21 1839    Lab Status: Final result Specimen: Swab from Nasopharynx Updated: 11/01/21 1927     COVID19 Not Detected    Narrative:      Fact sheet for providers: https://www.fda.gov/media/437158/download     Fact sheet for patients: https://www.fda.gov/media/670795/download  Fact sheet for providers: https://www.fda.gov/media/843905/download    Fact sheet for patients: https://www.fda.gov/media/652451/download    Test performed by PCR.          ECG/EMG Results (most recent)     Procedure Component Value Units Date/Time    ECG 12 Lead [[id number]] Collected: 11/01/21 1538     Updated: 11/01/21 1540     QT Interval 362 ms     Narrative:      HEART RATE= 127  bpm  RR Interval= 472  ms  NM Interval=   ms  P Horizontal Axis= 30  deg  P  Front Axis= 0  deg  QRSD Interval= 126  ms  QT Interval= 362  ms  QRS Axis= -75  deg  T Wave Axis= 98  deg  - ABNORMAL ECG -  Ventricular-paced rhythm  Electronically Signed By:   Date and Time of Study: 2021-11-01 15:38:52    ECG 12 Lead [[id number]] Collected: 11/02/21 0506     Updated: 11/02/21 0508     QT Interval 456 ms     Narrative:      HEART RATE= 73  bpm  RR Interval= 817  ms  DC Interval= 292  ms  P Horizontal Axis= 19  deg  P Front Axis= 13  deg  QRSD Interval= 141  ms  QT Interval= 456  ms  QRS Axis= -45  deg  T Wave Axis= 66  deg  - ABNORMAL ECG -  Ventricular-paced rhythm  Electronically Signed By:   Date and Time of Study: 2021-11-02 05:06:01              Results for orders placed during the hospital encounter of 07/12/21    Adult Transthoracic Echo Complete W/ Cont if Necessary Per Protocol    Interpretation Summary  · Estimated left ventricular EF = 60% Left ventricular systolic function is normal.    Indications  Dizziness    Technically satisfactory study.  Mitral valve is structurally normal.  Tricuspid valve is structurally normal.  Aortic valve is structurally normal.  Pulmonic valve could not be well visualized.  No evidence for mitral tricuspid or aortic regurgitation is seen by Doppler study.  Left atrium is normal in size.  Right atrium is normal in size.  Left ventricle is normal in size and contractility with ejection fraction of 60%.  Right ventricle is normal in size.  Atrial septum is intact.  Aorta is normal.  No pericardial effusion or intracardiac thrombus is seen.    Impression  Structurally and functionally normal cardiac valves.  Left ventricular size and contractility is normal with ejection fraction of 60%.      XR Chest 1 View    Result Date: 11/1/2021   1. No acute cardiopulmonary process. 2. The orientation of the pacemaker cartridge the left upper chest is rotated 180 degrees from previous position of uncertain clinical significance. Correlation with any interval cartridge  replacements/interventions is needed.   Electronically Signed By-Jimmy Mckee DO On:11/1/2021 5:50 PM This report was finalized on [id number] by  Jimmy Mckee DO.        Estimated Creatinine Clearance: 60.9 mL/min (by C-G formula based on SCr of 0.7 mg/dL).    Assessment/Plan   Assessment/Plan       Active Hospital Problems    Diagnosis  POA   • Palpitations [R00.2]  Yes      Resolved Hospital Problems   No resolved problems to display.     Palpitations, uncertain etiology, history of pacemaker placement July 2021: Serial troponin; cardiology consult; continue metoprolol  -Potassium 3.8, magnesium 1.8, calcium slightly low at 8.5    Hypertension, chronic: Continue amlodipine; continue aspirin; continue hydralazine; continue hydrochlorothiazide with potassium; continue lisinopril; continue metoprolol    Diabetes type 2, chronic: Continue glipizide x1 dose; continue Tradjenta; add sliding scale; hold Metformin        VTE Prophylaxis -   Mechanical Order History:     None      Pharmalogical Order History:     None          CODE STATUS:    Code Status and Medical Interventions:   Ordered at: 11/01/21 2129     Level Of Support Discussed With:    Patient     Code Status (Patient has no pulse and is not breathing):    CPR (Attempt to Resuscitate)     Medical Interventions (Patient has pulse or is breathing):    Full       This patient has been examined wearing personal protective equipment.     I discussed the patient's findings and my recommendations with patient and family.      Signature:Electronically signed by OBI Bee, 11/02/21, 9:06 AM EDT.

## 2021-11-02 NOTE — DISCHARGE SUMMARY
[facility]  DISCHARGE SUMMARY        Prepared For PCP:  Jaelyn Iverson MD    Patient Name: Celsa Brown  : 1950  MRN: [id number]      Date of Admission:   2021    Date of Discharge:  2021    Length of stay:  LOS: 0 days     Hospital Course     Presenting Problem:   Palpitations [R00.2]  Palpitation [R00.2]      Active Hospital Problems   No active problems to display.      Resolved Hospital Problems    Diagnosis Date Resolved POA   • Palpitations [R00.2] 2021 Yes           Hospital Course:  Celsa Brown is a 71 y.o. female patient presented to the ER with a chief complaint of palpitations.  The patient felt like her heart was racing despite having a pacemaker.  Patient was seen by cardiology who reviewed pacemaker interrogation with recommendation for patient to discharge and follow-up in office at her previously scheduled appointment.    -Status post permanent dual-chamber pacemaker implantation 2021 ([employer] MRI compatible).  Pacemaker site and function is normal. Recent interrogation of the pacemaker revealed excellent pacing parameters.        Recommendation for Outpatient Providers:             Reasons For Change In Medications and Indications for New Medications:        Day of Discharge     HPI:   71-year-old female presents to the ER with a chief complaint of palpitations described as her heart beating really fast with associated shortness of breath.  Patient denies chest pain or nausea with the symptoms.  However, about a week and a half ago the patient did have a syncopal episode preceded by diaphoresis which she attributed to her diabetes.  She did not seek medical attention at that time.     Review of records with summary: Stress test 2021 showing poor exercise tolerance, normal stress Cardiolite test, second-degree Mobitz 2 AV block, right bundle branch block, patient had pacemaker, [employer] MRI compatable, normal echo 2021.          Vital Signs:   Temp:  [97.3 °F (36.3 °C)-98.2 °F (36.8 °C)] 98.2 °F (36.8 °C)  Heart Rate:  [] 86  Resp:  [16-18] 16  BP: (116-169)/(59-93) 146/73     ROS:  Review of Systems   All other systems reviewed and are negative.    Physical Exam  Vitals and nursing note reviewed.   Constitutional:       Appearance: Normal appearance.   HENT:      Head: Normocephalic and atraumatic.      Right Ear: External ear normal.      Left Ear: External ear normal.      Nose: Nose normal. No congestion or rhinorrhea.      Mouth/Throat:      Mouth: Mucous membranes are moist.   Eyes:      Extraocular Movements: Extraocular movements intact.      Conjunctiva/sclera: Conjunctivae normal.      Pupils: Pupils are equal, round, and reactive to light.   Cardiovascular:      Rate and Rhythm: Normal rate and regular rhythm.      Pulses: Normal pulses.      Heart sounds: Normal heart sounds. No murmur heard.  Pulmonary:      Effort: Pulmonary effort is normal.      Breath sounds: Normal breath sounds.   Abdominal:      General: Bowel sounds are normal.      Palpations: Abdomen is soft.   Musculoskeletal:         General: Normal range of motion.      Cervical back: Normal range of motion and neck supple.      Right lower leg: No edema.      Left lower leg: No edema.   Skin:     General: Skin is warm and dry.      Capillary Refill: Capillary refill takes less than 2 seconds.   Neurological:      General: No focal deficit present.      Mental Status: She is alert and oriented to person, place, and time.   Psychiatric:         Mood and Affect: Mood normal.         Behavior: Behavior normal.         Thought Content: Thought content normal.         Judgment: Judgment normal.     Pertinent  and/or Most Recent Results     Results from last 7 days   Lab Units 11/02/21  0154 11/01/21  1652   WBC 10*3/mm3 5.90 5.30   HEMOGLOBIN g/dL 12.4 12.2   HEMATOCRIT % 38.0 36.6   PLATELETS 10*3/mm3 260 257   SODIUM mmol/L 145 141   POTASSIUM mmol/L 3.8  3.6   CHLORIDE mmol/L 108* 105   CO2 mmol/L 24.0 24.0   BUN mg/dL 15 15   CREATININE mg/dL 0.70 0.64   GLUCOSE mg/dL 244* 228*   CALCIUM mg/dL 8.5* 9.0     Results from last 7 days   Lab Units 11/01/21  1652   BILIRUBIN mg/dL 0.2   ALK PHOS U/L 75   ALT (SGPT) U/L 15   AST (SGOT) U/L 16   PROTIME Seconds 10.3   INR  <0.93*           Invalid input(s): TG, LDLCALC, LDLREALC  Results from last 7 days   Lab Units 11/02/21  0154 11/01/21  1956 11/01/21  1652   PROBNP pg/mL  --  504.7  --    TROPONIN T ng/mL <0.010 <0.010 <0.010       Brief Urine Lab Results  (Last result in the past 365 days)      Color   Clarity   Blood   Leuk Est   Nitrite   Protein   CREAT   Urine HCG        10/04/21 0846             26.8               Microbiology Results Abnormal     Procedure Component Value - Date/Time    COVID PRE-OP / PRE-PROCEDURE SCREENING ORDER (NO ISOLATION) - Swab, Nasopharynx [[id number]]  (Normal) Collected: 11/01/21 1839    Lab Status: Final result Specimen: Swab from Nasopharynx Updated: 11/01/21 1927    Narrative:      The following orders were created for panel order COVID PRE-OP / PRE-PROCEDURE SCREENING ORDER (NO ISOLATION) - Swab, Nasopharynx.  Procedure                               Abnormality         Status                     ---------                               -----------         ------                     COVID-19,CEPHEID/KYLE/BD...[[id number]]  Normal              Final result                 Please view results for these tests on the individual orders.    COVID-19,CEPHEID/KYLE/BDMAX,COR/EDUARDO/PAD/RAI IN-HOUSE(OR EMERGENT/ADD-ON),NP SWAB IN TRANSPORT MEDIA 3-4 HR TAT, RT-PCR - Swab, Nasopharynx [[id number]]  (Normal) Collected: 11/01/21 1839    Lab Status: Final result Specimen: Swab from Nasopharynx Updated: 11/01/21 1927     COVID19 Not Detected    Narrative:      Fact sheet for providers: https://www.fda.gov/media/721405/download     Fact sheet for patients: https://www.fda.gov/media/581923/download  Fact  sheet for providers: https://www.fda.gov/media/657555/download    Fact sheet for patients: https://www.fda.gov/media/420397/download    Test performed by PCR.          XR Chest 1 View    Result Date: 11/1/2021  Impression:  1. No acute cardiopulmonary process. 2. The orientation of the pacemaker cartridge the left upper chest is rotated 180 degrees from previous position of uncertain clinical significance. Correlation with any interval cartridge replacements/interventions is needed.   Electronically Signed By-Jimmy Mckee DO On:11/1/2021 5:50 PM This report was finalized on [id number] by  Jimmy Mckee DO.    DEXA Bone Density Axial    Result Date: 10/11/2021  Impression:  1. Normal bone mineral density lumbar spine in the area since the previous study performed on September 18, 2019 the bone mineral density in the hip has decreased and the bone mineral density in the lumbar spine has decreased  Copies of the computerized summary reports can be obtained from [employer] via the health information department of [facility].  Electronically Signed By-Albaro Hernández MD On:10/11/2021 2:35 PM This report was finalized on [id number] by  Albaro Hernández MD.              Results for orders placed during the hospital encounter of 07/12/21    Adult Transthoracic Echo Complete W/ Cont if Necessary Per Protocol    Interpretation Summary  · Estimated left ventricular EF = 60% Left ventricular systolic function is normal.    Indications  Dizziness    Technically satisfactory study.  Mitral valve is structurally normal.  Tricuspid valve is structurally normal.  Aortic valve is structurally normal.  Pulmonic valve could not be well visualized.  No evidence for mitral tricuspid or aortic regurgitation is seen by Doppler study.  Left atrium is normal in size.  Right atrium is normal in size.  Left ventricle is normal in size and contractility with ejection fraction of 60%.  Right ventricle is normal in  size.  Atrial septum is intact.  Aorta is normal.  No pericardial effusion or intracardiac thrombus is seen.    Impression  Structurally and functionally normal cardiac valves.  Left ventricular size and contractility is normal with ejection fraction of 60%.              Test Results Pending at Discharge        Procedures Performed           Consults:   Consults     Date and Time Order Name Status Description    11/2/2021 12:35 AM Inpatient Cardiology Consult Completed             Discharge Details        Discharge Medications      Continue These Medications      Instructions Start Date   Accu-Chek Thelma Plus test strip  Generic drug: glucose blood   TESTING BLOOD SUGAR ONCE  DAILY      amLODIPine 5 MG tablet  Commonly known as: NORVASC   5 mg, Oral, Daily      aspirin 81 MG EC tablet   81 mg, Oral, Daily, 1 tablet every other day      Centrum Silver Ultra Womens tablet tablet  Generic drug: multivitamin with minerals   1 tablet, Oral, Daily      cholecalciferol 25 MCG (1000 UT) tablet  Commonly known as: VITAMIN D3   1,000 Units, Oral, Every Other Day      glipizide 10 MG tablet  Commonly known as: GLUCOTROL   TAKE 2 TABLETS BY MOUTH  TWICE DAILY IN THE MORNING  AND EVENING      hydrALAZINE 100 MG tablet  Commonly known as: APRESOLINE   TAKE 1 TABLET BY MOUTH 3  TIMES DAILY      hydroCHLOROthiazide 25 MG tablet  Commonly known as: HYDRODIURIL   25 mg, Oral, Daily      Januvia 100 MG tablet  Generic drug: SITagliptin   TAKE 1 TABLET BY MOUTH  DAILY      lisinopril 40 MG tablet  Commonly known as: PRINIVIL,ZESTRIL   40 mg, Oral, Daily      loratadine 10 MG tablet  Commonly known as: CLARITIN   10 mg, Oral, Daily      Melatonin 10 MG tablet   10 mg, Oral, Daily      metFORMIN 500 MG tablet  Commonly known as: GLUCOPHAGE   TAKE 2 TABLETS BY MOUTH TWO TIMES DAILY WITH MEALS      metoprolol succinate  MG 24 hr tablet  Commonly known as: TOPROL-XL   Take 1/2 tablet at night.      Omega-3 Fish Oil 500 MG capsule    500 mg, Oral, Daily      potassium chloride 10 MEQ CR capsule  Commonly known as: MICRO-K   TAKE 1 CAPSULE BY MOUTH IN  THE MORNING AND EVENING, ON WEEKENDS 2 CAPSULES IN THE  MORNING AND 1 CAPSULE IN  THE EVENING      pravastatin 80 MG tablet  Commonly known as: PRAVACHOL   TAKE 1 TABLET BY MOUTH AT  BEDTIME      Vitamin B12 1000 MCG tablet controlled-release   1,000 mcg, Oral, Every Other Day             Allergies   Allergen Reactions   • Sulfa Antibiotics Hives         Discharge Disposition:  Home or Self Care    Diet:  Hospital:  Diet Order   Procedures   • Diet Regular, Cardiac, Diabetic/Consistent Carbs; Healthy Heart; Diabetic - Consistent Carb         Discharge Activity: As tolerated        CODE STATUS:    Code Status and Medical Interventions:   Ordered at: 11/01/21 2129     Level Of Support Discussed With:    Patient     Code Status (Patient has no pulse and is not breathing):    CPR (Attempt to Resuscitate)     Medical Interventions (Patient has pulse or is breathing):    Full         Follow-up Appointments  Future Appointments   Date Time Provider Department Center   11/9/2021 10:50 AM Dung Lynch MD [facility]   11/9/2021 11:30 [facility]   1/4/2022  9:15 AM Jaelyn Iverson MD [facility]             Condition on Discharge:      Stable      This patient has been examined wearing appropriate Personal Protective Equipment. 11/02/21      Electronically signed by OBI Bee, 11/02/21, 2:12 PM EDT.      Time: I spent  60  minutes on this discharge activity which included face-to-face encounter with the patient/reviewing the data in the system/coordination of the care with the nursing staff as well as consultants/documentation/entering orders.

## 2021-11-02 NOTE — CASE MANAGEMENT/SOCIAL WORK
Discharge Planning Assessment  [facility]     Patient Name: Celsa Brown  MRN: [id number]  Today's Date: 11/2/2021    Admit Date: 11/1/2021     Discharge Needs Assessment     Row Name 11/02/21 1325       Living Environment    Lives With spouse    Current Living Arrangements home/apartment/condo    Primary Care Provided by self    Provides Primary Care For no one    Family Caregiver if Needed spouse    Quality of Family Relationships supportive    Able to Return to Prior Arrangements yes       Resource/Environmental Concerns    Resource/Environmental Concerns none    Transportation Concerns car, none       Transition Planning    Patient/Family Anticipates Transition to home with family    Patient/Family Anticipated Services at Transition none    Transportation Anticipated family or friend will provide       Discharge Needs Assessment    Equipment Currently Used at Home glucometer    Concerns to be Addressed denies needs/concerns at this time    Anticipated Changes Related to Illness none    Equipment Needed After Discharge none    Provided Post Acute Provider List? N/A    N/A Provider List Comment denies dc needs    Provided Post Acute Provider Quality & Resource List? N/A               Discharge Plan     Row Name 11/02/21 1327       Plan    Plan Home    Plan Comments Spoke with patient at bedside.  PCP and pharmacy verified.  Denies dc needs              Continued Care and Services - Admitted Since 11/1/2021    Coordination has not been started for this encounter.       Expected Discharge Date and Time     Expected Discharge Date Expected Discharge Time    Nov 3, 2021          Demographic Summary     Row Name 11/02/21 1325       General Information    Admission Type observation    Arrived From emergency department    Required Notices Provided Observation Status Notice    Referral Source admission list    Reason for Consult discharge planning    Preferred Language English               Functional Status     Row Name  11/02/21 1325       Functional Status    Usual Activity Tolerance good    Current Activity Tolerance good       Functional Status, IADL    Medications independent    Meal Preparation independent    Housekeeping independent    Laundry independent    Shopping independent       Mental Status    General Appearance WDL WDL       Mental Status Summary    Recent Changes in Mental Status/Cognitive Functioning no changes                         Laine Flores, RN   Met with patient in room wearing PPE: mask, face shield/goggles, gloves, gown.      Maintained distance greater than six feet and spent less than 15 minutes in the room.

## 2021-11-02 NOTE — CONSULTS
Referring Provider: David Zhang MD  Reason for Consultation:  Palpitations  Status post pacemaker    Patient Care Team:  Jaelyn Iverson MD as PCP - General (Family Medicine)  Dung Lynch MD as Consulting Physician (Cardiology)    Chief complaint  Palpitations    Subjective .     History of present illness:  Celsa Brown is a 71 y.o. female who presents with history of multiple cardiac and noncardiac problems was admitted to the hospital with history of palpitations starting about 2 to 3 weeks back.  Sudden onset of palpitations and sudden relief of palpitations.  He would last for 20 to 30 minutes.  No associated chest pain shortness of breath but has mild dizziness.  Patient has been having these episodes every day.  Significant intermittent and daily.  No other associated aggravating or elevating factors.  EKG showed ventricular pacemaker rhythm at a rate of 127/min..         ROS      The patient has been without any chest discomfort ,shortness of breath, dizziness or syncope.  Denies having any headache ,abdominal pain ,nausea, vomiting , diarrhea constipation, loss of weight or loss of appetite.  Denies having any excessive bruising ,hematuria or blood in the stool.    Review of all systems negative except as indicated      History  Past Medical History:   Diagnosis Date   • B12 deficiency    • Hyperlipidemia    • Hypertension    • Tachycardia    • Vitamin D deficiency        Past Surgical History:   Procedure Laterality Date   • CARDIAC CATHETERIZATION     • CARDIAC ELECTROPHYSIOLOGY PROCEDURE N/A 7/13/2021    Procedure: Pacemaker DC new;  Surgeon: Dung Lynch MD;  Location: [facility] INVASIVE LOCATION;  Service: Cardiovascular;  Laterality: N/A;   • COLONOSCOPY     • TONSILLECTOMY     • TUBAL ABDOMINAL LIGATION         Family History   Problem Relation Age of Onset   • Heart failure Mother    • Diabetes Mother    • Breast cancer Neg Hx    • Ovarian cancer Neg Hx        Social  History     Tobacco Use   • Smoking status: Never Smoker   • Smokeless tobacco: Never Used   Substance Use Topics   • Alcohol use: No   • Drug use: No        Medications Prior to Admission   Medication Sig Dispense Refill Last Dose   • aspirin 81 MG EC tablet Take 81 mg by mouth Daily. 1 tablet every other day   11/1/2021 at Unknown time   • cholecalciferol (VITAMIN D3) 1000 units tablet Take 1,000 Units by mouth Every Other Day.   11/1/2021 at Unknown time   • glipizide (GLUCOTROL) 10 MG tablet TAKE 2 TABLETS BY MOUTH  TWICE DAILY IN THE MORNING  AND EVENING 360 tablet 3 11/1/2021 at Unknown time   • hydrALAZINE (APRESOLINE) 100 MG tablet TAKE 1 TABLET BY MOUTH 3  TIMES DAILY 270 tablet 3 11/1/2021 at Unknown time   • hydroCHLOROthiazide (HYDRODIURIL) 25 MG tablet TAKE 1 TABLET BY MOUTH  DAILY 90 tablet 3 11/1/2021 at Unknown time   • Januvia 100 MG tablet TAKE 1 TABLET BY MOUTH  DAILY 90 tablet 3 11/1/2021 at Unknown time   • lisinopril (PRINIVIL,ZESTRIL) 40 MG tablet TAKE 1 TABLET BY MOUTH  DAILY 90 tablet 3 11/1/2021 at Unknown time   • loratadine (CLARITIN) 10 MG tablet Take 10 mg by mouth Daily.   11/1/2021 at Unknown time   • Melatonin 10 MG tablet Take 10 mg by mouth Daily.   Past Week at Unknown time   • metFORMIN (GLUCOPHAGE) 500 MG tablet TAKE 2 TABLETS BY MOUTH TWO TIMES DAILY WITH MEALS 360 tablet 3 11/1/2021 at Unknown time   • metoprolol succinate XL (TOPROL-XL) 100 MG 24 hr tablet Take 1/2 tablet at night. 90 tablet 3 10/31/2021 at Unknown time   • Multiple Vitamins-Minerals (CENTRUM SILVER ULTRA WOMENS) tablet Take 1 tablet by mouth Daily.   11/1/2021 at Unknown time   • potassium chloride (MICRO-K) 10 MEQ CR capsule TAKE 1 CAPSULE BY MOUTH IN  THE MORNING AND EVENING, ON WEEKENDS 2 CAPSULES IN THE  MORNING AND 1 CAPSULE IN  THE EVENING 208 capsule 3 11/1/2021 at Unknown time   • pravastatin (PRAVACHOL) 80 MG tablet TAKE 1 TABLET BY MOUTH AT  BEDTIME 90 tablet 3 10/31/2021 at Unknown time   •  "Accu-Chek Thelma Plus test strip TESTING BLOOD SUGAR ONCE  DAILY 90 each 3    • amLODIPine (NORVASC) 5 MG tablet Take 5 mg by mouth Daily.      • Cyanocobalamin (VITAMIN B12) 1000 MCG tablet controlled-release Take 1,000 mcg by mouth Every Other Day.      • Omega-3 Fatty Acids (OMEGA-3 FISH OIL) 500 MG capsule Take 500 mg by mouth Daily.            Sulfa antibiotics    Scheduled Meds:amLODIPine, 5 mg, Oral, Daily  aspirin, 81 mg, Oral, Every Other Day  atorvastatin, 20 mg, Oral, Daily  glipizide, 20 mg, Oral, BID AC  hydrALAZINE, 100 mg, Oral, TID  hydroCHLOROthiazide, 25 mg, Oral, Daily  linagliptin, 5 mg, Oral, Daily  lisinopril, 40 mg, Oral, Daily  metFORMIN, 1,000 mg, Oral, BID With Meals  metoprolol succinate XL, 50 mg, Oral, Nightly  potassium chloride, 10 mEq, Oral, BID With Meals  sodium chloride, 10 mL, Intravenous, Q12H      Continuous Infusions:   PRN Meds:.melatonin  •  Morphine **AND** naloxone  •  nitroglycerin  •  sodium chloride  •  sodium chloride    Objective     VITAL SIGNS  Vitals:    11/01/21 1816 11/01/21 1951 11/01/21 2049 11/01/21 2201   BP: 131/71 147/93  139/87   BP Location:  Left arm  Right arm   Patient Position:  Lying  Lying   Pulse: 80 (!) 126 85 93   Resp: 18 16  16   Temp:  97.8 °F (36.6 °C)  97.7 °F (36.5 °C)   TempSrc:  Oral  Oral   SpO2: 97% 98%  98%   Weight:  68.5 kg (151 lb 0.2 oz)     Height:           Flowsheet Rows      First Filed Value   Admission Height 165.1 cm (65\") Documented at 11/01/2021 1533   Admission Weight 68.9 kg (152 lb) Documented at 11/01/2021 1533          No intake or output data in the 24 hours ending 11/02/21 0622     TELEMETRY: Dual-chamber pacemaker rhythm    Physical Exam:  The patient is alert, oriented and in no distress.  Vital signs as noted above.  Head and neck revealed no carotid bruits or jugular venous distention.  No thyromegaly or lymph adenopathy is present  Lungs clear.  No wheezing.  Breath sounds are normal bilaterally.  Heart normal " first and second heart sounds.No murmur.  No precordial rub is present.  No gallop is present.  Abdomen soft and nontender.  No organomegaly is present.  Extremities with good peripheral pulses without any pedal edema.  Skin warm and dry.  Pacemaker site looks normal.  Musculoskeletal system is grossly normal  CNS grossly normal      Results Review:   I reviewed the patient's new clinical results.  Lab Results (last 24 hours)     Procedure Component Value Units Date/Time    Basic Metabolic Panel [[id number]]  (Abnormal) Collected: 11/02/21 0154    Specimen: Blood Updated: 11/02/21 0235     Glucose 244 mg/dL      BUN 15 mg/dL      Creatinine 0.70 mg/dL      Sodium 145 mmol/L      Potassium 3.8 mmol/L      Comment: Slight hemolysis detected by analyzer. Results may be affected.        Chloride 108 mmol/L      CO2 24.0 mmol/L      Calcium 8.5 mg/dL      eGFR Non African Amer 82 mL/min/1.73      BUN/Creatinine Ratio 21.4     Anion Gap 13.0 mmol/L     Narrative:      GFR Normal >60  Chronic Kidney Disease <60  Kidney Failure <15      Troponin [[id number]]  (Normal) Collected: 11/02/21 0154    Specimen: Blood Updated: 11/02/21 0235     Troponin T <0.010 ng/mL     Narrative:      Troponin T Reference Range:  <= 0.03 ng/mL-   Negative for AMI  >0.03 ng/mL-     Abnormal for myocardial necrosis.  Clinicians would have to utilize clinical acumen, EKG, Troponin and serial changes to determine if it is an Acute Myocardial Infarction or myocardial injury due to an underlying chronic condition.       Results may be falsely decreased if patient taking Biotin.      Magnesium [[id number]]  (Normal) Collected: 11/02/21 0154    Specimen: Blood Updated: 11/02/21 0235     Magnesium 1.8 mg/dL     CBC (No Diff) [[id number]]  (Normal) Collected: 11/02/21 0154    Specimen: Blood Updated: 11/02/21 0204     WBC 5.90 10*3/mm3      RBC 4.06 10*6/mm3      Hemoglobin 12.4 g/dL      Hematocrit 38.0 %      MCV 93.4 fL      MCH 30.5 pg      MCHC 32.6  g/dL      RDW 14.1 %      RDW-SD 46.8 fl      MPV 8.8 fL      Platelets 260 10*3/mm3     BNP [[id number]]  (Normal) Collected: 11/01/21 1956    Specimen: Blood Updated: 11/01/21 2151     proBNP 504.7 pg/mL     Narrative:      Among patients with dyspnea, NT-proBNP is highly sensitive for the detection of acute congestive heart failure. In addition NT-proBNP of <300 pg/ml effectively rules out acute congestive heart failure with 99% negative predictive value.    Results may be falsely decreased if patient taking Biotin.      Magnesium [[id number]]  (Normal) Collected: 11/01/21 1956    Specimen: Blood Updated: 11/01/21 2146     Magnesium 2.0 mg/dL     Troponin [[id number]]  (Normal) Collected: 11/01/21 1956    Specimen: Blood Updated: 11/01/21 2056     Troponin T <0.010 ng/mL     Narrative:      Troponin T Reference Range:  <= 0.03 ng/mL-   Negative for AMI  >0.03 ng/mL-     Abnormal for myocardial necrosis.  Clinicians would have to utilize clinical acumen, EKG, Troponin and serial changes to determine if it is an Acute Myocardial Infarction or myocardial injury due to an underlying chronic condition.       Results may be falsely decreased if patient taking Biotin.      COVID PRE-OP / PRE-PROCEDURE SCREENING ORDER (NO ISOLATION) - Swab, Nasopharynx [[id number]]  (Normal) Collected: 11/01/21 1839    Specimen: Swab from Nasopharynx Updated: 11/01/21 1927    Narrative:      The following orders were created for panel order COVID PRE-OP / PRE-PROCEDURE SCREENING ORDER (NO ISOLATION) - Swab, Nasopharynx.  Procedure                               Abnormality         Status                     ---------                               -----------         ------                     COVID-19,CEPHEID/KYLE/BD...[[id number]]  Normal              Final result                 Please view results for these tests on the individual orders.    COVID-19,CEPHEID/KYLE/BDMAX,COR/EDUARDO/PAD/ARI IN-HOUSE(OR EMERGENT/ADD-ON),NP SWAB IN TRANSPORT MEDIA  3-4 HR TAT, RT-PCR - Swab, Nasopharynx [[id number]]  (Normal) Collected: 11/01/21 1839    Specimen: Swab from Nasopharynx Updated: 11/01/21 1927     COVID19 Not Detected    Narrative:      Fact sheet for providers: https://www.fda.gov/media/231666/download     Fact sheet for patients: https://www.fda.gov/media/477059/download  Fact sheet for providers: https://www.fda.gov/media/691961/download    Fact sheet for patients: https://www.fda.gov/media/686036/download    Test performed by PCR.    Extra Tubes [[id number]] Collected: 11/01/21 1652    Specimen: Blood, Venous Line Updated: 11/01/21 1835    Narrative:      The following orders were created for panel order Extra Tubes.  Procedure                               Abnormality         Status                     ---------                               -----------         ------                     Gold Top - SST[[id number]]                                   Final result                 Please view results for these tests on the individual orders.    Gold Top - SST [[id number]] Collected: 11/01/21 1652    Specimen: Blood Updated: 11/01/21 1835    Comprehensive Metabolic Panel [[id number]]  (Abnormal) Collected: 11/01/21 1652    Specimen: Blood Updated: 11/01/21 1725     Glucose 228 mg/dL      BUN 15 mg/dL      Creatinine 0.64 mg/dL      Sodium 141 mmol/L      Potassium 3.6 mmol/L      Chloride 105 mmol/L      CO2 24.0 mmol/L      Calcium 9.0 mg/dL      Total Protein 6.2 g/dL      Albumin 3.60 g/dL      ALT (SGPT) 15 U/L      AST (SGOT) 16 U/L      Alkaline Phosphatase 75 U/L      Total Bilirubin 0.2 mg/dL      eGFR Non African Amer 91 mL/min/1.73      Globulin 2.6 gm/dL      A/G Ratio 1.4 g/dL      BUN/Creatinine Ratio 23.4     Anion Gap 12.0 mmol/L     Narrative:      GFR Normal >60  Chronic Kidney Disease <60  Kidney Failure <15      Troponin [[id number]]  (Normal) Collected: 11/01/21 1652    Specimen: Blood Updated: 11/01/21 1725     Troponin T <0.010 ng/mL     Narrative:       Troponin T Reference Range:  <= 0.03 ng/mL-   Negative for AMI  >0.03 ng/mL-     Abnormal for myocardial necrosis.  Clinicians would have to utilize clinical acumen, EKG, Troponin and serial changes to determine if it is an Acute Myocardial Infarction or myocardial injury due to an underlying chronic condition.       Results may be falsely decreased if patient taking Biotin.      Protime-INR [[id number]]  (Abnormal) Collected: 11/01/21 1652    Specimen: Blood Updated: 11/01/21 1718     Protime 10.3 Seconds      INR <0.93    CBC & Differential [[id number]]  (Normal) Collected: 11/01/21 1652    Specimen: Blood Updated: 11/01/21 1703    Narrative:      The following orders were created for panel order CBC & Differential.  Procedure                               Abnormality         Status                     ---------                               -----------         ------                     CBC Auto Differential[[id number]]        Normal              Final result                 Please view results for these tests on the individual orders.    CBC Auto Differential [[id number]]  (Normal) Collected: 11/01/21 1652    Specimen: Blood Updated: 11/01/21 1703     WBC 5.30 10*3/mm3      RBC 4.04 10*6/mm3      Hemoglobin 12.2 g/dL      Hematocrit 36.6 %      MCV 90.7 fL      MCH 30.2 pg      MCHC 33.3 g/dL      RDW 13.8 %      RDW-SD 44.2 fl      MPV 8.4 fL      Platelets 257 10*3/mm3      Neutrophil % 45.1 %      Lymphocyte % 44.0 %      Monocyte % 8.9 %      Eosinophil % 1.4 %      Basophil % 0.6 %      Neutrophils, Absolute 2.40 10*3/mm3      Lymphocytes, Absolute 2.30 10*3/mm3      Monocytes, Absolute 0.50 10*3/mm3      Eosinophils, Absolute 0.10 10*3/mm3      Basophils, Absolute 0.00 10*3/mm3      nRBC 0.1 /100 WBC           Imaging Results (Last 24 Hours)     Procedure Component Value Units Date/Time    XR Chest 1 View [[id number]] Collected: 11/01/21 1746     Updated: 11/01/21 1752    Narrative:      XR CHEST 1 VW-      Date of Exam: 11/1/2021 5:10 PM     Indication: Chest pain protocol  71-year-old, history of pacemaker  hypertension and diabetes. Chest pain and arrhythmia     Comparison: 02/13/2021     Technique: 1 view(s) of the chest were obtained.     FINDINGS: Cardiac, hilar and mediastinal silhouettes are stable. A  dual chambered pacemaker is present. The pacemaker cartridge appears  rotated approximately 100 degrees compared to the prior study from  07/13/2021. Correlation with any history of interval interventions is  needed. No pneumothorax, pleural effusion or focal pulmonary parenchymal  opacity. There is persistent scarring versus chronic plate atelectasis  in the left lung base. Pulmonary vascularity is within normal limits.  The trachea is midline. Visualized bony structures are intact.       Impression:         1. No acute cardiopulmonary process.  2. The orientation of the pacemaker cartridge the left upper chest is  rotated 180 degrees from previous position of uncertain clinical  significance. Correlation with any interval cartridge  replacements/interventions is needed.        Electronically Signed By-Jimmy Mckee DO On:11/1/2021 5:50 PM  This report was finalized on [id number] by  Jimmy Mckee DO.      LAB RESULTS (LAST 7 DAYS)    CBC  Results from last 7 days   Lab Units 11/02/21  0154 11/01/21  1652   WBC 10*3/mm3 5.90 5.30   RBC 10*6/mm3 4.06 4.04   HEMOGLOBIN g/dL 12.4 12.2   HEMATOCRIT % 38.0 36.6   MCV fL 93.4 90.7   PLATELETS 10*3/mm3 260 257       BMP  Results from last 7 days   Lab Units 11/02/21  0154 11/01/21 1956 11/01/21  1652   SODIUM mmol/L 145  --  141   POTASSIUM mmol/L 3.8  --  3.6   CHLORIDE mmol/L 108*  --  105   CO2 mmol/L 24.0  --  24.0   BUN mg/dL 15  --  15   CREATININE mg/dL 0.70  --  0.64   GLUCOSE mg/dL 244*  --  228*   MAGNESIUM mg/dL 1.8 2.0  --        CMP   Results from last 7 days   Lab Units 11/02/21  0154 11/01/21  1652   SODIUM mmol/L 145 141   POTASSIUM mmol/L 3.8  3.6   CHLORIDE mmol/L 108* 105   CO2 mmol/L 24.0 24.0   BUN mg/dL 15 15   CREATININE mg/dL 0.70 0.64   GLUCOSE mg/dL 244* 228*   ALBUMIN g/dL  --  3.60   BILIRUBIN mg/dL  --  0.2   ALK PHOS U/L  --  75   AST (SGOT) U/L  --  16   ALT (SGPT) U/L  --  15         BNP        TROPONIN  Results from last 7 days   Lab Units 11/02/21  0154   TROPONIN T ng/mL <0.010       CoAg  Results from last 7 days   Lab Units 11/01/21  1652   INR  <0.93*       Creatinine Clearance  Estimated Creatinine Clearance: 60.9 mL/min (by C-G formula based on SCr of 0.7 mg/dL).    ABG        Radiology  XR Chest 1 View    Result Date: 11/1/2021   1. No acute cardiopulmonary process. 2. The orientation of the pacemaker cartridge the left upper chest is rotated 180 degrees from previous position of uncertain clinical significance. Correlation with any interval cartridge replacements/interventions is needed.   Electronically Signed By-Jimmy Mckee DO On:11/1/2021 5:50 PM This report was finalized on [id number] by  Jimmy Mckee DO.              EKG              I personally viewed and interpreted the patient's EKG/Telemetry data: Ventricular pacemaker rhythm at a rate of 127/min.  Subsequent EKG showed sinus rhythm right bundle branch block    ECHOCARDIOGRAM:    Results for orders placed during the hospital encounter of 07/12/21    Adult Transthoracic Echo Complete W/ Cont if Necessary Per Protocol    Interpretation Summary  · Estimated left ventricular EF = 60% Left ventricular systolic function is normal.    Indications  Dizziness    Technically satisfactory study.  Mitral valve is structurally normal.  Tricuspid valve is structurally normal.  Aortic valve is structurally normal.  Pulmonic valve could not be well visualized.  No evidence for mitral tricuspid or aortic regurgitation is seen by Doppler study.  Left atrium is normal in size.  Right atrium is normal in size.  Left ventricle is normal in size and contractility with ejection  fraction of 60%.  Right ventricle is normal in size.  Atrial septum is intact.  Aorta is normal.  No pericardial effusion or intracardiac thrombus is seen.    Impression  Structurally and functionally normal cardiac valves.  Left ventricular size and contractility is normal with ejection fraction of 60%.              Cardiolite (Tc-99m Sestamibi) stress test      OTHER:     Assessment/Plan     Active Problems:    Palpitations  ]]]]]]]]]]]]]]]]]]]  Impression  ==========  -Palpitations  Suspect strongly patient is having episodes of PMT.    -Status post permanent dual-chamber pacemaker implantation ([employer] MRI compatible).  7/13/2021     -Second-degree Mobitz type II AV block.  Right bundle branch block (prior to pacemaker implantation     -Chest discomfort and shortness of breath with or without exertion.     -Dizziness and near syncope-improved since patient had pacemaker implantation  Echocardiogram-normal 7/12/2021  Stress Cardiolite test-normal except patient has second-degree 2-1 AV block and right bundle branch block.  Very limited exercise tolerance.     -Right bundle branch block     -Sinus bradycardia     -Diabetes dyslipidemia hypertension     -Status post tonsillectomy abdominal tubal ligation     -Family history of coronary artery disease     -Non-smoker     -Allergic to sulfa  =============  Plan  ==========  Palpitations  Suspect patient is likely having PMT.  Patient will have interrogation of the pacemaker and reprogramming to minimize risk of PMT.    Status post permanent dual-chamber pacemaker implantation 7/13/2021 ([employer] MRI compatible).  Pacemaker site and function is normal.  Recent interrogation of the pacemaker revealed excellent pacing parameters.  Battery status was 11 years.     Hypertension-stable  Patient is on amlodipine hydralazine hydrochlorothiazide lisinopril 40 mg a day.  Patient is on metoprolol 100 mg tablet half a tablet a day.     Further plan will depend  on patient's progress.  ]]]]]]]]]]]]]]         Dung Lynch MD  11/02/21  06:22 EDT

## 2021-11-02 NOTE — PLAN OF CARE
Goal Outcome Evaluation:  Plan of Care Reviewed With: patient, spouse        Progress: improving  Outcome Summary: New admission from the ED with palpitations. Awaiting cardiology consult with Dr Lynch to evaluate recent pacermaker. VSS. Will monitor.

## 2021-11-02 NOTE — OUTREACH NOTE
Prep Survey      Responses   [facility] patient discharged from? [facility]   Is LACE score < 7 ? Yes   Emergency Room discharge w/ pulse ox? No   Eligibility [facility]   Date of Admission 11/01/21   Date of Discharge 11/02/21   Discharge Disposition Home or Self Care   Discharge diagnosis Palpitations status post permanent dual-chamber pacemaker implantation    Does the patient have one of the following disease processes/diagnoses(primary or secondary)? Other   Does the patient have Home health ordered? No   Is there a DME ordered? No   Prep survey completed? Yes          Tigist Mcallister RN

## 2021-11-02 NOTE — PLAN OF CARE
Problem: Adult Inpatient Plan of Care  Goal: Plan of Care Review  Outcome: Met  Flowsheets (Taken 11/2/2021 1420)  Progress: no change  Plan of Care Reviewed With: patient  Outcome Summary: ESPERANZA moffett distress. PM has been checked by the company's representative. Ok to d/c per dr estrella.  Goal: Patient-Specific Goal (Individualized)  Outcome: Met  Goal: Absence of Hospital-Acquired Illness or Injury  Outcome: Met  Intervention: Identify and Manage Fall Risk  Recent Flowsheet Documentation  Taken 11/2/2021 1300 by Rica Pacheco RN  Safety Promotion/Fall Prevention:   safety round/check completed   room organization consistent  Taken 11/2/2021 1120 by Rica Pacheco RN  Safety Promotion/Fall Prevention:   safety round/check completed   room organization consistent   activity supervised   assistive device/personal items within reach   clutter free environment maintained  Taken 11/2/2021 0955 by Rica Pacheco RN  Safety Promotion/Fall Prevention:   safety round/check completed   room organization consistent  Taken 11/2/2021 0810 by Rica Pacheco RN  Safety Promotion/Fall Prevention:   room organization consistent   safety round/check completed  Taken 11/2/2021 0700 by Rica Pacheco RN  Safety Promotion/Fall Prevention:   safety round/check completed   room organization consistent  Intervention: Prevent Skin Injury  Recent Flowsheet Documentation  Taken 11/2/2021 1300 by Rica Pacheco RN  Body Position:   position changed independently   supine  Taken 11/2/2021 1120 by Rica Pacheco RN  Body Position:   position changed independently   supine  Taken 11/2/2021 0955 by Rica Pacheco RN  Body Position:   position changed independently   supine  Taken 11/2/2021 0810 by Rica Pacheco RN  Body Position:   position changed independently   sitting up in bed  Taken 11/2/2021 0700 by Rica Pacheco RN  Body Position:   position changed independently   supine  Goal:  Optimal Comfort and Wellbeing  Outcome: Met  Intervention: Provide Person-Centered Care  Recent Flowsheet Documentation  Taken 11/2/2021 1300 by Rica Pacheco RN  Trust Relationship/Rapport: care explained  Taken 11/2/2021 0955 by Rica Pacheco RN  Trust Relationship/Rapport: care explained  Taken 11/2/2021 0810 by Rica Pacheco RN  Trust Relationship/Rapport: care explained  Taken 11/2/2021 0700 by Rica Pacheco RN  Trust Relationship/Rapport: care explained  Goal: Readiness for Transition of Care  Outcome: Met     Problem: Diabetes Comorbidity  Goal: Blood Glucose Level Within Desired Range  Outcome: Met  Intervention: Maintain Glycemic Control  Recent Flowsheet Documentation  Taken 11/2/2021 1120 by Rica Pacheco RN  Glycemic Management: blood glucose monitoring  Taken 11/2/2021 0700 by Rica Pacheco RN  Glycemic Management: blood glucose monitoring     Problem: Hypertension Comorbidity  Goal: Blood Pressure in Desired Range  Outcome: Met  Intervention: Maintain Hypertension-Management Strategies  Recent Flowsheet Documentation  Taken 11/2/2021 0700 by Rica Pacheco RN  Medication Review/Management: medications reviewed     Problem: Arrhythmia/Dysrhythmia  Goal: Normalized Cardiac Rhythm  Outcome: Met   Goal Outcome Evaluation:  Plan of Care Reviewed With: patient        Progress: no change  Outcome Summary: VSSMichelle moffett distress. PM has been checked by the company's representative. Ok to d/c per dr estrella.

## 2021-11-03 ENCOUNTER — TRANSITIONAL CARE MANAGEMENT TELEPHONE ENCOUNTER (OUTPATIENT)
Dept: CALL CENTER | Facility: HOSPITAL | Age: 71
End: 2021-11-03

## 2021-11-03 NOTE — OUTREACH NOTE
Call Center TCM Note      Responses   [facility] patient discharged from? [facility]   Does the patient have one of the following disease processes/diagnoses(primary or secondary)? Other   TCM attempt successful? Yes   Call start time 1158   Call end time 1203   Discharge diagnosis Palpitations   Is patient permission given to speak with other caregiver? No   Meds reviewed with patient/caregiver? Yes  [Patient resumed usual home medications after discharge. ]   Does the patient have all medications ordered at discharge? N/A  [No new meds ordered. ]   Is the patient taking all medications as directed (includes completed medication regime)? Yes   Comments regarding appointments Patient following up with Dr Lynch 11/9/21  1050am   Does the patient have a primary care provider?  Yes   Does the patient have an appointment with their PCP within 7 days of discharge? No   Comments regarding PCP PCP Dr Iverson. Offered patient a hospital follow up appt during call,  patient declined to schedule.    Nursing Interventions --  [Encourage PCP f/u. ]   Has the patient kept scheduled appointments due by today? N/A   Has home health visited the patient within 72 hours of discharge? N/A   Psychosocial issues? No   Did the patient receive a copy of their discharge instructions? Yes   Nursing interventions Reviewed instructions with patient   What is the patient's perception of their health status since discharge? Improving  [Patient denies any palpitations, heart racing. ]   Is the patient/caregiver able to teach back signs and symptoms related to disease process for when to call PCP? Yes   Is the patient/caregiver able to teach back signs and symptoms related to disease process for when to call 911? Yes   Is the patient/caregiver able to teach back the hierarchy of who to call/visit for symptoms/problems? PCP, Specialist, Home health nurse, Urgent Care, ED, 911 Yes   If the patient is a current smoker, are they able to teach back  resources for cessation? Not a smoker   TCM call completed? Yes   Wrap up additional comments Patient denies further questions or needs today.           Marlene Mccartney RN    11/3/2021, 12:03 EDT

## 2021-11-09 ENCOUNTER — OFFICE VISIT (OUTPATIENT)
Dept: CARDIOLOGY | Facility: CLINIC | Age: 71
End: 2021-11-09

## 2021-11-09 ENCOUNTER — CLINICAL SUPPORT NO REQUIREMENTS (OUTPATIENT)
Dept: CARDIOLOGY | Facility: CLINIC | Age: 71
End: 2021-11-09

## 2021-11-09 VITALS
WEIGHT: 154 LBS | HEART RATE: 79 BPM | BODY MASS INDEX: 25.66 KG/M2 | HEIGHT: 65 IN | OXYGEN SATURATION: 98 % | SYSTOLIC BLOOD PRESSURE: 131 MMHG | DIASTOLIC BLOOD PRESSURE: 74 MMHG

## 2021-11-09 DIAGNOSIS — R00.1 BRADYCARDIA: ICD-10-CM

## 2021-11-09 DIAGNOSIS — R06.02 SHORTNESS OF BREATH: ICD-10-CM

## 2021-11-09 DIAGNOSIS — E78.5 DYSLIPIDEMIA: ICD-10-CM

## 2021-11-09 DIAGNOSIS — R55 NEAR SYNCOPE: ICD-10-CM

## 2021-11-09 DIAGNOSIS — E08.9 DIABETES MELLITUS DUE TO UNDERLYING CONDITION WITHOUT COMPLICATION, WITHOUT LONG-TERM CURRENT USE OF INSULIN (HCC): ICD-10-CM

## 2021-11-09 DIAGNOSIS — I45.10 RIGHT BUNDLE BRANCH BLOCK: ICD-10-CM

## 2021-11-09 DIAGNOSIS — I10 ESSENTIAL HYPERTENSION: ICD-10-CM

## 2021-11-09 DIAGNOSIS — I44.1 SECOND DEGREE AV BLOCK, MOBITZ TYPE II: Primary | ICD-10-CM

## 2021-11-09 DIAGNOSIS — Z95.0 PACEMAKER: ICD-10-CM

## 2021-11-09 PROCEDURE — 93280 PM DEVICE PROGR EVAL DUAL: CPT | Performed by: INTERNAL MEDICINE

## 2021-11-09 PROCEDURE — 99214 OFFICE O/P EST MOD 30 MIN: CPT | Performed by: INTERNAL MEDICINE

## 2021-11-09 NOTE — PROGRESS NOTES
Encounter Date:11/09/2021  Last seen 11/2/2021      Patient ID: Celsa Brown is a 71 y.o. female.    Chief Complaint:  Status post pacemaker  Palpitations  Diabetes  Dyslipidemia  Hypertension      History of Present Illness  Patient recently was admitted to [facility] [place] with palpitations.  Patient was thought to have SVT and possibly PMT.  PVARP was increased.  Palpitations have significantly improved.    Since I have last seen, the patient has been without any chest discomfort ,shortness of breath, palpitations, dizziness or syncope.  Denies having any headache ,abdominal pain ,nausea, vomiting , diarrhea constipation, loss of weight or loss of appetite.  Denies having any excessive bruising ,hematuria or blood in the stool.    Review of all systems negative except as indicated.    Reviewed ROS.    Assessment and Plan       ]]]]]]]]]]]]]]]]]]]  Impression  ==========  -Palpitations probably due to PMT and SVT.  Suspect strongly patient is having episodes of PMT.  Recently PVARP was increased     -Status post permanent dual-chamber pacemaker implantation ([employer] MRI compatible).  7/13/2021     -Second-degree Mobitz type II AV block.  Right bundle branch block (prior to pacemaker implantation     -Chest discomfort and shortness of breath with or without exertion.     -Dizziness and near syncope-improved since patient had pacemaker implantation  Echocardiogram-normal 7/12/2021  Stress Cardiolite test-normal except patient has second-degree 2-1 AV block and right bundle branch block.  Very limited exercise tolerance.     -Right bundle branch block     -Sinus bradycardia     -Diabetes dyslipidemia hypertension     -Status post tonsillectomy abdominal tubal ligation     -Family history of coronary artery disease     -Non-smoker     -Allergic to sulfa  =============  Plan  ==========  Palpitations  Suspect patient is likely having PMT and SVT.  Patient will have interrogation of the pacemaker and  reprogramming with increased.  Patient was asked to take extra metoprolol as needed and twice a day if needed.    Status post permanent dual-chamber pacemaker implantation 7/13/2021 ([place] [employer] MRI compatible).  Pacemaker site and function is normal.  Recent interrogation of the pacemaker revealed excellent pacing parameters.  Battery status was  8 years    Hypertension-stable  130/70    Patient is on amlodipine hydralazine hydrochlorothiazide lisinopril 40 mg a day.  Patient is on metoprolol 100 mg tablet half a tablet a day.    Follow-up in the office in 6 months with pacemaker interrogation.     Further plan will depend on patient's progress.  ]]]]]]]]]]]]]]               Diagnosis Plan   1. Second degree AV block, Mobitz type II     2. Pacemaker     3. Bradycardia     4. Essential hypertension     5. Right bundle branch block     6. Dyslipidemia     7. Diabetes mellitus due to underlying condition without complication, without long-term current use of insulin (HCC)     8. Shortness of breath     9. Near syncope     LAB RESULTS (LAST 7 DAYS)    CBC        BMP        CMP         BNP        TROPONIN        CoAg        Creatinine Clearance  Estimated Creatinine Clearance: 61.5 mL/min (by C-G formula based on SCr of 0.7 mg/dL).    ABG        Radiology  No radiology results for the last day                The following portions of the patient's history were reviewed and updated as appropriate: allergies, current medications, past family history, past medical history, past social history, past surgical history and problem list.    Review of Systems   Constitutional: Negative for malaise/fatigue.   Cardiovascular: Positive for palpitations. Negative for chest pain, leg swelling and syncope.   Respiratory: Negative for shortness of breath.    Skin: Negative for rash.   Gastrointestinal: Negative for nausea and vomiting.   Neurological: Negative for dizziness, light-headedness and numbness.   All other systems  reviewed and are negative.        Current Outpatient Medications:   •  Accu-Chek Thelma Plus test strip, TESTING BLOOD SUGAR ONCE  DAILY, Disp: 90 each, Rfl: 3  •  amLODIPine (NORVASC) 5 MG tablet, Take 5 mg by mouth Daily., Disp: , Rfl:   •  aspirin 81 MG EC tablet, Take 81 mg by mouth Daily. 1 tablet every other day, Disp: , Rfl:   •  cholecalciferol (VITAMIN D3) 1000 units tablet, Take 1,000 Units by mouth Every Other Day., Disp: , Rfl:   •  Cyanocobalamin (VITAMIN B12) 1000 MCG tablet controlled-release, Take 1,000 mcg by mouth Every Other Day., Disp: , Rfl:   •  glipizide (GLUCOTROL) 10 MG tablet, TAKE 2 TABLETS BY MOUTH  TWICE DAILY IN THE MORNING  AND EVENING, Disp: 360 tablet, Rfl: 3  •  hydrALAZINE (APRESOLINE) 100 MG tablet, TAKE 1 TABLET BY MOUTH 3  TIMES DAILY, Disp: 270 tablet, Rfl: 3  •  hydroCHLOROthiazide (HYDRODIURIL) 25 MG tablet, TAKE 1 TABLET BY MOUTH  DAILY, Disp: 90 tablet, Rfl: 3  •  Januvia 100 MG tablet, TAKE 1 TABLET BY MOUTH  DAILY, Disp: 90 tablet, Rfl: 3  •  lisinopril (PRINIVIL,ZESTRIL) 40 MG tablet, TAKE 1 TABLET BY MOUTH  DAILY, Disp: 90 tablet, Rfl: 3  •  loratadine (CLARITIN) 10 MG tablet, Take 10 mg by mouth Daily., Disp: , Rfl:   •  Melatonin 10 MG tablet, Take 10 mg by mouth Daily., Disp: , Rfl:   •  metFORMIN (GLUCOPHAGE) 500 MG tablet, TAKE 2 TABLETS BY MOUTH  TWICE DAILY WITH MEALS, Disp: 360 tablet, Rfl: 3  •  metoprolol succinate XL (TOPROL-XL) 100 MG 24 hr tablet, Take 1/2 tablet at night., Disp: 90 tablet, Rfl: 3  •  Multiple Vitamins-Minerals (CENTRUM SILVER ULTRA WOMENS) tablet, Take 1 tablet by mouth Daily., Disp: , Rfl:   •  Omega-3 Fatty Acids (OMEGA-3 FISH OIL) 500 MG capsule, Take 500 mg by mouth Daily., Disp: , Rfl:   •  potassium chloride (MICRO-K) 10 MEQ CR capsule, TAKE 1 CAPSULE BY MOUTH IN  THE MORNING AND EVENING, ON WEEKENDS 2 CAPSULES IN THE  MORNING AND 1 CAPSULE IN  THE EVENING, Disp: 208 capsule, Rfl: 3  •  pravastatin (PRAVACHOL) 80 MG tablet, TAKE 1  "TABLET BY MOUTH AT  BEDTIME, Disp: 90 tablet, Rfl: 3    Allergies   Allergen Reactions   • Sulfa Antibiotics Hives       Family History   Problem Relation Age of Onset   • Heart failure Mother    • Diabetes Mother    • Breast cancer Neg Hx    • Ovarian cancer Neg Hx        Past Surgical History:   Procedure Laterality Date   • CARDIAC CATHETERIZATION     • CARDIAC ELECTROPHYSIOLOGY PROCEDURE N/A 7/13/2021    Procedure: Pacemaker DC new;  Surgeon: Dung Lynch MD;  Location: [facility] INVASIVE LOC[facility];  Service: Cardiovascular;  Laterality: N/A;   • COLONOSCOPY     • TONSILLECTOMY     • TUBAL ABDOMINAL LIGATION         Past Medical History:   Diagnosis Date   • B12 deficiency    • Hyperlipidemia    • Hypertension    • Tachycardia    • Vitamin D deficiency        Family History   Problem Relation Age of Onset   • Heart failure Mother    • Diabetes Mother    • Breast cancer Neg Hx    • Ovarian cancer Neg Hx        Social History     Socioeconomic History   • Marital status:    Tobacco Use   • Smoking status: Never Smoker   • Smokeless tobacco: Never Used   Substance and Sexual Activity   • Alcohol use: No   • Drug use: No   • Sexual activity: Yes     Partners: Male     Birth control/protection: None         Procedures      Objective:       Physical Exam    /74 (BP Location: Left arm, Patient Position: Sitting, Cuff Size: Adult)   Pulse 79   Ht 165.1 cm (65\")   Wt 69.9 kg (154 lb)   LMP  (LMP Unknown)   SpO2 98%   BMI 25.63 kg/m²   The patient is alert, oriented and in no distress.    Vital signs as noted above.    Head and neck revealed no carotid bruits or jugular venous distension.  No thyromegaly or lymphadenopathy is present.    Lungs clear.  No wheezing.  Breath sounds are normal bilaterally.    Heart normal first and second heart sounds.  No murmur..  No pericardial rub is present.  No gallop is present.    Abdomen soft and nontender.  No organomegaly is present.    Extremities revealed " good peripheral pulses without any pedal edema.    Skin warm and dry.  Pacemaker site looks normal.    Musculoskeletal system is grossly normal.    CNS grossly normal.

## 2021-11-17 ENCOUNTER — TELEPHONE (OUTPATIENT)
Dept: FAMILY MEDICINE CLINIC | Facility: CLINIC | Age: 71
End: 2021-11-17

## 2021-11-17 RX ORDER — BLOOD SUGAR DIAGNOSTIC
STRIP MISCELLANEOUS
Qty: 90 EACH | Refills: 3 | Status: SHIPPED | OUTPATIENT
Start: 2021-11-17 | End: 2021-11-18 | Stop reason: SDUPTHER

## 2021-11-18 ENCOUNTER — TELEPHONE (OUTPATIENT)
Dept: FAMILY MEDICINE CLINIC | Facility: CLINIC | Age: 71
End: 2021-11-18

## 2021-11-18 RX ORDER — BLOOD SUGAR DIAGNOSTIC
STRIP MISCELLANEOUS
Qty: 90 EACH | Refills: 3 | Status: SHIPPED | OUTPATIENT
Start: 2021-11-18 | End: 2022-02-15 | Stop reason: SDUPTHER

## 2022-01-03 PROBLEM — I44.1 SECOND DEGREE AV BLOCK, MOBITZ TYPE II: Status: RESOLVED | Noted: 2021-07-12 | Resolved: 2022-01-03

## 2022-01-03 NOTE — PATIENT INSTRUCTIONS
Health Maintenance Due   Topic Date Due   • DIABETIC EYE EXAM  11/24/2021   • MAMMOGRAM  12/07/2021     Bring BP cuff or take to pharmacy to calibrate.

## 2022-01-04 ENCOUNTER — OFFICE VISIT (OUTPATIENT)
Dept: FAMILY MEDICINE CLINIC | Facility: CLINIC | Age: 72
End: 2022-01-04

## 2022-01-04 VITALS
OXYGEN SATURATION: 97 % | TEMPERATURE: 97.7 F | WEIGHT: 154.4 LBS | HEART RATE: 74 BPM | SYSTOLIC BLOOD PRESSURE: 146 MMHG | HEIGHT: 65 IN | BODY MASS INDEX: 25.72 KG/M2 | DIASTOLIC BLOOD PRESSURE: 78 MMHG

## 2022-01-04 DIAGNOSIS — E11.9 TYPE 2 DIABETES MELLITUS WITHOUT COMPLICATION, WITHOUT LONG-TERM CURRENT USE OF INSULIN: Primary | ICD-10-CM

## 2022-01-04 DIAGNOSIS — E53.8 B12 DEFICIENCY: ICD-10-CM

## 2022-01-04 DIAGNOSIS — E78.5 HYPERLIPIDEMIA, UNSPECIFIED HYPERLIPIDEMIA TYPE: ICD-10-CM

## 2022-01-04 DIAGNOSIS — I10 PRIMARY HYPERTENSION: ICD-10-CM

## 2022-01-04 DIAGNOSIS — S92.301A CLOSED AVULSION FRACTURE OF METATARSAL BONE OF RIGHT FOOT, INITIAL ENCOUNTER: Primary | ICD-10-CM

## 2022-01-04 DIAGNOSIS — Z95.0 PACEMAKER: ICD-10-CM

## 2022-01-04 DIAGNOSIS — S93.601A FOOT SPRAIN, RIGHT, INITIAL ENCOUNTER: ICD-10-CM

## 2022-01-04 DIAGNOSIS — Z12.31 ENCOUNTER FOR SCREENING MAMMOGRAM FOR MALIGNANT NEOPLASM OF BREAST: ICD-10-CM

## 2022-01-04 LAB
ALBUMIN SERPL-MCNC: 4.3 G/DL (ref 3.5–5.2)
ALBUMIN UR-MCNC: 1.3 MG/DL
ALBUMIN/GLOB SERPL: 1.5 G/DL
ALP SERPL-CCNC: 105 U/L (ref 39–117)
ALT SERPL W P-5'-P-CCNC: 22 U/L (ref 1–33)
ANION GAP SERPL CALCULATED.3IONS-SCNC: 10.5 MMOL/L (ref 5–15)
AST SERPL-CCNC: 19 U/L (ref 1–32)
BASOPHILS # BLD AUTO: 0.04 10*3/MM3 (ref 0–0.2)
BASOPHILS NFR BLD AUTO: 0.5 % (ref 0–1.5)
BILIRUB SERPL-MCNC: 0.3 MG/DL (ref 0–1.2)
BUN SERPL-MCNC: 13 MG/DL (ref 8–23)
BUN/CREAT SERPL: 21.7 (ref 7–25)
CALCIUM SPEC-SCNC: 9.9 MG/DL (ref 8.6–10.5)
CHLORIDE SERPL-SCNC: 102 MMOL/L (ref 98–107)
CHOLEST SERPL-MCNC: 167 MG/DL (ref 0–200)
CO2 SERPL-SCNC: 29.5 MMOL/L (ref 22–29)
CREAT SERPL-MCNC: 0.6 MG/DL (ref 0.57–1)
CREAT UR-MCNC: 25.1 MG/DL
DEPRECATED RDW RBC AUTO: 39.9 FL (ref 37–54)
EOSINOPHIL # BLD AUTO: 0.1 10*3/MM3 (ref 0–0.4)
EOSINOPHIL NFR BLD AUTO: 1.2 % (ref 0.3–6.2)
ERYTHROCYTE [DISTWIDTH] IN BLOOD BY AUTOMATED COUNT: 12 % (ref 12.3–15.4)
GFR SERPL CREATININE-BSD FRML MDRD: 98 ML/MIN/1.73
GLOBULIN UR ELPH-MCNC: 2.8 GM/DL
GLUCOSE SERPL-MCNC: 139 MG/DL (ref 65–99)
HBA1C MFR BLD: 7.9 % (ref 3.5–5.6)
HCT VFR BLD AUTO: 39.1 % (ref 34–46.6)
HDLC SERPL-MCNC: 58 MG/DL (ref 40–60)
HGB BLD-MCNC: 12.6 G/DL (ref 12–15.9)
IMM GRANULOCYTES # BLD AUTO: 0.02 10*3/MM3 (ref 0–0.05)
IMM GRANULOCYTES NFR BLD AUTO: 0.2 % (ref 0–0.5)
LDLC SERPL CALC-MCNC: 96 MG/DL (ref 0–100)
LDLC/HDLC SERPL: 1.64 {RATIO}
LYMPHOCYTES # BLD AUTO: 1.91 10*3/MM3 (ref 0.7–3.1)
LYMPHOCYTES NFR BLD AUTO: 23.6 % (ref 19.6–45.3)
MCH RBC QN AUTO: 29.8 PG (ref 26.6–33)
MCHC RBC AUTO-ENTMCNC: 32.2 G/DL (ref 31.5–35.7)
MCV RBC AUTO: 92.4 FL (ref 79–97)
MICROALBUMIN/CREAT UR: 51.8 MG/G
MONOCYTES # BLD AUTO: 0.59 10*3/MM3 (ref 0.1–0.9)
MONOCYTES NFR BLD AUTO: 7.3 % (ref 5–12)
NEUTROPHILS NFR BLD AUTO: 5.45 10*3/MM3 (ref 1.7–7)
NEUTROPHILS NFR BLD AUTO: 67.2 % (ref 42.7–76)
NRBC BLD AUTO-RTO: 0 /100 WBC (ref 0–0.2)
PLATELET # BLD AUTO: 306 10*3/MM3 (ref 140–450)
PMV BLD AUTO: 11.4 FL (ref 6–12)
POTASSIUM SERPL-SCNC: 3.8 MMOL/L (ref 3.5–5.2)
PROT SERPL-MCNC: 7.1 G/DL (ref 6–8.5)
RBC # BLD AUTO: 4.23 10*6/MM3 (ref 3.77–5.28)
SODIUM SERPL-SCNC: 142 MMOL/L (ref 136–145)
TRIGL SERPL-MCNC: 69 MG/DL (ref 0–150)
TSH SERPL DL<=0.05 MIU/L-ACNC: 1.13 UIU/ML (ref 0.27–4.2)
VLDLC SERPL-MCNC: 13 MG/DL (ref 5–40)
WBC NRBC COR # BLD: 8.11 10*3/MM3 (ref 3.4–10.8)

## 2022-01-04 PROCEDURE — 85025 COMPLETE CBC W/AUTO DIFF WBC: CPT | Performed by: PREVENTIVE MEDICINE

## 2022-01-04 PROCEDURE — 99214 OFFICE O/P EST MOD 30 MIN: CPT | Performed by: PREVENTIVE MEDICINE

## 2022-01-04 PROCEDURE — 80053 COMPREHEN METABOLIC PANEL: CPT | Performed by: PREVENTIVE MEDICINE

## 2022-01-04 PROCEDURE — 83036 HEMOGLOBIN GLYCOSYLATED A1C: CPT | Performed by: PREVENTIVE MEDICINE

## 2022-01-04 PROCEDURE — 82570 ASSAY OF URINE CREATININE: CPT | Performed by: PREVENTIVE MEDICINE

## 2022-01-04 PROCEDURE — 82043 UR ALBUMIN QUANTITATIVE: CPT | Performed by: PREVENTIVE MEDICINE

## 2022-01-04 PROCEDURE — 80061 LIPID PANEL: CPT | Performed by: PREVENTIVE MEDICINE

## 2022-01-04 PROCEDURE — 84443 ASSAY THYROID STIM HORMONE: CPT | Performed by: PREVENTIVE MEDICINE

## 2022-01-04 PROCEDURE — 82607 VITAMIN B-12: CPT | Performed by: PREVENTIVE MEDICINE

## 2022-01-04 NOTE — PROGRESS NOTES
Venipuncture Blood Specimen Collection  Venipuncture performed in left arm by Leia Gallardo MA with good hemostasis. Patient tolerated the procedure well without complications.   01/04/22   Leia Gallardo MA

## 2022-01-04 NOTE — PROGRESS NOTES
Patient advised foot -proximal 5th is fractures with 1-2 mm distraction-have referred to Dr. Piper and advise stiff soled shoe and limit walking until she gets in to see him

## 2022-01-04 NOTE — PROGRESS NOTES
"Subjective   Celsa Brown is a 71 y.o. female presents for   Chief Complaint   Patient presents with   • Diabetes   Patient presents today for follow-up of multiple chronic health conditions most of which seem stable her blood sugar has been below 100 on a couple of occasions when she has not eaten promptly.  Her blood pressure is not controlled in the office today that she has a cuff that is been calibrated and it was controlled at home and is written.  Finally she has a new injury to her right foot she was asleep and jumped up and her right foot felt like it was asleep and so she sustained some sort of a twisting injury 5 days ago and is still painful and swollen she has never broken the foot before.  X-rays have been ordered and did show a fracture to the proximal fifth metatarsal and she has been referred to podiatry she will wear a stiff soled shoe and limit her walking in the meantime.    Health Maintenance Due   Topic Date Due   • DIABETIC EYE EXAM  11/24/2021   • MAMMOGRAM  12/07/2021       History of Present Illness     Vitals:    01/04/22 0951 01/04/22 0952 01/04/22 0953   BP: 163/74 153/74 146/78   BP Location: Right arm Right arm Left arm   Patient Position: Sitting Standing Sitting   Cuff Size: Adult Adult Adult   Pulse: 74     Temp: 97.7 °F (36.5 °C)     TempSrc: Temporal     SpO2: 97%     Weight: 70 kg (154 lb 6.4 oz)     Height: 165.1 cm (65\")       Body mass index is 25.69 kg/m².    Current Outpatient Medications on File Prior to Visit   Medication Sig Dispense Refill   • Accu-Chek Thelma Plus test strip Testing blood sugar once daily Dx E11.6 90 each 3   • amLODIPine (NORVASC) 5 MG tablet Take 5 mg by mouth Daily.     • aspirin 81 MG EC tablet Take 81 mg by mouth Daily. 1 tablet every other day     • cholecalciferol (VITAMIN D3) 1000 units tablet Take 1,000 Units by mouth Every Other Day.     • Cyanocobalamin (VITAMIN B12) 1000 MCG tablet controlled-release Take 1,000 mcg by mouth Every Other Day.   "   • glipizide (GLUCOTROL) 10 MG tablet TAKE 2 TABLETS BY MOUTH  TWICE DAILY IN THE MORNING  AND EVENING 360 tablet 3   • hydrALAZINE (APRESOLINE) 100 MG tablet TAKE 1 TABLET BY MOUTH 3  TIMES DAILY (Patient taking differently: Take 100 mg by mouth 2 (Two) Times a Day.) 270 tablet 3   • hydroCHLOROthiazide (HYDRODIURIL) 25 MG tablet TAKE 1 TABLET BY MOUTH  DAILY 90 tablet 3   • Januvia 100 MG tablet TAKE 1 TABLET BY MOUTH  DAILY 90 tablet 3   • lisinopril (PRINIVIL,ZESTRIL) 40 MG tablet TAKE 1 TABLET BY MOUTH  DAILY 90 tablet 3   • loratadine (CLARITIN) 10 MG tablet Take 10 mg by mouth Daily.     • Melatonin 10 MG tablet Take 10 mg by mouth Daily.     • metFORMIN (GLUCOPHAGE) 500 MG tablet TAKE 2 TABLETS BY MOUTH  TWICE DAILY WITH MEALS 360 tablet 3   • metoprolol succinate XL (TOPROL-XL) 100 MG 24 hr tablet Take 1/2 tablet at night. 90 tablet 3   • Multiple Vitamins-Minerals (CENTRUM SILVER ULTRA WOMENS) tablet Take 1 tablet by mouth Daily.     • Omega-3 Fatty Acids (OMEGA-3 FISH OIL) 500 MG capsule Take 500 mg by mouth Daily.     • potassium chloride (MICRO-K) 10 MEQ CR capsule TAKE 1 CAPSULE BY MOUTH IN  THE MORNING AND EVENING, ON WEEKENDS 2 CAPSULES IN THE  MORNING AND 1 CAPSULE IN  THE EVENING 208 capsule 3   • pravastatin (PRAVACHOL) 80 MG tablet TAKE 1 TABLET BY MOUTH AT  BEDTIME 90 tablet 3     No current facility-administered medications on file prior to visit.       The following portions of the patient's history were reviewed and updated as appropriate: allergies, current medications, past family history, past medical history, past social history, past surgical history and problem list.    Review of Systems   Respiratory: Negative for shortness of breath.    Cardiovascular: Negative for chest pain and palpitations.   Musculoskeletal: Positive for arthralgias, gait problem, joint swelling and myalgias.       Objective   Physical Exam  Vitals reviewed.   Constitutional:       General: She is not in acute  distress.     Appearance: Normal appearance. She is well-developed. She is not ill-appearing or toxic-appearing.   HENT:      Head: Normocephalic and atraumatic.      Right Ear: Tympanic membrane, ear canal and external ear normal.      Left Ear: Tympanic membrane, ear canal and external ear normal.      Nose: Nose normal.   Eyes:      Extraocular Movements: Extraocular movements intact.      Conjunctiva/sclera: Conjunctivae normal.      Pupils: Pupils are equal, round, and reactive to light.   Cardiovascular:      Rate and Rhythm: Normal rate and regular rhythm.      Pulses:           Dorsalis pedis pulses are 1+ on the right side and 1+ on the left side.        Posterior tibial pulses are 1+ on the right side and 1+ on the left side.      Heart sounds: Normal heart sounds.   Pulmonary:      Effort: Pulmonary effort is normal.      Breath sounds: Normal breath sounds.   Abdominal:      General: Bowel sounds are normal. There is no distension.      Palpations: Abdomen is soft. There is no mass.      Tenderness: There is no abdominal tenderness.   Musculoskeletal:         General: Swelling, tenderness and signs of injury present.      Cervical back: Neck supple.      Right foot: Decreased range of motion.      Comments: Swelling of the right foot with actually no point tenderness she does have ecchymosis along the side of the right foot as well as erythema on the top of the foot.  Full range of motion of the ankle toe motor and sensory is intact   Feet:      Right foot:      Skin integrity: Erythema and warmth present.      Toenail Condition: Right toenails are abnormally thick.      Left foot:      Skin integrity: Skin integrity normal.      Toenail Condition: Left toenails are abnormally thick.      Comments: Diabetic Foot Exam Performed    Skin:     General: Skin is warm.   Neurological:      General: No focal deficit present.      Mental Status: She is alert and oriented to person, place, and time.   Psychiatric:          Mood and Affect: Mood normal.         Behavior: Behavior normal.       PHQ-9 Total Score:      Assessment/Plan   Diagnoses and all orders for this visit:    1. Type 2 diabetes mellitus without complication, without long-term current use of insulin (HCC) (Primary)  Comments:  Couple of blood sugars below 100 when she did not eat no blood sugars over 200 and she is to get eye exam soon and will have them send us the results  Orders:  -     Comprehensive Metabolic Panel  -     Hemoglobin A1c  -     Microalbumin / Creatinine Urine Ratio - Urine, Clean Catch  -     TSH    2. Encounter for screening mammogram for malignant neoplasm of breast  -     Mammo Screening Digital Tomosynthesis Bilateral With CAD; Future    3. Pacemaker  Comments:  Pacemaker seems to be functioning well she is lot less dizzy and short of breath.    4. Primary hypertension  Comments:  129/80 with pulse 86 at home  Orders:  -     CBC Auto Differential    5. Hyperlipidemia, unspecified hyperlipidemia type  Comments:  Trying to eat less saturated fats  Orders:  -     Lipid Panel    6. B12 deficiency  Comments:  Takes regularly  Orders:  -     Vitamin B12    7. Foot sprain, right, initial encounter  Comments:  Awoke and went to stand on her foot that was still feeling asleep and she sustained a twisting injury to the foot 5 days ago it is still sore and swollen.  X-ra  Orders:  -     XR Foot 3+ View Right (In Office)        Patient Instructions     Health Maintenance Due   Topic Date Due   • DIABETIC EYE EXAM  11/24/2021   • MAMMOGRAM  12/07/2021     Bring BP cuff or take to pharmacy to calibrate.

## 2022-01-05 ENCOUNTER — TELEPHONE (OUTPATIENT)
Dept: FAMILY MEDICINE CLINIC | Facility: CLINIC | Age: 72
End: 2022-01-05

## 2022-01-05 LAB — VIT B12 BLD-MCNC: 694 PG/ML (ref 211–946)

## 2022-01-05 NOTE — PROGRESS NOTES
Glucose 139 with A1C not showing good control at 7.9-can add Actos if she feels like she has done as much as she can with diet and exercises-let me know.  Bad cholesterol 96 and goal is below 70-since she is on maximum Pravastatin can switch to Atorvastatin which is stronger . Let me know

## 2022-01-05 NOTE — TELEPHONE ENCOUNTER
Hub to read  ----- Message from Jaelyn Iverson MD sent at 1/5/2022  3:14 PM EST -----  Glucose 139 with A1C not showing good control at 7.9-can add Actos if she feels like she has done as much as she can with diet and exercises-let me know.  Bad cholesterol 96 and goal is below 70-since she is on maximum Pravastatin can switch to Atorvastatin which is stronger . Let me know

## 2022-01-06 ENCOUNTER — OFFICE VISIT (OUTPATIENT)
Dept: PODIATRY | Facility: CLINIC | Age: 72
End: 2022-01-06

## 2022-01-06 VITALS
BODY MASS INDEX: 25.72 KG/M2 | HEART RATE: 80 BPM | HEIGHT: 65 IN | DIASTOLIC BLOOD PRESSURE: 73 MMHG | WEIGHT: 154.38 LBS | SYSTOLIC BLOOD PRESSURE: 186 MMHG

## 2022-01-06 DIAGNOSIS — S92.354A CLOSED NONDISPLACED FRACTURE OF FIFTH METATARSAL BONE OF RIGHT FOOT, INITIAL ENCOUNTER: ICD-10-CM

## 2022-01-06 DIAGNOSIS — M79.671 RIGHT FOOT PAIN: Primary | ICD-10-CM

## 2022-01-06 PROCEDURE — 99203 OFFICE O/P NEW LOW 30 MIN: CPT | Performed by: PODIATRIST

## 2022-01-06 NOTE — PROGRESS NOTES
01/06/2022  Foot and Ankle Surgery - New Patient   Provider: Dr. Kumar Piper DPM  Location: [facility]    Subjective:  Celsa Brown is a 71 y.o. female.     Chief Complaint   Patient presents with   • Right Foot - Pain     twisted foot 12/30   • Establish Care     Last pcp appt with Dr. Reeder 1/4/2022       HPI: Patient is a 71-year-old female that presents with pain to the right foot.  She was referred by her primary care physician after a fall approximately 1 week ago.  She states that she was sitting down and got up and realized that her foot was asleep causing her to sustain an inversion type injury.  She localizes the pain to the lateral aspect of the foot.  She has been wearing a regular shoe and did obtain outside imaging.  Findings were noted to have 1/5 metatarsal fracture and she was referred to me.  She denies any previous injuries involving the right foot.  No issues to the left foot today    Allergies   Allergen Reactions   • Sulfa Antibiotics Hives       Past Medical History:   Diagnosis Date   • B12 deficiency    • Hyperlipidemia    • Hypertension    • Tachycardia    • Vitamin D deficiency        Past Surgical History:   Procedure Laterality Date   • CARDIAC CATHETERIZATION     • CARDIAC ELECTROPHYSIOLOGY PROCEDURE N/A 7/13/2021    Procedure: Pacemaker DC new;  Surgeon: Dung Lynch MD;  Location: [facility] INVASIVE LOCATION;  Service: Cardiovascular;  Laterality: N/A;   • COLONOSCOPY     • TONSILLECTOMY     • TUBAL ABDOMINAL LIGATION         Family History   Problem Relation Age of Onset   • Heart failure Mother    • Diabetes Mother    • Breast cancer Neg Hx    • Ovarian cancer Neg Hx        Social History     Socioeconomic History   • Marital status:    Tobacco Use   • Smoking status: Never Smoker   • Smokeless tobacco: Never Used   Vaping Use   • Vaping Use: Never used   Substance and Sexual Activity   • Alcohol use: No   • Drug use: No   • Sexual activity: Yes      Partners: Male     Birth control/protection: None        Current Outpatient Medications on File Prior to Visit   Medication Sig Dispense Refill   • Accu-Chek Thelma Plus test strip Testing blood sugar once daily Dx E11.6 90 each 3   • amLODIPine (NORVASC) 5 MG tablet Take 5 mg by mouth Daily.     • aspirin 81 MG EC tablet Take 81 mg by mouth Daily. 1 tablet every other day     • cholecalciferol (VITAMIN D3) 1000 units tablet Take 1,000 Units by mouth Every Other Day.     • Cyanocobalamin (VITAMIN B12) 1000 MCG tablet controlled-release Take 1,000 mcg by mouth Every Other Day.     • glipizide (GLUCOTROL) 10 MG tablet TAKE 2 TABLETS BY MOUTH  TWICE DAILY IN THE MORNING  AND EVENING 360 tablet 3   • hydrALAZINE (APRESOLINE) 100 MG tablet TAKE 1 TABLET BY MOUTH 3  TIMES DAILY (Patient taking differently: Take 100 mg by mouth 2 (Two) Times a Day.) 270 tablet 3   • hydroCHLOROthiazide (HYDRODIURIL) 25 MG tablet TAKE 1 TABLET BY MOUTH  DAILY 90 tablet 3   • Januvia 100 MG tablet TAKE 1 TABLET BY MOUTH  DAILY 90 tablet 3   • lisinopril (PRINIVIL,ZESTRIL) 40 MG tablet TAKE 1 TABLET BY MOUTH  DAILY 90 tablet 3   • loratadine (CLARITIN) 10 MG tablet Take 10 mg by mouth Daily.     • Melatonin 10 MG tablet Take 10 mg by mouth Daily.     • metFORMIN (GLUCOPHAGE) 500 MG tablet TAKE 2 TABLETS BY MOUTH  TWICE DAILY WITH MEALS 360 tablet 3   • metoprolol succinate XL (TOPROL-XL) 100 MG 24 hr tablet Take 1/2 tablet at night. 90 tablet 3   • Multiple Vitamins-Minerals (CENTRUM SILVER ULTRA WOMENS) tablet Take 1 tablet by mouth Daily.     • Omega-3 Fatty Acids (OMEGA-3 FISH OIL) 500 MG capsule Take 500 mg by mouth Daily.     • potassium chloride (MICRO-K) 10 MEQ CR capsule TAKE 1 CAPSULE BY MOUTH IN  THE MORNING AND EVENING, ON WEEKENDS 2 CAPSULES IN THE  MORNING AND 1 CAPSULE IN  THE EVENING 208 capsule 3   • pravastatin (PRAVACHOL) 80 MG tablet TAKE 1 TABLET BY MOUTH AT  BEDTIME 90 tablet 3     No current facility-administered  "medications on file prior to visit.       Review of Systems:  General: Denies fever, chills, fatigue, and weakness.  Eyes: Denies vision loss, blurry vision, and excessive redness.  ENT: Denies hearing issues and difficulty swallowing.  Cardiovascular: Denies palpitations, chest pain, or syncopal episodes.  Respiratory: Denies shortness of breath, wheezing, and coughing.  GI: Denies abdominal pain, nausea, and vomiting.   : Denies frequency, hematuria, and urgency.  Musculoskeletal: + Right foot pain  Derm: Denies rash, open wounds, or suspicious lesions.  Neuro: Denies headaches, numbness, loss of coordination, and tremors.  Psych: Denies anxiety and depression.  Endocrine: Denies temperature intolerance and changes in appetite.  Heme: Denies bleeding disorders or abnormal bruising.     Objective   BP (!) 186/73   Pulse 80   Ht 165.1 cm (65\")   Wt 70 kg (154 lb 6 oz)   LMP  (LMP Unknown)   BMI 25.69 kg/m²     Foot/Ankle Exam:       General:   Appearance: appears stated age and healthy    Orientation: AAOx3    Affect: appropriate    Gait: antalgic      VASCULAR      Right Foot Vascularity   Normal vascular exam    Dorsalis pedis:  2+  Posterior tibial:  2+  Skin Temperature: warm    Edema Gradin+  CFT:  < 3 seconds  Pedal Hair Growth:  Present      NEUROLOGIC     Right Foot Neurologic   Light touch sensation:  Normal  Hot/Cold sensation: normal    Achilles reflex:  2+     MUSCULOSKELETAL      Right Foot Musculoskeletal   Ecchymosis:  None  Tenderness: fifth metatarsal base    Arch:  Normal     MUSCLE STRENGTH     Right Foot Muscle Strength   Normal strength    Foot dorsiflexion:  5  Foot plantar flexion:  5  Foot inversion:  5  Foot eversion:  5     DERMATOLOGIC     Right Foot Dermatologic   Skin: skin intact        Right Foot Additional Comments Instability testing deferred.  No proximal fibular pain.  No deformity.  Discomfort along the lateral column.      Assessment/Plan   Diagnoses and all orders " for this visit:    1. Right foot pain (Primary)    2. Closed nondisplaced fracture of fifth metatarsal bone of right foot, initial encounter      Patient presents after injury involving her right foot.  Imaging was independently reviewed showing a nondisplaced fracture involving the fifth metatarsal base.  I did review the imaging, diagnosis, and treatment options.  I do feel that she will do well with nonoperative management.  I have dispensed a cam boot and reviewed proper use and effects.  I have asked that she decrease overall activity.  We did discuss rice therapy and proper use of OTC anti-inflammatories if necessary.  I would like her to start gentle range of motion manual therapy exercises.  She is to observe and call with any new issues or concerns.  I will see her in 4 weeks for reevaluation and imaging.  Greater than 30 minutes was spent before, during, and after evaluation for patient care.    No orders of the defined types were placed in this encounter.       Note is dictated utilizing voice recognition software. Unfortunately this leads to occasional typographical errors. I apologize in advance if the situation occurs. If questions occur please do not hesitate to call our office.

## 2022-01-11 PROCEDURE — 93296 REM INTERROG EVL PM/IDS: CPT | Performed by: INTERNAL MEDICINE

## 2022-01-11 PROCEDURE — 93294 REM INTERROG EVL PM/LDLS PM: CPT | Performed by: INTERNAL MEDICINE

## 2022-01-17 ENCOUNTER — TELEPHONE (OUTPATIENT)
Dept: FAMILY MEDICINE CLINIC | Facility: CLINIC | Age: 72
End: 2022-01-17

## 2022-01-17 ENCOUNTER — HOSPITAL ENCOUNTER (OUTPATIENT)
Dept: MAMMOGRAPHY | Facility: HOSPITAL | Age: 72
Discharge: HOME | End: 2022-01-17
Admitting: PREVENTIVE MEDICINE

## 2022-01-17 DIAGNOSIS — Z12.31 ENCOUNTER FOR SCREENING MAMMOGRAM FOR MALIGNANT NEOPLASM OF BREAST: ICD-10-CM

## 2022-01-17 PROCEDURE — 77063 BREAST TOMOSYNTHESIS BI: CPT

## 2022-01-17 PROCEDURE — 77067 SCR MAMMO BI INCL CAD: CPT

## 2022-01-17 NOTE — TELEPHONE ENCOUNTER
HUB TO READ  ----- Message from Jaelny Iverson MD sent at 1/17/2022  1:04 PM EST -----  mammogram normal.  Ordered for one year unless change in symptoms or physical

## 2022-02-02 ENCOUNTER — OFFICE VISIT (OUTPATIENT)
Dept: PODIATRY | Facility: CLINIC | Age: 72
End: 2022-02-02

## 2022-02-02 VITALS
HEIGHT: 65 IN | SYSTOLIC BLOOD PRESSURE: 161 MMHG | HEART RATE: 77 BPM | WEIGHT: 154.38 LBS | BODY MASS INDEX: 25.72 KG/M2 | DIASTOLIC BLOOD PRESSURE: 71 MMHG

## 2022-02-02 DIAGNOSIS — S92.354D CLOSED NONDISPLACED FRACTURE OF FIFTH METATARSAL BONE OF RIGHT FOOT WITH ROUTINE HEALING, SUBSEQUENT ENCOUNTER: ICD-10-CM

## 2022-02-02 DIAGNOSIS — M79.671 RIGHT FOOT PAIN: Primary | ICD-10-CM

## 2022-02-02 PROCEDURE — 99213 OFFICE O/P EST LOW 20 MIN: CPT | Performed by: PODIATRIST

## 2022-02-03 NOTE — PROGRESS NOTES
02/02/2022  Foot and Ankle Surgery - Established Patient/Follow-up  Provider: Dr. Kumar Piper DPM  Location: [facility]    Subjective:  Celsa Brown is a 71 y.o. female.     Chief Complaint   Patient presents with   • Right Foot - Pain   • Follow-up     Last pcp appt with JORDEN Reeder MD 1/4/2022       HPI: Patient returns for follow-up regarding her right foot.  She states that she has been doing well and remain in the cam boot.  She denies any significant pain or limitation.  No other issues or concerns    Allergies   Allergen Reactions   • Sulfa Antibiotics Hives       Current Outpatient Medications on File Prior to Visit   Medication Sig Dispense Refill   • Accu-Chek Thelma Plus test strip Testing blood sugar once daily Dx E11.6 90 each 3   • amLODIPine (NORVASC) 5 MG tablet Take 5 mg by mouth Daily.     • aspirin 81 MG EC tablet Take 81 mg by mouth Daily. 1 tablet every other day     • cholecalciferol (VITAMIN D3) 1000 units tablet Take 1,000 Units by mouth Every Other Day.     • Cyanocobalamin (VITAMIN B12) 1000 MCG tablet controlled-release Take 1,000 mcg by mouth Every Other Day.     • glipizide (GLUCOTROL) 10 MG tablet TAKE 2 TABLETS BY MOUTH  TWICE DAILY IN THE MORNING  AND EVENING 360 tablet 3   • hydrALAZINE (APRESOLINE) 100 MG tablet TAKE 1 TABLET BY MOUTH 3  TIMES DAILY (Patient taking differently: Take 100 mg by mouth 2 (Two) Times a Day.) 270 tablet 3   • hydroCHLOROthiazide (HYDRODIURIL) 25 MG tablet TAKE 1 TABLET BY MOUTH  DAILY 90 tablet 3   • Januvia 100 MG tablet TAKE 1 TABLET BY MOUTH  DAILY 90 tablet 3   • lisinopril (PRINIVIL,ZESTRIL) 40 MG tablet TAKE 1 TABLET BY MOUTH  DAILY 90 tablet 3   • loratadine (CLARITIN) 10 MG tablet Take 10 mg by mouth Daily.     • Melatonin 10 MG tablet Take 10 mg by mouth Daily.     • metFORMIN (GLUCOPHAGE) 500 MG tablet TAKE 2 TABLETS BY MOUTH  TWICE DAILY WITH MEALS 360 tablet 3   • metoprolol succinate XL (TOPROL-XL) 100 MG 24 hr tablet Take 1/2  "tablet at night. 90 tablet 3   • Multiple Vitamins-Minerals (CENTRUM SILVER ULTRA WOMENS) tablet Take 1 tablet by mouth Daily.     • Omega-3 Fatty Acids (OMEGA-3 FISH OIL) 500 MG capsule Take 500 mg by mouth Daily.     • potassium chloride (MICRO-K) 10 MEQ CR capsule TAKE 1 CAPSULE BY MOUTH IN  THE MORNING AND EVENING, ON WEEKENDS 2 CAPSULES IN THE  MORNING AND 1 CAPSULE IN  THE EVENING 208 capsule 3   • pravastatin (PRAVACHOL) 80 MG tablet TAKE 1 TABLET BY MOUTH AT  BEDTIME 90 tablet 3     No current facility-administered medications on file prior to visit.       Objective   /71   Pulse 77   Ht 165.1 cm (65\")   Wt 70 kg (154 lb 6 oz)   LMP  (LMP Unknown)   BMI 25.69 kg/m²     General:   Appearance: appears stated age and healthy    Orientation: AAOx3    Affect: appropriate    Gait: antalgic       VASCULAR       Right Foot Vascularity   Normal vascular exam    Dorsalis pedis:  2+  Posterior tibial:  2+  Skin Temperature: warm    Edema Gradin+  CFT:  < 3 seconds  Pedal Hair Growth:  Present      NEUROLOGIC      Right Foot Neurologic   Light touch sensation:  Normal  Hot/Cold sensation: normal    Achilles reflex:  2+      MUSCULOSKELETAL       Right Foot Musculoskeletal   Ecchymosis:  None  Tenderness: fifth metatarsal base    Arch:  Normal      MUSCLE STRENGTH      Right Foot Muscle Strength   Normal strength    Foot dorsiflexion:  5  Foot plantar flexion:  5  Foot inversion:  5  Foot eversion:  5      DERMATOLOGIC      Right Foot Dermatologic   Skin: skin intact    No significant discomfort involving the lateral column.    Assessment/Plan   Diagnoses and all orders for this visit:    1. Right foot pain (Primary)    2. Closed nondisplaced fracture of fifth metatarsal bone of right foot with routine healing, subsequent encounter  -     XR Foot 3+ View Right      Patient returns for evaluation of the fifth metatarsal.  Imaging was performed showing interval signs of healing and no progressive " displacement.  I have recommended that she remain in the cam boot over the next 2 weeks and then gradually return to her regular shoe as tolerated.  I would like her to avoid high-impact and excessive activity.  She is to monitor closely and call with any progressive issues or concerns.  I will see her in 4 weeks for reevaluation and imaging.  Greater than 20 minutes was spent before, during, and after evaluation for patient care.    Orders Placed This Encounter   Procedures   • XR Foot 3+ View Right     Closed nondisplaced fracture of 5th metatarsal bone of of right foot     Scheduling Instructions:      Room 15      wb     Order Specific Question:   Reason for Exam:     Answer:   right foot pain     Order Specific Question:   Does this patient have a diabetic monitoring/medication delivering device on?     Answer:   No     Order Specific Question:   Release to patient     Answer:   Immediate          Note is dictated utilizing voice recognition software. Unfortunately this leads to occasional typographical errors. I apologize in advance if the situation occurs. If questions occur please do not hesitate to call our office.

## 2022-02-11 ENCOUNTER — HOSPITAL ENCOUNTER (EMERGENCY)
Facility: HOSPITAL | Age: 72
Discharge: HOME | End: 2022-02-11
Attending: EMERGENCY MEDICINE | Admitting: EMERGENCY MEDICINE

## 2022-02-11 ENCOUNTER — OFFICE VISIT (OUTPATIENT)
Dept: FAMILY MEDICINE CLINIC | Facility: CLINIC | Age: 72
End: 2022-02-11

## 2022-02-11 ENCOUNTER — APPOINTMENT (OUTPATIENT)
Dept: CT IMAGING | Facility: HOSPITAL | Age: 72
End: 2022-02-11

## 2022-02-11 VITALS
DIASTOLIC BLOOD PRESSURE: 74 MMHG | SYSTOLIC BLOOD PRESSURE: 118 MMHG | OXYGEN SATURATION: 95 % | WEIGHT: 156.6 LBS | HEIGHT: 65 IN | TEMPERATURE: 97.9 F | HEART RATE: 91 BPM | BODY MASS INDEX: 26.09 KG/M2

## 2022-02-11 VITALS
HEIGHT: 68 IN | TEMPERATURE: 98.4 F | SYSTOLIC BLOOD PRESSURE: 147 MMHG | DIASTOLIC BLOOD PRESSURE: 72 MMHG | WEIGHT: 160.5 LBS | HEART RATE: 88 BPM | RESPIRATION RATE: 16 BRPM | BODY MASS INDEX: 24.32 KG/M2 | OXYGEN SATURATION: 98 %

## 2022-02-11 DIAGNOSIS — N39.0 URINARY TRACT INFECTION WITHOUT HEMATURIA, SITE UNSPECIFIED: ICD-10-CM

## 2022-02-11 DIAGNOSIS — R11.2 NAUSEA AND VOMITING, INTRACTABILITY OF VOMITING NOT SPECIFIED, UNSPECIFIED VOMITING TYPE: ICD-10-CM

## 2022-02-11 DIAGNOSIS — R10.9 ABDOMINAL PAIN, UNSPECIFIED ABDOMINAL LOCATION: Primary | ICD-10-CM

## 2022-02-11 DIAGNOSIS — R10.33 PERIUMBILICAL ABDOMINAL PAIN: Primary | ICD-10-CM

## 2022-02-11 LAB
ALBUMIN SERPL-MCNC: 3.8 G/DL (ref 3.5–5.2)
ALBUMIN/GLOB SERPL: 1.5 G/DL
ALP SERPL-CCNC: 81 U/L (ref 39–117)
ALT SERPL W P-5'-P-CCNC: 13 U/L (ref 1–33)
ANION GAP SERPL CALCULATED.3IONS-SCNC: 10 MMOL/L (ref 5–15)
AST SERPL-CCNC: 13 U/L (ref 1–32)
BACTERIA UR QL AUTO: ABNORMAL /HPF
BASOPHILS # BLD AUTO: 0 10*3/MM3 (ref 0–0.2)
BASOPHILS NFR BLD AUTO: 0.4 % (ref 0–1.5)
BILIRUB BLD-MCNC: NEGATIVE MG/DL
BILIRUB SERPL-MCNC: 0.3 MG/DL (ref 0–1.2)
BILIRUB UR QL STRIP: NEGATIVE
BUN SERPL-MCNC: 16 MG/DL (ref 8–23)
BUN/CREAT SERPL: 25 (ref 7–25)
CALCIUM SPEC-SCNC: 9.4 MG/DL (ref 8.6–10.5)
CHLORIDE SERPL-SCNC: 101 MMOL/L (ref 98–107)
CLARITY UR: CLEAR
CLARITY, POC: CLEAR
CO2 SERPL-SCNC: 28 MMOL/L (ref 22–29)
COLOR UR: YELLOW
COLOR UR: YELLOW
CREAT SERPL-MCNC: 0.64 MG/DL (ref 0.57–1)
DEPRECATED RDW RBC AUTO: 43.8 FL (ref 37–54)
EOSINOPHIL # BLD AUTO: 0.1 10*3/MM3 (ref 0–0.4)
EOSINOPHIL NFR BLD AUTO: 1.1 % (ref 0.3–6.2)
ERYTHROCYTE [DISTWIDTH] IN BLOOD BY AUTOMATED COUNT: 13.9 % (ref 12.3–15.4)
EXPIRATION DATE: ABNORMAL
GFR SERPL CREATININE-BSD FRML MDRD: 91 ML/MIN/1.73
GLOBULIN UR ELPH-MCNC: 2.5 GM/DL
GLUCOSE SERPL-MCNC: 184 MG/DL (ref 65–99)
GLUCOSE UR STRIP-MCNC: NEGATIVE MG/DL
GLUCOSE UR STRIP-MCNC: NEGATIVE MG/DL
HCT VFR BLD AUTO: 36.2 % (ref 34–46.6)
HGB BLD-MCNC: 12.1 G/DL (ref 12–15.9)
HGB UR QL STRIP.AUTO: NEGATIVE
HYALINE CASTS UR QL AUTO: ABNORMAL /LPF
KETONES UR QL STRIP: NEGATIVE
KETONES UR QL: NEGATIVE
LEUKOCYTE EST, POC: ABNORMAL
LEUKOCYTE ESTERASE UR QL STRIP.AUTO: ABNORMAL
LIPASE SERPL-CCNC: 26 U/L (ref 13–60)
LYMPHOCYTES # BLD AUTO: 2.5 10*3/MM3 (ref 0.7–3.1)
LYMPHOCYTES NFR BLD AUTO: 32.7 % (ref 19.6–45.3)
Lab: ABNORMAL
MCH RBC QN AUTO: 30.3 PG (ref 26.6–33)
MCHC RBC AUTO-ENTMCNC: 33.5 G/DL (ref 31.5–35.7)
MCV RBC AUTO: 90.5 FL (ref 79–97)
MONOCYTES # BLD AUTO: 0.7 10*3/MM3 (ref 0.1–0.9)
MONOCYTES NFR BLD AUTO: 9.1 % (ref 5–12)
NEUTROPHILS NFR BLD AUTO: 4.3 10*3/MM3 (ref 1.7–7)
NEUTROPHILS NFR BLD AUTO: 56.7 % (ref 42.7–76)
NITRITE UR QL STRIP: POSITIVE
NITRITE UR-MCNC: NEGATIVE MG/ML
NRBC BLD AUTO-RTO: 0.1 /100 WBC (ref 0–0.2)
PH UR STRIP.AUTO: 6 [PH] (ref 5–8)
PH UR: 7 [PH] (ref 5–8)
PLATELET # BLD AUTO: 266 10*3/MM3 (ref 140–450)
PMV BLD AUTO: 8.3 FL (ref 6–12)
POTASSIUM SERPL-SCNC: 3.4 MMOL/L (ref 3.5–5.2)
PROT SERPL-MCNC: 6.3 G/DL (ref 6–8.5)
PROT UR QL STRIP: NEGATIVE
PROT UR STRIP-MCNC: NEGATIVE MG/DL
RBC # BLD AUTO: 4 10*6/MM3 (ref 3.77–5.28)
RBC # UR STRIP: ABNORMAL /HPF
RBC # UR STRIP: NEGATIVE /UL
REF LAB TEST METHOD: ABNORMAL
SODIUM SERPL-SCNC: 139 MMOL/L (ref 136–145)
SP GR UR STRIP: 1.03 (ref 1–1.03)
SP GR UR: 1.01 (ref 1–1.03)
SQUAMOUS #/AREA URNS HPF: ABNORMAL /HPF
UROBILINOGEN UR QL STRIP: ABNORMAL
UROBILINOGEN UR QL: NORMAL
WBC # UR STRIP: ABNORMAL /HPF
WBC NRBC COR # BLD: 7.6 10*3/MM3 (ref 3.4–10.8)

## 2022-02-11 PROCEDURE — 87088 URINE BACTERIA CULTURE: CPT | Performed by: PHYSICIAN ASSISTANT

## 2022-02-11 PROCEDURE — 99283 EMERGENCY DEPT VISIT LOW MDM: CPT

## 2022-02-11 PROCEDURE — 87086 URINE CULTURE/COLONY COUNT: CPT | Performed by: PREVENTIVE MEDICINE

## 2022-02-11 PROCEDURE — 80053 COMPREHEN METABOLIC PANEL: CPT | Performed by: PHYSICIAN ASSISTANT

## 2022-02-11 PROCEDURE — 87186 SC STD MICRODIL/AGAR DIL: CPT | Performed by: PREVENTIVE MEDICINE

## 2022-02-11 PROCEDURE — 36415 COLL VENOUS BLD VENIPUNCTURE: CPT

## 2022-02-11 PROCEDURE — 87186 SC STD MICRODIL/AGAR DIL: CPT | Performed by: PHYSICIAN ASSISTANT

## 2022-02-11 PROCEDURE — 81001 URINALYSIS AUTO W/SCOPE: CPT | Performed by: PHYSICIAN ASSISTANT

## 2022-02-11 PROCEDURE — 81003 URINALYSIS AUTO W/O SCOPE: CPT | Performed by: PREVENTIVE MEDICINE

## 2022-02-11 PROCEDURE — 99213 OFFICE O/P EST LOW 20 MIN: CPT | Performed by: PREVENTIVE MEDICINE

## 2022-02-11 PROCEDURE — 25010000002 CEFTRIAXONE PER 250 MG: Performed by: PHYSICIAN ASSISTANT

## 2022-02-11 PROCEDURE — 83690 ASSAY OF LIPASE: CPT | Performed by: PHYSICIAN ASSISTANT

## 2022-02-11 PROCEDURE — 96374 THER/PROPH/DIAG INJ IV PUSH: CPT

## 2022-02-11 PROCEDURE — 85025 COMPLETE CBC W/AUTO DIFF WBC: CPT | Performed by: PHYSICIAN ASSISTANT

## 2022-02-11 PROCEDURE — 87086 URINE CULTURE/COLONY COUNT: CPT | Performed by: PHYSICIAN ASSISTANT

## 2022-02-11 PROCEDURE — 74177 CT ABD & PELVIS W/CONTRAST: CPT

## 2022-02-11 PROCEDURE — 0 IOPAMIDOL PER 1 ML: Performed by: EMERGENCY MEDICINE

## 2022-02-11 PROCEDURE — 87088 URINE BACTERIA CULTURE: CPT | Performed by: PREVENTIVE MEDICINE

## 2022-02-11 RX ORDER — SODIUM CHLORIDE 0.9 % (FLUSH) 0.9 %
10 SYRINGE (ML) INJECTION AS NEEDED
Status: DISCONTINUED | OUTPATIENT
Start: 2022-02-11 | End: 2022-02-12 | Stop reason: HOSPADM

## 2022-02-11 RX ORDER — ONDANSETRON 4 MG/1
4 TABLET, ORALLY DISINTEGRATING ORAL EVERY 8 HOURS PRN
Qty: 20 TABLET | Refills: 0 | Status: SHIPPED | OUTPATIENT
Start: 2022-02-11 | End: 2022-04-19

## 2022-02-11 RX ORDER — CEFDINIR 300 MG/1
300 CAPSULE ORAL 2 TIMES DAILY
Qty: 14 CAPSULE | Refills: 0 | Status: SHIPPED | OUTPATIENT
Start: 2022-02-11 | End: 2022-02-18

## 2022-02-11 RX ADMIN — IOPAMIDOL 100 ML: 755 INJECTION, SOLUTION INTRAVENOUS at 21:44

## 2022-02-11 RX ADMIN — CEFTRIAXONE 1 G: 10 INJECTION, POWDER, FOR SOLUTION INTRAVENOUS at 22:48

## 2022-02-12 NOTE — PROGRESS NOTES
"Subjective   Celsa Brown is a 71 y.o. female presents for   Chief Complaint   Patient presents with   • Abdominal Pain     Began last night   • Vomiting   Severe abdominal pain and vomiting began last evening and kept her awake until 4 AM she attempted to try a scant breakfast in the pains resumed.  She has not had any diarrhea no blood in her vomit is diabetic and has been able to keep a little down.  No dysuria hematuria hematemesis.  No others in the family are ill.    Health Maintenance Due   Topic Date Due   • DIABETIC EYE EXAM  11/24/2021       History of Present Illness     Vitals:    02/11/22 1342 02/11/22 1343 02/11/22 1344   BP: 152/73 134/76 118/74   BP Location: Left arm Right arm Right arm   Patient Position: Sitting Sitting Standing   Cuff Size: Adult     Pulse: 91     Temp: 97.9 °F (36.6 °C)     TempSrc: Temporal     SpO2: 95%     Weight: 71 kg (156 lb 9.6 oz)     Height: 165.1 cm (65\")       Body mass index is 26.06 kg/m².    Current Outpatient Medications on File Prior to Visit   Medication Sig Dispense Refill   • Accu-Chek Thelma Plus test strip Testing blood sugar once daily Dx E11.6 90 each 3   • amLODIPine (NORVASC) 5 MG tablet Take 5 mg by mouth Daily.     • aspirin 81 MG EC tablet Take 81 mg by mouth Daily. 1 tablet every other day     • cholecalciferol (VITAMIN D3) 1000 units tablet Take 1,000 Units by mouth Every Other Day.     • Cyanocobalamin (VITAMIN B12) 1000 MCG tablet controlled-release Take 1,000 mcg by mouth Every Other Day.     • glipizide (GLUCOTROL) 10 MG tablet TAKE 2 TABLETS BY MOUTH  TWICE DAILY IN THE MORNING  AND EVENING 360 tablet 3   • hydrALAZINE (APRESOLINE) 100 MG tablet TAKE 1 TABLET BY MOUTH 3  TIMES DAILY (Patient taking differently: Take 100 mg by mouth 2 (Two) Times a Day.) 270 tablet 3   • hydroCHLOROthiazide (HYDRODIURIL) 25 MG tablet TAKE 1 TABLET BY MOUTH  DAILY 90 tablet 3   • Januvia 100 MG tablet TAKE 1 TABLET BY MOUTH  DAILY 90 tablet 3   • lisinopril " (PRINIVIL,ZESTRIL) 40 MG tablet TAKE 1 TABLET BY MOUTH  DAILY 90 tablet 3   • loratadine (CLARITIN) 10 MG tablet Take 10 mg by mouth Daily.     • Melatonin 10 MG tablet Take 10 mg by mouth Daily.     • metFORMIN (GLUCOPHAGE) 500 MG tablet TAKE 2 TABLETS BY MOUTH  TWICE DAILY WITH MEALS 360 tablet 3   • metoprolol succinate XL (TOPROL-XL) 100 MG 24 hr tablet Take 1/2 tablet at night. 90 tablet 3   • Multiple Vitamins-Minerals (CENTRUM SILVER ULTRA WOMENS) tablet Take 1 tablet by mouth Daily.     • Omega-3 Fatty Acids (OMEGA-3 FISH OIL) 500 MG capsule Take 500 mg by mouth Daily.     • potassium chloride (MICRO-K) 10 MEQ CR capsule TAKE 1 CAPSULE BY MOUTH IN  THE MORNING AND EVENING, ON WEEKENDS 2 CAPSULES IN THE  MORNING AND 1 CAPSULE IN  THE EVENING 208 capsule 3   • pravastatin (PRAVACHOL) 80 MG tablet TAKE 1 TABLET BY MOUTH AT  BEDTIME 90 tablet 3     No current facility-administered medications on file prior to visit.       The following portions of the patient's history were reviewed and updated as appropriate: allergies, current medications, past family history, past medical history, past social history, past surgical history and problem list.    Review of Systems   Constitutional: Positive for appetite change and fatigue. Negative for fever.   Gastrointestinal: Positive for abdominal pain, nausea and vomiting.   Genitourinary: Negative for dysuria, flank pain and hematuria.       Objective   Physical Exam  Vitals reviewed.   Constitutional:       General: She is not in acute distress.     Appearance: Normal appearance. She is well-developed. She is not ill-appearing or toxic-appearing.   HENT:      Head: Normocephalic and atraumatic.      Right Ear: Tympanic membrane, ear canal and external ear normal.      Left Ear: Tympanic membrane, ear canal and external ear normal.      Nose: Nose normal.   Eyes:      Extraocular Movements: Extraocular movements intact.      Conjunctiva/sclera: Conjunctivae normal.       Pupils: Pupils are equal, round, and reactive to light.   Cardiovascular:      Rate and Rhythm: Normal rate and regular rhythm.      Heart sounds: Normal heart sounds.   Pulmonary:      Effort: Pulmonary effort is normal.      Breath sounds: Normal breath sounds.   Abdominal:      General: Bowel sounds are normal.      Palpations: Abdomen is soft. There is no mass.      Tenderness: There is abdominal tenderness. There is guarding. There is no right CVA tenderness, left CVA tenderness or rebound.      Hernia: No hernia is present.   Musculoskeletal:         General: Normal range of motion.      Cervical back: Neck supple.   Skin:     General: Skin is warm.   Neurological:      General: No focal deficit present.      Mental Status: She is alert and oriented to person, place, and time.   Psychiatric:         Mood and Affect: Mood normal.         Behavior: Behavior normal.       PHQ-9 Total Score:      Assessment/Plan   Diagnoses and all orders for this visit:    1. Periumbilical abdominal pain (Primary)  Comments:  Severe pain with nausea and vomiting until 4 AM this morning.  Did attempt to eat and redeveloped nausea and vomiting.  No fever dysuria or hematuria  Orders:  -     Amylase  -     CT Abdomen Pelvis With & Without Contrast; Future  -     Lipase  -     Comprehensive Metabolic Panel  -     CBC & Differential  -     POCT urinalysis dipstick, automated  -     Sedimentation Rate  -     Urine Culture - Urine, Urine, Clean Catch; Future  -     Urine Culture - Urine, Urine, Clean Catch        There are no Patient Instructions on file for this visit.

## 2022-02-12 NOTE — ED PROVIDER NOTES
Subjective   Chief Complaint: Abdominal pain    Patient is a 71-year-old  female with history of hyperlipidemia, hypertension presents the ER with complaints of abdominal pain that started last night.  Patient states the pain started after she ate dinner.  She describes it as a cramping and sharp pain that is intermittent that she reports got worse today.  Patient rated her pain a 9/10 earlier today, currently rates a 4/10.  Patient states pain is mostly in her right lower quadrant and left lower quadrant.  She reports some nausea, 1 episode of vomiting at 4 AM today, no further vomiting.  She denies any diarrhea or dysuria.  She denies any hematuria, hematochezia or melena.  She denies any fever or chills.  Abdominal surgical history significant for tubal ligation.    Location: Lower abdomen    Quality: Cramping, sharp    Duration: 1 day    Timing: Intermittent    Severity: Mild to moderate    Associated Symptoms: Nausea vomiting    PCP: Jaelyn Iverson      History provided by:  Patient      Review of Systems   Constitutional: Negative for fever.   HENT: Negative for sore throat and trouble swallowing.    Respiratory: Negative for shortness of breath and wheezing.    Cardiovascular: Negative for chest pain.   Gastrointestinal: Positive for abdominal pain, nausea and vomiting.   Genitourinary: Negative for dysuria.   Musculoskeletal: Negative for myalgias.   Skin: Negative for rash.   Neurological: Negative for weakness and headaches.   Psychiatric/Behavioral: Negative for behavioral problems.   All other systems reviewed and are negative.      Past Medical History:   Diagnosis Date   • B12 deficiency    • Hyperlipidemia    • Hypertension    • Tachycardia    • Vitamin D deficiency        Allergies   Allergen Reactions   • Sulfa Antibiotics Hives       Past Surgical History:   Procedure Laterality Date   • CARDIAC CATHETERIZATION     • CARDIAC ELECTROPHYSIOLOGY PROCEDURE N/A 7/13/2021    Procedure: Pacemaker  RAJ seymour;  Surgeon: Dung Lynch MD;  Location: [facility] INVASIVE LOCATION;  Service: Cardiovascular;  Laterality: N/A;   • COLONOSCOPY     • TONSILLECTOMY     • TUBAL ABDOMINAL LIGATION         Family History   Problem Relation Age of Onset   • Heart failure Mother    • Diabetes Mother    • Breast cancer Neg Hx    • Ovarian cancer Neg Hx        Social History     Socioeconomic History   • Marital status:    Tobacco Use   • Smoking status: Never Smoker   • Smokeless tobacco: Never Used   Vaping Use   • Vaping Use: Never used   Substance and Sexual Activity   • Alcohol use: No   • Drug use: No   • Sexual activity: Yes     Partners: Male     Birth control/protection: None           Objective   Physical Exam  Vitals reviewed.   Constitutional:       Appearance: Normal appearance. She is well-developed. She is obese. She is not ill-appearing or toxic-appearing.   HENT:      Head: Normocephalic and atraumatic.   Eyes:      Pupils: Pupils are equal, round, and reactive to light.   Cardiovascular:      Rate and Rhythm: Normal rate and regular rhythm.      Heart sounds: Normal heart sounds.   Pulmonary:      Effort: Pulmonary effort is normal. No respiratory distress.      Breath sounds: Normal breath sounds. No wheezing.   Abdominal:      General: Abdomen is flat. Bowel sounds are decreased. There is no distension.      Palpations: Abdomen is soft.      Tenderness: There is abdominal tenderness in the right lower quadrant and left lower quadrant.   Skin:     General: Skin is warm and dry.      Capillary Refill: Capillary refill takes less than 2 seconds.      Findings: No rash.   Neurological:      General: No focal deficit present.      Mental Status: She is alert and oriented to person, place, and time.      Cranial Nerves: No cranial nerve deficit.   Psychiatric:         Behavior: Behavior normal.         Procedures           ED Course    /66 (BP Location: Left arm, Patient Position: Sitting)   Pulse  "95   Temp 98.3 °F (36.8 °C) (Oral)   Resp 15   Ht 172.7 cm (68\")   Wt 72.8 kg (160 lb 7.9 oz)   LMP  (LMP Unknown)   SpO2 98%   BMI 24.40 kg/m²   Labs Reviewed   COMPREHENSIVE METABOLIC PANEL - Abnormal; Notable for the following components:       Result Value    Glucose 184 (*)     Potassium 3.4 (*)     All other components within normal limits    Narrative:     GFR Normal >60  Chronic Kidney Disease <60  Kidney Failure <15     URINALYSIS W/ CULTURE IF INDICATED - Abnormal; Notable for the following components:    Leuk Esterase, UA Small (1+) (*)     Nitrite, UA Positive (*)     All other components within normal limits   URINALYSIS, MICROSCOPIC ONLY - Abnormal; Notable for the following components:    RBC, UA 0-2 (*)     WBC, UA 6-12 (*)     Bacteria, UA 4+ (*)     All other components within normal limits   LIPASE - Normal   CBC WITH AUTO DIFFERENTIAL - Normal   URINE CULTURE   CBC AND DIFFERENTIAL    Narrative:     The following orders were created for panel order CBC & Differential.  Procedure                               Abnormality         Status                     ---------                               -----------         ------                     CBC Auto Differential[[id number]]        Normal              Final result                 Please view results for these tests on the individual orders.     Medications   sodium chloride 0.9 % flush 10 mL (has no administration in time range)   iopamidol (ISOVUE-370) 76 % injection 100 mL (100 mL Intravenous Given 2/11/22 2144)   cefTRIAXone (ROCEPHIN) in SWFI 1 gram/10ml IV PUSH syringe (1 g Intravenous Given 2/11/22 4985)     CT Abdomen Pelvis With Contrast    Result Date: 2/11/2022  1.There is subsegmental wall thickening and enhancement of small bowel loops. There is a small moderate amount of ascites in the pelvis. This is likely reactive. No obstruction is seen at this time. Enteritis whether related to inflammatory bowel disease such as Crohn's " disease or infectious enteritis should be considered. Small mesenteric lymph nodes are likely reactive. 2.Moderate size hiatal hernia. 3.Diverticulosis without diverticulitis. Moderate stool burden.    Electronically Signed By-Gisele Foley MD On:2/11/2022 9:55 PM This report was finalized on [id number] by  Gisele Foley MD.                                                 MDM  Number of Diagnoses or Management Options  Abdominal pain, unspecified abdominal location  Nausea and vomiting, intractability of vomiting not specified, unspecified vomiting type  Urinary tract infection without hematuria, site unspecified  Diagnosis management comments: MEDICAL DECISION  Epic Chart Review: No recent admissions  Comorbidities: Patient denies  Differentials: Diverticulitis, bowel obstruction, gastroenteritis, UTI, pyelonephritis; this list is not all inclusive and does not constitute the entirety of considered causes  Radiology interpretation:  Images reviewed by me and interpreted by radiologist, as above  Lab interpretation:  Labs viewed by me significant for, as above    While in the ED IV was placed and labs were obtained appropriate PPE was worn during exam and throughout all encounters with the patient. Patient had the above evaluation. IV established, lab work obtained. Patient afebrile, nontoxic in appearance and in no acute respiratory distress. Abdominal exam soft, bowel sounds appropriate, mildly tender right lower quadrant and left lower quadrant without distention rigidity or guarding or signs of acute abdomen. Lab work CBC essentially normal, no leukocytosis. Normal lipase. CMP glucose 184. Urinalysis nitrite positive, 4+ bacteria suggesting UTI. Patient given 1 g Rocephin prior to discharge. Findings discussed with patient and  at bedside, all questions answered. Patient discharged with prescription for cefdinir and Zofran and she was encouraged follow-up with primary care next week for urine recheck to  ensure resolution of UTI. Patient verbalized understanding.  Discharge plan and instructions were discussed with the patient who verbalized understanding and is in agreement with the plan, all questions were answered at this time.  Patient is aware of signs symptoms that would require immediate return to the emergency room.  Patient understands importance of following up with primary care provider for further evaluation and worsening concerns as well as blood pressure recheck in the next 4 weeks.    Patient was discharged in improved stable condition with an upright steady gait.         Amount and/or Complexity of Data Reviewed  Clinical lab tests: reviewed and ordered  Tests in the radiology section of CPT®: reviewed and ordered        Final diagnoses:   Abdominal pain, unspecified abdominal location   Urinary tract infection without hematuria, site unspecified   Nausea and vomiting, intractability of vomiting not specified, unspecified vomiting type       ED Disposition  ED Disposition     ED Disposition Condition Comment    Discharge Stable           Jaelyn Iverson MD  [address] [facility] [place] [address] [phone]    Schedule an appointment as soon as possible for a visit in 2 days  As needed, If symptoms worsen         Medication List      New Prescriptions    cefdinir 300 MG capsule  Commonly known as: OMNICEF  Take 1 capsule by mouth 2 (Two) Times a Day for 7 days.     ondansetron ODT 4 MG disintegrating tablet  Commonly known as: Zofran ODT  Place 1 tablet under the tongue Every 8 (Eight) Hours As Needed for Nausea.        Changed    hydrALAZINE 100 MG tablet  Commonly known as: APRESOLINE  TAKE 1 TABLET BY MOUTH 3  TIMES DAILY  What changed: when to take this           Where to Get Your Medications      These medications were sent to [id number] - [place], [place] - [address] AT [facility] [address] - [phone]  - [phone] FX  [address] [place] [address]     Phone: [phone]   · cefdinir 300 MG capsule  · ondansetron ODT 4 MG disintegrating tablet          Marlene Torrez PA  02/11/22 2988

## 2022-02-12 NOTE — ED PROVIDER NOTES
Subjective   History of Present Illness    Review of Systems    Past Medical History:   Diagnosis Date   • B12 deficiency    • Hyperlipidemia    • Hypertension    • Tachycardia    • Vitamin D deficiency        Allergies   Allergen Reactions   • Sulfa Antibiotics Hives       Past Surgical History:   Procedure Laterality Date   • CARDIAC CATHETERIZATION     • CARDIAC ELECTROPHYSIOLOGY PROCEDURE N/A 7/13/2021    Procedure: Pacemaker DC new;  Surgeon: Dung Lynch MD;  Location: [facility] INVASIVE LOCATION;  Service: Cardiovascular;  Laterality: N/A;   • COLONOSCOPY     • TONSILLECTOMY     • TUBAL ABDOMINAL LIGATION         Family History   Problem Relation Age of Onset   • Heart failure Mother    • Diabetes Mother    • Breast cancer Neg Hx    • Ovarian cancer Neg Hx        Social History     Socioeconomic History   • Marital status:    Tobacco Use   • Smoking status: Never Smoker   • Smokeless tobacco: Never Used   Vaping Use   • Vaping Use: Never used   Substance and Sexual Activity   • Alcohol use: No   • Drug use: No   • Sexual activity: Yes     Partners: Male     Birth control/protection: None           Objective   Physical Exam    Procedures           ED Course                                                 MDM    Final diagnoses:   None       ED Disposition  ED Disposition     None          No follow-up provider specified.       Medication List      No changes were made to your prescriptions during this visit.

## 2022-02-12 NOTE — DISCHARGE INSTRUCTIONS
Take cefdinir antibiotics to completion.  Take nausea as needed for nausea vomiting    Drink plenty fluids    Follow-up with your primary care provider next week for recheck to ensure that the infection has improved    Return to the ER for new or worsening symptoms

## 2022-02-13 LAB
BACTERIA SPEC AEROBE CULT: ABNORMAL
BACTERIA SPEC AEROBE CULT: ABNORMAL

## 2022-02-13 NOTE — PHARMACY RECOMMENDATION
Patient urine culture resulted with E. coli. Susceptible to Cephalosporins (3rd gen). Patient was given Rx for cefdinir. Therapy is appropriate coverage. No further follow-up required..    Microbiology Results (last 10 days)       Procedure Component Value - Date/Time    Urine Culture - Urine, Urine, Clean Catch [[id number]]  (Abnormal)  (Susceptibility) Collected: 02/11/22 2152    Lab Status: Final result Specimen: Urine, Clean Catch Updated: 02/13/22 1018     Urine Culture >100,000 CFU/mL Escherichia coli    Susceptibility      Escherichia coli  COSME  Ampicillin  Susceptible  Ampicillin + Sulbactam  Susceptible  Cefazolin  Susceptible  Cefepime  Susceptible  Ceftazidime  Susceptible  Ceftriaxone  Susceptible  Gentamicin  Susceptible  Levofloxacin  Intermediate  Nitrofurantoin  Susceptible  Piperacillin + Tazobactam  Susceptible  Trimethoprim + Sulfamethoxazole  Susceptible                 Urine Culture - Urine, Urine, Clean Catch [[id number]]  (Abnormal)  (Susceptibility) Collected: 02/11/22 1451    Lab Status: Final result Specimen: Urine, Clean Catch Updated: 02/13/22 1019     Urine Culture >100,000 CFU/mL Escherichia coli    Susceptibility      Escherichia coli  COSME  Ampicillin  Susceptible  Ampicillin + Sulbactam  Susceptible  Cefazolin  Susceptible  Cefepime  Susceptible  Ceftazidime  Susceptible  Ceftriaxone  Susceptible  Gentamicin  Susceptible  Levofloxacin  Intermediate  Nitrofurantoin  Susceptible  Piperacillin + Tazobactam  Susceptible  Trimethoprim + Sulfamethoxazole  Susceptible                       Apollo Zamora RPH  2/13/2022 12:42 EST

## 2022-02-14 ENCOUNTER — TELEPHONE (OUTPATIENT)
Dept: FAMILY MEDICINE CLINIC | Facility: CLINIC | Age: 72
End: 2022-02-14

## 2022-02-14 DIAGNOSIS — N39.0 URINARY TRACT INFECTION WITHOUT HEMATURIA, SITE UNSPECIFIED: Primary | ICD-10-CM

## 2022-02-14 NOTE — PROGRESS NOTES
Did have uti so make sure takes antibiotic till gone and after 3 days off med, come to office for repeat culture to make sure it is clear

## 2022-02-14 NOTE — TELEPHONE ENCOUNTER
Hub to read  ----- Message from Jaelyn Iverson MD sent at 2/14/2022  6:30 AM EST -----  Did have uti so make sure takes antibiotic till gone and after 3 days off med, come to office for repeat culture to make sure it is clear

## 2022-02-15 ENCOUNTER — PATIENT OUTREACH (OUTPATIENT)
Dept: CASE MANAGEMENT | Facility: OTHER | Age: 72
End: 2022-02-15

## 2022-02-15 RX ORDER — BLOOD SUGAR DIAGNOSTIC
STRIP MISCELLANEOUS
Qty: 90 EACH | Refills: 3 | Status: SHIPPED | OUTPATIENT
Start: 2022-02-15 | End: 2023-03-13

## 2022-02-15 RX ORDER — METOPROLOL SUCCINATE 100 MG/1
TABLET, EXTENDED RELEASE ORAL
Qty: 90 TABLET | Refills: 3 | Status: SHIPPED | OUTPATIENT
Start: 2022-02-15 | End: 2023-03-13

## 2022-02-15 RX ORDER — POTASSIUM CHLORIDE 750 MG/1
CAPSULE, EXTENDED RELEASE ORAL
Qty: 208 CAPSULE | Refills: 3 | Status: SHIPPED | OUTPATIENT
Start: 2022-02-15 | End: 2023-03-13

## 2022-02-15 RX ORDER — HYDROCHLOROTHIAZIDE 25 MG/1
25 TABLET ORAL DAILY
Qty: 90 TABLET | Refills: 3 | Status: SHIPPED | OUTPATIENT
Start: 2022-02-15 | End: 2023-03-13

## 2022-02-15 RX ORDER — AMLODIPINE BESYLATE 5 MG/1
5 TABLET ORAL DAILY
Qty: 90 TABLET | Refills: 3 | Status: SHIPPED | OUTPATIENT
Start: 2022-02-15 | End: 2022-10-24

## 2022-02-15 RX ORDER — GLIPIZIDE 10 MG/1
TABLET ORAL
Qty: 360 TABLET | Refills: 3 | Status: SHIPPED | OUTPATIENT
Start: 2022-02-15

## 2022-02-15 RX ORDER — LISINOPRIL 40 MG/1
40 TABLET ORAL DAILY
Qty: 90 TABLET | Refills: 3 | Status: SHIPPED | OUTPATIENT
Start: 2022-02-15 | End: 2023-03-13

## 2022-02-15 RX ORDER — PRAVASTATIN SODIUM 80 MG/1
80 TABLET ORAL
Qty: 90 TABLET | Refills: 3 | Status: SHIPPED | OUTPATIENT
Start: 2022-02-15 | End: 2023-03-13

## 2022-02-15 RX ORDER — HYDRALAZINE HYDROCHLORIDE 100 MG/1
100 TABLET, FILM COATED ORAL 3 TIMES DAILY
Qty: 270 TABLET | Refills: 3 | Status: SHIPPED | OUTPATIENT
Start: 2022-02-15 | End: 2022-07-19

## 2022-02-15 NOTE — TELEPHONE ENCOUNTER
Caller: Celsa Brown    Relationship: Self    Best call back number: [phone]     Requested Prescriptions:   Requested Prescriptions     Pending Prescriptions Disp Refills   • amLODIPine (NORVASC) 5 MG tablet       Sig: Take 1 tablet by mouth Daily.   • glipizide (GLUCOTROL) 10 MG tablet 360 tablet 3   • hydrALAZINE (APRESOLINE) 100 MG tablet 270 tablet 3     Sig: Take 1 tablet by mouth 3 (Three) Times a Day.   • hydroCHLOROthiazide (HYDRODIURIL) 25 MG tablet 90 tablet 3     Sig: Take 1 tablet by mouth Daily.   • SITagliptin (Januvia) 100 MG tablet 90 tablet 3     Sig: Take 1 tablet by mouth Daily.   • lisinopril (PRINIVIL,ZESTRIL) 40 MG tablet 90 tablet 3     Sig: Take 1 tablet by mouth Daily.   • metFORMIN (GLUCOPHAGE) 500 MG tablet 360 tablet 3     Sig: Take 2 tablets by mouth 2 (Two) Times a Day With Meals.   • metoprolol succinate XL (TOPROL-XL) 100 MG 24 hr tablet 90 tablet 3     Sig: Take 1/2 tablet at night.   • potassium chloride (MICRO-K) 10 MEQ CR capsule 208 capsule 3   • pravastatin (PRAVACHOL) 80 MG tablet 90 tablet 3     Sig: Take 1 tablet by mouth every night at bedtime.   • Accu-Chek Thelma Plus test strip 90 each 3     Sig: Testing blood sugar once daily Dx E11.6        Pharmacy where request should be sent: [facility] [place] [address] [phone] [phone] FX     Does the patient have less than a 3 day supply:  [] Yes  [x] No    Lorraine Duckworth Rep   02/15/22 14:09 EST

## 2022-02-15 NOTE — OUTREACH NOTE
Ambulatory Case Management Note  Patient Outreach    Brief outreach completed with Ms Brown re 2/11/22 ED visit for DX abdominal pain, N/V, UTI.    Patient states symptoms improving, continues antibiotic as directed. She denies abdominal pain, N/V, F/C today.  Eating/drinking ok.  Denies immediate issues/concerns.  Unable to further outreach, she thanks RN-ACM for calling and ended call.    Per 2/14/22 Hub note, patient confirmed PCP ED f/up instructions.   No future outreach scheduled at this time.  RN-ACM to outreach as needed.    Mary Jo Sullivan RN  Ambulatory Case Management  2/15/2022, 13:38 EST

## 2022-02-15 NOTE — TELEPHONE ENCOUNTER
HUB READ FOLLOWING MESSAGE    Hub to read  ----- Message from Jaelyn Iverson MD sent at 2/14/2022  6:30 AM EST -----  Did have uti so make sure takes antibiotic till gone and after 3 days off med, come to office for repeat culture to make sure it is clear    PATIENT STATES UNDERSTANDING AND WILL DO THAT.

## 2022-03-02 ENCOUNTER — OFFICE VISIT (OUTPATIENT)
Dept: PODIATRY | Facility: CLINIC | Age: 72
End: 2022-03-02

## 2022-03-02 ENCOUNTER — CLINICAL SUPPORT (OUTPATIENT)
Dept: FAMILY MEDICINE CLINIC | Facility: CLINIC | Age: 72
End: 2022-03-02

## 2022-03-02 VITALS
WEIGHT: 160 LBS | HEIGHT: 68 IN | BODY MASS INDEX: 24.25 KG/M2 | DIASTOLIC BLOOD PRESSURE: 73 MMHG | SYSTOLIC BLOOD PRESSURE: 147 MMHG | HEART RATE: 82 BPM

## 2022-03-02 DIAGNOSIS — N39.0 URINARY TRACT INFECTION WITHOUT HEMATURIA, SITE UNSPECIFIED: ICD-10-CM

## 2022-03-02 DIAGNOSIS — I10 PRIMARY HYPERTENSION: ICD-10-CM

## 2022-03-02 DIAGNOSIS — S92.354D CLOSED NONDISPLACED FRACTURE OF FIFTH METATARSAL BONE OF RIGHT FOOT WITH ROUTINE HEALING, SUBSEQUENT ENCOUNTER: ICD-10-CM

## 2022-03-02 DIAGNOSIS — M79.671 RIGHT FOOT PAIN: Primary | ICD-10-CM

## 2022-03-02 LAB
BACTERIA UR QL AUTO: NORMAL /HPF
BILIRUB UR QL STRIP: NEGATIVE
CLARITY UR: CLEAR
COLOR UR: YELLOW
GLUCOSE UR STRIP-MCNC: ABNORMAL MG/DL
HGB UR QL STRIP.AUTO: NEGATIVE
HYALINE CASTS UR QL AUTO: NORMAL /LPF
KETONES UR QL STRIP: NEGATIVE
LEUKOCYTE ESTERASE UR QL STRIP.AUTO: ABNORMAL
NITRITE UR QL STRIP: NEGATIVE
PH UR STRIP.AUTO: 7 [PH] (ref 5–8)
PROT UR QL STRIP: NEGATIVE
RBC # UR STRIP: NORMAL /HPF
REF LAB TEST METHOD: NORMAL
SP GR UR STRIP: 1.02 (ref 1–1.03)
SQUAMOUS #/AREA URNS HPF: NORMAL /HPF
UROBILINOGEN UR QL STRIP: ABNORMAL
WBC # UR STRIP: NORMAL /HPF

## 2022-03-02 PROCEDURE — 99211 OFF/OP EST MAY X REQ PHY/QHP: CPT | Performed by: NURSE PRACTITIONER

## 2022-03-02 PROCEDURE — 81001 URINALYSIS AUTO W/SCOPE: CPT | Performed by: PREVENTIVE MEDICINE

## 2022-03-02 PROCEDURE — 99213 OFFICE O/P EST LOW 20 MIN: CPT | Performed by: PODIATRIST

## 2022-03-02 RX ORDER — DILTIAZEM HYDROCHLORIDE 240 MG/1
CAPSULE, EXTENDED RELEASE ORAL
COMMUNITY
End: 2022-05-31

## 2022-03-02 NOTE — PROGRESS NOTES
Checked home BP machine to our machine. Patient's was running higher than ours. She will call manufacture to see if they will replace hers. If not, then she will let us know so we can get insurance to cover a new machine.    Pt also leaving urine for lab UA.

## 2022-03-03 ENCOUNTER — TELEPHONE (OUTPATIENT)
Dept: FAMILY MEDICINE CLINIC | Facility: CLINIC | Age: 72
End: 2022-03-03

## 2022-03-03 DIAGNOSIS — N39.0 URINARY TRACT INFECTION WITHOUT HEMATURIA, SITE UNSPECIFIED: Primary | ICD-10-CM

## 2022-03-03 PROCEDURE — 81003 URINALYSIS AUTO W/O SCOPE: CPT | Performed by: PREVENTIVE MEDICINE

## 2022-03-03 NOTE — PROGRESS NOTES
Trace of leukocyte Estrace which is probably not but possibly could be infection.  Lets increase fluids over the weekend and we will get urine culture and urine the first of next week.  Orders placed

## 2022-03-03 NOTE — TELEPHONE ENCOUNTER
----- Message from Jaelyn Iverson MD sent at 3/3/2022  7:18 AM EST -----    HUB TO READ    Trace of leukocyte Estrace which is probably not but possibly could be infection.  Lets increase fluids over the weekend and we will get urine culture and urine the first of next week.  Orders placed

## 2022-03-03 NOTE — PROGRESS NOTES
03/02/2022  Foot and Ankle Surgery - Established Patient/Follow-up  Provider: Dr. Kumar Piper DPM  Location: [facility]    Subjective:  Celsa Brown is a 71 y.o. female.     Chief Complaint   Patient presents with   • Right Foot - Pain   • Follow-up     Last pcp appt with JORDEN Iverson MD 1/4/2022       HPI: Patient returns for evaluation of her right foot fracture.  She states that she is doing well.  She has been ambulating the cam boot intermittently.  She has noticed significant improvement in pain.    Allergies   Allergen Reactions   • Sulfa Antibiotics Hives       Current Outpatient Medications on File Prior to Visit   Medication Sig Dispense Refill   • Accu-Chek Thelma Plus test strip Testing blood sugar once daily Dx E11.6 90 each 3   • amLODIPine (NORVASC) 5 MG tablet Take 1 tablet by mouth Daily. 90 tablet 3   • aspirin 81 MG EC tablet Take 81 mg by mouth Daily. 1 tablet every other day     • cholecalciferol (VITAMIN D3) 1000 units tablet Take 1,000 Units by mouth Every Other Day.     • Cyanocobalamin (VITAMIN B12) 1000 MCG tablet controlled-release Take 1,000 mcg by mouth Every Other Day.     • dilTIAZem (TIAZAC) 240 MG 24 hr capsule      • glipizide (GLUCOTROL) 10 MG tablet Take two tablets by mouth twice daily by mouth 360 tablet 3   • hydrALAZINE (APRESOLINE) 100 MG tablet Take 1 tablet by mouth 3 (Three) Times a Day. 270 tablet 3   • hydroCHLOROthiazide (HYDRODIURIL) 25 MG tablet Take 1 tablet by mouth Daily. 90 tablet 3   • lisinopril (PRINIVIL,ZESTRIL) 40 MG tablet Take 1 tablet by mouth Daily. 90 tablet 3   • loratadine (CLARITIN) 10 MG tablet Take 10 mg by mouth Daily.     • Melatonin 10 MG tablet Take 10 mg by mouth Daily.     • metFORMIN (GLUCOPHAGE) 500 MG tablet Take 2 tablets by mouth 2 (Two) Times a Day With Meals. 360 tablet 3   • metoprolol succinate XL (TOPROL-XL) 100 MG 24 hr tablet Take 1/2 tablet at night. 90 tablet 3   • Multiple Vitamins-Minerals (CENTRUM SILVER ULTRA  "WOMENS) tablet Take 1 tablet by mouth Daily.     • Omega-3 Fatty Acids (OMEGA-3 FISH OIL) 500 MG capsule Take 500 mg by mouth Daily.     • ondansetron ODT (Zofran ODT) 4 MG disintegrating tablet Place 1 tablet under the tongue Every 8 (Eight) Hours As Needed for Nausea. 20 tablet 0   • potassium chloride (MICRO-K) 10 MEQ CR capsule Take 1 capsule by mouth am and pm on weekends and 2 capsules by mouth am and one pm on weekdays 208 capsule 3   • pravastatin (PRAVACHOL) 80 MG tablet Take 1 tablet by mouth every night at bedtime. 90 tablet 3   • SITagliptin (Januvia) 100 MG tablet Take 1 tablet by mouth Daily. 90 tablet 3     No current facility-administered medications on file prior to visit.       Objective   /73   Pulse 82   Ht 172.7 cm (68\")   Wt 72.6 kg (160 lb)   LMP  (LMP Unknown)   BMI 24.33 kg/m²     General:   Appearance: appears stated age and healthy    Orientation: AAOx3    Affect: appropriate    Gait: antalgic       VASCULAR       Right Foot Vascularity   Normal vascular exam    Dorsalis pedis:  2+  Posterior tibial:  2+  Skin Temperature: warm    Edema Gradin+  CFT:  < 3 seconds  Pedal Hair Growth:  Present      NEUROLOGIC      Right Foot Neurologic   Light touch sensation:  Normal  Hot/Cold sensation: normal    Achilles reflex:  2+      MUSCULOSKELETAL       Right Foot Musculoskeletal   Ecchymosis:  None  Tenderness: fifth metatarsal base    Arch:  Normal      MUSCLE STRENGTH      Right Foot Muscle Strength   Normal strength    Foot dorsiflexion:  5  Foot plantar flexion:  5  Foot inversion:  5  Foot eversion:  5      DERMATOLOGIC      Right Foot Dermatologic   Skin: skin intact     No significant discomfort involving the lateral column.       Assessment/Plan   Diagnoses and all orders for this visit:    1. Right foot pain (Primary)  -     Cancel: Urinalysis With Culture If Indicated - Urine, Clean Catch; Future  -     Urinalysis With Culture If Indicated - Urine, Clean Catch  -     " Urinalysis, Microscopic Only - Urine, Clean Catch    2. Closed nondisplaced fracture of fifth metatarsal bone of right foot with routine healing, subsequent encounter  -     XR Foot 3+ View Right      Patient is doing quite well at this time.  She denies any significant pain involving her foot.  Imaging was performed today showing minimal interval signs of healing with continued fracture site.  No progressive displacement.  Clinically, she is asymptomatic.  She does not have any pain or limitation at this time.  I do feel that she may gradually return to her regular shoe and light activity as needed.  She may gradually resume baseline activity when tolerated.  I will see her on an as-needed basis.  She is to call with any additional issues or concerns.  Greater than 20 minutes was spent before, during, and after evaluation for patient care.    Orders Placed This Encounter   Procedures   • XR Foot 3+ View Right     Order Specific Question:   Reason for Exam:     Answer:   Room 15 wb, closed nondisplaced fx of 5th metatarsal of right foot Injury 1/4/2022 F/U     Order Specific Question:   Does this patient have a diabetic monitoring/medication delivering device on?     Answer:   No     Order Specific Question:   Release to patient     Answer:   Immediate   • Urinalysis, Microscopic Only - Urine, Clean Catch     Order Specific Question:   Release to patient     Answer:   Immediate          Note is dictated utilizing voice recognition software. Unfortunately this leads to occasional typographical errors. I apologize in advance if the situation occurs. If questions occur please do not hesitate to call our office.

## 2022-03-10 ENCOUNTER — TELEPHONE (OUTPATIENT)
Dept: FAMILY MEDICINE CLINIC | Facility: CLINIC | Age: 72
End: 2022-03-10

## 2022-03-10 ENCOUNTER — CLINICAL SUPPORT (OUTPATIENT)
Dept: FAMILY MEDICINE CLINIC | Facility: CLINIC | Age: 72
End: 2022-03-10

## 2022-03-10 DIAGNOSIS — N39.0 URINARY TRACT INFECTION WITHOUT HEMATURIA, SITE UNSPECIFIED: Primary | ICD-10-CM

## 2022-03-10 DIAGNOSIS — N39.0 URINARY TRACT INFECTION WITHOUT HEMATURIA, SITE UNSPECIFIED: ICD-10-CM

## 2022-03-10 LAB
BILIRUB BLD-MCNC: NEGATIVE MG/DL
CLARITY, POC: CLEAR
COLOR UR: YELLOW
EXPIRATION DATE: ABNORMAL
GLUCOSE UR STRIP-MCNC: NEGATIVE MG/DL
KETONES UR QL: NEGATIVE
LEUKOCYTE EST, POC: ABNORMAL
Lab: ABNORMAL
NITRITE UR-MCNC: POSITIVE MG/ML
PH UR: 8.5 [PH] (ref 5–8)
PROT UR STRIP-MCNC: NEGATIVE MG/DL
RBC # UR STRIP: ABNORMAL /UL
SP GR UR: 1.02 (ref 1–1.03)
UROBILINOGEN UR QL: NORMAL

## 2022-03-10 PROCEDURE — 87086 URINE CULTURE/COLONY COUNT: CPT | Performed by: PREVENTIVE MEDICINE

## 2022-03-10 PROCEDURE — 87077 CULTURE AEROBIC IDENTIFY: CPT | Performed by: PREVENTIVE MEDICINE

## 2022-03-10 PROCEDURE — 87186 SC STD MICRODIL/AGAR DIL: CPT | Performed by: PREVENTIVE MEDICINE

## 2022-03-10 RX ORDER — NITROFURANTOIN 25; 75 MG/1; MG/1
100 CAPSULE ORAL 2 TIMES DAILY
Qty: 14 CAPSULE | Refills: 0 | Status: SHIPPED | OUTPATIENT
Start: 2022-03-10 | End: 2022-04-19

## 2022-03-10 NOTE — TELEPHONE ENCOUNTER
HUB TO READ:  ----- Message from Jaelyn Iverson MD sent at 3/10/2022  9:10 AM EST -----  Urine has blood and maybe signs of infection-have ordered urine culture and will let her know.  Is she having any symptoms of Uti like burning, fever side or bladder pain?

## 2022-03-10 NOTE — TELEPHONE ENCOUNTER
Order placed-please repeat urine 3 days off antibiotics and call if this doesn't have the burning.

## 2022-03-10 NOTE — PROGRESS NOTES
Urine has blood and maybe signs of infection-have ordered urine culture and will let her know.  Is she having any symptoms of Uti like burning, fever side or bladder pain?

## 2022-03-10 NOTE — TELEPHONE ENCOUNTER
HUB RELAYED MESSAGE.    PATIENT STATES THAT SHE IS HAVING SOME BURNING. NEW PHARMACY LISTED BELOW.     [facility] [address], [place] - [address] - [phone]  - [phone]   [phone]

## 2022-03-12 LAB — BACTERIA SPEC AEROBE CULT: ABNORMAL

## 2022-03-22 ENCOUNTER — CLINICAL SUPPORT (OUTPATIENT)
Dept: FAMILY MEDICINE CLINIC | Facility: CLINIC | Age: 72
End: 2022-03-22

## 2022-03-22 DIAGNOSIS — R82.90 ABNORMAL URINE FINDING: ICD-10-CM

## 2022-03-22 PROCEDURE — 87086 URINE CULTURE/COLONY COUNT: CPT | Performed by: PREVENTIVE MEDICINE

## 2022-03-22 PROCEDURE — 87077 CULTURE AEROBIC IDENTIFY: CPT | Performed by: PREVENTIVE MEDICINE

## 2022-03-22 PROCEDURE — 87186 SC STD MICRODIL/AGAR DIL: CPT | Performed by: PREVENTIVE MEDICINE

## 2022-03-24 ENCOUNTER — TELEPHONE (OUTPATIENT)
Dept: FAMILY MEDICINE CLINIC | Facility: CLINIC | Age: 72
End: 2022-03-24

## 2022-03-24 DIAGNOSIS — N39.0 URINARY TRACT INFECTION WITHOUT HEMATURIA, SITE UNSPECIFIED: Primary | ICD-10-CM

## 2022-03-24 LAB — BACTERIA SPEC AEROBE CULT: ABNORMAL

## 2022-03-24 RX ORDER — NITROFURANTOIN 25; 75 MG/1; MG/1
100 CAPSULE ORAL 2 TIMES DAILY
Qty: 20 CAPSULE | Refills: 0 | Status: SHIPPED | OUTPATIENT
Start: 2022-03-24 | End: 2022-04-19

## 2022-03-24 NOTE — PROGRESS NOTES
Urine is stillinfected-have sent another round of antibiotics and please repeat culture after off meds for 3 days

## 2022-03-24 NOTE — TELEPHONE ENCOUNTER
----- Message from Jaelyn Iverson MD sent at 3/24/2022  1:43 PM EDT -----    HUB TO READ    Urine is stillinfected-have sent another round of antibiotics and please repeat culture after off meds for 3 days

## 2022-04-05 ENCOUNTER — CLINICAL SUPPORT (OUTPATIENT)
Dept: FAMILY MEDICINE CLINIC | Facility: CLINIC | Age: 72
End: 2022-04-05

## 2022-04-05 ENCOUNTER — TELEPHONE (OUTPATIENT)
Dept: FAMILY MEDICINE CLINIC | Facility: CLINIC | Age: 72
End: 2022-04-05

## 2022-04-05 DIAGNOSIS — N39.0 URINARY TRACT INFECTION WITHOUT HEMATURIA, SITE UNSPECIFIED: ICD-10-CM

## 2022-04-05 PROBLEM — H52.223 REGULAR ASTIGMATISM OF BOTH EYES: Status: ACTIVE | Noted: 2022-04-01

## 2022-04-05 PROBLEM — H43.399 VITREOUS FLOATERS: Status: ACTIVE | Noted: 2022-04-01

## 2022-04-05 PROCEDURE — 87088 URINE BACTERIA CULTURE: CPT | Performed by: PREVENTIVE MEDICINE

## 2022-04-05 PROCEDURE — 87086 URINE CULTURE/COLONY COUNT: CPT | Performed by: PREVENTIVE MEDICINE

## 2022-04-05 PROCEDURE — 87186 SC STD MICRODIL/AGAR DIL: CPT | Performed by: PREVENTIVE MEDICINE

## 2022-04-05 NOTE — TELEPHONE ENCOUNTER
Patient chart was marked allergic to Macrobid.  Patient is taking Tylenol for the joint aches and will try to take some Benadryl Claritin and Zyrtec for the symptoms as well.  Patient will call back if symptoms persist.

## 2022-04-05 NOTE — TELEPHONE ENCOUNTER
PT left a note saying that the antibiotic Nitrofurantoin mono/ mac 100mg gave her body aches, headaches, and a red rash  On her legs that looks like she has a case of the measles.  Pt dropped off a urine today, MA says she was due for more labs to be taken.   [phone] cell  [place]- [phone]

## 2022-04-07 ENCOUNTER — TELEPHONE (OUTPATIENT)
Dept: FAMILY MEDICINE CLINIC | Facility: CLINIC | Age: 72
End: 2022-04-07

## 2022-04-07 LAB — BACTERIA SPEC AEROBE CULT: ABNORMAL

## 2022-04-07 RX ORDER — AMOXICILLIN 875 MG/1
875 TABLET, COATED ORAL 2 TIMES DAILY
Qty: 20 TABLET | Refills: 0 | Status: SHIPPED | OUTPATIENT
Start: 2022-04-07 | End: 2022-04-19

## 2022-04-07 NOTE — TELEPHONE ENCOUNTER
HUB TO READ:  ----- Message from Jaelyn Iverson MD sent at 4/7/2022 11:49 AM EDT -----  Urine is still infected so we will prescribe some amoxicillin 875 should take it twice a day for the next 10 days and 3 days off the medicine not to reculture her again.  Continue to drink plenty of fluids and is gets fever flank pain blood in the urine let us know.  Sent to [facility] in [place].

## 2022-04-07 NOTE — PROGRESS NOTES
Urine is still infected so we will prescribe some amoxicillin 875 should take it twice a day for the next 10 days and 3 days off the medicine not to reculture her again.  Continue to drink plenty of fluids and is gets fever flank pain blood in the urine let us know.  Sent to [facility] in [place].

## 2022-04-12 PROCEDURE — 93296 REM INTERROG EVL PM/IDS: CPT | Performed by: INTERNAL MEDICINE

## 2022-04-12 PROCEDURE — 93294 REM INTERROG EVL PM/LDLS PM: CPT | Performed by: INTERNAL MEDICINE

## 2022-04-18 NOTE — PATIENT INSTRUCTIONS
Health Maintenance Due   Topic Date Due    ANNUAL WELLNESS VISIT  03/25/2022    Advise second Covid Booster

## 2022-04-19 ENCOUNTER — OFFICE VISIT (OUTPATIENT)
Dept: FAMILY MEDICINE CLINIC | Facility: CLINIC | Age: 72
End: 2022-04-19

## 2022-04-19 VITALS
BODY MASS INDEX: 23.34 KG/M2 | SYSTOLIC BLOOD PRESSURE: 138 MMHG | HEIGHT: 68 IN | WEIGHT: 154 LBS | TEMPERATURE: 98.6 F | OXYGEN SATURATION: 97 % | HEART RATE: 83 BPM | DIASTOLIC BLOOD PRESSURE: 74 MMHG

## 2022-04-19 DIAGNOSIS — I10 PRIMARY HYPERTENSION: ICD-10-CM

## 2022-04-19 DIAGNOSIS — R31.9 URINARY TRACT INFECTION WITH HEMATURIA, SITE UNSPECIFIED: ICD-10-CM

## 2022-04-19 DIAGNOSIS — R09.89 RIGHT CAROTID BRUIT: ICD-10-CM

## 2022-04-19 DIAGNOSIS — N39.0 URINARY TRACT INFECTION WITH HEMATURIA, SITE UNSPECIFIED: ICD-10-CM

## 2022-04-19 DIAGNOSIS — Z00.01 ENCOUNTER FOR ANNUAL GENERAL MEDICAL EXAMINATION WITH ABNORMAL FINDINGS IN ADULT: Primary | ICD-10-CM

## 2022-04-19 DIAGNOSIS — D22.9 ATYPICAL MOLE: ICD-10-CM

## 2022-04-19 DIAGNOSIS — Z95.0 PACEMAKER: ICD-10-CM

## 2022-04-19 DIAGNOSIS — B35.1 NAIL FUNGUS: ICD-10-CM

## 2022-04-19 DIAGNOSIS — E53.8 B12 DEFICIENCY: ICD-10-CM

## 2022-04-19 DIAGNOSIS — E11.9 TYPE 2 DIABETES MELLITUS WITHOUT COMPLICATION, WITHOUT LONG-TERM CURRENT USE OF INSULIN: ICD-10-CM

## 2022-04-19 DIAGNOSIS — E78.5 HYPERLIPIDEMIA, UNSPECIFIED HYPERLIPIDEMIA TYPE: ICD-10-CM

## 2022-04-19 LAB
ALBUMIN SERPL-MCNC: 4.3 G/DL (ref 3.5–5.2)
ALBUMIN/GLOB SERPL: 1.5 G/DL
ALP SERPL-CCNC: 73 U/L (ref 39–117)
ALT SERPL W P-5'-P-CCNC: 24 U/L (ref 1–33)
AMYLASE SERPL-CCNC: 46 U/L (ref 28–100)
ANION GAP SERPL CALCULATED.3IONS-SCNC: 14 MMOL/L (ref 5–15)
AST SERPL-CCNC: 24 U/L (ref 1–32)
BASOPHILS # BLD AUTO: 0.04 10*3/MM3 (ref 0–0.2)
BASOPHILS NFR BLD AUTO: 0.6 % (ref 0–1.5)
BILIRUB SERPL-MCNC: 0.4 MG/DL (ref 0–1.2)
BUN SERPL-MCNC: 16 MG/DL (ref 8–23)
BUN/CREAT SERPL: 26.7 (ref 7–25)
CALCIUM SPEC-SCNC: 9.3 MG/DL (ref 8.6–10.5)
CHLORIDE SERPL-SCNC: 104 MMOL/L (ref 98–107)
CHOLEST SERPL-MCNC: 166 MG/DL (ref 0–200)
CO2 SERPL-SCNC: 25 MMOL/L (ref 22–29)
CREAT SERPL-MCNC: 0.6 MG/DL (ref 0.57–1)
DEPRECATED RDW RBC AUTO: 41 FL (ref 37–54)
EGFRCR SERPLBLD CKD-EPI 2021: 94.9 ML/MIN/1.73
EOSINOPHIL # BLD AUTO: 0.12 10*3/MM3 (ref 0–0.4)
EOSINOPHIL NFR BLD AUTO: 1.7 % (ref 0.3–6.2)
ERYTHROCYTE [DISTWIDTH] IN BLOOD BY AUTOMATED COUNT: 12.7 % (ref 12.3–15.4)
ERYTHROCYTE [SEDIMENTATION RATE] IN BLOOD: 9 MM/HR (ref 0–30)
GLOBULIN UR ELPH-MCNC: 2.9 GM/DL
GLUCOSE SERPL-MCNC: 172 MG/DL (ref 65–99)
HBA1C MFR BLD: 7.8 % (ref 3.5–5.6)
HCT VFR BLD AUTO: 38 % (ref 34–46.6)
HDLC SERPL-MCNC: 62 MG/DL (ref 40–60)
HGB BLD-MCNC: 12.7 G/DL (ref 12–15.9)
IMM GRANULOCYTES # BLD AUTO: 0.04 10*3/MM3 (ref 0–0.05)
IMM GRANULOCYTES NFR BLD AUTO: 0.6 % (ref 0–0.5)
LDLC SERPL CALC-MCNC: 92 MG/DL (ref 0–100)
LDLC/HDLC SERPL: 1.49 {RATIO}
LIPASE SERPL-CCNC: 41 U/L (ref 13–60)
LYMPHOCYTES # BLD AUTO: 1.96 10*3/MM3 (ref 0.7–3.1)
LYMPHOCYTES NFR BLD AUTO: 27.9 % (ref 19.6–45.3)
MCH RBC QN AUTO: 30.5 PG (ref 26.6–33)
MCHC RBC AUTO-ENTMCNC: 33.4 G/DL (ref 31.5–35.7)
MCV RBC AUTO: 91.1 FL (ref 79–97)
MONOCYTES # BLD AUTO: 0.5 10*3/MM3 (ref 0.1–0.9)
MONOCYTES NFR BLD AUTO: 7.1 % (ref 5–12)
NEUTROPHILS NFR BLD AUTO: 4.36 10*3/MM3 (ref 1.7–7)
NEUTROPHILS NFR BLD AUTO: 62.1 % (ref 42.7–76)
NRBC BLD AUTO-RTO: 0 /100 WBC (ref 0–0.2)
PLATELET # BLD AUTO: 312 10*3/MM3 (ref 140–450)
PMV BLD AUTO: 11.3 FL (ref 6–12)
POTASSIUM SERPL-SCNC: 3.9 MMOL/L (ref 3.5–5.2)
PROT SERPL-MCNC: 7.2 G/DL (ref 6–8.5)
RBC # BLD AUTO: 4.17 10*6/MM3 (ref 3.77–5.28)
SODIUM SERPL-SCNC: 143 MMOL/L (ref 136–145)
TRIGL SERPL-MCNC: 58 MG/DL (ref 0–150)
TSH SERPL DL<=0.05 MIU/L-ACNC: 0.88 UIU/ML (ref 0.27–4.2)
VIT B12 BLD-MCNC: 1296 PG/ML (ref 211–946)
VLDLC SERPL-MCNC: 12 MG/DL (ref 5–40)
WBC NRBC COR # BLD: 7.02 10*3/MM3 (ref 3.4–10.8)

## 2022-04-19 PROCEDURE — 80061 LIPID PANEL: CPT | Performed by: PREVENTIVE MEDICINE

## 2022-04-19 PROCEDURE — 82570 ASSAY OF URINE CREATININE: CPT | Performed by: PREVENTIVE MEDICINE

## 2022-04-19 PROCEDURE — 84443 ASSAY THYROID STIM HORMONE: CPT | Performed by: PREVENTIVE MEDICINE

## 2022-04-19 PROCEDURE — 83690 ASSAY OF LIPASE: CPT | Performed by: PREVENTIVE MEDICINE

## 2022-04-19 PROCEDURE — 80053 COMPREHEN METABOLIC PANEL: CPT | Performed by: PREVENTIVE MEDICINE

## 2022-04-19 PROCEDURE — 85652 RBC SED RATE AUTOMATED: CPT | Performed by: PREVENTIVE MEDICINE

## 2022-04-19 PROCEDURE — 83036 HEMOGLOBIN GLYCOSYLATED A1C: CPT | Performed by: PREVENTIVE MEDICINE

## 2022-04-19 PROCEDURE — 87086 URINE CULTURE/COLONY COUNT: CPT | Performed by: PREVENTIVE MEDICINE

## 2022-04-19 PROCEDURE — 82150 ASSAY OF AMYLASE: CPT | Performed by: PREVENTIVE MEDICINE

## 2022-04-19 PROCEDURE — G0439 PPPS, SUBSEQ VISIT: HCPCS | Performed by: PREVENTIVE MEDICINE

## 2022-04-19 PROCEDURE — 85025 COMPLETE CBC W/AUTO DIFF WBC: CPT | Performed by: PREVENTIVE MEDICINE

## 2022-04-19 PROCEDURE — 1159F MED LIST DOCD IN RCRD: CPT | Performed by: PREVENTIVE MEDICINE

## 2022-04-19 PROCEDURE — 82607 VITAMIN B-12: CPT | Performed by: PREVENTIVE MEDICINE

## 2022-04-19 PROCEDURE — 82043 UR ALBUMIN QUANTITATIVE: CPT | Performed by: PREVENTIVE MEDICINE

## 2022-04-19 PROCEDURE — 99397 PER PM REEVAL EST PAT 65+ YR: CPT | Performed by: PREVENTIVE MEDICINE

## 2022-04-19 PROCEDURE — 99213 OFFICE O/P EST LOW 20 MIN: CPT | Performed by: PREVENTIVE MEDICINE

## 2022-04-19 NOTE — PROGRESS NOTES
The ABCs of the Annual Wellness Visit  Subsequent Medicare Wellness Visit  Patient is also here for an age-specific physical and has been advised to wear sunscreen and seatbelt.  She is also here to check on multiple chronic health conditions most of which are stable she has had 3 or 4 low blood sugars but does not wish to decrease her medication because that makes her sugars go up.  We will see what her A1c looks like today again she was cautioned to keep sugar source with her at all times she did have a urinary tract infection that is treated she is 2 days off the medicine so we will reculture today.  She has not had her atypical mole on her back checked by the dermatologist as suggested so we will put the referral in again today it does seem to be somewhat enlarged and more variegated in color.  She does have a new right carotid bruit and ultrasound has been ordered nail fungus of the toenails of the left foot seem to have worsened and thickened will refer back to podiatry.  Chief Complaint   Patient presents with   • Annual Exam     Medicare wellness   • Hyperlipidemia     Annual    • Hypertension     annual      Subjective    History of Present Illness:  Celsa Brown is a 71 y.o. female who presents for a Subsequent Medicare Wellness Visit.    The following portions of the patient's history were reviewed and   updated as appropriate: allergies, current medications, past family history, past medical history, past social history, past surgical history and problem list.    Compared to one year ago, the patient feels her physical   health is the same.    Compared to one year ago, the patient feels her mental   health is the same.    Recent Hospitalizations:  This patient has had a [facility] admission record on file within the last 365 days.    Current Medical Providers:  Patient Care Team:  Jaelyn Iverson MD as PCP - General (Family Medicine)  Dung Lynch MD as Consulting Physician  (Cardiology)    Outpatient Medications Prior to Visit   Medication Sig Dispense Refill   • Accu-Chek Thelma Plus test strip Testing blood sugar once daily Dx E11.6 90 each 3   • amLODIPine (NORVASC) 5 MG tablet Take 1 tablet by mouth Daily. 90 tablet 3   • aspirin 81 MG EC tablet Take 81 mg by mouth Daily. 1 tablet every other day     • cholecalciferol (VITAMIN D3) 1000 units tablet Take 1,000 Units by mouth Every Other Day.     • Cyanocobalamin (VITAMIN B12) 1000 MCG tablet controlled-release Take 1,000 mcg by mouth Every Other Day.     • dilTIAZem (TIAZAC) 240 MG 24 hr capsule      • glipizide (GLUCOTROL) 10 MG tablet Take two tablets by mouth twice daily by mouth 360 tablet 3   • hydrALAZINE (APRESOLINE) 100 MG tablet Take 1 tablet by mouth 3 (Three) Times a Day. 270 tablet 3   • hydroCHLOROthiazide (HYDRODIURIL) 25 MG tablet Take 1 tablet by mouth Daily. 90 tablet 3   • lisinopril (PRINIVIL,ZESTRIL) 40 MG tablet Take 1 tablet by mouth Daily. 90 tablet 3   • loratadine (CLARITIN) 10 MG tablet Take 10 mg by mouth Daily.     • Melatonin 10 MG tablet Take 10 mg by mouth Daily.     • metFORMIN (GLUCOPHAGE) 500 MG tablet Take 2 tablets by mouth 2 (Two) Times a Day With Meals. 360 tablet 3   • metoprolol succinate XL (TOPROL-XL) 100 MG 24 hr tablet Take 1/2 tablet at night. 90 tablet 3   • Multiple Vitamins-Minerals (CENTRUM SILVER ULTRA WOMENS) tablet Take 1 tablet by mouth Daily.     • Omega-3 Fatty Acids (OMEGA-3 FISH OIL) 500 MG capsule Take 500 mg by mouth Daily.     • potassium chloride (MICRO-K) 10 MEQ CR capsule Take 1 capsule by mouth am and pm on weekends and 2 capsules by mouth am and one pm on weekdays 208 capsule 3   • pravastatin (PRAVACHOL) 80 MG tablet Take 1 tablet by mouth every night at bedtime. 90 tablet 3   • SITagliptin (Januvia) 100 MG tablet Take 1 tablet by mouth Daily. 90 tablet 3   • amoxicillin (AMOXIL) 875 MG tablet Take 1 tablet by mouth 2 (Two) Times a Day. 20 tablet 0   • nitrofurantoin,  macrocrystal-monohydrate, (Macrobid) 100 MG capsule Take 1 capsule by mouth 2 (Two) Times a Day. 14 capsule 0   • nitrofurantoin, macrocrystal-monohydrate, (Macrobid) 100 MG capsule Take 1 capsule by mouth 2 (Two) Times a Day. 20 capsule 0   • ondansetron ODT (Zofran ODT) 4 MG disintegrating tablet Place 1 tablet under the tongue Every 8 (Eight) Hours As Needed for Nausea. 20 tablet 0     No facility-administered medications prior to visit.       No opioid medication identified on active medication list. I have reviewed chart for other potential  high risk medication/s and harmful drug interactions in the elderly.          Aspirin is on active medication list. Aspirin use is indicated based on review of current medical condition/s. Pros and cons of this therapy have been discussed today. Benefits of this medication outweigh potential harm.  Patient has been encouraged to continue taking this medication.  .      Patient Active Problem List   Diagnosis   • B12 deficiency   • Body mass index (BMI) of 27.0-27.9 in adult   • Degenerative joint disease   • Hyperlipidemia   • Hypertension   • Atypical mole   • Postmenopausal status   • Type 2 diabetes mellitus (HCC)   • Vitamin D deficiency   • Near syncope   • Right bundle branch block   • Pacemaker   • Age-related nuclear cataract of both eyes   • Macular degeneration of left eye   • Nonexudative age-related macular degeneration   • Presbyopia   • Ptosis of eyelid   • Regular astigmatism of both eyes   • Vitreous floaters   • Nail fungus   • Right carotid bruit     Advance Care Planning  Advance Directive is on file.  ACP discussion was held with the patient during this visit. Patient has an advance directive in EMR which is still valid.     Review of Systems   Cardiovascular: Negative for chest pain, palpitations and leg swelling.   Genitourinary: Negative for dysuria.   Skin:        Left foot toenail fungus   Neurological: Negative for dizziness and light-headedness.  "       Objective    Vitals:    04/19/22 0848 04/19/22 0850 04/19/22 0851   BP: 153/72 160/72 138/74   BP Location: Left arm Right arm Right arm   Patient Position: Sitting Sitting Standing   Cuff Size: Adult Adult Adult   Pulse: 83     Temp: 98.6 °F (37 °C)     TempSrc: Temporal     SpO2: 97%     Weight: 69.9 kg (154 lb)     Height: 172.7 cm (68\")       BMI Readings from Last 1 Encounters:   04/19/22 23.42 kg/m²   BMI is within normal parameters. No follow-up required.    Does the patient have evidence of cognitive impairment? No    Physical Exam  Vitals reviewed.   Constitutional:       General: She is not in acute distress.     Appearance: Normal appearance. She is well-developed. She is not ill-appearing or toxic-appearing.   HENT:      Head: Normocephalic and atraumatic.      Right Ear: Tympanic membrane, ear canal and external ear normal.      Left Ear: Tympanic membrane, ear canal and external ear normal.      Nose: Nose normal.      Mouth/Throat:      Mouth: Mucous membranes are moist.      Pharynx: No posterior oropharyngeal erythema.   Eyes:      Extraocular Movements: Extraocular movements intact.      Conjunctiva/sclera: Conjunctivae normal.      Pupils: Pupils are equal, round, and reactive to light.   Neck:      Vascular: Carotid bruit present.   Cardiovascular:      Rate and Rhythm: Normal rate and regular rhythm.      Pulses:           Dorsalis pedis pulses are 1+ on the right side and 1+ on the left side.        Posterior tibial pulses are 1+ on the right side and 1+ on the left side.      Heart sounds: Normal heart sounds.   Pulmonary:      Effort: Pulmonary effort is normal.      Breath sounds: Normal breath sounds.   Abdominal:      General: Bowel sounds are normal. There is no distension.      Palpations: Abdomen is soft. There is no mass.      Tenderness: There is no abdominal tenderness.   Musculoskeletal:      Cervical back: Neck supple.   Feet:      Right foot:      Protective Sensation: 10 " sites tested. 10 sites sensed.      Skin integrity: Skin integrity normal.      Toenail Condition: Right toenails are ingrown.      Left foot:      Protective Sensation: 10 sites tested. 10 sites sensed.      Skin integrity: Skin integrity normal.      Toenail Condition: Left toenails are abnormally thick.      Comments: Diabetic Foot Exam Performed and Monofilament Test Performed    Skin:     General: Skin is warm.   Neurological:      General: No focal deficit present.      Mental Status: She is alert and oriented to person, place, and time.   Psychiatric:         Mood and Affect: Mood normal.         Behavior: Behavior normal.                 HEALTH RISK ASSESSMENT    Smoking Status:  Social History     Tobacco Use   Smoking Status Never Smoker   Smokeless Tobacco Never Used     Alcohol Consumption:  Social History     Substance and Sexual Activity   Alcohol Use No     Fall Risk Screen:    [facility] Fall Risk Assessment has not been completed.    Depression Screening:  PHQ-2/PHQ-9 Depression Screening 4/19/2022   Retired Total Score -   Little Interest or Pleasure in Doing Things 0-->not at all   Feeling Down, Depressed or Hopeless 0-->not at all   PHQ-9: Brief Depression Severity Measure Score 0       Health Habits and Functional and Cognitive Screening:  Functional & Cognitive Status 4/19/2022   Do you have difficulty preparing food and eating? No   Do you have difficulty bathing yourself, getting dressed or grooming yourself? No   Do you have difficulty using the toilet? No   Do you have difficulty moving around from place to place? No   Do you have trouble with steps or getting out of a bed or a chair? No   Current Diet Diabetic Diet   Dental Exam Up to date   Eye Exam Up to date   Exercise (times per week) 7 times per week   Current Exercises Include Walking   Current Exercise Activities Include -   Do you need help using the phone?  No   Are you deaf or do you have serious difficulty hearing?  No   Do you need  help with transportation? No   Do you need help shopping? No   Do you need help preparing meals?  No   Do you need help with housework?  No   Do you need help with laundry? No   Do you need help taking your medications? No   Do you need help managing money? No   Do you ever drive or ride in a car without wearing a seat belt? No   Have you felt unusual stress, anger or loneliness in the last month? No   Who do you live with? Spouse   If you need help, do you have trouble finding someone available to you? No   Have you been bothered in the last four weeks by sexual problems? No   Do you have difficulty concentrating, remembering or making decisions? Yes       Age-appropriate Screening Schedule:  Refer to the list below for future screening recommendations based on patient's age, sex and/or medical conditions. Orders for these recommended tests are listed in the plan section. The patient has been provided with a written plan.    Health Maintenance   Topic Date Due   • TDAP/TD VACCINES (2 - Tdap) 08/02/2027 (Originally 8/1/2027)   • HEMOGLOBIN A1C  07/04/2022   • INFLUENZA VACCINE  08/01/2022   • DIABETIC FOOT EXAM  01/04/2023   • LIPID PANEL  01/04/2023   • URINE MICROALBUMIN  01/04/2023   • MAMMOGRAM  01/17/2023   • DIABETIC EYE EXAM  04/01/2023   • DXA SCAN  10/11/2023   • ZOSTER VACCINE  Completed              Assessment/Plan   CMS Preventative Services Quick Reference  Risk Factors Identified During Encounter  None Identified  The above risks/problems have been discussed with the patient.  Follow up actions/plans if indicated are seen below in the Assessment/Plan Section.  Pertinent information has been shared with the patient in the After Visit Summary.    Diagnoses and all orders for this visit:    1. Encounter for annual general medical examination with abnormal findings in adult (Primary)  Comments:  Patient has been advised to wear sunscreen and seatbelt    2. B12 deficiency  Comments:  Takes daily  Orders:  -      Cancel: CBC Auto Differential  -     Vitamin B12    3. Hyperlipidemia, unspecified hyperlipidemia type  Comments:  Patient is watching saturated fats  Orders:  -     Lipid Panel    4. Primary hypertension  Comments:  Good control in the office but home readings show that she was well controlled at 130/77 with a pulse of 84.  Orders:  -     Cancel: Comprehensive Metabolic Panel    5. Pacemaker  Comments:  No dizziness or lightheadedness    6. Type 2 diabetes mellitus without complication, without long-term current use of insulin (HCC)  Comments:  Patient has had 3 or 4 low blood sugars but does not wish to have her medications lowered because then her sugar resumes up.  Orders:  -     Hemoglobin A1c  -     Microalbumin / Creatinine Urine Ratio - Urine, Clean Catch  -     TSH    7. Urinary tract infection with hematuria, site unspecified  Comments:  Treated UTI and patient will get a urine today to check to make sure her off the antibiotics is cleared up.  Orders:  -     Urine Culture - Urine, Urine, Clean Catch    8. Atypical mole  Comments:  Patient will follow up with the dermatologist due to atypical mole this is on her back and approximately 9 mm of variegated color and scaling.  Orders:  -     Ambulatory Referral to Dermatology    9. Right carotid bruit  Comments:  New right carotid bruit auscultated today ultrasound of the carotids is pending  Orders:  -     US Carotid Bilateral; Future    10. Nail fungus  Comments:  Is 123 and 5 of the left foot has fungal infection referred to podiatry  Orders:  -     Ambulatory Referral to Podiatry        Follow Up:   Return in about 3 months (around 7/19/2022).     An After Visit Summary and PPPS were made available to the patient.

## 2022-04-19 NOTE — PROGRESS NOTES
Venipuncture Blood Specimen Collection  Venipuncture performed in Left arm by Leia Gallardo MA with good hemostasis. Patient tolerated the procedure well without complications.   04/19/22   Leia Gallardo MA

## 2022-04-20 LAB
ALBUMIN UR-MCNC: <1.2 MG/DL
BACTERIA SPEC AEROBE CULT: NORMAL
CREAT UR-MCNC: 61.8 MG/DL
MICROALBUMIN/CREAT UR: NORMAL MG/G{CREAT}

## 2022-04-21 ENCOUNTER — TELEPHONE (OUTPATIENT)
Dept: FAMILY MEDICINE CLINIC | Facility: CLINIC | Age: 72
End: 2022-04-21

## 2022-04-21 NOTE — TELEPHONE ENCOUNTER
HUB TO READ:  ----- Message from Jaelyn Iverson MD sent at 4/21/2022  8:17 AM EDT -----  Vitamin B12 is elevated she can decrease the over-the-counter dose a couple of days per week.  Bad cholesterol is 92 and the goal is below 70 can she do more with diet and exercise or should we add Zetia.  She is on the maximum protocol.  Let me know

## 2022-04-21 NOTE — PROGRESS NOTES
Vitamin B12 is elevated she can decrease the over-the-counter dose a couple of days per week.  Bad cholesterol is 92 and the goal is below 70 can she do more with diet and exercise or should we add Zetia.  She is on the maximum protocol.  Let me know

## 2022-04-21 NOTE — TELEPHONE ENCOUNTER
HUB TO READ:  ----- Message from Jaelyn Iverson MD sent at 4/21/2022  8:16 AM EDT -----  Glucose is 172 but the rest of the labs look good if she still having problems with anything?

## 2022-04-21 NOTE — TELEPHONE ENCOUNTER
08/27/18 0322   Vital Signs   Temp 98.2 °F (36.8 °C)   Temp Source Oral   Pulse 92   Heart Rate Source Monitor   Resp 18   BP (!) 152/85   BP Location Right lower arm   BP Upper/Lower Lower   Patient Position Semi fowlers   Level of Consciousness 0   MEWS Score 1   Height and Weight   Weight 285 lb 3.2 oz (129.4 kg)   Weight Method Actual;Bed scale   BMI (Calculated) 55.8   Oxygen Therapy   SpO2 94 %   O2 Device Nasal cannula   O2 Flow Rate (L/min) 1 L/min   resting in bed, no acute distress.  Herminia Gordon Patient made aware and voiced an understanding.

## 2022-04-27 ENCOUNTER — OFFICE VISIT (OUTPATIENT)
Dept: PODIATRY | Facility: CLINIC | Age: 72
End: 2022-04-27

## 2022-04-27 VITALS
WEIGHT: 154 LBS | SYSTOLIC BLOOD PRESSURE: 163 MMHG | DIASTOLIC BLOOD PRESSURE: 73 MMHG | BODY MASS INDEX: 23.34 KG/M2 | HEIGHT: 68 IN | HEART RATE: 88 BPM

## 2022-04-27 DIAGNOSIS — B35.1 ONYCHOMYCOSIS: ICD-10-CM

## 2022-04-27 DIAGNOSIS — E11.65 TYPE 2 DIABETES MELLITUS WITH HYPERGLYCEMIA, WITHOUT LONG-TERM CURRENT USE OF INSULIN: ICD-10-CM

## 2022-04-27 DIAGNOSIS — L60.0 INGROWN NAIL: Primary | ICD-10-CM

## 2022-04-27 PROCEDURE — 11721 DEBRIDE NAIL 6 OR MORE: CPT

## 2022-04-27 PROCEDURE — 99213 OFFICE O/P EST LOW 20 MIN: CPT

## 2022-04-29 NOTE — PROGRESS NOTES
04/27/2022  Foot and Ankle Surgery - Established Patient/Follow-up  Provider: O[facility] [place]   Location: [facility]    Subjective:  Celsa Brown is a 71 y.o. female.     Chief Complaint   Patient presents with   • Left Foot - Nail Problem, Callouses   • Right Foot - Nail Problem, Callouses   • Follow-up     Last pcp appt with angle Reeder md 4/20/2022       HPI: Patient presents to office today for routine diabetic foot check and with complaints of thickened painful right great toenail.  Patient reports that her blood sugars have been relatively well controlled.  She denies any significant history of wounds or infections to her feet.  She is requesting her nails be trimmed today as they are too thick and it is difficult for her to safely cut them.  Chart review reveals patient's last A1c was in the range of 7.6%.    Allergies   Allergen Reactions   • Sulfa Antibiotics Hives   • Macrobid [Nitrofurantoin] Rash       Current Outpatient Medications on File Prior to Visit   Medication Sig Dispense Refill   • Accu-Chek Thelma Plus test strip Testing blood sugar once daily Dx E11.6 90 each 3   • amLODIPine (NORVASC) 5 MG tablet Take 1 tablet by mouth Daily. 90 tablet 3   • aspirin 81 MG EC tablet Take 81 mg by mouth Daily. 1 tablet every other day     • cholecalciferol (VITAMIN D3) 1000 units tablet Take 1,000 Units by mouth Every Other Day.     • Cyanocobalamin (VITAMIN B12) 1000 MCG tablet controlled-release Take 1,000 mcg by mouth Every Other Day.     • dilTIAZem (TIAZAC) 240 MG 24 hr capsule      • glipizide (GLUCOTROL) 10 MG tablet Take two tablets by mouth twice daily by mouth 360 tablet 3   • hydrALAZINE (APRESOLINE) 100 MG tablet Take 1 tablet by mouth 3 (Three) Times a Day. 270 tablet 3   • hydroCHLOROthiazide (HYDRODIURIL) 25 MG tablet Take 1 tablet by mouth Daily. 90 tablet 3   • lisinopril (PRINIVIL,ZESTRIL) 40 MG tablet Take 1 tablet by mouth Daily. 90 tablet 3   • loratadine (CLARITIN) 10 MG  "tablet Take 10 mg by mouth Daily.     • Melatonin 10 MG tablet Take 10 mg by mouth Daily.     • metFORMIN (GLUCOPHAGE) 500 MG tablet Take 2 tablets by mouth 2 (Two) Times a Day With Meals. 360 tablet 3   • metoprolol succinate XL (TOPROL-XL) 100 MG 24 hr tablet Take 1/2 tablet at night. 90 tablet 3   • Multiple Vitamins-Minerals (CENTRUM SILVER ULTRA WOMENS) tablet Take 1 tablet by mouth Daily.     • Omega-3 Fatty Acids (OMEGA-3 FISH OIL) 500 MG capsule Take 500 mg by mouth Daily.     • potassium chloride (MICRO-K) 10 MEQ CR capsule Take 1 capsule by mouth am and pm on weekends and 2 capsules by mouth am and one pm on weekdays 208 capsule 3   • pravastatin (PRAVACHOL) 80 MG tablet Take 1 tablet by mouth every night at bedtime. 90 tablet 3   • SITagliptin (Januvia) 100 MG tablet Take 1 tablet by mouth Daily. 90 tablet 3     No current facility-administered medications on file prior to visit.       Objective   /73   Pulse 88   Ht 172.7 cm (68\")   Wt 69.9 kg (154 lb)   LMP  (LMP Unknown)   BMI 23.42 kg/m²     Foot/Ankle Exam:       General:   Diabetic Foot Exam Performed    Appearance: appears stated age and healthy    Orientation: AAOx3    Affect: appropriate    Gait: unimpaired    Assistance: independent    Shoe Gear:  Casual shoes and socks    VASCULAR      Right Foot Vascularity   Dorsalis pedis:  2+  Skin Temperature: warm    Edema Grading:  None  CFT:  < 3 seconds  Varicosities: none       Left Foot Vascularity   Dorsalis pedis:  2+  Skin Temperature: warm    Edema Grading:  None  CFT:  < 3 seconds  Varicosities: none        NEUROLOGIC     Right Foot Neurologic   Light touch sensation:  Normal  Protective Sensation using [employer] [place] Monofilament:  9     Left Foot Neurologic   Light touch sensation:  Normal  Protective Sensation using [place] Monofilament:  9     MUSCULOSKELETAL      Right Foot Musculoskeletal   Ecchymosis:  None  Tenderness: toenails       Left Foot Musculoskeletal "   Ecchymosis:  None  Tenderness: toenails       DERMATOLOGIC     Right Foot Dermatologic   Skin: skin intact    Nails: onychomycosis, abnormally thick and dystrophic nails       Left Foot Dermatologic   Skin: skin intact    Nails: onychomycosis, abnormally thick and dystrophic nails        Assessment/Plan   Diagnoses and all orders for this visit:    1. Ingrown nail (Primary)    2. Onychomycosis    3. Type 2 diabetes mellitus with hyperglycemia, without long-term current use of insulin (HCC)      On exam patient's bilateral feet do appear overall stable and no open wounds or signs of active infection.  Patient does have slightly elongated, thickened, dystrophic, fungal appearing nails.  Did discuss pathophysiology of onychomycosis and ingrown toenail.  Treatment options were discussed at length.  At this time patient wishes to continue with conservative options of keeping nails trimmed down and do recommend patient try Epson salt soak to help with ingrown nail discomfort.  Did discuss with patient to make sure water is appropriate temperature before soaking feet and to dry feet well after soaking including in between toes.  Feel patient is at mild risk of pedal complication related to diabetes and this was discussed with patient today.Explained importance of diabetic foot care, daily foot checks, and glycemic control. Patient should check both feet on a daily basis, monitor and control blood sugars, make sure that both feet and in between toes are towel dried after baths or showers. Avoid barefoot walking at all times. Check shoes before putting them on.   Patient was given information on proper foot care. Call the office at the first signs of a wound or with signs of infection.    Nail debridement: Both feet x8    Consent and time out was performed before proceeding with the procedure. Nails were debrided with a nail nipper without complication.  No anesthesia was required.  Indications for procedure were  thickened, dystrophic, and fungal appearing nails which are difficult to trim.  Proper self-care and technique was discussed with patient.  Patient was stable after procedure.    At this time patient would like to follow-up yearly for routine diabetic foot check.    No orders of the defined types were placed in this encounter.         Note is dictated utilizing voice recognition software. Unfortunately this leads to occasional typographical errors. I apologize in advance if the situation occurs. If questions occur please do not hesitate to call our office.

## 2022-05-02 DIAGNOSIS — R09.89 RIGHT CAROTID BRUIT: ICD-10-CM

## 2022-05-03 ENCOUNTER — TELEPHONE (OUTPATIENT)
Dept: FAMILY MEDICINE CLINIC | Facility: CLINIC | Age: 72
End: 2022-05-03

## 2022-05-03 NOTE — TELEPHONE ENCOUNTER
PATIENT CALLED BACK, I INFORMED HER OF HUB TO READ MESSAGE. SHE STATES IF SHE BEGINS EXPERIENCING DIZZINESS SHE WILL LET US NOW, PATIENT HAD NO FURTHER QUESTIONS.     PATIENT> [phone]

## 2022-05-03 NOTE — TELEPHONE ENCOUNTER
HUB TO READ:  ----- Message from Jaelyn Iverson MD sent at 5/2/2022  5:20 PM EDT -----  Plaque or calcium coating is present in both of the carotid arteries it does not appear to be impeding flow.  Can have her follow-up with the vascular surgeons if she desires or if she develops any dizziness please let us know.

## 2022-05-21 PROBLEM — R10.9 ABDOMINAL PAIN: Status: ACTIVE | Noted: 2022-05-21

## 2022-05-21 PROCEDURE — 87077 CULTURE AEROBIC IDENTIFY: CPT | Performed by: FAMILY MEDICINE

## 2022-05-21 PROCEDURE — 87086 URINE CULTURE/COLONY COUNT: CPT | Performed by: FAMILY MEDICINE

## 2022-05-21 PROCEDURE — 87186 SC STD MICRODIL/AGAR DIL: CPT | Performed by: FAMILY MEDICINE

## 2022-05-25 ENCOUNTER — CLINICAL SUPPORT NO REQUIREMENTS (OUTPATIENT)
Dept: CARDIOLOGY | Facility: CLINIC | Age: 72
End: 2022-05-25

## 2022-05-25 ENCOUNTER — OFFICE VISIT (OUTPATIENT)
Dept: CARDIOLOGY | Facility: CLINIC | Age: 72
End: 2022-05-25

## 2022-05-25 VITALS
SYSTOLIC BLOOD PRESSURE: 181 MMHG | WEIGHT: 156 LBS | DIASTOLIC BLOOD PRESSURE: 71 MMHG | BODY MASS INDEX: 23.64 KG/M2 | HEIGHT: 68 IN | HEART RATE: 70 BPM | OXYGEN SATURATION: 99 %

## 2022-05-25 DIAGNOSIS — R55 NEAR SYNCOPE: ICD-10-CM

## 2022-05-25 DIAGNOSIS — Z95.0 PACEMAKER: Primary | ICD-10-CM

## 2022-05-25 DIAGNOSIS — E08.9 DIABETES MELLITUS DUE TO UNDERLYING CONDITION WITHOUT COMPLICATION, WITHOUT LONG-TERM CURRENT USE OF INSULIN: ICD-10-CM

## 2022-05-25 DIAGNOSIS — I44.1 AV BLOCK, 2ND DEGREE: ICD-10-CM

## 2022-05-25 DIAGNOSIS — R06.02 SHORTNESS OF BREATH: ICD-10-CM

## 2022-05-25 DIAGNOSIS — I10 ESSENTIAL HYPERTENSION: ICD-10-CM

## 2022-05-25 DIAGNOSIS — I49.5 SICK SINUS SYNDROME: ICD-10-CM

## 2022-05-25 DIAGNOSIS — I44.1 SECOND DEGREE AV BLOCK, MOBITZ TYPE II: Primary | ICD-10-CM

## 2022-05-25 DIAGNOSIS — Z95.0 PACEMAKER: ICD-10-CM

## 2022-05-25 DIAGNOSIS — I45.10 RIGHT BUNDLE BRANCH BLOCK: ICD-10-CM

## 2022-05-25 DIAGNOSIS — E78.5 DYSLIPIDEMIA: ICD-10-CM

## 2022-05-25 DIAGNOSIS — R42 DIZZINESS: ICD-10-CM

## 2022-05-25 PROCEDURE — 93000 ELECTROCARDIOGRAM COMPLETE: CPT | Performed by: INTERNAL MEDICINE

## 2022-05-25 PROCEDURE — 99214 OFFICE O/P EST MOD 30 MIN: CPT | Performed by: INTERNAL MEDICINE

## 2022-05-25 PROCEDURE — 93280 PM DEVICE PROGR EVAL DUAL: CPT | Performed by: INTERNAL MEDICINE

## 2022-05-25 NOTE — PROGRESS NOTES
Encounter Date:05/25/2022  Last seen 11/9/2021      Patient ID: Celsa Brown is a 72 y.o. female.    Chief Complaint:  Status post pacemaker  Palpitations  Diabetes  Dyslipidemia  Hypertension        History of Present Illness  Since I have last seen, the patient has been without any chest discomfort ,shortness of breath, palpitations, dizziness or syncope.  Denies having any headache ,abdominal pain ,nausea, vomiting , diarrhea constipation, loss of weight or loss of appetite.  Denies having any excessive bruising ,hematuria or blood in the stool.    Review of all systems negative except as indicated.    Reviewed ROS.  Assessment and Plan         ]]]]]]]]]]]]]]]]]]]  Impression  ==========  -Palpitations probably due to PMT and SVT.  Suspect strongly patient is having episodes of PMT.  Recently PVARP was increased     -Status post permanent dual-chamber pacemaker implantation ([place] [employer] MRI compatible).  7/13/2021     -Second-degree Mobitz type II AV block.  Right bundle branch block (prior to pacemaker implantation     -Chest discomfort and shortness of breath with or without exertion.     -Dizziness and near syncope-improved since patient had pacemaker implantation  Echocardiogram-normal 7/12/2021  Stress Cardiolite test-normal except patient has second-degree 2-1 AV block and right bundle branch block.  Very limited exercise tolerance.     -Right bundle branch block     -Sinus bradycardia     -Diabetes dyslipidemia hypertension     -Status post tonsillectomy abdominal tubal ligation     -Family history of coronary artery disease     -Non-smoker     -Allergic to sulfa  =============  Plan  ==========  Palpitations-improved  History of PMT and SVT.  Patient was asked to take extra metoprolol as needed and twice a day if needed.  EKG showed atrial sensed ventricular paced rhythm.     Status post permanent dual-chamber pacemaker implantation 7/13/2021 ([place] [employer] MRI compatible).  Pacemaker site and  function is normal.  Interrogation of the pacemaker revealed excellent pacing parameters.  Battery status was 7.5 years.    Rate atrial fibrillation.  Observe closely.  Consider anticoagulation if patient has increased atrial fibrillation load.     Hypertension-stable  180/70  Patient is on amlodipine hydralazine hydrochlorothiazide lisinopril 40 mg a day.  Patient is on metoprolol 100 mg tablet half a tablet a day.     Follow-up in the office in 6 months with pacemaker interrogation.     Further plan will depend on patient's progress.  ]]]]]]]]]]]]]]                Diagnosis Plan   1. Second degree AV block, Mobitz type II  ECG 12 Lead   2. Pacemaker  ECG 12 Lead   3. Dyslipidemia  ECG 12 Lead   4. Right bundle branch block  ECG 12 Lead   5. Shortness of breath  ECG 12 Lead   6. Essential hypertension  ECG 12 Lead   7. Diabetes mellitus due to underlying condition without complication, without long-term current use of insulin (HCC)     8. Near syncope     9. Dizziness     LAB RESULTS (LAST 7 DAYS)    CBC        BMP        CMP         BNP        TROPONIN        CoAg        Creatinine Clearance  CrCl cannot be calculated (Patient's most recent lab result is older than the maximum 30 days allowed.).    ABG        Radiology  No radiology results for the last day                The following portions of the patient's history were reviewed and updated as appropriate: allergies, current medications, past family history, past medical history, past social history, past surgical history and problem list.    Review of Systems   Constitutional: Negative for malaise/fatigue.   Cardiovascular: Negative for chest pain, leg swelling, palpitations and syncope.   Respiratory: Negative for shortness of breath.    Skin: Negative for rash.   Gastrointestinal: Negative for nausea and vomiting.   Neurological: Negative for dizziness, light-headedness and numbness.   All other systems reviewed and are negative.        Current Outpatient  Medications:   •  Accu-Chek Thelma Plus test strip, Testing blood sugar once daily Dx E11.6, Disp: 90 each, Rfl: 3  •  amLODIPine (NORVASC) 5 MG tablet, Take 1 tablet by mouth Daily., Disp: 90 tablet, Rfl: 3  •  aspirin 81 MG EC tablet, Take 81 mg by mouth Daily. 1 tablet every other day, Disp: , Rfl:   •  cholecalciferol (VITAMIN D3) 1000 units tablet, Take 1,000 Units by mouth Every Other Day., Disp: , Rfl:   •  ciprofloxacin (CIPRO) 500 MG tablet, Take 1 tablet by mouth 2 (Two) Times a Day., Disp: 14 tablet, Rfl: 0  •  Cyanocobalamin (VITAMIN B12) 1000 MCG tablet controlled-release, Take 1,000 mcg by mouth Every Other Day., Disp: , Rfl:   •  dilTIAZem (TIAZAC) 240 MG 24 hr capsule, , Disp: , Rfl:   •  glipizide (GLUCOTROL) 10 MG tablet, Take two tablets by mouth twice daily by mouth, Disp: 360 tablet, Rfl: 3  •  hydrALAZINE (APRESOLINE) 100 MG tablet, Take 1 tablet by mouth 3 (Three) Times a Day., Disp: 270 tablet, Rfl: 3  •  hydroCHLOROthiazide (HYDRODIURIL) 25 MG tablet, Take 1 tablet by mouth Daily., Disp: 90 tablet, Rfl: 3  •  lisinopril (PRINIVIL,ZESTRIL) 40 MG tablet, Take 1 tablet by mouth Daily., Disp: 90 tablet, Rfl: 3  •  loratadine (CLARITIN) 10 MG tablet, Take 10 mg by mouth Daily., Disp: , Rfl:   •  Melatonin 10 MG tablet, Take 10 mg by mouth Daily., Disp: , Rfl:   •  metFORMIN (GLUCOPHAGE) 500 MG tablet, Take 2 tablets by mouth 2 (Two) Times a Day With Meals., Disp: 360 tablet, Rfl: 3  •  metoprolol succinate XL (TOPROL-XL) 100 MG 24 hr tablet, Take 1/2 tablet at night., Disp: 90 tablet, Rfl: 3  •  Multiple Vitamins-Minerals (CENTRUM SILVER ULTRA WOMENS) tablet, Take 1 tablet by mouth Daily., Disp: , Rfl:   •  Omega-3 Fatty Acids (OMEGA-3 FISH OIL) 500 MG capsule, Take 500 mg by mouth Daily., Disp: , Rfl:   •  Potassium Bicarb-Citric Acid (Effer-K) 10 MEQ effervescent tablet, , Disp: , Rfl:   •  potassium chloride (MICRO-K) 10 MEQ CR capsule, Take 1 capsule by mouth am and pm on weekends and 2  capsules by mouth am and one pm on weekdays, Disp: 208 capsule, Rfl: 3  •  pravastatin (PRAVACHOL) 80 MG tablet, Take 1 tablet by mouth every night at bedtime., Disp: 90 tablet, Rfl: 3  •  SITagliptin (Januvia) 100 MG tablet, Take 1 tablet by mouth Daily., Disp: 90 tablet, Rfl: 3    Allergies   Allergen Reactions   • Sulfa Antibiotics Hives   • Macrobid [Nitrofurantoin] Rash       Family History   Problem Relation Age of Onset   • Heart failure Mother    • Diabetes Mother    • Breast cancer Neg Hx    • Ovarian cancer Neg Hx        Past Surgical History:   Procedure Laterality Date   • CARDIAC CATHETERIZATION     • CARDIAC ELECTROPHYSIOLOGY PROCEDURE N/A 7/13/2021    Procedure: Pacemaker DC new;  Surgeon: Dung Lynch MD;  Location: [facility] INVASIVE LOCATION;  Service: Cardiovascular;  Laterality: N/A;   • COLONOSCOPY     • TONSILLECTOMY     • TUBAL ABDOMINAL LIGATION         Past Medical History:   Diagnosis Date   • Abdominal pain 05/21/2022   • B12 deficiency    • Hyperlipidemia    • Hypertension    • Tachycardia    • Vitamin D deficiency        Family History   Problem Relation Age of Onset   • Heart failure Mother    • Diabetes Mother    • Breast cancer Neg Hx    • Ovarian cancer Neg Hx        Social History     Socioeconomic History   • Marital status:    Tobacco Use   • Smoking status: Never Smoker   • Smokeless tobacco: Never Used   Vaping Use   • Vaping Use: Never used   Substance and Sexual Activity   • Alcohol use: No   • Drug use: No   • Sexual activity: Yes     Partners: Male     Birth control/protection: None           ECG 12 Lead    Date/Time: 5/25/2022 4:19 PM  Performed by: Dung Lynch MD  Authorized by: Dung Lynch MD   Comparison: compared with previous ECG   Similar to previous ECG  Comparison to previous ECG: Atrial sensed ventricular paced rhythm 74/min nonspecific ST-T wave changes no ectopy no significant change from 11/2/2021                Objective:  "      Physical Exam    BP (!) 181/71 (BP Location: Left arm, Patient Position: Sitting, Cuff Size: Adult)   Pulse 70   Ht 172.7 cm (68\")   Wt 70.8 kg (156 lb)   LMP  (LMP Unknown)   SpO2 99%   BMI 23.72 kg/m²   The patient is alert, oriented and in no distress.    Vital signs as noted above.    Head and neck revealed no carotid bruits or jugular venous distension.  No thyromegaly or lymphadenopathy is present.    Lungs clear.  No wheezing.  Breath sounds are normal bilaterally.    Heart normal first and second heart sounds.  No murmur..  No pericardial rub is present.  No gallop is present.    Abdomen soft and nontender.  No organomegaly is present.    Extremities revealed good peripheral pulses without any pedal edema.    Skin warm and dry.  Pacemaker site looks normal.    Musculoskeletal system is grossly normal.    CNS grossly normal.    Reviewed and updated.        "

## 2022-05-31 ENCOUNTER — OFFICE VISIT (OUTPATIENT)
Dept: FAMILY MEDICINE CLINIC | Facility: CLINIC | Age: 72
End: 2022-05-31

## 2022-05-31 VITALS
HEART RATE: 80 BPM | HEIGHT: 68 IN | TEMPERATURE: 97.9 F | BODY MASS INDEX: 23.55 KG/M2 | WEIGHT: 155.4 LBS | OXYGEN SATURATION: 97 % | DIASTOLIC BLOOD PRESSURE: 77 MMHG | SYSTOLIC BLOOD PRESSURE: 131 MMHG

## 2022-05-31 DIAGNOSIS — I10 PRIMARY HYPERTENSION: ICD-10-CM

## 2022-05-31 DIAGNOSIS — N30.90 RECURRENT CYSTITIS: ICD-10-CM

## 2022-05-31 DIAGNOSIS — E11.9 TYPE 2 DIABETES MELLITUS WITHOUT COMPLICATION, WITHOUT LONG-TERM CURRENT USE OF INSULIN: ICD-10-CM

## 2022-05-31 DIAGNOSIS — N30.00 ACUTE CYSTITIS WITHOUT HEMATURIA: Primary | ICD-10-CM

## 2022-05-31 LAB
BILIRUB BLD-MCNC: NEGATIVE MG/DL
CLARITY, POC: CLEAR
COLOR UR: YELLOW
EXPIRATION DATE: ABNORMAL
GLUCOSE UR STRIP-MCNC: NEGATIVE MG/DL
KETONES UR QL: NEGATIVE
LEUKOCYTE EST, POC: ABNORMAL
Lab: ABNORMAL
NITRITE UR-MCNC: NEGATIVE MG/ML
PH UR: 6.5 [PH] (ref 5–8)
PROT UR STRIP-MCNC: NEGATIVE MG/DL
RBC # UR STRIP: NEGATIVE /UL
SP GR UR: 1.01 (ref 1–1.03)
UROBILINOGEN UR QL: NORMAL

## 2022-05-31 PROCEDURE — 87186 SC STD MICRODIL/AGAR DIL: CPT | Performed by: PREVENTIVE MEDICINE

## 2022-05-31 PROCEDURE — 81003 URINALYSIS AUTO W/O SCOPE: CPT | Performed by: PREVENTIVE MEDICINE

## 2022-05-31 PROCEDURE — 87086 URINE CULTURE/COLONY COUNT: CPT | Performed by: PREVENTIVE MEDICINE

## 2022-05-31 PROCEDURE — 99213 OFFICE O/P EST LOW 20 MIN: CPT | Performed by: PREVENTIVE MEDICINE

## 2022-05-31 PROCEDURE — 87088 URINE BACTERIA CULTURE: CPT | Performed by: PREVENTIVE MEDICINE

## 2022-05-31 NOTE — PROGRESS NOTES
"Subjective   Celsa Brown is a 72 y.o. female presents for   Chief Complaint   Patient presents with   • URI     Follow up from [facility] ~ 10 days ago       Patient is here to follow-up on UTI that she was treated for at [facility] urgent care.  She still has a trace of leukocytes in her urine so culture is pending this is her fourth or fifth infection this year so she will be referred to the urologist for follow-up.  Blood sugars have been somewhat high due to her UTI and her blood pressure is today in the office not controlled so she will home monitor and send us the results.  Presently no fever chills or flank pain has not noticed any blood in her urine and stools are passing well.  There are no preventive care reminders to display for this patient.    History of Present Illness     Vitals:    05/31/22 1039 05/31/22 1040 05/31/22 1041   BP: 170/80 149/84 131/77   BP Location: Right arm Left arm Right arm   Patient Position: Sitting Sitting Standing   Cuff Size: Adult Adult Adult   Pulse: 80     Temp: 97.9 °F (36.6 °C)     TempSrc: Temporal     SpO2: 97%     Weight: 70.5 kg (155 lb 6.4 oz)     Height: 172.7 cm (68\")       Body mass index is 23.63 kg/m².    Current Outpatient Medications on File Prior to Visit   Medication Sig Dispense Refill   • Accu-Chek Thelma Plus test strip Testing blood sugar once daily Dx E11.6 90 each 3   • amLODIPine (NORVASC) 5 MG tablet Take 1 tablet by mouth Daily. 90 tablet 3   • aspirin 81 MG EC tablet Take 81 mg by mouth Daily. 1 tablet every other day     • cholecalciferol (VITAMIN D3) 1000 units tablet Take 1,000 Units by mouth Every Other Day.     • Cyanocobalamin (VITAMIN B12) 1000 MCG tablet controlled-release Take 1,000 mcg by mouth Every Other Day.     • glipizide (GLUCOTROL) 10 MG tablet Take two tablets by mouth twice daily by mouth 360 tablet 3   • hydrALAZINE (APRESOLINE) 100 MG tablet Take 1 tablet by mouth 3 (Three) Times a Day. 270 tablet 3   • " hydroCHLOROthiazide (HYDRODIURIL) 25 MG tablet Take 1 tablet by mouth Daily. 90 tablet 3   • lisinopril (PRINIVIL,ZESTRIL) 40 MG tablet Take 1 tablet by mouth Daily. 90 tablet 3   • loratadine (CLARITIN) 10 MG tablet Take 10 mg by mouth Daily.     • Melatonin 10 MG tablet Take 10 mg by mouth Daily.     • metFORMIN (GLUCOPHAGE) 500 MG tablet Take 2 tablets by mouth 2 (Two) Times a Day With Meals. 360 tablet 3   • metoprolol succinate XL (TOPROL-XL) 100 MG 24 hr tablet Take 1/2 tablet at night. 90 tablet 3   • Multiple Vitamins-Minerals (CENTRUM SILVER ULTRA WOMENS) tablet Take 1 tablet by mouth Daily.     • Omega-3 Fatty Acids (OMEGA-3 FISH OIL) 500 MG capsule Take 500 mg by mouth Daily.     • Potassium Bicarb-Citric Acid (Effer-K) 10 MEQ effervescent tablet      • potassium chloride (MICRO-K) 10 MEQ CR capsule Take 1 capsule by mouth am and pm on weekends and 2 capsules by mouth am and one pm on weekdays 208 capsule 3   • pravastatin (PRAVACHOL) 80 MG tablet Take 1 tablet by mouth every night at bedtime. 90 tablet 3   • SITagliptin (Januvia) 100 MG tablet Take 1 tablet by mouth Daily. 90 tablet 3   • [DISCONTINUED] ciprofloxacin (CIPRO) 500 MG tablet Take 1 tablet by mouth 2 (Two) Times a Day. 14 tablet 0   • [DISCONTINUED] dilTIAZem (TIAZAC) 240 MG 24 hr capsule        No current facility-administered medications on file prior to visit.       The following portions of the patient's history were reviewed and updated as appropriate: allergies, current medications, past family history, past medical history, past social history, past surgical history and problem list.    Review of Systems   Respiratory: Negative for shortness of breath.    Cardiovascular: Negative for chest pain.   Genitourinary: Positive for dysuria. Negative for flank pain, frequency, hematuria and urgency.   Neurological: Negative for headache.       Objective   Physical Exam  Vitals reviewed.   Constitutional:       General: She is not in acute  distress.     Appearance: Normal appearance. She is well-developed. She is not ill-appearing or toxic-appearing.   HENT:      Head: Normocephalic and atraumatic.      Nose: Nose normal.   Eyes:      Extraocular Movements: Extraocular movements intact.      Conjunctiva/sclera: Conjunctivae normal.      Pupils: Pupils are equal, round, and reactive to light.   Cardiovascular:      Rate and Rhythm: Normal rate and regular rhythm.      Pulses:           Dorsalis pedis pulses are 1+ on the right side and 1+ on the left side.        Posterior tibial pulses are 1+ on the right side and 1+ on the left side.      Heart sounds: Normal heart sounds.   Pulmonary:      Effort: Pulmonary effort is normal.      Breath sounds: Normal breath sounds.   Abdominal:      General: Bowel sounds are normal. There is no distension.      Palpations: Abdomen is soft. There is no mass.      Tenderness: There is no abdominal tenderness. There is no right CVA tenderness or left CVA tenderness.   Musculoskeletal:      Cervical back: Neck supple.   Feet:      Right foot:      Skin integrity: Skin integrity normal.      Toenail Condition: Right toenails are normal.      Left foot:      Skin integrity: Skin integrity normal.      Comments: Diabetic Foot Exam Performed    Skin:     General: Skin is warm.   Neurological:      General: No focal deficit present.      Mental Status: She is alert and oriented to person, place, and time.   Psychiatric:         Mood and Affect: Mood normal.         Behavior: Behavior normal.       PHQ-9 Total Score:      Assessment & Plan   Diagnoses and all orders for this visit:    1. Acute cystitis without hematuria (Primary)  Comments:  Patient is finished her antibiotics and still has a trace of leukocytes in her urine culture is pending.  Orders:  -     Ambulatory Referral to Urology  -     Urine Culture - Urine, Urine, Clean Catch  -     POC Urinalysis Dipstick, Automated    2. Recurrent cystitis  Comments:  Patient  is being referred to urology for follow-up on recurrent approximately 4-5 episodes this year cystitis.    3. Type 2 diabetes mellitus without complication, without long-term current use of insulin (HCC)  Comments:  Blood sugars have been somewhat elevated due to the infection.    4. Primary hypertension  Comments:  Blood pressure not controlled patient will home monitor and send us the results.        Patient Instructions   There are no preventive care reminders to display for this patient.

## 2022-06-02 ENCOUNTER — TELEPHONE (OUTPATIENT)
Dept: FAMILY MEDICINE CLINIC | Facility: CLINIC | Age: 72
End: 2022-06-02

## 2022-06-02 DIAGNOSIS — N39.0 URINARY TRACT INFECTION WITHOUT HEMATURIA, SITE UNSPECIFIED: Primary | ICD-10-CM

## 2022-06-02 LAB — BACTERIA SPEC AEROBE CULT: ABNORMAL

## 2022-06-02 RX ORDER — AMOXICILLIN 875 MG/1
875 TABLET, COATED ORAL 2 TIMES DAILY
Qty: 20 TABLET | Refills: 0 | Status: SHIPPED | OUTPATIENT
Start: 2022-06-02 | End: 2022-06-16

## 2022-06-02 NOTE — TELEPHONE ENCOUNTER
HUB TO READ:  ----- Message from Jaelyn Iverson MD sent at 6/2/2022  3:04 PM EDT -----  Urine is still infected-let's restart Amoxicillin and reculture 3 days off antibiotics if not seen urology yet

## 2022-06-15 ENCOUNTER — CLINICAL SUPPORT (OUTPATIENT)
Dept: FAMILY MEDICINE CLINIC | Facility: CLINIC | Age: 72
End: 2022-06-15

## 2022-06-15 DIAGNOSIS — N39.0 URINARY TRACT INFECTION WITHOUT HEMATURIA, SITE UNSPECIFIED: ICD-10-CM

## 2022-06-15 PROCEDURE — 87086 URINE CULTURE/COLONY COUNT: CPT | Performed by: PREVENTIVE MEDICINE

## 2022-06-15 PROCEDURE — 87186 SC STD MICRODIL/AGAR DIL: CPT | Performed by: PREVENTIVE MEDICINE

## 2022-06-15 PROCEDURE — 87088 URINE BACTERIA CULTURE: CPT | Performed by: PREVENTIVE MEDICINE

## 2022-06-16 ENCOUNTER — TELEPHONE (OUTPATIENT)
Dept: FAMILY MEDICINE CLINIC | Facility: CLINIC | Age: 72
End: 2022-06-16

## 2022-06-16 RX ORDER — CEPHALEXIN 500 MG/1
500 CAPSULE ORAL 4 TIMES DAILY
Qty: 40 CAPSULE | Refills: 0 | Status: SHIPPED | OUTPATIENT
Start: 2022-06-16 | End: 2022-10-24

## 2022-06-16 NOTE — TELEPHONE ENCOUNTER
Caller: Celsa Brown    Relationship: Self    Best call back number: [phone]    Caller requesting test results: SELF    What test was performed: URINE CULTURE    When was the test performed: 6/15    Where was the test performed: IN OFFICE    Additional notes: PLEASE REACH OUT TO PATIENT WITH RESULTS.

## 2022-06-16 NOTE — TELEPHONE ENCOUNTER
Was waiting for sensitivities to come back but did have infection.  Is she having any pain with urination, blood, frequency, trouble going, side pain fever or chills?

## 2022-06-17 ENCOUNTER — TELEPHONE (OUTPATIENT)
Dept: FAMILY MEDICINE CLINIC | Facility: CLINIC | Age: 72
End: 2022-06-17

## 2022-06-17 LAB — BACTERIA SPEC AEROBE CULT: ABNORMAL

## 2022-06-17 NOTE — TELEPHONE ENCOUNTER
Hub to read  ----- Message from Jaelyn Iverson MD sent at 6/17/2022 12:51 PM EDT -----  Antibiotic should take care of UtI-remember to reculture after treatment 3 days off antibiotics

## 2022-06-17 NOTE — PROGRESS NOTES
Antibiotic should take care of UtI-remember to reculture after treatment 3 days off antibiotics SUBJECTIVE     Patient ID: Simran is a 18 year old female    CHIEF COMPLAINT   Medication Management    HISTORY OF PRESENT ILLNESS  Patient comes in today for annual follow-up regarding her medication.  She is a sophomore [employer] and is studying chemistry.  Currently taking organic chemistry 2, psychiatry psychology and a molecular and cellular biology course.  She states this is somewhat of a light load for her.  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    She is doing well regarding her anxiety.  Her symptoms are fairly well-controlled.  She denies thoughts of harm to herself or others.  She is not feeling overly down or depressed.  She does feel like she would benefit from seeing a counselor and has looked into options through the college but since she is not on the University insurance plan and she is not able to see anyone and also other therapy available through the [facility] is limited to only a month at a time.  They do not do long-term therapy.    Regarding her ADHD.  She does take her methylphenidate typically 2 tabs a day depending on her course work.  At times in the past she is taking it 3 times daily.  She does not always take it on weekends.  She feels like the medication makes her little more anxious but she recognizes without the medication she has a hard time completing her work.  On weekends she has plenty of time to work she will not take the medication and just take longer to complete her work.  She denies any racing thoughts.  No manic type behaviors.    She continues to live with her boyfriend in an apartment.    Overall she feels things are going well.  She feels like her grades are doing well.  She is still looking at potentially a career in medicine.      REVIEW OF SYSTEMS  Review of Systems   Constitutional: Negative for chills and fever.   HENT: Negative.    Respiratory: Negative for chest tightness and shortness of breath.     Cardiovascular: Negative for chest pain and palpitations.   Gastrointestinal: Negative.    Psychiatric/Behavioral: Negative for agitation, confusion and dysphoric mood. The patient is not nervous/anxious.        Patient's medications, allergies, past medical histories were reviewed and updated as appropriate.      OBJECTIVE     Visit Vitals  /76 (BP Location: [facility], Patient Position: Sitting)   Pulse 78   Temp 98.3 °F (36.8 °C)   Resp 18   Ht 5' 4.5\" (1.638 m)   Wt 53.5 kg (118 lb)   LMP 03/03/2019 (Exact Date)   SpO2 100%   BMI 19.94 kg/m²     PHYSICAL EXAM  Physical Exam   Constitutional: She appears well-developed and well-nourished.   HENT:   Head: Normocephalic.   Right Ear: Tympanic membrane and ear canal normal.   Left Ear: Tympanic membrane and ear canal normal.   Nose: No rhinorrhea.   Eyes: Conjunctivae are normal.   Neck: Normal range of motion. Neck supple. No thyromegaly present.   Cardiovascular: Normal rate, regular rhythm and normal heart sounds.   No murmur heard.  Pulmonary/Chest: Effort normal and breath sounds normal. No respiratory distress. She has no wheezes.   Abdominal: Soft. Bowel sounds are normal. There is no tenderness.   Skin: No rash noted.   Psychiatric: She has a normal mood and affect. Her behavior is normal.       ASSESSMENT/PLAN     Problem List Items Addressed This Visit     Anxiety - Primary     Fairly well-controlled on her bupropion.  -She would like to see a counselor.  I think this would be a good option for her.  We will refer her to a counselor/therapist.  She may need to look through options such as [facility]         Relevant Medications    buPROPion (WELLBUTRIN XL) 300 MG 24 hr tablet    Other Relevant Orders    SERVICE TO BEHAVIORAL HEALTH    Attention deficit hyperactivity disorder (ADHD), inattentive type, mild     Stable on methylphenidate 10 mg up to 3 times daily.  She has been at times taking 2 tablets at once for a better effect however will  still leave her the option of 1 3 times daily depending on what her class load is like.  -Follow-up in 6 months for recheck.           Other Visit Diagnoses     Attention deficit hyperactivity disorder (ADHD), unspecified ADHD type        Relevant Medications    methylpheniDATE (RITALIN) 10 MG tablet    Other Relevant Orders    SERVICE TO BEHAVIORAL HEALTH    Dental disease      Need of referral for routine dental cleaning and history of multiple cavities    Relevant Orders    SERVICE TO DENTISTRY      Yes

## 2022-07-18 NOTE — PATIENT INSTRUCTIONS
There are no preventive care reminders to display for this patient.    Patient to set up appointment with dermatologist

## 2022-07-19 ENCOUNTER — OFFICE VISIT (OUTPATIENT)
Dept: FAMILY MEDICINE CLINIC | Facility: CLINIC | Age: 72
End: 2022-07-19

## 2022-07-19 VITALS
BODY MASS INDEX: 23.73 KG/M2 | TEMPERATURE: 97.2 F | WEIGHT: 156.6 LBS | OXYGEN SATURATION: 97 % | SYSTOLIC BLOOD PRESSURE: 147 MMHG | HEIGHT: 68 IN | DIASTOLIC BLOOD PRESSURE: 73 MMHG | HEART RATE: 79 BPM

## 2022-07-19 DIAGNOSIS — E78.5 HYPERLIPIDEMIA, UNSPECIFIED HYPERLIPIDEMIA TYPE: ICD-10-CM

## 2022-07-19 DIAGNOSIS — R09.89 RIGHT CAROTID BRUIT: ICD-10-CM

## 2022-07-19 DIAGNOSIS — E53.8 B12 DEFICIENCY: ICD-10-CM

## 2022-07-19 DIAGNOSIS — Z95.0 PACEMAKER: ICD-10-CM

## 2022-07-19 DIAGNOSIS — E11.9 TYPE 2 DIABETES MELLITUS WITHOUT COMPLICATION, WITHOUT LONG-TERM CURRENT USE OF INSULIN: Primary | ICD-10-CM

## 2022-07-19 DIAGNOSIS — I10 PRIMARY HYPERTENSION: ICD-10-CM

## 2022-07-19 DIAGNOSIS — D22.9 ATYPICAL MOLE: ICD-10-CM

## 2022-07-19 LAB
ALBUMIN SERPL-MCNC: 4 G/DL (ref 3.5–5.2)
ALBUMIN UR-MCNC: <1.2 MG/DL
ALBUMIN/GLOB SERPL: 1.1 G/DL
ALP SERPL-CCNC: 75 U/L (ref 39–117)
ALT SERPL W P-5'-P-CCNC: 24 U/L (ref 1–33)
ANION GAP SERPL CALCULATED.3IONS-SCNC: 12.1 MMOL/L (ref 5–15)
AST SERPL-CCNC: 22 U/L (ref 1–32)
BASOPHILS # BLD AUTO: 0.03 10*3/MM3 (ref 0–0.2)
BASOPHILS NFR BLD AUTO: 0.4 % (ref 0–1.5)
BILIRUB SERPL-MCNC: 0.4 MG/DL (ref 0–1.2)
BUN SERPL-MCNC: 16 MG/DL (ref 8–23)
BUN/CREAT SERPL: 22.2 (ref 7–25)
CALCIUM SPEC-SCNC: 9.6 MG/DL (ref 8.6–10.5)
CHLORIDE SERPL-SCNC: 105 MMOL/L (ref 98–107)
CHOLEST SERPL-MCNC: 164 MG/DL (ref 0–200)
CO2 SERPL-SCNC: 25.9 MMOL/L (ref 22–29)
CREAT SERPL-MCNC: 0.72 MG/DL (ref 0.57–1)
CREAT UR-MCNC: 36.7 MG/DL
DEPRECATED RDW RBC AUTO: 42.4 FL (ref 37–54)
EGFRCR SERPLBLD CKD-EPI 2021: 87.9 ML/MIN/1.73
EOSINOPHIL # BLD AUTO: 0.1 10*3/MM3 (ref 0–0.4)
EOSINOPHIL NFR BLD AUTO: 1.2 % (ref 0.3–6.2)
ERYTHROCYTE [DISTWIDTH] IN BLOOD BY AUTOMATED COUNT: 12.5 % (ref 12.3–15.4)
GLOBULIN UR ELPH-MCNC: 3.5 GM/DL
GLUCOSE SERPL-MCNC: 150 MG/DL (ref 65–99)
HCT VFR BLD AUTO: 38.3 % (ref 34–46.6)
HDLC SERPL-MCNC: 66 MG/DL (ref 40–60)
HGB BLD-MCNC: 12.3 G/DL (ref 12–15.9)
IMM GRANULOCYTES # BLD AUTO: 0.02 10*3/MM3 (ref 0–0.05)
IMM GRANULOCYTES NFR BLD AUTO: 0.2 % (ref 0–0.5)
LDLC SERPL CALC-MCNC: 87 MG/DL (ref 0–100)
LDLC/HDLC SERPL: 1.32 {RATIO}
LYMPHOCYTES # BLD AUTO: 2.16 10*3/MM3 (ref 0.7–3.1)
LYMPHOCYTES NFR BLD AUTO: 26.2 % (ref 19.6–45.3)
MCH RBC QN AUTO: 29.8 PG (ref 26.6–33)
MCHC RBC AUTO-ENTMCNC: 32.1 G/DL (ref 31.5–35.7)
MCV RBC AUTO: 92.7 FL (ref 79–97)
MICROALBUMIN/CREAT UR: NORMAL MG/G{CREAT}
MONOCYTES # BLD AUTO: 0.58 10*3/MM3 (ref 0.1–0.9)
MONOCYTES NFR BLD AUTO: 7 % (ref 5–12)
NEUTROPHILS NFR BLD AUTO: 5.35 10*3/MM3 (ref 1.7–7)
NEUTROPHILS NFR BLD AUTO: 65 % (ref 42.7–76)
NRBC BLD AUTO-RTO: 0 /100 WBC (ref 0–0.2)
PLATELET # BLD AUTO: 287 10*3/MM3 (ref 140–450)
PMV BLD AUTO: 11.4 FL (ref 6–12)
POTASSIUM SERPL-SCNC: 3.8 MMOL/L (ref 3.5–5.2)
PROT SERPL-MCNC: 7.5 G/DL (ref 6–8.5)
RBC # BLD AUTO: 4.13 10*6/MM3 (ref 3.77–5.28)
SODIUM SERPL-SCNC: 143 MMOL/L (ref 136–145)
TRIGL SERPL-MCNC: 56 MG/DL (ref 0–150)
VIT B12 BLD-MCNC: 980 PG/ML (ref 211–946)
VLDLC SERPL-MCNC: 11 MG/DL (ref 5–40)
WBC NRBC COR # BLD: 8.24 10*3/MM3 (ref 3.4–10.8)

## 2022-07-19 PROCEDURE — 82570 ASSAY OF URINE CREATININE: CPT | Performed by: PREVENTIVE MEDICINE

## 2022-07-19 PROCEDURE — 80061 LIPID PANEL: CPT | Performed by: PREVENTIVE MEDICINE

## 2022-07-19 PROCEDURE — 82043 UR ALBUMIN QUANTITATIVE: CPT | Performed by: PREVENTIVE MEDICINE

## 2022-07-19 PROCEDURE — 83036 HEMOGLOBIN GLYCOSYLATED A1C: CPT | Performed by: PREVENTIVE MEDICINE

## 2022-07-19 PROCEDURE — 80053 COMPREHEN METABOLIC PANEL: CPT | Performed by: PREVENTIVE MEDICINE

## 2022-07-19 PROCEDURE — 85025 COMPLETE CBC W/AUTO DIFF WBC: CPT | Performed by: PREVENTIVE MEDICINE

## 2022-07-19 PROCEDURE — 82607 VITAMIN B-12: CPT | Performed by: PREVENTIVE MEDICINE

## 2022-07-19 PROCEDURE — 99214 OFFICE O/P EST MOD 30 MIN: CPT | Performed by: PREVENTIVE MEDICINE

## 2022-07-19 RX ORDER — HYDRALAZINE HYDROCHLORIDE 100 MG/1
100 TABLET, FILM COATED ORAL 2 TIMES DAILY
Qty: 180 TABLET | Refills: 3
Start: 2022-07-19 | End: 2022-08-25 | Stop reason: SDUPTHER

## 2022-07-19 RX ORDER — ESTRADIOL 0.1 MG/G
2 CREAM VAGINAL DAILY
COMMUNITY

## 2022-07-19 NOTE — PROGRESS NOTES
"Subjective   Celsa Brown is a 72 y.o. female presents for   Chief Complaint   Patient presents with   • Hyperlipidemia     3 month    • Hypertension     3 month    • Diabetes     3 month    • Medication Problem     Hydrolozine question   Patient presents today for follow-up on multiple chronic health conditions most of which are stable.  COVID vaccinations are up-to-date patient did notice some dizziness taking hydralazine 3 times a day.  She cut the dose down to 100 mg twice a day blood pressure is controlled and she does not have any dizziness.  This was reflected in a new prescription today.  Dizziness is gone.    There are no preventive care reminders to display for this patient.    History of Present Illness     Vitals:    07/19/22 0911 07/19/22 0912 07/19/22 0913   BP: 147/75 156/75 147/73   BP Location: Right arm Left arm Left arm   Patient Position: Sitting Sitting Standing   Cuff Size: Adult Adult Adult   Pulse: 79     Temp: 97.2 °F (36.2 °C)     TempSrc: Temporal     SpO2: 97%     Weight: 71 kg (156 lb 9.6 oz)     Height: 172.7 cm (68\")       Body mass index is 23.81 kg/m².    Current Outpatient Medications on File Prior to Visit   Medication Sig Dispense Refill   • Accu-Chek Thelma Plus test strip Testing blood sugar once daily Dx E11.6 90 each 3   • amLODIPine (NORVASC) 5 MG tablet Take 1 tablet by mouth Daily. 90 tablet 3   • aspirin 81 MG EC tablet Take 81 mg by mouth Daily. 1 tablet every other day     • cephalexin (Keflex) 500 MG capsule Take 1 capsule by mouth 4 (Four) Times a Day. 40 capsule 0   • cholecalciferol (VITAMIN D3) 1000 units tablet Take 1,000 Units by mouth Every Other Day.     • Cyanocobalamin (VITAMIN B12) 1000 MCG tablet controlled-release Take 1,000 mcg by mouth Every Other Day.     • estradiol (ESTRACE) 0.1 MG/GM vaginal cream Insert 2 g into the vagina Daily.     • glipizide (GLUCOTROL) 10 MG tablet Take two tablets by mouth twice daily by mouth 360 tablet 3   • " hydroCHLOROthiazide (HYDRODIURIL) 25 MG tablet Take 1 tablet by mouth Daily. 90 tablet 3   • lisinopril (PRINIVIL,ZESTRIL) 40 MG tablet Take 1 tablet by mouth Daily. 90 tablet 3   • loratadine (CLARITIN) 10 MG tablet Take 10 mg by mouth Daily.     • Melatonin 10 MG tablet Take 10 mg by mouth Daily.     • metFORMIN (GLUCOPHAGE) 500 MG tablet Take 2 tablets by mouth 2 (Two) Times a Day With Meals. 360 tablet 3   • metoprolol succinate XL (TOPROL-XL) 100 MG 24 hr tablet Take 1/2 tablet at night. 90 tablet 3   • Multiple Vitamins-Minerals (CENTRUM SILVER ULTRA WOMENS) tablet Take 1 tablet by mouth Daily.     • Omega-3 Fatty Acids (OMEGA-3 FISH OIL) 500 MG capsule Take 500 mg by mouth Daily.     • potassium chloride (MICRO-K) 10 MEQ CR capsule Take 1 capsule by mouth am and pm on weekends and 2 capsules by mouth am and one pm on weekdays 208 capsule 3   • pravastatin (PRAVACHOL) 80 MG tablet Take 1 tablet by mouth every night at bedtime. 90 tablet 3   • SITagliptin (Januvia) 100 MG tablet Take 1 tablet by mouth Daily. 90 tablet 3   • [DISCONTINUED] hydrALAZINE (APRESOLINE) 100 MG tablet Take 1 tablet by mouth 3 (Three) Times a Day. (Patient taking differently: Take 100 mg by mouth 2 (Two) Times a Day.) 270 tablet 3   • [DISCONTINUED] Potassium Bicarb-Citric Acid (Effer-K) 10 MEQ effervescent tablet        No current facility-administered medications on file prior to visit.       The following portions of the patient's history were reviewed and updated as appropriate: allergies, current medications, past family history, past medical history, past social history, past surgical history and problem list.    Review of Systems   Endocrine: Negative for polydipsia, polyphagia and polyuria.   Neurological: Positive for dizziness and weakness.       Objective   Physical Exam  Vitals reviewed.   Constitutional:       General: She is not in acute distress.     Appearance: Normal appearance. She is well-developed. She is not  ill-appearing or toxic-appearing.   HENT:      Head: Normocephalic and atraumatic.      Right Ear: Tympanic membrane, ear canal and external ear normal.      Left Ear: Tympanic membrane, ear canal and external ear normal.      Nose: Nose normal.      Mouth/Throat:      Mouth: Mucous membranes are moist.      Pharynx: No posterior oropharyngeal erythema.   Eyes:      Extraocular Movements: Extraocular movements intact.      Conjunctiva/sclera: Conjunctivae normal.      Pupils: Pupils are equal, round, and reactive to light.   Cardiovascular:      Rate and Rhythm: Normal rate and regular rhythm.      Pulses:           Dorsalis pedis pulses are 1+ on the right side and 1+ on the left side.        Posterior tibial pulses are 1+ on the right side and 1+ on the left side.      Heart sounds: Normal heart sounds.   Pulmonary:      Effort: Pulmonary effort is normal.      Breath sounds: Normal breath sounds.   Abdominal:      General: Bowel sounds are normal. There is no distension.      Palpations: Abdomen is soft. There is no mass.      Tenderness: There is no abdominal tenderness.   Musculoskeletal:         General: Normal range of motion.      Cervical back: Neck supple.   Feet:      Right foot:      Skin integrity: Skin integrity normal.      Toenail Condition: Right toenails are normal.      Left foot:      Skin integrity: Skin integrity normal.      Comments: Diabetic Foot Exam Performed    Skin:     General: Skin is warm.   Neurological:      General: No focal deficit present.      Mental Status: She is alert and oriented to person, place, and time.   Psychiatric:         Mood and Affect: Mood normal.         Behavior: Behavior normal.       PHQ-9 Total Score:      Assessment & Plan   Diagnoses and all orders for this visit:    1. Type 2 diabetes mellitus without complication, without long-term current use of insulin ([facility]) (Primary)  Comments:  Some low sugars when doesn't eat.  None above 200 and eye exqam  UTD  Orders:  -     Comprehensive Metabolic Panel; Future  -     Hemoglobin A1c; Future  -     Microalbumin / Creatinine Urine Ratio - Urine, Clean Catch; Future  -     Comprehensive Metabolic Panel  -     Hemoglobin A1c  -     Microalbumin / Creatinine Urine Ratio - Urine, Clean Catch    2. Right carotid bruit  Comments:  4/22 checked and no blockage    3. Pacemaker  Comments:  Recheck 12/2022    4. Primary hypertension  Comments:  Controlled at home 132/78.  Patient did stop hydralazine dose at night and blood pressure is controlled with this.  She was getting dizzy with 100 mg 3 times a   Orders:  -     CBC Auto Differential; Future  -     hydrALAZINE (APRESOLINE) 100 MG tablet; Take 1 tablet by mouth 2 (Two) Times a Day.  Dispense: 180 tablet; Refill: 3  -     CBC Auto Differential    5. Hyperlipidemia, unspecified hyperlipidemia type  Comments:  Patient is trying to eat less saturated fats  Orders:  -     Lipid Panel; Future  -     Lipid Panel    6. B12 deficiency  Comments:  Takes daily  Orders:  -     Vitamin B12; Future  -     Vitamin B12    7. Atypical mole  Comments:  Patient will call dermatology        Patient Instructions   There are no preventive care reminders to display for this patient.    Patient to set up appointment with dermatologist

## 2022-07-19 NOTE — PROGRESS NOTES
Venipuncture Blood Specimen Collection  Venipuncture performed in left arm by Leia Gallardo MA with good hemostasis. Patient tolerated the procedure well without complications.   07/19/22   Leia Gallardo MA

## 2022-07-20 LAB — HBA1C MFR BLD: 8 % (ref 3.5–5.6)

## 2022-07-20 NOTE — PROGRESS NOTES
Glucose 150 with A1C showing not controlled at 8.0.  Can she do more with diet and exercise?  Vitamin B12 is elevated so can cut otc dose a couple of days a week.  Bad cholesterol 87 and goal below 70 so watch sat fats and walk.

## 2022-07-21 ENCOUNTER — TELEPHONE (OUTPATIENT)
Dept: FAMILY MEDICINE CLINIC | Facility: CLINIC | Age: 72
End: 2022-07-21

## 2022-07-21 NOTE — TELEPHONE ENCOUNTER
HUB TO READ:  ----- Message from Jaelyn Iverson MD sent at 7/20/2022  6:19 PM EDT -----  Glucose 150 with A1C showing not controlled at 8.0.  Can she do more with diet and exercise?  Vitamin B12 is elevated so can cut otc dose a couple of days a week.  Bad cholesterol 87 and goal below 70 so watch sat fats and walk.

## 2022-08-25 DIAGNOSIS — I10 PRIMARY HYPERTENSION: ICD-10-CM

## 2022-08-25 RX ORDER — HYDRALAZINE HYDROCHLORIDE 100 MG/1
100 TABLET, FILM COATED ORAL 2 TIMES DAILY
Qty: 180 TABLET | Refills: 3
Start: 2022-08-25 | End: 2023-03-13

## 2022-08-25 NOTE — TELEPHONE ENCOUNTER
Caller: Celsa Brown    Relationship: Self    Best call back number: [phone]    Requested Prescriptions:   Requested Prescriptions     Pending Prescriptions Disp Refills   • hydrALAZINE (APRESOLINE) 100 MG tablet 180 tablet 3     Sig: Take 1 tablet by mouth 2 (Two) Times a Day.      Pharmacy where request should be sent: [facility] - [address] - [phone] [facility] [phone] FX     Additional details provided by patient: PT HAS MORE THAN 1 WEEK LEFT OF MEDICATION.     Does the patient have less than a 3 day supply:  [] Yes  [x] No    Lorraine Velez Rep   08/25/22 10:58 EDT

## 2022-09-22 NOTE — PATIENT INSTRUCTIONS
Called pt to inform of clinic cancellation today with Dr Mendoza. Pt was understanding and will call back to reschedule this appointment   There are no preventive care reminders to display for this patient.

## 2022-10-11 PROCEDURE — 93296 REM INTERROG EVL PM/IDS: CPT | Performed by: INTERNAL MEDICINE

## 2022-10-11 PROCEDURE — 93294 REM INTERROG EVL PM/LDLS PM: CPT | Performed by: INTERNAL MEDICINE

## 2022-10-13 NOTE — DISCHARGE INSTR - ACTIVITY
----- Message from Debbie Grubbs MD sent at 10/13/2022  7:01 AM CDT -----  Pls triage  Component      Latest Ref Rng & Units 10/12/2022   DDIMER, QUANTITATIVE      <0.57 mg/L (FEU) 2.04 (H)   Patient would like communication of their results via:   Cell Phone: Telephone Information:  Mobile [phone]  . Okay to leave a message containing results? No   As tolerated

## 2022-10-21 ENCOUNTER — TELEPHONE (OUTPATIENT)
Dept: FAMILY MEDICINE CLINIC | Facility: CLINIC | Age: 72
End: 2022-10-21

## 2022-10-21 NOTE — TELEPHONE ENCOUNTER
She should go to [facility] over the weekend or if no fever or chills and doesn't feel too bad can work her in Monday.  Best not to check urine from pill bottle

## 2022-10-21 NOTE — TELEPHONE ENCOUNTER
PATIENT DROPPED OFF A URINE THIS MORNING IN A PILL BOTTLE. SHE SAID SHE HAS HAD UTI SYMPTOMS FOR A FEW DAYS, STOMACH PAIN AND LEG CRAMPS THE LAST 2 NIGHT. PATIENT HAS NOT BEEN IN SINCE July AND CANCELLED HER APPT YESTERDAY. SHE SAID SHE CANNOT COME IN TODAY AS [facility]  [facility]. OKAY TO TEST URINE OR HAVE HER COME IN FOR APPT AND CLEAN CATCH? PATIENT SAID SHE HAS BEEN TAKING CEPHALEXIN 500MG 1 CAPSULE AT BEDTIME.

## 2022-10-23 PROBLEM — R30.0 DYSURIA: Status: ACTIVE | Noted: 2022-10-23

## 2022-10-24 ENCOUNTER — OFFICE VISIT (OUTPATIENT)
Dept: FAMILY MEDICINE CLINIC | Facility: CLINIC | Age: 72
End: 2022-10-24

## 2022-10-24 ENCOUNTER — TELEPHONE (OUTPATIENT)
Dept: FAMILY MEDICINE CLINIC | Facility: CLINIC | Age: 72
End: 2022-10-24

## 2022-10-24 VITALS
SYSTOLIC BLOOD PRESSURE: 171 MMHG | TEMPERATURE: 99.6 F | HEART RATE: 81 BPM | WEIGHT: 156 LBS | HEIGHT: 68 IN | BODY MASS INDEX: 23.64 KG/M2 | OXYGEN SATURATION: 96 % | DIASTOLIC BLOOD PRESSURE: 76 MMHG

## 2022-10-24 DIAGNOSIS — I10 PRIMARY HYPERTENSION: ICD-10-CM

## 2022-10-24 DIAGNOSIS — E11.9 TYPE 2 DIABETES MELLITUS WITHOUT COMPLICATION, WITHOUT LONG-TERM CURRENT USE OF INSULIN: ICD-10-CM

## 2022-10-24 DIAGNOSIS — R35.0 URINARY FREQUENCY: ICD-10-CM

## 2022-10-24 DIAGNOSIS — R30.0 DYSURIA: Primary | ICD-10-CM

## 2022-10-24 LAB
BILIRUB BLD-MCNC: NEGATIVE MG/DL
CLARITY, POC: CLEAR
COLOR UR: YELLOW
EXPIRATION DATE: NORMAL
GLUCOSE UR STRIP-MCNC: NEGATIVE MG/DL
KETONES UR QL: NEGATIVE
LEUKOCYTE EST, POC: NEGATIVE
Lab: NORMAL
NITRITE UR-MCNC: NEGATIVE MG/ML
PH UR: 6.5 [PH] (ref 5–8)
PROT UR STRIP-MCNC: NEGATIVE MG/DL
RBC # UR STRIP: NEGATIVE /UL
SP GR UR: 1.01 (ref 1–1.03)
UROBILINOGEN UR QL: NORMAL

## 2022-10-24 PROCEDURE — 81003 URINALYSIS AUTO W/O SCOPE: CPT | Performed by: PREVENTIVE MEDICINE

## 2022-10-24 PROCEDURE — 87086 URINE CULTURE/COLONY COUNT: CPT | Performed by: PREVENTIVE MEDICINE

## 2022-10-24 PROCEDURE — 99214 OFFICE O/P EST MOD 30 MIN: CPT | Performed by: PREVENTIVE MEDICINE

## 2022-10-24 RX ORDER — AMLODIPINE BESYLATE 5 MG/1
TABLET ORAL
Qty: 135 TABLET | Refills: 3 | Status: SHIPPED | OUTPATIENT
Start: 2022-10-24

## 2022-10-24 RX ORDER — CEPHALEXIN 500 MG/1
500 CAPSULE ORAL 4 TIMES DAILY
Qty: 40 CAPSULE | Refills: 0 | Status: SHIPPED | OUTPATIENT
Start: 2022-10-24

## 2022-10-24 NOTE — TELEPHONE ENCOUNTER
PT BROUGHT IN LIST OF BPS, PULSE, AND BLOOD SUGARS TODAY 10/24/22 TO HAVE FOR HER APPT.   AVERAGES ARE AS FOLLOWS;    BLOOD PRESSURE:        136/82  PULSE:                         82  BLOOD SUGAR:                113

## 2022-10-24 NOTE — PROGRESS NOTES
"Subjective   Celsa Brown is a 72 y.o. female presents for   Chief Complaint   Patient presents with   • Urinary Frequency     Patient presents today with increased frequency of urination and some dysuria she did note some blood in her urine and did have some left lower quadrant discomfort.  Fever just seems to have started today she has had no change in her bowel movements and did take extra cephalexin that she had been prescribed by Dr. Jerzy Ignacio urologist for.  Urine today was clear but I suspect that she had had a smoldering UTI.  We will increase her cephalexin to 500 mg 4 times a day for 7 days and then repeat the urine and urine culture in 10 days.  Blood pressure blood sugars have been elevated we will increase her amlodipine to 10 mg alternating every day with 5 mg.  There are no preventive care reminders to display for this patient.    History of Present Illness     Vitals:    10/24/22 1312 10/24/22 1318   BP: 176/78 171/76   BP Location: Right arm Left arm   Patient Position: Sitting Sitting   Cuff Size: Adult Adult   Pulse: 81    Temp: 99.6 °F (37.6 °C)    TempSrc: Temporal    SpO2: 96%    Weight: 70.8 kg (156 lb)    Height: 172.7 cm (68\")      Body mass index is 23.72 kg/m².    Current Outpatient Medications on File Prior to Visit   Medication Sig Dispense Refill   • Accu-Chek Thelma Plus test strip Testing blood sugar once daily Dx E11.6 90 each 3   • aspirin 81 MG EC tablet Take 81 mg by mouth Daily. 1 tablet every other day     • cholecalciferol (VITAMIN D3) 1000 units tablet Take 1,000 Units by mouth Every Other Day.     • Cyanocobalamin (VITAMIN B12) 1000 MCG tablet controlled-release Take 1,000 mcg by mouth Every Other Day.     • estradiol (ESTRACE) 0.1 MG/GM vaginal cream Insert 2 g into the vagina Daily.     • glipizide (GLUCOTROL) 10 MG tablet Take two tablets by mouth twice daily by mouth 360 tablet 3   • hydrALAZINE (APRESOLINE) 100 MG tablet Take 1 tablet by mouth 2 (Two) Times a Day. 180 " tablet 3   • hydroCHLOROthiazide (HYDRODIURIL) 25 MG tablet Take 1 tablet by mouth Daily. 90 tablet 3   • lisinopril (PRINIVIL,ZESTRIL) 40 MG tablet Take 1 tablet by mouth Daily. 90 tablet 3   • loratadine (CLARITIN) 10 MG tablet Take 10 mg by mouth Daily.     • Melatonin 10 MG tablet Take 10 mg by mouth Daily.     • metFORMIN (GLUCOPHAGE) 500 MG tablet Take 2 tablets by mouth 2 (Two) Times a Day With Meals. 360 tablet 3   • metoprolol succinate XL (TOPROL-XL) 100 MG 24 hr tablet Take 1/2 tablet at night. 90 tablet 3   • Multiple Vitamins-Minerals (CENTRUM SILVER ULTRA WOMENS) tablet Take 1 tablet by mouth Daily.     • Omega-3 Fatty Acids (OMEGA-3 FISH OIL) 500 MG capsule Take 500 mg by mouth Daily.     • potassium chloride (MICRO-K) 10 MEQ CR capsule Take 1 capsule by mouth am and pm on weekends and 2 capsules by mouth am and one pm on weekdays 208 capsule 3   • pravastatin (PRAVACHOL) 80 MG tablet Take 1 tablet by mouth every night at bedtime. 90 tablet 3   • SITagliptin (Januvia) 100 MG tablet Take 1 tablet by mouth Daily. 90 tablet 3   • [DISCONTINUED] amLODIPine (NORVASC) 5 MG tablet Take 1 tablet by mouth Daily. 90 tablet 3   • [DISCONTINUED] cephalexin (Keflex) 500 MG capsule Take 1 capsule by mouth 4 (Four) Times a Day. 40 capsule 0     No current facility-administered medications on file prior to visit.       The following portions of the patient's history were reviewed and updated as appropriate: allergies, current medications, past family history, past medical history, past social history, past surgical history and problem list.    Review of Systems   Gastrointestinal: Positive for abdominal pain.   Genitourinary: Positive for dysuria, frequency and hematuria.       Objective   Physical Exam  Vitals reviewed.   Constitutional:       General: She is not in acute distress.     Appearance: Normal appearance. She is well-developed. She is not ill-appearing or toxic-appearing.   HENT:      Head: Normocephalic  and atraumatic.      Right Ear: Tympanic membrane, ear canal and external ear normal.      Left Ear: Tympanic membrane, ear canal and external ear normal.      Nose: Nose normal.      Mouth/Throat:      Mouth: Mucous membranes are moist.      Pharynx: No posterior oropharyngeal erythema.   Eyes:      Extraocular Movements: Extraocular movements intact.      Conjunctiva/sclera: Conjunctivae normal.      Pupils: Pupils are equal, round, and reactive to light.   Cardiovascular:      Rate and Rhythm: Normal rate and regular rhythm.      Heart sounds: Normal heart sounds.   Pulmonary:      Effort: Pulmonary effort is normal.      Breath sounds: Normal breath sounds.   Abdominal:      General: Bowel sounds are normal. There is no distension.      Palpations: Abdomen is soft. There is no mass.      Tenderness: There is no abdominal tenderness. There is no right CVA tenderness or left CVA tenderness.   Musculoskeletal:      Cervical back: Neck supple.   Skin:     General: Skin is warm.   Neurological:      General: No focal deficit present.      Mental Status: She is alert and oriented to person, place, and time.   Psychiatric:         Mood and Affect: Mood normal.         Behavior: Behavior normal.       PHQ-9 Total Score:      Assessment & Plan   Diagnoses and all orders for this visit:    1. Dysuria (Primary)  Comments:  Dysuria began on 1020 no fever noted until today.  She was having left-sided pain and noted blood in her urine.  Increase cephalexin to 500 twice a day  Orders:  -     cephalexin (Keflex) 500 MG capsule; Take 1 capsule by mouth 4 (Four) Times a Day.  Dispense: 40 capsule; Refill: 0  -     Urine Culture - Urine, Urine, Clean Catch; Future  -     POC Urinalysis Dipstick, Automated; Future    2. Primary hypertension  Comments:  Blood pressures not under good control for the last couple of days increase amlodipine to 10 mg 1 day alternating with 5 mg the next.  We will recheck her in [place]    3. Type 2 diabetes  mellitus without complication, without long-term current use of insulin ([facility])  Comments:  Blood sugars have been below 200 but have been running higher than usual.    4. Urinary frequency  Comments:  Persist increase in urinary frequency.  Orders:  -     POCT urinalysis dipstick, automated    Other orders  -     amLODIPine (NORVASC) 5 MG tablet; Take one on odd days and two on even days by mouth  Dispense: 135 tablet; Refill: 3        Patient Instructions   There are no preventive care reminders to display for this patient.

## 2022-10-25 ENCOUNTER — TELEPHONE (OUTPATIENT)
Dept: FAMILY MEDICINE CLINIC | Facility: CLINIC | Age: 72
End: 2022-10-25

## 2022-10-25 LAB — BACTERIA SPEC AEROBE CULT: NORMAL

## 2022-10-25 NOTE — PROGRESS NOTES
Culture did not show infection so we'll wait till we see what culture after stops antibiotics looks like

## 2022-10-25 NOTE — TELEPHONE ENCOUNTER
HUB TO READ:  ----- Message from Jaelyn Iverson MD sent at 10/25/2022  1:10 PM EDT -----  Culture did not show infection so we'll wait till we see what culture after stops antibiotics looks like

## 2022-11-07 PROBLEM — I45.10 RIGHT BUNDLE BRANCH BLOCK: Status: RESOLVED | Noted: 2021-07-12 | Resolved: 2022-11-07

## 2022-11-07 PROBLEM — I44.1 AV BLOCK, 2ND DEGREE: Status: RESOLVED | Noted: 2021-07-12 | Resolved: 2022-11-07

## 2022-11-07 NOTE — PATIENT INSTRUCTIONS
There are no preventive care reminders to display for this patient.     Advise patient to set up dermatology evaluation with [facility]-

## 2022-11-08 ENCOUNTER — OFFICE VISIT (OUTPATIENT)
Dept: FAMILY MEDICINE CLINIC | Facility: CLINIC | Age: 72
End: 2022-11-08

## 2022-11-08 VITALS
OXYGEN SATURATION: 96 % | HEART RATE: 80 BPM | BODY MASS INDEX: 23.34 KG/M2 | SYSTOLIC BLOOD PRESSURE: 147 MMHG | TEMPERATURE: 97.3 F | WEIGHT: 154 LBS | DIASTOLIC BLOOD PRESSURE: 76 MMHG | HEIGHT: 68 IN

## 2022-11-08 DIAGNOSIS — R30.0 DYSURIA: ICD-10-CM

## 2022-11-08 DIAGNOSIS — I10 PRIMARY HYPERTENSION: ICD-10-CM

## 2022-11-08 DIAGNOSIS — N76.0 ACUTE VAGINITIS: ICD-10-CM

## 2022-11-08 DIAGNOSIS — E11.9 TYPE 2 DIABETES MELLITUS WITHOUT COMPLICATION, WITHOUT LONG-TERM CURRENT USE OF INSULIN: ICD-10-CM

## 2022-11-08 DIAGNOSIS — E55.9 VITAMIN D DEFICIENCY: ICD-10-CM

## 2022-11-08 DIAGNOSIS — Z95.0 PACEMAKER: ICD-10-CM

## 2022-11-08 DIAGNOSIS — R09.89 RIGHT CAROTID BRUIT: ICD-10-CM

## 2022-11-08 DIAGNOSIS — E78.5 HYPERLIPIDEMIA, UNSPECIFIED HYPERLIPIDEMIA TYPE: ICD-10-CM

## 2022-11-08 DIAGNOSIS — D22.9 ATYPICAL MOLE: Primary | ICD-10-CM

## 2022-11-08 DIAGNOSIS — E53.8 B12 DEFICIENCY: ICD-10-CM

## 2022-11-08 LAB
25(OH)D3 SERPL-MCNC: 77.3 NG/ML (ref 30–100)
ALBUMIN SERPL-MCNC: 4.2 G/DL (ref 3.5–5.2)
ALBUMIN/GLOB SERPL: 1.4 G/DL
ALP SERPL-CCNC: 76 U/L (ref 39–117)
ALT SERPL W P-5'-P-CCNC: 15 U/L (ref 1–33)
ANION GAP SERPL CALCULATED.3IONS-SCNC: 10.6 MMOL/L (ref 5–15)
AST SERPL-CCNC: 21 U/L (ref 1–32)
BASOPHILS # BLD AUTO: 0.03 10*3/MM3 (ref 0–0.2)
BASOPHILS NFR BLD AUTO: 0.4 % (ref 0–1.5)
BILIRUB SERPL-MCNC: 0.4 MG/DL (ref 0–1.2)
BUN SERPL-MCNC: 13 MG/DL (ref 8–23)
BUN/CREAT SERPL: 19.7 (ref 7–25)
CALCIUM SPEC-SCNC: 9.4 MG/DL (ref 8.6–10.5)
CHLORIDE SERPL-SCNC: 105 MMOL/L (ref 98–107)
CHOLEST SERPL-MCNC: 154 MG/DL (ref 0–200)
CO2 SERPL-SCNC: 26.4 MMOL/L (ref 22–29)
CREAT SERPL-MCNC: 0.66 MG/DL (ref 0.57–1)
DEPRECATED RDW RBC AUTO: 38.7 FL (ref 37–54)
EGFRCR SERPLBLD CKD-EPI 2021: 92.2 ML/MIN/1.73
EOSINOPHIL # BLD AUTO: 0.08 10*3/MM3 (ref 0–0.4)
EOSINOPHIL NFR BLD AUTO: 1.1 % (ref 0.3–6.2)
ERYTHROCYTE [DISTWIDTH] IN BLOOD BY AUTOMATED COUNT: 11.9 % (ref 12.3–15.4)
GLOBULIN UR ELPH-MCNC: 2.9 GM/DL
GLUCOSE SERPL-MCNC: 154 MG/DL (ref 65–99)
HBA1C MFR BLD: 8 % (ref 3.5–5.6)
HCT VFR BLD AUTO: 36.7 % (ref 34–46.6)
HDLC SERPL-MCNC: 61 MG/DL (ref 40–60)
HGB BLD-MCNC: 12.5 G/DL (ref 12–15.9)
IMM GRANULOCYTES # BLD AUTO: 0.01 10*3/MM3 (ref 0–0.05)
IMM GRANULOCYTES NFR BLD AUTO: 0.1 % (ref 0–0.5)
LDLC SERPL CALC-MCNC: 80 MG/DL (ref 0–100)
LDLC/HDLC SERPL: 1.31 {RATIO}
LYMPHOCYTES # BLD AUTO: 1.85 10*3/MM3 (ref 0.7–3.1)
LYMPHOCYTES NFR BLD AUTO: 26 % (ref 19.6–45.3)
MCH RBC QN AUTO: 30.6 PG (ref 26.6–33)
MCHC RBC AUTO-ENTMCNC: 34.1 G/DL (ref 31.5–35.7)
MCV RBC AUTO: 89.7 FL (ref 79–97)
MONOCYTES # BLD AUTO: 0.54 10*3/MM3 (ref 0.1–0.9)
MONOCYTES NFR BLD AUTO: 7.6 % (ref 5–12)
NEUTROPHILS NFR BLD AUTO: 4.6 10*3/MM3 (ref 1.7–7)
NEUTROPHILS NFR BLD AUTO: 64.8 % (ref 42.7–76)
NRBC BLD AUTO-RTO: 0 /100 WBC (ref 0–0.2)
PLATELET # BLD AUTO: 255 10*3/MM3 (ref 140–450)
PMV BLD AUTO: 11.5 FL (ref 6–12)
POTASSIUM SERPL-SCNC: 3.6 MMOL/L (ref 3.5–5.2)
PROT SERPL-MCNC: 7.1 G/DL (ref 6–8.5)
RBC # BLD AUTO: 4.09 10*6/MM3 (ref 3.77–5.28)
SODIUM SERPL-SCNC: 142 MMOL/L (ref 136–145)
TRIGL SERPL-MCNC: 64 MG/DL (ref 0–150)
TSH SERPL DL<=0.05 MIU/L-ACNC: 1.42 UIU/ML (ref 0.27–4.2)
VIT B12 BLD-MCNC: 673 PG/ML (ref 211–946)
VLDLC SERPL-MCNC: 13 MG/DL (ref 5–40)
WBC NRBC COR # BLD: 7.11 10*3/MM3 (ref 3.4–10.8)

## 2022-11-08 PROCEDURE — 84443 ASSAY THYROID STIM HORMONE: CPT | Performed by: PREVENTIVE MEDICINE

## 2022-11-08 PROCEDURE — 99214 OFFICE O/P EST MOD 30 MIN: CPT | Performed by: PREVENTIVE MEDICINE

## 2022-11-08 PROCEDURE — 82306 VITAMIN D 25 HYDROXY: CPT | Performed by: PREVENTIVE MEDICINE

## 2022-11-08 PROCEDURE — 82043 UR ALBUMIN QUANTITATIVE: CPT | Performed by: PREVENTIVE MEDICINE

## 2022-11-08 PROCEDURE — 82607 VITAMIN B-12: CPT | Performed by: PREVENTIVE MEDICINE

## 2022-11-08 PROCEDURE — 80053 COMPREHEN METABOLIC PANEL: CPT | Performed by: PREVENTIVE MEDICINE

## 2022-11-08 PROCEDURE — 82570 ASSAY OF URINE CREATININE: CPT | Performed by: PREVENTIVE MEDICINE

## 2022-11-08 PROCEDURE — 80061 LIPID PANEL: CPT | Performed by: PREVENTIVE MEDICINE

## 2022-11-08 PROCEDURE — 83036 HEMOGLOBIN GLYCOSYLATED A1C: CPT | Performed by: PREVENTIVE MEDICINE

## 2022-11-08 PROCEDURE — 85025 COMPLETE CBC W/AUTO DIFF WBC: CPT | Performed by: PREVENTIVE MEDICINE

## 2022-11-08 RX ORDER — CLOTRIMAZOLE 1 %
1 CREAM (GRAM) TOPICAL 2 TIMES DAILY
COMMUNITY

## 2022-11-08 NOTE — PROGRESS NOTES
"Subjective   Celsa Brown is a 72 y.o. female presents for   Chief Complaint   Patient presents with   • Follow-up     3 month    Patient presents today for 3-month recheck and states that her diabetes has been stable.  She is having no more burning with urination but now has developed some irritation around her vagina probably from the antibiotic she was on for the UTI.  She has left us a urine specimen to make sure off the antibiotics that she is cleared.  She is already starting 7 days of clotrimazole locally.  If that does not work we will try fluconazole patient will call and let us know.  Blood pressure was elevated in the office as it usually is for her but it is always controlled at home.  She has not had any increase in dizziness I will try to get in and get her eye exam blood sugars have been running between 102 100    There are no preventive care reminders to display for this patient.    History of Present Illness     Vitals:    11/08/22 0808 11/08/22 0813 11/08/22 0814   BP: 157/72 150/71 147/76   BP Location:   Right arm   Patient Position:   Standing   Pulse: 80     Temp: 97.3 °F (36.3 °C)     SpO2: 96%     Weight: 69.9 kg (154 lb)     Height: 172.7 cm (67.99\")       Body mass index is 23.42 kg/m².    Current Outpatient Medications on File Prior to Visit   Medication Sig Dispense Refill   • Accu-Chek Thelma Plus test strip Testing blood sugar once daily Dx E11.6 90 each 3   • amLODIPine (NORVASC) 5 MG tablet Take one on odd days and two on even days by mouth 135 tablet 3   • aspirin 81 MG EC tablet Take 81 mg by mouth Daily. 1 tablet every other day     • cholecalciferol (VITAMIN D3) 1000 units tablet Take 1,000 Units by mouth Every Other Day.     • clotrimazole (LOTRIMIN) 1 % cream Apply 1 application topically to the appropriate area as directed 2 (Two) Times a Day.     • Cyanocobalamin (VITAMIN B12) 1000 MCG tablet controlled-release Take 1,000 mcg by mouth Every Other Day.     • estradiol (ESTRACE) " 0.1 MG/GM vaginal cream Insert 2 g into the vagina Daily.     • glipizide (GLUCOTROL) 10 MG tablet Take two tablets by mouth twice daily by mouth 360 tablet 3   • hydrALAZINE (APRESOLINE) 100 MG tablet Take 1 tablet by mouth 2 (Two) Times a Day. 180 tablet 3   • hydroCHLOROthiazide (HYDRODIURIL) 25 MG tablet Take 1 tablet by mouth Daily. 90 tablet 3   • lisinopril (PRINIVIL,ZESTRIL) 40 MG tablet Take 1 tablet by mouth Daily. 90 tablet 3   • loratadine (CLARITIN) 10 MG tablet Take 10 mg by mouth Daily.     • Melatonin 10 MG tablet Take 10 mg by mouth Daily.     • metFORMIN (GLUCOPHAGE) 500 MG tablet Take 2 tablets by mouth 2 (Two) Times a Day With Meals. 360 tablet 3   • metoprolol succinate XL (TOPROL-XL) 100 MG 24 hr tablet Take 1/2 tablet at night. 90 tablet 3   • Multiple Vitamins-Minerals (CENTRUM SILVER ULTRA WOMENS) tablet Take 1 tablet by mouth Daily.     • Omega-3 Fatty Acids (OMEGA-3 FISH OIL) 500 MG capsule Take 500 mg by mouth Daily.     • potassium chloride (MICRO-K) 10 MEQ CR capsule Take 1 capsule by mouth am and pm on weekends and 2 capsules by mouth am and one pm on weekdays 208 capsule 3   • pravastatin (PRAVACHOL) 80 MG tablet Take 1 tablet by mouth every night at bedtime. 90 tablet 3   • SITagliptin (Januvia) 100 MG tablet Take 1 tablet by mouth Daily. 90 tablet 3   • cephalexin (Keflex) 500 MG capsule Take 1 capsule by mouth 4 (Four) Times a Day. 40 capsule 0     No current facility-administered medications on file prior to visit.       The following portions of the patient's history were reviewed and updated as appropriate: allergies, current medications, past family history, past medical history, past social history, past surgical history and problem list.    Review of Systems   Genitourinary: Positive for vaginal discharge and vaginal pain.       Objective   Physical Exam  Vitals reviewed.   Constitutional:       General: She is not in acute distress.     Appearance: Normal appearance. She is  well-developed. She is not ill-appearing or toxic-appearing.   HENT:      Head: Normocephalic and atraumatic.      Right Ear: Tympanic membrane, ear canal and external ear normal.      Left Ear: Tympanic membrane, ear canal and external ear normal.      Nose: Nose normal.   Eyes:      Extraocular Movements: Extraocular movements intact.      Conjunctiva/sclera: Conjunctivae normal.      Pupils: Pupils are equal, round, and reactive to light.   Cardiovascular:      Rate and Rhythm: Normal rate and regular rhythm.      Pulses:           Dorsalis pedis pulses are 2+ on the right side and 2+ on the left side.        Posterior tibial pulses are 2+ on the right side and 2+ on the left side.      Heart sounds: Normal heart sounds.   Pulmonary:      Effort: Pulmonary effort is normal.      Breath sounds: Normal breath sounds.   Abdominal:      General: Bowel sounds are normal. There is no distension.      Palpations: Abdomen is soft. There is no mass.      Tenderness: There is no abdominal tenderness.   Genitourinary:     Comments: Vulva red and irritated  Musculoskeletal:         General: Normal range of motion.      Cervical back: Neck supple.   Feet:      Right foot:      Protective Sensation: 10 sites tested. 10 sites sensed.      Skin integrity: Callus present.      Toenail Condition: Right toenails are abnormally thick.      Left foot:      Protective Sensation: 10 sites tested. 10 sites sensed.      Skin integrity: Callus present.      Toenail Condition: Left toenails are abnormally thick.      Comments: Diabetic Foot Exam Performed and Monofilament Test Performed    Skin:     General: Skin is warm.      Findings: Rash present.   Neurological:      General: No focal deficit present.      Mental Status: She is alert and oriented to person, place, and time.   Psychiatric:         Mood and Affect: Mood normal.         Behavior: Behavior normal.       PHQ-9 Total Score:      Assessment & Plan   Diagnoses and all orders  for this visit:    1. Atypical mole (Primary)  Comments:  Will see derm-again advised    2. B12 deficiency  Comments:  Takes vitamin B-12 regularly  Orders:  -     CBC Auto Differential; Future  -     Vitamin B12; Future  -     CBC Auto Differential  -     Vitamin B12    3. Dysuria  Comments:  No more burning and reculture off meds pending    4. Hyperlipidemia, unspecified hyperlipidemia type  Comments:  Patient is watching saturated fats  Orders:  -     Lipid Panel; Future  -     Lipid Panel    5. Primary hypertension  Comments:  Controlled at home after multiple checks    6. Pacemaker  Comments:  Seeing Dr. BLACKBURN in Dec    7. Right carotid bruit  Comments:  No new dizziness will repeat 5/23    8. Type 2 diabetes mellitus without complication, without long-term current use of insulin (HCC)  Comments:  100-200 will get eye exam soon  Orders:  -     Comprehensive Metabolic Panel; Future  -     Hemoglobin A1c; Future  -     Microalbumin / Creatinine Urine Ratio - Urine, Clean Catch; Future  -     TSH; Future  -     Comprehensive Metabolic Panel  -     Hemoglobin A1c  -     Microalbumin / Creatinine Urine Ratio - Urine, Clean Catch  -     TSH    9. Vitamin D deficiency  Comments:  Takes vitamin D regularly.  Orders:  -     Vitamin D,25-Hydroxy; Future  -     Vitamin D,25-Hydroxy    10. Acute vaginitis  Comments:  Will finish Clotrimazole and call if nneds Diflucan        Patient Instructions   There are no preventive care reminders to display for this patient.     Advise patient to set up dermatology evaluation with [facility]-

## 2022-11-08 NOTE — PROGRESS NOTES
Venipuncture Blood Specimen Collection  Venipuncture performed in [facility] by Aquilino Montano MA with good hemostasis. Patient tolerated the procedure well without complications.   11/08/22   Aquilino Montano MA

## 2022-11-09 ENCOUNTER — TELEPHONE (OUTPATIENT)
Dept: FAMILY MEDICINE CLINIC | Facility: CLINIC | Age: 72
End: 2022-11-09

## 2022-11-09 LAB
ALBUMIN UR-MCNC: <1.2 MG/DL
CREAT UR-MCNC: 26.8 MG/DL
MICROALBUMIN/CREAT UR: NORMAL MG/G{CREAT}

## 2022-11-10 NOTE — PROGRESS NOTES
A1C is still 8-can she do more with diet and exercise or should we increase or add meds for DM.  Let me know

## 2022-11-10 NOTE — TELEPHONE ENCOUNTER
Hub to read  ----- Message from Jaelyn Iverson MD sent at 11/9/2022  7:57 PM EST -----  A1C is still 8-can she do more with diet and exercise or should we increase or add meds for DM.  Let me know    
Average

## 2022-12-29 ENCOUNTER — CLINICAL SUPPORT NO REQUIREMENTS (OUTPATIENT)
Dept: CARDIOLOGY | Facility: CLINIC | Age: 72
End: 2022-12-29

## 2022-12-29 ENCOUNTER — OFFICE VISIT (OUTPATIENT)
Dept: CARDIOLOGY | Facility: CLINIC | Age: 72
End: 2022-12-29

## 2022-12-29 VITALS
BODY MASS INDEX: 23.64 KG/M2 | DIASTOLIC BLOOD PRESSURE: 57 MMHG | SYSTOLIC BLOOD PRESSURE: 150 MMHG | HEIGHT: 68 IN | HEART RATE: 74 BPM | OXYGEN SATURATION: 97 % | WEIGHT: 156 LBS

## 2022-12-29 DIAGNOSIS — Z95.0 PACEMAKER: Primary | ICD-10-CM

## 2022-12-29 DIAGNOSIS — I49.5 SICK SINUS SYNDROME: ICD-10-CM

## 2022-12-29 DIAGNOSIS — R06.02 SHORTNESS OF BREATH: ICD-10-CM

## 2022-12-29 DIAGNOSIS — I10 ESSENTIAL HYPERTENSION: ICD-10-CM

## 2022-12-29 DIAGNOSIS — E08.9 DIABETES MELLITUS DUE TO UNDERLYING CONDITION WITHOUT COMPLICATION, WITHOUT LONG-TERM CURRENT USE OF INSULIN: ICD-10-CM

## 2022-12-29 DIAGNOSIS — E78.5 DYSLIPIDEMIA: ICD-10-CM

## 2022-12-29 DIAGNOSIS — I45.10 RIGHT BUNDLE BRANCH BLOCK: ICD-10-CM

## 2022-12-29 DIAGNOSIS — R42 DIZZINESS: ICD-10-CM

## 2022-12-29 DIAGNOSIS — I44.1 SECOND DEGREE AV BLOCK, MOBITZ TYPE II: ICD-10-CM

## 2022-12-29 DIAGNOSIS — R55 NEAR SYNCOPE: ICD-10-CM

## 2022-12-29 DIAGNOSIS — I44.1 AV BLOCK, 2ND DEGREE: ICD-10-CM

## 2022-12-29 PROCEDURE — 93280 PM DEVICE PROGR EVAL DUAL: CPT | Performed by: INTERNAL MEDICINE

## 2022-12-29 PROCEDURE — 99214 OFFICE O/P EST MOD 30 MIN: CPT | Performed by: INTERNAL MEDICINE

## 2022-12-29 PROCEDURE — 93000 ELECTROCARDIOGRAM COMPLETE: CPT | Performed by: INTERNAL MEDICINE

## 2022-12-29 NOTE — PROGRESS NOTES
Encounter Date:12/29/2022  Last seen 5/25/2022      Patient ID: Celsa Brown is a 72 y.o. female.    Chief Complaint:  Status post pacemaker  Palpitations  Diabetes  Dyslipidemia  Hypertension        History of Present Illness  Since I have last seen, the patient has been without any chest discomfort ,shortness of breath, palpitations, dizziness or syncope.  Denies having any headache ,abdominal pain ,nausea, vomiting , diarrhea constipation, loss of weight or loss of appetite.  Denies having any excessive bruising ,hematuria or blood in the stool.    Review of all systems negative except as indicated.    Reviewed ROS.  Assessment and Plan         ]]]]]]]]]]]]]]]]]]]  Impression  ==========    -Status post permanent dual-chamber pacemaker implantation ([employer] MRI compatible).  7/13/2021     -Palpitations probably due to PMT and SVT.  Suspect strongly patient is having episodes of PMT.  Recently PVARP was increased    -Second-degree Mobitz type II AV block.  Right bundle branch block (prior to pacemaker implantation)     -Chest discomfort and shortness of breath with or without exertion.     -Dizziness and near syncope-improved since patient had pacemaker implantation    Echocardiogram-normal 7/12/2021  Stress Cardiolite test-normal except patient has second-degree 2-1 AV block and right bundle branch block.  Very limited exercise tolerance.     -Right bundle branch block     -Sinus bradycardia     -Diabetes dyslipidemia hypertension     -Status post tonsillectomy abdominal tubal ligation     -Family history of coronary artery disease     -Non-smoker     -Allergic to sulfa  =============  Plan  ==========  Palpitations-improved    History of PMT and SVT.  Patient was asked to take extra metoprolol as needed and twice a day if needed.  EKG showed atrial sensed ventricular paced rhythm.-12/29/2022     Status post permanent dual-chamber pacemaker implantation 7/13/2021 ([employer]  compatible).  Pacemaker site and function is normal.  Interrogation of the pacemaker revealed excellent pacing parameters.  Battery status was 7 years.     Rare atrial fibrillation.  Observe closely.  Consider anticoagulation if patient has increased atrial fibrillation load.     Hypertension-stable  150/57    Medications were reviewed and updated.    Patient is on amlodipine hydralazine hydrochlorothiazide lisinopril 40 mg a day.  Patient is on metoprolol 100 mg tablet half a tablet a day.     Follow-up in the office in 6 months with pacemaker interrogation.     Further plan will depend on patient's progress.  ]]]]]]]]]]]]]]                 Diagnosis Plan   1. Pacemaker        2. Second degree AV block, Mobitz type II        3. AV block, 2nd degree        4. Dyslipidemia        5. Sick sinus syndrome (HCC)        6. Right bundle branch block        7. Essential hypertension        8. Shortness of breath        9. Diabetes mellitus due to underlying condition without complication, without long-term current use of insulin (HCC)        10. Dizziness        11. Near syncope        LAB RESULTS (LAST 7 DAYS)    CBC        BMP        CMP         BNP        TROPONIN        CoAg        Creatinine Clearance  CrCl cannot be calculated (Patient's most recent lab result is older than the maximum 30 days allowed.).    ABG        Radiology  No radiology results for the last day                The following portions of the patient's history were reviewed and updated as appropriate: allergies, current medications, past family history, past medical history, past social history, past surgical history and problem list.    Review of Systems   Constitutional: Negative for malaise/fatigue.   Cardiovascular: Negative for chest pain, dyspnea on exertion, leg swelling and palpitations.   Respiratory: Negative for cough and shortness of breath.    Gastrointestinal: Negative for abdominal pain, nausea and vomiting.   Neurological: Negative  for dizziness, focal weakness, headaches, light-headedness and numbness.   All other systems reviewed and are negative.        Current Outpatient Medications:   •  Accu-Chek Thelma Plus test strip, Testing blood sugar once daily Dx E11.6, Disp: 90 each, Rfl: 3  •  amLODIPine (NORVASC) 5 MG tablet, Take one on odd days and two on even days by mouth, Disp: 135 tablet, Rfl: 3  •  aspirin 81 MG EC tablet, Take 81 mg by mouth Daily. 1 tablet every other day, Disp: , Rfl:   •  cephalexin (Keflex) 500 MG capsule, Take 1 capsule by mouth 4 (Four) Times a Day., Disp: 40 capsule, Rfl: 0  •  cholecalciferol (VITAMIN D3) 1000 units tablet, Take 1,000 Units by mouth Every Other Day., Disp: , Rfl:   •  clotrimazole (LOTRIMIN) 1 % cream, Apply 1 application topically to the appropriate area as directed 2 (Two) Times a Day., Disp: , Rfl:   •  Cyanocobalamin (VITAMIN B12) 1000 MCG tablet controlled-release, Take 1,000 mcg by mouth Every Other Day., Disp: , Rfl:   •  estradiol (ESTRACE) 0.1 MG/GM vaginal cream, Insert 2 g into the vagina Daily., Disp: , Rfl:   •  glipizide (GLUCOTROL) 10 MG tablet, Take two tablets by mouth twice daily by mouth, Disp: 360 tablet, Rfl: 3  •  hydrALAZINE (APRESOLINE) 100 MG tablet, Take 1 tablet by mouth 2 (Two) Times a Day., Disp: 180 tablet, Rfl: 3  •  hydroCHLOROthiazide (HYDRODIURIL) 25 MG tablet, Take 1 tablet by mouth Daily., Disp: 90 tablet, Rfl: 3  •  lisinopril (PRINIVIL,ZESTRIL) 40 MG tablet, Take 1 tablet by mouth Daily., Disp: 90 tablet, Rfl: 3  •  loratadine (CLARITIN) 10 MG tablet, Take 10 mg by mouth Daily., Disp: , Rfl:   •  Melatonin 10 MG tablet, Take 10 mg by mouth Daily., Disp: , Rfl:   •  metFORMIN (GLUCOPHAGE) 500 MG tablet, Take 2 tablets by mouth 2 (Two) Times a Day With Meals., Disp: 360 tablet, Rfl: 3  •  metoprolol succinate XL (TOPROL-XL) 100 MG 24 hr tablet, Take 1/2 tablet at night., Disp: 90 tablet, Rfl: 3  •  Multiple Vitamins-Minerals (CENTRUM SILVER ULTRA WOMENS)  tablet, Take 1 tablet by mouth Daily., Disp: , Rfl:   •  Omega-3 Fatty Acids (OMEGA-3 FISH OIL) 500 MG capsule, Take 500 mg by mouth Daily., Disp: , Rfl:   •  potassium chloride (MICRO-K) 10 MEQ CR capsule, Take 1 capsule by mouth am and pm on weekends and 2 capsules by mouth am and one pm on weekdays, Disp: 208 capsule, Rfl: 3  •  pravastatin (PRAVACHOL) 80 MG tablet, Take 1 tablet by mouth every night at bedtime., Disp: 90 tablet, Rfl: 3  •  SITagliptin (Januvia) 100 MG tablet, Take 1 tablet by mouth Daily., Disp: 90 tablet, Rfl: 3    Allergies   Allergen Reactions   • Sulfa Antibiotics Hives   • Macrobid [Nitrofurantoin] Rash       Family History   Problem Relation Age of Onset   • Heart failure Mother    • Diabetes Mother    • Breast cancer Neg Hx    • Ovarian cancer Neg Hx        Past Surgical History:   Procedure Laterality Date   • CARDIAC CATHETERIZATION     • CARDIAC ELECTROPHYSIOLOGY PROCEDURE N/A 7/13/2021    Procedure: Pacemaker DC new;  Surgeon: Dung Lynch MD;  Location: [facility] INVASIVE LOCATION;  Service: Cardiovascular;  Laterality: N/A;   • COLONOSCOPY     • TONSILLECTOMY     • TUBAL ABDOMINAL LIGATION         Past Medical History:   Diagnosis Date   • Abdominal pain 05/21/2022   • B12 deficiency    • Hyperlipidemia    • Hypertension    • Tachycardia    • Vitamin D deficiency        Family History   Problem Relation Age of Onset   • Heart failure Mother    • Diabetes Mother    • Breast cancer Neg Hx    • Ovarian cancer Neg Hx        Social History     Socioeconomic History   • Marital status:    Tobacco Use   • Smoking status: Never   • Smokeless tobacco: Never   Vaping Use   • Vaping Use: Never used   Substance and Sexual Activity   • Alcohol use: No   • Drug use: No   • Sexual activity: Yes     Partners: Male     Birth control/protection: None           ECG 12 Lead    Date/Time: 12/29/2022 11:16 AM  Performed by: Dung Lynch MD  Authorized by: Dung Lynch MD  "  Comparison: compared with previous ECG   Similar to previous ECG  Comparison to previous ECG: Atrial sensed ventricular paced rhythm 74/min nonspecific ST-T wave changes left anterior fascicular block no ectopy no significant change from previous EKG.                Objective:       Physical Exam    /57 (BP Location: Right arm, Patient Position: Sitting, Cuff Size: Large Adult)   Pulse 74   Ht 172.7 cm (68\")   Wt 70.8 kg (156 lb)   LMP  (LMP Unknown)   SpO2 97%   BMI 23.72 kg/m²   The patient is alert, oriented and in no distress.    Vital signs as noted above.    Head and neck revealed no carotid bruits or jugular venous distension.  No thyromegaly or lymphadenopathy is present.    Lungs clear.  No wheezing.  Breath sounds are normal bilaterally.    Heart normal first and second heart sounds.  No murmur..  No pericardial rub is present.  No gallop is present.    Abdomen soft and nontender.  No organomegaly is present.    Extremities revealed good peripheral pulses without any pedal edema.    Skin warm and dry.  Pacemaker site looks normal.    Musculoskeletal system is grossly normal.    CNS grossly normal.    Reviewed and updated.        "

## 2023-01-10 PROCEDURE — 93296 REM INTERROG EVL PM/IDS: CPT | Performed by: INTERNAL MEDICINE

## 2023-01-10 PROCEDURE — 93294 REM INTERROG EVL PM/LDLS PM: CPT | Performed by: INTERNAL MEDICINE

## 2023-02-08 PROBLEM — I49.5 SICK SINUS SYNDROME: Status: RESOLVED | Noted: 2022-05-25 | Resolved: 2023-02-08

## 2023-02-08 PROBLEM — R55 NEAR SYNCOPE: Status: RESOLVED | Noted: 2021-07-12 | Resolved: 2023-02-08

## 2023-02-09 ENCOUNTER — TELEPHONE (OUTPATIENT)
Dept: FAMILY MEDICINE CLINIC | Facility: CLINIC | Age: 73
End: 2023-02-09

## 2023-02-09 ENCOUNTER — OFFICE VISIT (OUTPATIENT)
Dept: FAMILY MEDICINE CLINIC | Facility: CLINIC | Age: 73
End: 2023-02-09
Payer: MEDICARE

## 2023-02-09 VITALS
BODY MASS INDEX: 23.67 KG/M2 | TEMPERATURE: 98.4 F | HEART RATE: 78 BPM | DIASTOLIC BLOOD PRESSURE: 76 MMHG | WEIGHT: 156.2 LBS | SYSTOLIC BLOOD PRESSURE: 130 MMHG | HEIGHT: 68 IN | OXYGEN SATURATION: 98 %

## 2023-02-09 DIAGNOSIS — D22.9 ATYPICAL MOLE: ICD-10-CM

## 2023-02-09 DIAGNOSIS — E11.9 TYPE 2 DIABETES MELLITUS WITHOUT COMPLICATION, WITHOUT LONG-TERM CURRENT USE OF INSULIN: ICD-10-CM

## 2023-02-09 DIAGNOSIS — R10.9 ABDOMINAL PAIN, UNSPECIFIED ABDOMINAL LOCATION: ICD-10-CM

## 2023-02-09 DIAGNOSIS — R09.89 RIGHT CAROTID BRUIT: ICD-10-CM

## 2023-02-09 DIAGNOSIS — E78.5 HYPERLIPIDEMIA, UNSPECIFIED HYPERLIPIDEMIA TYPE: ICD-10-CM

## 2023-02-09 DIAGNOSIS — I10 PRIMARY HYPERTENSION: ICD-10-CM

## 2023-02-09 DIAGNOSIS — Z12.31 ENCOUNTER FOR SCREENING MAMMOGRAM FOR MALIGNANT NEOPLASM OF BREAST: Primary | ICD-10-CM

## 2023-02-09 DIAGNOSIS — R30.0 DYSURIA: ICD-10-CM

## 2023-02-09 LAB
ALBUMIN UR-MCNC: <1.2 MG/DL
CREAT UR-MCNC: 23.4 MG/DL
HBA1C MFR BLD: 7.7 % (ref 3.5–5.6)
MICROALBUMIN/CREAT UR: NORMAL MG/G{CREAT}

## 2023-02-09 PROCEDURE — 83036 HEMOGLOBIN GLYCOSYLATED A1C: CPT | Performed by: PREVENTIVE MEDICINE

## 2023-02-09 PROCEDURE — 80061 LIPID PANEL: CPT | Performed by: PREVENTIVE MEDICINE

## 2023-02-09 PROCEDURE — 82570 ASSAY OF URINE CREATININE: CPT | Performed by: PREVENTIVE MEDICINE

## 2023-02-09 PROCEDURE — 80053 COMPREHEN METABOLIC PANEL: CPT | Performed by: PREVENTIVE MEDICINE

## 2023-02-09 PROCEDURE — 82043 UR ALBUMIN QUANTITATIVE: CPT | Performed by: PREVENTIVE MEDICINE

## 2023-02-09 PROCEDURE — 85025 COMPLETE CBC W/AUTO DIFF WBC: CPT | Performed by: PREVENTIVE MEDICINE

## 2023-02-09 PROCEDURE — 99214 OFFICE O/P EST MOD 30 MIN: CPT | Performed by: PREVENTIVE MEDICINE

## 2023-02-09 NOTE — TELEPHONE ENCOUNTER
PT BROUGHT IN A LIST OF BLOOD PRESSURES, PULSE RATES, AND BLOOD SUGARS, AVERAGES ARE AS FOLLOWS;    BP:        126/72  HR:          81  SUGAR:   113

## 2023-02-09 NOTE — PROGRESS NOTES
"Subjective   Celsa Brown is a 72 y.o. female presents for   Chief Complaint   Patient presents with   • Follow-up     Patient presents today for follow-up on multiple chronic health conditions most of which are stable blood pressures well controlled at home as is her diabetes she had her atypical moles evaluated recently by dermatology and is seeing the urologist on the 22nd to get a recheck of her urine she will follow-up on her right carotid bruit in the next few months and abdominal pain is gone.  Health Maintenance Due   Topic Date Due   • MAMMOGRAM  01/17/2023       History of Present Illness     Vitals:    02/09/23 0919 02/09/23 0924 02/09/23 0925   BP: 142/72 147/77 130/76   BP Location: Right arm Left arm Left arm   Patient Position: Sitting Sitting Standing   Cuff Size: Adult Adult Adult   Pulse: 78     Temp: 98.4 °F (36.9 °C)     TempSrc: Tympanic     SpO2: 98%     Weight: 70.9 kg (156 lb 3.2 oz)     Height: 172.7 cm (67.99\")       Body mass index is 23.76 kg/m².    Current Outpatient Medications on File Prior to Visit   Medication Sig Dispense Refill   • Accu-Chek Thelma Plus test strip Testing blood sugar once daily Dx E11.6 90 each 3   • amLODIPine (NORVASC) 5 MG tablet Take one on odd days and two on even days by mouth 135 tablet 3   • aspirin 81 MG EC tablet Take 81 mg by mouth Daily. 1 tablet every other day     • cephalexin (Keflex) 500 MG capsule Take 1 capsule by mouth 4 (Four) Times a Day. 40 capsule 0   • cholecalciferol (VITAMIN D3) 1000 units tablet Take 1,000 Units by mouth Every Other Day.     • clotrimazole (LOTRIMIN) 1 % cream Apply 1 application topically to the appropriate area as directed 2 (Two) Times a Day.     • Cyanocobalamin (VITAMIN B12) 1000 MCG tablet controlled-release Take 1,000 mcg by mouth Every Other Day.     • estradiol (ESTRACE) 0.1 MG/GM vaginal cream Insert 2 g into the vagina Daily.     • glipizide (GLUCOTROL) 10 MG tablet Take two tablets by mouth twice daily by mouth " 360 tablet 3   • hydrALAZINE (APRESOLINE) 100 MG tablet Take 1 tablet by mouth 2 (Two) Times a Day. 180 tablet 3   • hydroCHLOROthiazide (HYDRODIURIL) 25 MG tablet Take 1 tablet by mouth Daily. 90 tablet 3   • lisinopril (PRINIVIL,ZESTRIL) 40 MG tablet Take 1 tablet by mouth Daily. 90 tablet 3   • loratadine (CLARITIN) 10 MG tablet Take 10 mg by mouth Daily.     • Melatonin 10 MG tablet Take 10 mg by mouth Daily.     • metFORMIN (GLUCOPHAGE) 500 MG tablet Take 2 tablets by mouth 2 (Two) Times a Day With Meals. 360 tablet 3   • metoprolol succinate XL (TOPROL-XL) 100 MG 24 hr tablet Take 1/2 tablet at night. 90 tablet 3   • Multiple Vitamins-Minerals (CENTRUM SILVER ULTRA WOMENS) tablet Take 1 tablet by mouth Daily.     • Omega-3 Fatty Acids (OMEGA-3 FISH OIL) 500 MG capsule Take 500 mg by mouth Daily.     • potassium chloride (MICRO-K) 10 MEQ CR capsule Take 1 capsule by mouth am and pm on weekends and 2 capsules by mouth am and one pm on weekdays 208 capsule 3   • pravastatin (PRAVACHOL) 80 MG tablet Take 1 tablet by mouth every night at bedtime. 90 tablet 3   • SITagliptin (Januvia) 100 MG tablet Take 1 tablet by mouth Daily. 90 tablet 3     No current facility-administered medications on file prior to visit.       The following portions of the patient's history were reviewed and updated as appropriate: allergies, current medications, past family history, past medical history, past social history, past surgical history and problem list.    Review of Systems   Constitutional: Negative for fatigue.   Gastrointestinal: Negative for abdominal pain.   Neurological: Negative for dizziness.       Objective   Physical Exam  Vitals reviewed.   Constitutional:       General: She is not in acute distress.     Appearance: Normal appearance. She is well-developed. She is not ill-appearing or toxic-appearing.   HENT:      Head: Normocephalic and atraumatic.      Right Ear: Tympanic membrane, ear canal and external ear normal.       Left Ear: Tympanic membrane, ear canal and external ear normal.      Nose: Nose normal.      Mouth/Throat:      Mouth: Mucous membranes are moist.      Pharynx: No posterior oropharyngeal erythema.   Eyes:      Extraocular Movements: Extraocular movements intact.      Conjunctiva/sclera: Conjunctivae normal.      Pupils: Pupils are equal, round, and reactive to light.   Cardiovascular:      Rate and Rhythm: Normal rate and regular rhythm.      Pulses:           Dorsalis pedis pulses are 1+ on the right side and 1+ on the left side.        Posterior tibial pulses are 1+ on the right side and 1+ on the left side.      Heart sounds: Normal heart sounds.   Pulmonary:      Effort: Pulmonary effort is normal.      Breath sounds: Normal breath sounds.   Abdominal:      General: Bowel sounds are normal. There is no distension.      Palpations: Abdomen is soft. There is no mass.      Tenderness: There is no abdominal tenderness.   Musculoskeletal:      Cervical back: Neck supple.   Feet:      Right foot:      Protective Sensation: 10 sites tested. 10 sites sensed.      Skin integrity: Skin integrity normal.      Toenail Condition: Right toenails are abnormally thick.      Left foot:      Protective Sensation: 10 sites tested. 10 sites sensed.      Skin integrity: Skin integrity normal.      Toenail Condition: Left toenails are abnormally thick.      Comments: Diabetic Foot Exam Performed and Monofilament Test Performed    Skin:     General: Skin is warm.      Findings: Lesion present.   Neurological:      General: No focal deficit present.      Mental Status: She is alert and oriented to person, place, and time.   Psychiatric:         Mood and Affect: Mood normal.         Behavior: Behavior normal.       PHQ-9 Total Score: 0    Assessment & Plan   Diagnoses and all orders for this visit:    1. Encounter for screening mammogram for malignant neoplasm of breast (Primary)  -     Mammo Screening Digital Tomosynthesis  Bilateral With CAD; Future    2. Hyperlipidemia, unspecified hyperlipidemia type  Comments:  Watching sat fats  Orders:  -     Lipid Panel; Future  -     Lipid Panel    3. Primary hypertension  Comments:  Multiple readings show that the blood pressure average at home was 126/72 with average heart rate of 81 and her blood sugars have been 113  Orders:  -     CBC Auto Differential; Future  -     Comprehensive Metabolic Panel; Future  -     CBC Auto Differential  -     Comprehensive Metabolic Panel    4. Atypical mole  Comments:  Saw dermatologist within the last couple of weeks skin survey did not show any abnormalities that needed further follow-up    5. Type 2 diabetes mellitus without complication, without long-term current use of insulin ([facility])  Comments:  Glucose 100-200 and eye exam Utd  Orders:  -     Hemoglobin A1c; Future  -     Microalbumin / Creatinine Urine Ratio - Urine, Clean Catch; Future  -     Hemoglobin A1c  -     Microalbumin / Creatinine Urine Ratio - Urine, Clean Catch    6. Dysuria  Comments:  Seeing urologist 2/22 and will get recheck     7. Right carotid bruit  Comments:  Surveillance is coming this summer.    8. Abdominal pain, unspecified abdominal location  Comments:  Gone.        Patient Instructions     Health Maintenance Due   Topic Date Due   • MAMMOGRAM  01/17/2023      Needs US Right carotid 4/23

## 2023-02-09 NOTE — PROGRESS NOTES
Venipuncture performed on Left Arm by Dari Goode MA  with good hemostasis. Patient tolerated well. 02/09/23

## 2023-02-10 ENCOUNTER — TELEPHONE (OUTPATIENT)
Dept: FAMILY MEDICINE CLINIC | Facility: CLINIC | Age: 73
End: 2023-02-10
Payer: MEDICARE

## 2023-02-10 LAB
ALBUMIN SERPL-MCNC: 4.1 G/DL (ref 3.5–5.2)
ALBUMIN/GLOB SERPL: 1.5 G/DL
ALP SERPL-CCNC: 79 U/L (ref 39–117)
ALT SERPL W P-5'-P-CCNC: 19 U/L (ref 1–33)
ANION GAP SERPL CALCULATED.3IONS-SCNC: 13.5 MMOL/L (ref 5–15)
AST SERPL-CCNC: 22 U/L (ref 1–32)
BASOPHILS # BLD AUTO: 0.03 10*3/MM3 (ref 0–0.2)
BASOPHILS NFR BLD AUTO: 0.4 % (ref 0–1.5)
BILIRUB SERPL-MCNC: 0.3 MG/DL (ref 0–1.2)
BUN SERPL-MCNC: 17 MG/DL (ref 8–23)
BUN/CREAT SERPL: 27.4 (ref 7–25)
CALCIUM SPEC-SCNC: 9.3 MG/DL (ref 8.6–10.5)
CHLORIDE SERPL-SCNC: 104 MMOL/L (ref 98–107)
CHOLEST SERPL-MCNC: 149 MG/DL (ref 0–200)
CO2 SERPL-SCNC: 26.5 MMOL/L (ref 22–29)
CREAT SERPL-MCNC: 0.62 MG/DL (ref 0.57–1)
DEPRECATED RDW RBC AUTO: 39 FL (ref 37–54)
EGFRCR SERPLBLD CKD-EPI 2021: 93.6 ML/MIN/1.73
EOSINOPHIL # BLD AUTO: 0.1 10*3/MM3 (ref 0–0.4)
EOSINOPHIL NFR BLD AUTO: 1.3 % (ref 0.3–6.2)
ERYTHROCYTE [DISTWIDTH] IN BLOOD BY AUTOMATED COUNT: 12 % (ref 12.3–15.4)
GLOBULIN UR ELPH-MCNC: 2.7 GM/DL
GLUCOSE SERPL-MCNC: 132 MG/DL (ref 65–99)
HCT VFR BLD AUTO: 36.7 % (ref 34–46.6)
HDLC SERPL-MCNC: 62 MG/DL (ref 40–60)
HGB BLD-MCNC: 12.3 G/DL (ref 12–15.9)
IMM GRANULOCYTES # BLD AUTO: 0.02 10*3/MM3 (ref 0–0.05)
IMM GRANULOCYTES NFR BLD AUTO: 0.3 % (ref 0–0.5)
LDLC SERPL CALC-MCNC: 76 MG/DL (ref 0–100)
LDLC/HDLC SERPL: 1.25 {RATIO}
LYMPHOCYTES # BLD AUTO: 2.22 10*3/MM3 (ref 0.7–3.1)
LYMPHOCYTES NFR BLD AUTO: 29 % (ref 19.6–45.3)
MCH RBC QN AUTO: 30.1 PG (ref 26.6–33)
MCHC RBC AUTO-ENTMCNC: 33.5 G/DL (ref 31.5–35.7)
MCV RBC AUTO: 90 FL (ref 79–97)
MONOCYTES # BLD AUTO: 0.62 10*3/MM3 (ref 0.1–0.9)
MONOCYTES NFR BLD AUTO: 8.1 % (ref 5–12)
NEUTROPHILS NFR BLD AUTO: 4.67 10*3/MM3 (ref 1.7–7)
NEUTROPHILS NFR BLD AUTO: 60.9 % (ref 42.7–76)
NRBC BLD AUTO-RTO: 0 /100 WBC (ref 0–0.2)
PLATELET # BLD AUTO: 292 10*3/MM3 (ref 140–450)
PMV BLD AUTO: 11.4 FL (ref 6–12)
POTASSIUM SERPL-SCNC: 3.8 MMOL/L (ref 3.5–5.2)
PROT SERPL-MCNC: 6.8 G/DL (ref 6–8.5)
RBC # BLD AUTO: 4.08 10*6/MM3 (ref 3.77–5.28)
SODIUM SERPL-SCNC: 144 MMOL/L (ref 136–145)
TRIGL SERPL-MCNC: 49 MG/DL (ref 0–150)
VLDLC SERPL-MCNC: 11 MG/DL (ref 5–40)
WBC NRBC COR # BLD: 7.66 10*3/MM3 (ref 3.4–10.8)

## 2023-02-10 NOTE — PROGRESS NOTES
Blood sugar is 132 with an A1c showing improvement but still not controlled at 7.7.  If she has been taking all of her medicines as she should, we should consider adding Farxiga or placing her on an injection that she takes once weekly.  Please let me know what she decides.  Bad cholesterol is 76 and goal is below 70Can she do more with diet and exercise as she is on maximum dose of pravastatin?  We can change to a stronger statin or add Zetia please let me know about the above call if any other questions or concerns

## 2023-02-10 NOTE — TELEPHONE ENCOUNTER
HUB TO READ:  ----- Message from Jaelyn Iverson MD sent at 2/10/2023  7:39 AM EST -----  Blood sugar is 132 with an A1c showing improvement but still not controlled at 7.7.  If she has been taking all of her medicines as she should, we should consider adding Farxiga or placing her on an injection that she takes once weekly.  Please let me know what she decides.  Bad cholesterol is 76 and goal is below 70Can she do more with diet and exercise as she is on maximum dose of pravastatin?  We can change to a stronger statin or add Zetia please let me know about the above call if any other questions or concerns

## 2023-02-13 NOTE — TELEPHONE ENCOUNTER
Patient does not want to add zetia or a stronger statin.  Will try to do more with diet and exercise first.  Does not want an injectable either at this time.

## 2023-03-13 DIAGNOSIS — I10 PRIMARY HYPERTENSION: ICD-10-CM

## 2023-03-13 RX ORDER — METOPROLOL SUCCINATE 100 MG/1
TABLET, EXTENDED RELEASE ORAL
Qty: 45 TABLET | Refills: 3 | Status: SHIPPED | OUTPATIENT
Start: 2023-03-13

## 2023-03-13 RX ORDER — BLOOD SUGAR DIAGNOSTIC
STRIP MISCELLANEOUS
Qty: 100 EACH | Refills: 0 | Status: SHIPPED | OUTPATIENT
Start: 2023-03-13

## 2023-03-13 RX ORDER — LISINOPRIL 40 MG/1
TABLET ORAL
Qty: 90 TABLET | Refills: 0 | Status: SHIPPED | OUTPATIENT
Start: 2023-03-13

## 2023-03-13 RX ORDER — HYDROCHLOROTHIAZIDE 25 MG/1
TABLET ORAL
Qty: 90 TABLET | Refills: 0 | Status: SHIPPED | OUTPATIENT
Start: 2023-03-13

## 2023-03-13 RX ORDER — POTASSIUM CHLORIDE 750 MG/1
CAPSULE, EXTENDED RELEASE ORAL
Qty: 245 CAPSULE | Refills: 0 | Status: SHIPPED | OUTPATIENT
Start: 2023-03-13

## 2023-03-13 RX ORDER — PRAVASTATIN SODIUM 80 MG/1
TABLET ORAL
Qty: 90 TABLET | Refills: 0 | Status: SHIPPED | OUTPATIENT
Start: 2023-03-13

## 2023-03-13 RX ORDER — HYDRALAZINE HYDROCHLORIDE 100 MG/1
TABLET, FILM COATED ORAL
Qty: 270 TABLET | Refills: 3 | Status: SHIPPED | OUTPATIENT
Start: 2023-03-13

## 2023-03-13 RX ORDER — SITAGLIPTIN 100 MG/1
TABLET, FILM COATED ORAL
Qty: 90 TABLET | Refills: 3 | Status: SHIPPED | OUTPATIENT
Start: 2023-03-13

## 2023-03-30 ENCOUNTER — TELEPHONE (OUTPATIENT)
Dept: FAMILY MEDICINE CLINIC | Facility: CLINIC | Age: 73
End: 2023-03-30
Payer: MEDICARE

## 2023-04-10 ENCOUNTER — HOSPITAL ENCOUNTER (OUTPATIENT)
Dept: MAMMOGRAPHY | Facility: HOSPITAL | Age: 73
Discharge: HOME | End: 2023-04-10
Admitting: PREVENTIVE MEDICINE
Payer: MEDICARE

## 2023-04-10 DIAGNOSIS — Z12.31 ENCOUNTER FOR SCREENING MAMMOGRAM FOR MALIGNANT NEOPLASM OF BREAST: ICD-10-CM

## 2023-04-10 PROCEDURE — 77067 SCR MAMMO BI INCL CAD: CPT

## 2023-04-10 PROCEDURE — 77063 BREAST TOMOSYNTHESIS BI: CPT

## 2023-04-11 PROCEDURE — 93296 REM INTERROG EVL PM/IDS: CPT | Performed by: INTERNAL MEDICINE

## 2023-04-11 PROCEDURE — 93294 REM INTERROG EVL PM/LDLS PM: CPT | Performed by: INTERNAL MEDICINE

## 2023-04-12 ENCOUNTER — TELEPHONE (OUTPATIENT)
Dept: FAMILY MEDICINE CLINIC | Facility: CLINIC | Age: 73
End: 2023-04-12
Payer: MEDICARE

## 2023-04-12 NOTE — TELEPHONE ENCOUNTER
HUB TO READ:  ----- Message from Jaelyn Iverson MD sent at 4/11/2023  5:52 PM EDT -----  mammogram normal.  Ordered for one year unless change in symptoms or physical

## 2023-06-19 RX ORDER — PRAVASTATIN SODIUM 80 MG/1
TABLET ORAL
Qty: 90 TABLET | Refills: 0 | Status: SHIPPED | OUTPATIENT
Start: 2023-06-19

## 2023-06-19 RX ORDER — BLOOD SUGAR DIAGNOSTIC
STRIP MISCELLANEOUS
Qty: 100 EACH | Refills: 0 | Status: SHIPPED | OUTPATIENT
Start: 2023-06-19

## 2023-06-19 RX ORDER — LISINOPRIL 40 MG/1
TABLET ORAL
Qty: 90 TABLET | Refills: 0 | Status: SHIPPED | OUTPATIENT
Start: 2023-06-19

## 2023-06-28 ENCOUNTER — OFFICE (AMBULATORY)
Dept: URBAN - METROPOLITAN AREA PATHOLOGY 4 | Facility: PATHOLOGY | Age: 73
End: 2023-06-28
Payer: COMMERCIAL

## 2023-06-28 ENCOUNTER — ON CAMPUS - OUTPATIENT (AMBULATORY)
Dept: URBAN - METROPOLITAN AREA HOSPITAL 2 | Facility: HOSPITAL | Age: 73
End: 2023-06-28
Payer: COMMERCIAL

## 2023-06-28 VITALS
HEART RATE: 89 BPM | HEART RATE: 78 BPM | SYSTOLIC BLOOD PRESSURE: 139 MMHG | SYSTOLIC BLOOD PRESSURE: 142 MMHG | DIASTOLIC BLOOD PRESSURE: 67 MMHG | HEART RATE: 71 BPM | RESPIRATION RATE: 98 BRPM | DIASTOLIC BLOOD PRESSURE: 77 MMHG | HEART RATE: 86 BPM | RESPIRATION RATE: 17 BRPM | SYSTOLIC BLOOD PRESSURE: 185 MMHG | SYSTOLIC BLOOD PRESSURE: 102 MMHG | SYSTOLIC BLOOD PRESSURE: 111 MMHG | TEMPERATURE: 97.2 F | RESPIRATION RATE: 16 BRPM | OXYGEN SATURATION: 98 % | HEART RATE: 75 BPM | DIASTOLIC BLOOD PRESSURE: 73 MMHG | HEART RATE: 76 BPM | SYSTOLIC BLOOD PRESSURE: 131 MMHG | WEIGHT: 154 LBS | HEART RATE: 73 BPM | OXYGEN SATURATION: 97 % | SYSTOLIC BLOOD PRESSURE: 171 MMHG | HEIGHT: 66 IN | SYSTOLIC BLOOD PRESSURE: 133 MMHG | SYSTOLIC BLOOD PRESSURE: 107 MMHG | DIASTOLIC BLOOD PRESSURE: 61 MMHG | DIASTOLIC BLOOD PRESSURE: 71 MMHG | OXYGEN SATURATION: 99 % | SYSTOLIC BLOOD PRESSURE: 108 MMHG | DIASTOLIC BLOOD PRESSURE: 94 MMHG | DIASTOLIC BLOOD PRESSURE: 79 MMHG | DIASTOLIC BLOOD PRESSURE: 63 MMHG | OXYGEN SATURATION: 100 % | RESPIRATION RATE: 19 BRPM

## 2023-06-28 DIAGNOSIS — D12.4 BENIGN NEOPLASM OF DESCENDING COLON: ICD-10-CM

## 2023-06-28 DIAGNOSIS — D12.5 BENIGN NEOPLASM OF SIGMOID COLON: ICD-10-CM

## 2023-06-28 DIAGNOSIS — D12.3 BENIGN NEOPLASM OF TRANSVERSE COLON: ICD-10-CM

## 2023-06-28 DIAGNOSIS — Z86.010 PERSONAL HISTORY OF COLONIC POLYPS: ICD-10-CM

## 2023-06-28 DIAGNOSIS — K57.30 DIVERTICULOSIS OF LARGE INTESTINE WITHOUT PERFORATION OR ABS: ICD-10-CM

## 2023-06-28 DIAGNOSIS — K64.1 SECOND DEGREE HEMORRHOIDS: ICD-10-CM

## 2023-06-28 PROBLEM — K63.5 POLYP OF COLON: Status: ACTIVE | Noted: 2023-06-28

## 2023-06-28 LAB
GI HISTOLOGY: A. UNSPECIFIED: (no result)
GI HISTOLOGY: B. UNSPECIFIED: (no result)
GI HISTOLOGY: C. UNSPECIFIED: (no result)
GI HISTOLOGY: PDF REPORT: (no result)

## 2023-06-28 PROCEDURE — 45385 COLONOSCOPY W/LESION REMOVAL: CPT | Mod: PT | Performed by: INTERNAL MEDICINE

## 2023-06-28 PROCEDURE — 88305 TISSUE EXAM BY PATHOLOGIST: CPT | Mod: 26 | Performed by: INTERNAL MEDICINE

## 2023-06-28 PROCEDURE — 45390 COLONOSCOPY W/RESECTION: CPT | Mod: PT,59 | Performed by: INTERNAL MEDICINE

## 2023-06-28 PROCEDURE — 45390 COLONOSCOPY W/RESECTION: CPT | Mod: 59,PT | Performed by: INTERNAL MEDICINE

## 2023-06-28 RX ADMIN — ONDANSETRON HYDROCHLORIDE 4 MG: 8 TABLET, FILM COATED ORAL at 14:04

## 2023-07-09 PROBLEM — R30.0 DYSURIA: Status: RESOLVED | Noted: 2022-10-23 | Resolved: 2023-07-09

## 2023-07-10 PROBLEM — R20.0 HAND NUMBNESS: Status: ACTIVE | Noted: 2023-07-10

## 2023-07-24 ENCOUNTER — TELEPHONE (OUTPATIENT)
Dept: FAMILY MEDICINE CLINIC | Facility: CLINIC | Age: 73
End: 2023-07-24
Payer: MEDICARE

## 2023-07-24 DIAGNOSIS — R09.89 RIGHT CAROTID BRUIT: ICD-10-CM

## 2023-07-24 NOTE — TELEPHONE ENCOUNTER
Blockage in the carotids has not changed significantly since the last checked.  We will continue to monitor.  There is a small thyroid nodule that is likely benign and requires no further follow-up call if any other questions or concerns

## 2023-07-24 NOTE — TELEPHONE ENCOUNTER
HUB TO READ:  Blockage in the carotids has not changed significantly since the last checked.  We will continue to monitor.  There is a small thyroid nodule that is likely benign and requires no further follow-up call if any other questions or concerns

## 2023-10-02 RX ORDER — PRAVASTATIN SODIUM 80 MG/1
TABLET ORAL
Qty: 90 TABLET | Refills: 0 | Status: SHIPPED | OUTPATIENT
Start: 2023-10-02

## 2023-10-02 RX ORDER — HYDROCHLOROTHIAZIDE 25 MG/1
TABLET ORAL
Qty: 90 TABLET | Refills: 0 | Status: SHIPPED | OUTPATIENT
Start: 2023-10-02

## 2023-10-02 RX ORDER — LISINOPRIL 40 MG/1
TABLET ORAL
Qty: 90 TABLET | Refills: 0 | Status: SHIPPED | OUTPATIENT
Start: 2023-10-02

## 2023-10-02 RX ORDER — BLOOD SUGAR DIAGNOSTIC
STRIP MISCELLANEOUS
Qty: 100 EACH | Refills: 0 | Status: SHIPPED | OUTPATIENT
Start: 2023-10-02

## 2023-10-11 PROBLEM — I44.1 AV BLOCK, 2ND DEGREE: Status: RESOLVED | Noted: 2021-07-12 | Resolved: 2023-10-11

## 2023-10-11 NOTE — PATIENT INSTRUCTIONS
Health Maintenance Due   Topic Date Due    COVID-19 Vaccine (7 - 2023-24 season) 09/15/2023    DXA SCAN  10/11/2023    Check blood pressure cuff for accuracy and send 10 blood pressures over 2 weeks.  Watch sodium, alcohol and weight.    If blood pressure over 140/90 continue Amlodipine 10 one day 5 the next.  If below cut back to 5 mg Amlodipine daily

## 2023-10-12 ENCOUNTER — OFFICE VISIT (OUTPATIENT)
Dept: FAMILY MEDICINE CLINIC | Facility: CLINIC | Age: 73
End: 2023-10-12
Payer: MEDICARE

## 2023-10-12 VITALS
TEMPERATURE: 97 F | HEIGHT: 68 IN | WEIGHT: 157.6 LBS | DIASTOLIC BLOOD PRESSURE: 78 MMHG | SYSTOLIC BLOOD PRESSURE: 136 MMHG | BODY MASS INDEX: 23.89 KG/M2 | OXYGEN SATURATION: 98 % | HEART RATE: 87 BPM

## 2023-10-12 DIAGNOSIS — I10 PRIMARY HYPERTENSION: ICD-10-CM

## 2023-10-12 DIAGNOSIS — Z95.0 PACEMAKER: ICD-10-CM

## 2023-10-12 DIAGNOSIS — Z78.0 POSTMENOPAUSE: Primary | ICD-10-CM

## 2023-10-12 DIAGNOSIS — D22.9 ATYPICAL MOLE: ICD-10-CM

## 2023-10-12 DIAGNOSIS — E53.8 B12 DEFICIENCY: ICD-10-CM

## 2023-10-12 DIAGNOSIS — E11.9 TYPE 2 DIABETES MELLITUS WITHOUT COMPLICATION, WITHOUT LONG-TERM CURRENT USE OF INSULIN: ICD-10-CM

## 2023-10-12 DIAGNOSIS — E78.5 HYPERLIPIDEMIA, UNSPECIFIED HYPERLIPIDEMIA TYPE: ICD-10-CM

## 2023-10-12 LAB
ALBUMIN SERPL-MCNC: 4.3 G/DL (ref 3.5–5.2)
ALBUMIN UR-MCNC: <1.2 MG/DL
ALBUMIN/GLOB SERPL: 1.4 G/DL
ALP SERPL-CCNC: 112 U/L (ref 39–117)
ALT SERPL W P-5'-P-CCNC: 28 U/L (ref 1–33)
ANION GAP SERPL CALCULATED.3IONS-SCNC: 15.3 MMOL/L (ref 5–15)
AST SERPL-CCNC: 27 U/L (ref 1–32)
BASOPHILS # BLD AUTO: 0.03 10*3/MM3 (ref 0–0.2)
BASOPHILS NFR BLD AUTO: 0.3 % (ref 0–1.5)
BILIRUB SERPL-MCNC: 0.3 MG/DL (ref 0–1.2)
BUN SERPL-MCNC: 14 MG/DL (ref 8–23)
BUN/CREAT SERPL: 18.9 (ref 7–25)
CALCIUM SPEC-SCNC: 9.9 MG/DL (ref 8.6–10.5)
CHLORIDE SERPL-SCNC: 103 MMOL/L (ref 98–107)
CHOLEST SERPL-MCNC: 163 MG/DL (ref 0–200)
CO2 SERPL-SCNC: 22.7 MMOL/L (ref 22–29)
CREAT SERPL-MCNC: 0.74 MG/DL (ref 0.57–1)
CREAT UR-MCNC: 23.8 MG/DL
DEPRECATED RDW RBC AUTO: 40 FL (ref 37–54)
EGFRCR SERPLBLD CKD-EPI 2021: 84.5 ML/MIN/1.73
EOSINOPHIL # BLD AUTO: 0.05 10*3/MM3 (ref 0–0.4)
EOSINOPHIL NFR BLD AUTO: 0.6 % (ref 0.3–6.2)
ERYTHROCYTE [DISTWIDTH] IN BLOOD BY AUTOMATED COUNT: 12.5 % (ref 12.3–15.4)
GLOBULIN UR ELPH-MCNC: 3.1 GM/DL
GLUCOSE SERPL-MCNC: 184 MG/DL (ref 65–99)
HBA1C MFR BLD: 8.4 % (ref 4.8–5.6)
HCT VFR BLD AUTO: 41.1 % (ref 34–46.6)
HDLC SERPL-MCNC: 58 MG/DL (ref 40–60)
HGB BLD-MCNC: 14.2 G/DL (ref 12–15.9)
IMM GRANULOCYTES # BLD AUTO: 0.02 10*3/MM3 (ref 0–0.05)
IMM GRANULOCYTES NFR BLD AUTO: 0.2 % (ref 0–0.5)
LDLC SERPL CALC-MCNC: 92 MG/DL (ref 0–100)
LDLC/HDLC SERPL: 1.57 {RATIO}
LYMPHOCYTES # BLD AUTO: 1.99 10*3/MM3 (ref 0.7–3.1)
LYMPHOCYTES NFR BLD AUTO: 21.9 % (ref 19.6–45.3)
MCH RBC QN AUTO: 30.3 PG (ref 26.6–33)
MCHC RBC AUTO-ENTMCNC: 34.5 G/DL (ref 31.5–35.7)
MCV RBC AUTO: 87.8 FL (ref 79–97)
MICROALBUMIN/CREAT UR: NORMAL MG/G{CREAT}
MONOCYTES # BLD AUTO: 0.54 10*3/MM3 (ref 0.1–0.9)
MONOCYTES NFR BLD AUTO: 5.9 % (ref 5–12)
NEUTROPHILS NFR BLD AUTO: 6.46 10*3/MM3 (ref 1.7–7)
NEUTROPHILS NFR BLD AUTO: 71.1 % (ref 42.7–76)
NRBC BLD AUTO-RTO: 0 /100 WBC (ref 0–0.2)
PLATELET # BLD AUTO: 313 10*3/MM3 (ref 140–450)
PMV BLD AUTO: 11.7 FL (ref 6–12)
POTASSIUM SERPL-SCNC: 3.8 MMOL/L (ref 3.5–5.2)
PROT SERPL-MCNC: 7.4 G/DL (ref 6–8.5)
RBC # BLD AUTO: 4.68 10*6/MM3 (ref 3.77–5.28)
SODIUM SERPL-SCNC: 141 MMOL/L (ref 136–145)
TRIGL SERPL-MCNC: 69 MG/DL (ref 0–150)
TSH SERPL DL<=0.05 MIU/L-ACNC: 0.8 UIU/ML (ref 0.27–4.2)
VIT B12 BLD-MCNC: 950 PG/ML (ref 211–946)
VLDLC SERPL-MCNC: 13 MG/DL (ref 5–40)
WBC NRBC COR # BLD: 9.09 10*3/MM3 (ref 3.4–10.8)

## 2023-10-12 PROCEDURE — 82570 ASSAY OF URINE CREATININE: CPT | Performed by: PREVENTIVE MEDICINE

## 2023-10-12 PROCEDURE — 80061 LIPID PANEL: CPT | Performed by: PREVENTIVE MEDICINE

## 2023-10-12 PROCEDURE — 85025 COMPLETE CBC W/AUTO DIFF WBC: CPT | Performed by: PREVENTIVE MEDICINE

## 2023-10-12 PROCEDURE — 82607 VITAMIN B-12: CPT | Performed by: PREVENTIVE MEDICINE

## 2023-10-12 PROCEDURE — 82043 UR ALBUMIN QUANTITATIVE: CPT | Performed by: PREVENTIVE MEDICINE

## 2023-10-12 PROCEDURE — 84443 ASSAY THYROID STIM HORMONE: CPT | Performed by: PREVENTIVE MEDICINE

## 2023-10-12 PROCEDURE — 83036 HEMOGLOBIN GLYCOSYLATED A1C: CPT | Performed by: PREVENTIVE MEDICINE

## 2023-10-12 PROCEDURE — 80053 COMPREHEN METABOLIC PANEL: CPT | Performed by: PREVENTIVE MEDICINE

## 2023-10-12 NOTE — PROGRESS NOTES
"Subjective   Celsa Brown presents for follow up on postmenopausal, type 2 diabetes without long-term use of insulin, pacemaker, hyperlipidemia, hypertension, B12 deficiency and atypical mole.    The patient reports she checks her blood pressure at home and its okay. She states doing  20 minutes of exercise daily.    The patient denies having blood glucose reading over 200 mg/dL, but has some glucose readings under 100 mg/dL that causes her to feel weak, shaky and sweat. She notes it happens when she forgets to eat or exercises to much.    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 She states her blood pressure was low, reading 115/80. She states this morning she took her hydralazine, lisinopril, water pill, metformin and Januvia and her face turned bright red. She notes that her face has turned red before in the morning after taking the pills.    The patient reports seeing dermatology for the atypical mole and said it was okay. She states she returns to dermatology 2/2024.    Chief Complaint   Patient presents with    Diabetes    Hypertension     Patient is fasting.       Health Maintenance Due   Topic Date Due    COVID-19 Vaccine (7 - 2023-24 season) 09/15/2023    DXA SCAN  10/11/2023       Diabetes  Pertinent negatives for hypoglycemia include no seizures. Pertinent negatives for diabetes include no chest pain, no weakness and no weight loss.   Hypertension  Pertinent negatives include no chest pain or palpitations.        Vitals:    10/12/23 1041 10/12/23 1042 10/12/23 1043   BP: 146/74 147/73 136/78   BP Location: Right arm Left arm Left arm   Patient Position: Sitting Sitting Standing   Cuff Size: Adult Adult Adult   Pulse: 74 82 87   Temp: 97 øF (36.1 øC)     TempSrc: Temporal     SpO2: 98%     Weight: 71.5 kg (157 lb 9.6 oz)     Height: 172.7 cm (68\")       Body mass index is 23.96 kg/mý.    Current Outpatient Medications on File Prior to Visit   Medication Sig " Dispense Refill    Accu-Chek Thelma Plus test strip TEST BLOOD SUGAR ONCE DAILY 100 each 0    amLODIPine (NORVASC) 5 MG tablet Take one on odd days and two on even days by mouth 135 tablet 3    aspirin 81 MG EC tablet Take 1 tablet by mouth Daily. 1 tablet every other day      cholecalciferol (VITAMIN D3) 1000 units tablet Take 1 tablet by mouth Every Other Day.      clotrimazole (LOTRIMIN) 1 % cream Apply 1 application  topically to the appropriate area as directed 2 (Two) Times a Day.      Cyanocobalamin (VITAMIN B12) 1000 MCG tablet controlled-release Take 1,000 mcg by mouth Every Other Day.      estradiol (ESTRACE) 0.1 MG/GM vaginal cream Insert 2 g into the vagina Daily.      glipizide (GLUCOTROL) 10 MG tablet TAKE 2 TABLETS TWICE A  tablet 3    hydrALAZINE (APRESOLINE) 100 MG tablet TAKE 1 TABLET 3 TIMES A  tablet 3    hydroCHLOROthiazide (HYDRODIURIL) 25 MG tablet TAKE 1 TABLET DAILY 90 tablet 0    Januvia 100 MG tablet TAKE 1 TABLET DAILY 90 tablet 3    lisinopril (PRINIVIL,ZESTRIL) 40 MG tablet TAKE 1 TABLET DAILY 90 tablet 0    loratadine (CLARITIN) 10 MG tablet Take 1 tablet by mouth Daily.      Melatonin 10 MG tablet Take 1 tablet by mouth Daily.      metFORMIN (GLUCOPHAGE) 500 MG tablet TAKE 2 TABLETS 2 TIMES     DAILY WITH MEALS 360 tablet 3    metoprolol succinate XL (TOPROL-XL) 100 MG 24 hr tablet TAKE 1/2 TABLET NIGHTLY 45 tablet 3    Multiple Vitamins-Minerals (CENTRUM SILVER ULTRA WOMENS) tablet Take 1 tablet by mouth Daily.      Omega-3 Fatty Acids (OMEGA-3 FISH OIL) 500 MG capsule Take 1 capsule by mouth Daily.      pravastatin (PRAVACHOL) 80 MG tablet TAKE 1 TABLET NIGHTLY AT   BEDTIME 90 tablet 0     No current facility-administered medications on file prior to visit.       The following portions of the patient's history were reviewed and updated as appropriate: allergies, current medications, past family history, past medical history, past social history, past surgical history,  and problem list.    Review of Systems   Constitutional:  Negative for unexpected weight loss.   HENT:  Negative for hearing loss and trouble swallowing.    Respiratory:  Negative for cough, chest tightness and wheezing.    Cardiovascular:  Negative for chest pain and palpitations.   Gastrointestinal: Negative.    Genitourinary:  Negative for dysuria, hematuria and vaginal discharge.   Neurological:  Negative for seizures, syncope, weakness and headache.       Objective   Physical Exam  Constitutional:       Appearance: Normal appearance.   HENT:      Right Ear: Tympanic membrane normal.      Left Ear: Tympanic membrane normal.      Mouth/Throat:      Mouth: Mucous membranes are moist.      Pharynx: Oropharynx is clear.   Eyes:      Pupils: Pupils are equal, round, and reactive to light.   Neck:      Vascular: No carotid bruit.      Comments: no thyromegaly, thyroid masses, cervical or thorax, adenopathy.  Cardiovascular:      Rate and Rhythm: Normal rate and regular rhythm.      Pulses: Normal pulses.           Dorsalis pedis pulses are 1+ on the right side and 1+ on the left side.        Posterior tibial pulses are 1+ on the right side and 1+ on the left side.      Heart sounds: Normal heart sounds. No murmur heard.  Pulmonary:      Effort: Pulmonary effort is normal.      Breath sounds: Normal breath sounds.   Feet:      Right foot:      Protective Sensation: 10 sites tested.  8 sites sensed.      Skin integrity: Skin integrity normal. Callus present.      Toenail Condition: Right toenails are abnormally thick.      Left foot:      Protective Sensation: 10 sites tested.  8 sites sensed.      Skin integrity: Skin integrity normal. Callus present.      Toenail Condition: Left toenails are abnormally thick.      Comments: Diabetic Foot Exam Performed and Monofilament Test Performed    Neurological:      Mental Status: She is alert.   Psychiatric:         Mood and Affect: Mood normal.         Behavior: Behavior  normal.       PHQ-9 Total Score:      Assessment & Plan   Diagnoses and all orders for this visit:    1. Postmenopause (Primary)  -     DEXA Bone Density Axial; Future    2. Type 2 diabetes mellitus without complication, without long-term current use of insulin  -     Comprehensive Metabolic Panel; Future  -     Hemoglobin A1c; Future  -     Microalbumin / Creatinine Urine Ratio - Urine, Clean Catch; Future  -     TSH; Future  -     Comprehensive Metabolic Panel  -     Hemoglobin A1c  -     Microalbumin / Creatinine Urine Ratio - Urine, Clean Catch  -     TSH    3. Pacemaker    4. Primary hypertension  -     CBC Auto Differential; Future  -     CBC Auto Differential    5. Hyperlipidemia, unspecified hyperlipidemia type  -     Lipid Panel; Future  -     Lipid Panel    6. B12 deficiency  -     Vitamin B12; Future  -     Vitamin B12    7. Atypical mole        Patient Instructions     Health Maintenance Due   Topic Date Due    COVID-19 Vaccine (7 - 2023-24 season) 09/15/2023    DXA SCAN  10/11/2023    Check blood pressure cuff for accuracy and send 10 blood pressures over 2 weeks.  Watch sodium, alcohol and weight.    If blood pressure over 140/90 continue Amlodipine 10 one day 5 the next.  If below cut back to 5 mg Amlodipine daily    Transcribed from ambient dictation for Jaelyn Iverson MD by Jayla Sharma.  10/12/23   13:05 EDT    Patient or patient representative verbalized consent to the visit recording.  I have personally performed the services described in this document as transcribed by the above individual, and it is both accurate and complete.

## 2023-10-12 NOTE — PROGRESS NOTES
Venipuncture performed on Left Arm by Dari Goode MA  with good hemostasis. Patient tolerated well. 10/12/23

## 2023-10-13 ENCOUNTER — TELEPHONE (OUTPATIENT)
Dept: FAMILY MEDICINE CLINIC | Facility: CLINIC | Age: 73
End: 2023-10-13
Payer: MEDICARE

## 2023-10-13 RX ORDER — PIOGLITAZONEHYDROCHLORIDE 15 MG/1
15 TABLET ORAL DAILY
Qty: 90 TABLET | Refills: 1 | Status: SHIPPED | OUTPATIENT
Start: 2023-10-13

## 2023-10-13 NOTE — TELEPHONE ENCOUNTER
----- Message from Jaelyn Iverson MD sent at 10/13/2023  7:44 AM EDT -----  Glucose was 184 and A1c is 8.4.  Can you do more to decrease your carbohydrates?  If not we could consider adding a fourth blood sugar agent.  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  Finally your bad cholesterol is 92 and you are on the maximum pravastatin.  We could consider switching you to a stronger statin to see if we could get this down below 70 please let me know about the above and call if any other questions or concerns

## 2023-10-13 NOTE — PROGRESS NOTES
Glucose was 184 and A1c is 8.4.  Can you do more to decrease your carbohydrates?  If not we could consider adding a fourth blood sugar agent.  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  Finally your bad cholesterol is 92 and you are on the maximum pravastatin.  We could consider switching you to a stronger statin to see if we could get this down below 70 please let me know about the above and call if any other questions or concerns

## 2023-10-13 NOTE — TELEPHONE ENCOUNTER
Caller: Celsa Brown    Relationship to patient: Self    Best call back number: [phone]    Patient is needing:     CALLING TO ASK WHAT THE BLOOD SUGAR MEDICATION WOULD BE IF SHE WENT AHEAD WITH THAT?    SHE WANTS TO MAKE SURE IT WILL BE COVERED WITH HER INSURANCE

## 2023-10-13 NOTE — TELEPHONE ENCOUNTER
"Relay     \"Glucose was 184 and A1c is 8.4.  Can you do more to decrease your carbohydrates?  If not we could consider adding a fourth blood sugar agent.  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  Finally your bad cholesterol is 92 and you are on the maximum pravastatin.  We could consider switching you to a stronger statin to see if we could get this down below 70 please let me know about the above and call if any other questions or concerns \"                "

## 2023-10-13 NOTE — TELEPHONE ENCOUNTER
Celsa Brown notified and voiced comprehension and understanding.    Pt will try limiting carbs to help decrease A1c. She does not want to change her statin and will continue with the Pravastatin.

## 2023-10-24 ENCOUNTER — HOSPITAL ENCOUNTER (OUTPATIENT)
Dept: BONE DENSITY | Facility: HOSPITAL | Age: 73
Discharge: HOME | End: 2023-10-24
Admitting: PREVENTIVE MEDICINE
Payer: MEDICARE

## 2023-10-24 ENCOUNTER — TELEPHONE (OUTPATIENT)
Dept: FAMILY MEDICINE CLINIC | Facility: CLINIC | Age: 73
End: 2023-10-24
Payer: MEDICARE

## 2023-10-24 DIAGNOSIS — Z78.0 POSTMENOPAUSE: ICD-10-CM

## 2023-10-24 PROCEDURE — 77080 DXA BONE DENSITY AXIAL: CPT

## 2023-10-24 NOTE — TELEPHONE ENCOUNTER
RELAY  ----- Message from Jaelyn Iverson MD sent at 10/24/2023  4:23 PM EDT -----  Bone density is normal keep doing as you have been.

## 2023-12-06 PROBLEM — H02.9 EYELID ABNORMALITY: Status: ACTIVE | Noted: 2023-12-06

## 2023-12-06 NOTE — PATIENT INSTRUCTIONS
There are no preventive care reminders to display for this patient.     Check blood pressure cuff for accuracy and send 10 blood pressures over 2 weeks.  Watch sodium, alcohol and weight     Warm not hot soaks 10 minutes three times/day and put one eye drop in after.  Take antibiotic till gone.    Patient to call Dr. Plasencia and get recheIncrease Amlodipine to 7.5 mg one day and 10 the next and after one week send 10 more blood pressures

## 2023-12-07 ENCOUNTER — TELEPHONE (OUTPATIENT)
Dept: FAMILY MEDICINE CLINIC | Facility: CLINIC | Age: 73
End: 2023-12-07

## 2023-12-07 ENCOUNTER — OFFICE VISIT (OUTPATIENT)
Dept: FAMILY MEDICINE CLINIC | Facility: CLINIC | Age: 73
End: 2023-12-07
Payer: MEDICARE

## 2023-12-07 VITALS
DIASTOLIC BLOOD PRESSURE: 78 MMHG | HEART RATE: 84 BPM | TEMPERATURE: 98 F | WEIGHT: 161.4 LBS | HEIGHT: 68 IN | OXYGEN SATURATION: 98 % | SYSTOLIC BLOOD PRESSURE: 147 MMHG | BODY MASS INDEX: 24.46 KG/M2

## 2023-12-07 DIAGNOSIS — D22.9 ATYPICAL MOLE: ICD-10-CM

## 2023-12-07 DIAGNOSIS — E11.9 TYPE 2 DIABETES MELLITUS WITHOUT COMPLICATION, WITHOUT LONG-TERM CURRENT USE OF INSULIN: ICD-10-CM

## 2023-12-07 DIAGNOSIS — H02.9 EYELID ABNORMALITY: Primary | ICD-10-CM

## 2023-12-07 DIAGNOSIS — I10 PRIMARY HYPERTENSION: ICD-10-CM

## 2023-12-07 RX ORDER — AMLODIPINE BESYLATE 5 MG/1
TABLET ORAL
Qty: 135 TABLET | Refills: 3 | Status: SHIPPED | OUTPATIENT
Start: 2023-12-07 | End: 2023-12-07 | Stop reason: SDUPTHER

## 2023-12-07 RX ORDER — MOXIFLOXACIN 5 MG/ML
1 SOLUTION/ DROPS OPHTHALMIC 3 TIMES DAILY
Qty: 1 EACH | Refills: 0 | Status: SHIPPED | OUTPATIENT
Start: 2023-12-07

## 2023-12-07 RX ORDER — CEPHALEXIN 500 MG/1
500 CAPSULE ORAL 3 TIMES DAILY
Qty: 30 CAPSULE | Refills: 0 | Status: SHIPPED | OUTPATIENT
Start: 2023-12-07

## 2023-12-07 RX ORDER — AMLODIPINE BESYLATE 10 MG/1
10 TABLET ORAL DAILY
Qty: 90 TABLET | Refills: 3 | Status: SHIPPED | OUTPATIENT
Start: 2023-12-07

## 2023-12-07 RX ORDER — AMLODIPINE BESYLATE 5 MG/1
TABLET ORAL
Qty: 135 TABLET | Refills: 3 | Status: SHIPPED | OUTPATIENT
Start: 2023-12-07 | End: 2023-12-07

## 2023-12-07 NOTE — TELEPHONE ENCOUNTER
Caller: [facility] [address] [place] [address] [place] - [address] [facility] - [phone] [facility] [phone] FX    Relationship to patient: Pharmacy    Best call back number: [phone]      Patient is needing:     LOOKING FOR CLARIFYING QUESTIONS FOR THE DOSAGE INSTRUCTIONS FOR THE MEDIATION:    moxifloxacin (Vigamox) 0.5 % ophthalmic solution   --SHOULD IT BE 1 DROP 3X DAILY?

## 2023-12-08 NOTE — PROGRESS NOTES
"Subjective   Celsa Brown is a 73 y.o. female presents for   Chief Complaint   Patient presents with    Mass     Left side on eyelid.      Diabetes    Hypertension     Needing refill on amlodipine.   For the last week or so patient has noted swelling of the left upper outer eyelid.  No change in her vision has never had anything like this before did not injure the eyelid in any way that she is aware of.  Suddenly last week after taking 3 amoxicillin the lump drained clear fluid and now is beginning to resolve.  She has had no fever or chills her blood sugars have been running the same and again she has not noticed any problem with her vision and is sustained no injury.    There are no preventive care reminders to display for this patient.    Diabetes    Hypertension         Vitals:    12/07/23 1409 12/07/23 1410 12/07/23 1411   BP: 164/76 145/79 147/78   BP Location: Right arm Left arm Left arm   Patient Position: Sitting Sitting Standing   Cuff Size: Adult Adult Adult   Pulse: 79 81 84   Temp: 98 °F (36.7 °C)     TempSrc: Temporal     SpO2: 98%     Weight: 73.2 kg (161 lb 6.4 oz)     Height: 172.7 cm (68\")       Body mass index is 24.54 kg/m².    Current Outpatient Medications on File Prior to Visit   Medication Sig Dispense Refill    Accu-Chek Thelma Plus test strip TEST BLOOD SUGAR ONCE DAILY 100 each 0    aspirin 81 MG EC tablet Take 1 tablet by mouth Daily. 1 tablet every other day      cholecalciferol (VITAMIN D3) 1000 units tablet Take 1 tablet by mouth Every Other Day.      clotrimazole (LOTRIMIN) 1 % cream Apply 1 application  topically to the appropriate area as directed 2 (Two) Times a Day.      Cyanocobalamin (VITAMIN B12) 1000 MCG tablet controlled-release Take 1,000 mcg by mouth Every Other Day.      estradiol (ESTRACE) 0.1 MG/GM vaginal cream Insert 2 applicators into the vagina Daily.      glipizide (GLUCOTROL) 10 MG tablet TAKE 2 TABLETS TWICE A  tablet 3    hydrALAZINE (APRESOLINE) 100 MG " tablet TAKE 1 TABLET 3 TIMES A  tablet 3    hydroCHLOROthiazide (HYDRODIURIL) 25 MG tablet TAKE 1 TABLET DAILY 90 tablet 0    Januvia 100 MG tablet TAKE 1 TABLET DAILY 90 tablet 3    lisinopril (PRINIVIL,ZESTRIL) 40 MG tablet TAKE 1 TABLET DAILY 90 tablet 0    loratadine (CLARITIN) 10 MG tablet Take 1 tablet by mouth Daily.      Melatonin 10 MG tablet Take 1 tablet by mouth Daily.      metFORMIN (GLUCOPHAGE) 500 MG tablet TAKE 2 TABLETS 2 TIMES     DAILY WITH MEALS 360 tablet 3    metoprolol succinate XL (TOPROL-XL) 100 MG 24 hr tablet TAKE 1/2 TABLET NIGHTLY 45 tablet 3    Multiple Vitamins-Minerals (CENTRUM SILVER ULTRA WOMENS) tablet Take 1 tablet by mouth Daily.      Omega-3 Fatty Acids (OMEGA-3 FISH OIL) 500 MG capsule Take 1 capsule by mouth Daily.      pioglitazone (Actos) 15 MG tablet Take 1 tablet by mouth Daily. 90 tablet 1    pravastatin (PRAVACHOL) 80 MG tablet TAKE 1 TABLET NIGHTLY AT   BEDTIME 90 tablet 0    [DISCONTINUED] amLODIPine (NORVASC) 5 MG tablet Take one on odd days and two on even days by mouth 135 tablet 3     No current facility-administered medications on file prior to visit.       The following portions of the patient's history were reviewed and updated as appropriate: allergies, current medications, past family history, past medical history, past social history, past surgical history, and problem list.    Review of Systems   Constitutional:  Negative for chills and fever.   Eyes:         Pain and swelling in the left upper eyelid laterally       Objective   Physical Exam  Vitals reviewed.   Constitutional:       General: She is not in acute distress.     Appearance: Normal appearance. She is well-developed. She is not ill-appearing or toxic-appearing.   HENT:      Head: Normocephalic and atraumatic.      Nose: Nose normal.   Eyes:      General:         Right eye: No discharge.         Left eye: No discharge.      Extraocular Movements: Extraocular movements intact.       Conjunctiva/sclera: Conjunctivae normal.      Pupils: Pupils are equal, round, and reactive to light.      Comments: 4 mm nodule palpable on the left upper outer eyelid inverting the lid does show that there is some erythema on the globe side but no pus was expressed pupils were equal and reactive to light there was no globe pain and no pain in the eye with movement.   Cardiovascular:      Rate and Rhythm: Normal rate.   Pulmonary:      Effort: Pulmonary effort is normal.   Abdominal:      General: There is no distension.      Palpations: There is no mass.      Tenderness: There is no abdominal tenderness.   Neurological:      Mental Status: She is alert and oriented to person, place, and time.   Psychiatric:         Mood and Affect: Mood normal.         Behavior: Behavior normal.       PHQ-9 Total Score:      Assessment & Plan   Diagnoses and all orders for this visit:    1. Eyelid abnormality (Primary)    2. Atypical mole    3. Primary hypertension    4. Type 2 diabetes mellitus without complication, without long-term current use of insulin    Other orders  -     Discontinue: amLODIPine (NORVASC) 5 MG tablet; Take one on odd days and two on even days by mouth  Dispense: 135 tablet; Refill: 3  -     moxifloxacin (Vigamox) 0.5 % ophthalmic solution; Administer 0.05 mL into the left eye 3 (Three) Times a Day.  Dispense: 1 each; Refill: 0  -     cephalexin (Keflex) 500 MG capsule; Take 1 capsule by mouth 3 (Three) Times a Day.  Dispense: 30 capsule; Refill: 0  -     Discontinue: amLODIPine (NORVASC) 5 MG tablet; Take one and one half on odd days and two on even days by mouth  Dispense: 135 tablet; Refill: 3  -     amLODIPine (NORVASC) 10 MG tablet; Take 1 tablet by mouth Daily.  Dispense: 90 tablet; Refill: 3        Patient Instructions   There are no preventive care reminders to display for this patient.     Check blood pressure cuff for accuracy and send 10 blood pressures over 2 weeks.  Watch sodium, alcohol and  weight     Warm not hot soaks 10 minutes three times/day and put one eye drop in after.  Take antibiotic till gone.    Patient to call Dr. Plasencia and get recheIncrease Amlodipine to 7.5 mg one day and 10 the next and after one week send 10 more blood pressures

## 2024-01-05 RX ORDER — PIOGLITAZONEHYDROCHLORIDE 15 MG/1
15 TABLET ORAL DAILY
Qty: 90 TABLET | Refills: 1 | Status: SHIPPED | OUTPATIENT
Start: 2024-01-05

## 2024-01-05 NOTE — TELEPHONE ENCOUNTER
Caller: Celsa Brown    Relationship: Self    Best call back number: [phone]    Requested Prescriptions:   Requested Prescriptions     Pending Prescriptions Disp Refills    pioglitazone (Actos) 15 MG tablet 90 tablet 1     Sig: Take 1 tablet by mouth Daily.        Pharmacy where request should be sent: [facility] [address] [place] [address] [facility] [phone] [place] [phone]      Last office visit with prescribing clinician: 12/7/2023   Last telemedicine visit with prescribing clinician: Visit date not found   Next office visit with prescribing clinician: 1/23/2024     Additional details provided by patient: PATIENT STATED THAT THEY HAVE 3 OF THE MEDICATION REMAINING    Does the patient have less than a 3 day supply:  [x] Yes  [] No    Would you like a call back once the refill request has been completed: [] Yes [x] No    If the office needs to give you a call back, can they leave a voicemail: [] Yes [x] No    Lorraine Ruff Rep   01/05/24 09:38 EST

## 2024-01-14 NOTE — PROGRESS NOTES
Encounter Date:01/29/2024    Last seen 7/17/2023      Patient ID: Celsa Brown is a 73 y.o. female.      Chief Complaint:     Status post pacemaker  Palpitations  Diabetes  Dyslipidemia  Hypertension        History of Present Illness  Occasional palpitations.    Since I have last seen, the patient has been without any chest discomfort ,shortness of breath,, dizziness or syncope.  Denies having any headache ,abdominal pain ,nausea, vomiting , diarrhea constipation, loss of weight or loss of appetite.  Denies having any excessive bruising ,hematuria or blood in the stool.    Review of all systems negative except as indicated.    Reviewed ROS.    Assessment and Plan         ]]]]]]]]]]]]]]]]]]]  History  ==========     -Status post permanent dual-chamber pacemaker implantation ([employer] MRI compatible).  7/13/2021      -Palpitations probably due to PMT and SVT.  Suspect strongly patient is having episodes of PMT.  Recently PVARP was increased     -Second-degree Mobitz type II AV block.  Right bundle branch block (prior to pacemaker implantation)     -Chest discomfort and shortness of breath with or without exertion.     -Dizziness and near syncope-improved since patient had pacemaker implantation     Echocardiogram-normal 7/12/2021  Stress Cardiolite test-normal except patient has second-degree 2-1 AV block and right bundle branch block.  Very limited exercise tolerance.     -Right bundle branch block     -Sinus bradycardia     -Diabetes dyslipidemia hypertension     -Status post tonsillectomy abdominal tubal ligation     -Family history of coronary artery disease     -Non-smoker     -Allergic to sulfa  =============  Plan  ==========  Palpitations-improved  Occasional palpitations.     History of PMT and SVT.  Patient was asked to take extra metoprolol as needed and twice a day if needed.  EKG showed atrial sensed ventricular paced rhythm.-12/29/2022  EKG 7/17/2023-atrial sensed ventricular paced rhythm.      Status post permanent dual-chamber pacemaker implantation 7/13/2021 ([employer] MRI compatible).  Pacemaker site and function is normal.  Interrogation of the pacemaker revealed excellent pacing parameters.-1/29/2024  Battery status was 6 years.     Rare atrial fibrillation.  Observe closely.  Consider anticoagulation if patient has increased atrial fibrillation load.     Hypertension-stable  Well-controlled  138/70     Medications were reviewed and updated.     Patient is on amlodipine hydralazine hydrochlorothiazide lisinopril 40 mg a day.  Patient is on metoprolol 100 mg tablet half a tablet a day.     Follow-up in the office in 6 months with pacemaker interrogation.     Further plan will depend on patient's progress.    Reviewed and updated-1/29/2024  ]]]]]]]]]]]]]]                    Diagnosis Plan   1. Pacemaker        2. Second degree AV block, Mobitz type II        3. Right bundle branch block        4. Dizziness        5. Sick sinus syndrome        6. Shortness of breath        7. Dyslipidemia        8. Bradycardia        9. AV block, 2nd degree        10. Essential hypertension        11. Diabetes mellitus due to underlying condition without complication, without long-term current use of insulin        12. Near syncope        LAB RESULTS (LAST 7 DAYS)    CBC        BMP        CMP         BNP        TROPONIN        CoAg        Creatinine Clearance  CrCl cannot be calculated (Patient's most recent lab result is older than the maximum 30 days allowed.).    ABG        Radiology  No radiology results for the last day                The following portions of the patient's history were reviewed and updated as appropriate: allergies, current medications, past family history, past medical history, past social history, past surgical history, and problem list.    Review of Systems   Constitutional: Negative for malaise/fatigue.   Cardiovascular:  Positive for leg swelling. Negative for chest pain, dyspnea on  exertion and palpitations.   Respiratory:  Negative for cough and shortness of breath.    Gastrointestinal:  Negative for abdominal pain, nausea and vomiting.   Neurological:  Positive for dizziness and light-headedness. Negative for focal weakness, headaches and numbness.   All other systems reviewed and are negative.      Current Outpatient Medications:     Accu-Chek Thelma Plus test strip, TEST BLOOD SUGAR ONCE DAILY, Disp: 100 each, Rfl: 0    amLODIPine (NORVASC) 10 MG tablet, Take 1 tablet by mouth Daily., Disp: 90 tablet, Rfl: 3    aspirin 81 MG EC tablet, Take 1 tablet by mouth Daily. 1 tablet every other day, Disp: , Rfl:     cephalexin (Keflex) 500 MG capsule, Take 1 capsule by mouth 3 (Three) Times a Day., Disp: 30 capsule, Rfl: 0    cholecalciferol (VITAMIN D3) 1000 units tablet, Take 1 tablet by mouth Every Other Day., Disp: , Rfl:     clotrimazole (LOTRIMIN) 1 % cream, Apply 1 application  topically to the appropriate area as directed 2 (Two) Times a Day., Disp: , Rfl:     Cyanocobalamin (VITAMIN B12) 1000 MCG tablet controlled-release, Take 1,000 mcg by mouth Every Other Day., Disp: , Rfl:     estradiol (ESTRACE) 0.1 MG/GM vaginal cream, Insert 2 applicators into the vagina Daily., Disp: , Rfl:     glipizide (GLUCOTROL) 10 MG tablet, TAKE 2 TABLETS TWICE A DAY, Disp: 360 tablet, Rfl: 3    hydrALAZINE (APRESOLINE) 100 MG tablet, TAKE 1 TABLET 3 TIMES A DAY, Disp: 270 tablet, Rfl: 3    hydroCHLOROthiazide (HYDRODIURIL) 25 MG tablet, TAKE 1 TABLET DAILY, Disp: 90 tablet, Rfl: 0    Januvia 100 MG tablet, TAKE 1 TABLET DAILY, Disp: 90 tablet, Rfl: 3    lisinopril (PRINIVIL,ZESTRIL) 40 MG tablet, TAKE 1 TABLET DAILY, Disp: 90 tablet, Rfl: 0    loratadine (CLARITIN) 10 MG tablet, Take 1 tablet by mouth Daily., Disp: , Rfl:     Melatonin 10 MG tablet, Take 1 tablet by mouth Daily., Disp: , Rfl:     metFORMIN (GLUCOPHAGE) 500 MG tablet, TAKE 2 TABLETS 2 TIMES     DAILY WITH MEALS, Disp: 360 tablet, Rfl: 3     metoprolol succinate XL (TOPROL-XL) 100 MG 24 hr tablet, TAKE 1/2 TABLET NIGHTLY, Disp: 45 tablet, Rfl: 3    moxifloxacin (Vigamox) 0.5 % ophthalmic solution, Administer 0.05 mL into the left eye 3 (Three) Times a Day., Disp: 1 each, Rfl: 0    Multiple Vitamins-Minerals (CENTRUM SILVER ULTRA WOMENS) tablet, Take 1 tablet by mouth Daily., Disp: , Rfl:     Omega-3 Fatty Acids (OMEGA-3 FISH OIL) 500 MG capsule, Take 1 capsule by mouth Daily., Disp: , Rfl:     pioglitazone (Actos) 15 MG tablet, Take 1 tablet by mouth Daily., Disp: 90 tablet, Rfl: 1    pravastatin (PRAVACHOL) 80 MG tablet, TAKE 1 TABLET NIGHTLY AT   BEDTIME, Disp: 90 tablet, Rfl: 0    Allergies   Allergen Reactions    Sulfa Antibiotics Hives    Macrobid [Nitrofurantoin] Rash       Family History   Problem Relation Age of Onset    Heart failure Mother     Diabetes Mother     Breast cancer Neg Hx     Ovarian cancer Neg Hx        Past Surgical History:   Procedure Laterality Date    BREAST BIOPSY Right     CARDIAC CATHETERIZATION      CARDIAC ELECTROPHYSIOLOGY PROCEDURE N/A 07/13/2021    Procedure: Pacemaker DC new;  Surgeon: Dung Lynch MD;  Location: [facility] INVASIVE LOC[facility];  Service: Cardiovascular;  Laterality: N/A;    COLONOSCOPY      TONSILLECTOMY      TUBAL ABDOMINAL LIGATION         Past Medical History:   Diagnosis Date    Abdominal pain 05/21/2022    B12 deficiency     Hyperlipidemia     Hypertension     Tachycardia     Vitamin D deficiency        Family History   Problem Relation Age of Onset    Heart failure Mother     Diabetes Mother     Breast cancer Neg Hx     Ovarian cancer Neg Hx        Social History     Socioeconomic History    Marital status:    Tobacco Use    Smoking status: Never     Passive exposure: Past (AS A CHILD)    Smokeless tobacco: Never   Vaping Use    Vaping Use: Never used   Substance and Sexual Activity    Alcohol use: No    Drug use: No    Sexual activity: Yes     Partners: Male     Birth  control/protection: None         Procedures      Objective:       Physical Exam    LMP  (LMP Unknown)   The patient is alert, oriented and in no distress.    Vital signs as noted above.    Head and neck revealed no carotid bruits or jugular venous distension.  No thyromegaly or lymphadenopathy is present.    Lungs clear.  No wheezing.  Breath sounds are normal bilaterally.    Heart normal first and second heart sounds.  No murmur..  No pericardial rub is present.  No gallop is present.    Abdomen soft and nontender.  No organomegaly is present.    Extremities revealed good peripheral pulses without any pedal edema.    Skin warm and dry.  Pacemaker site looks normal.    Musculoskeletal system is grossly normal.    CNS grossly normal.    Reviewed and updated.

## 2024-01-23 ENCOUNTER — OFFICE VISIT (OUTPATIENT)
Dept: FAMILY MEDICINE CLINIC | Facility: CLINIC | Age: 74
End: 2024-01-23
Payer: MEDICARE

## 2024-01-23 ENCOUNTER — TELEPHONE (OUTPATIENT)
Dept: FAMILY MEDICINE CLINIC | Facility: CLINIC | Age: 74
End: 2024-01-23

## 2024-01-23 VITALS
BODY MASS INDEX: 24.31 KG/M2 | TEMPERATURE: 98 F | SYSTOLIC BLOOD PRESSURE: 127 MMHG | DIASTOLIC BLOOD PRESSURE: 72 MMHG | OXYGEN SATURATION: 98 % | HEIGHT: 68 IN | WEIGHT: 160.4 LBS | HEART RATE: 102 BPM

## 2024-01-23 DIAGNOSIS — E55.9 VITAMIN D DEFICIENCY: ICD-10-CM

## 2024-01-23 DIAGNOSIS — E11.9 TYPE 2 DIABETES MELLITUS WITHOUT COMPLICATION, WITHOUT LONG-TERM CURRENT USE OF INSULIN: ICD-10-CM

## 2024-01-23 DIAGNOSIS — H02.9 EYELID ABNORMALITY: ICD-10-CM

## 2024-01-23 DIAGNOSIS — D22.9 ATYPICAL MOLE: ICD-10-CM

## 2024-01-23 DIAGNOSIS — Z95.0 PACEMAKER: ICD-10-CM

## 2024-01-23 DIAGNOSIS — E53.8 B12 DEFICIENCY: Primary | ICD-10-CM

## 2024-01-23 DIAGNOSIS — I10 PRIMARY HYPERTENSION: ICD-10-CM

## 2024-01-23 DIAGNOSIS — E78.5 HYPERLIPIDEMIA, UNSPECIFIED HYPERLIPIDEMIA TYPE: ICD-10-CM

## 2024-01-23 DIAGNOSIS — Z78.0 POSTMENOPAUSAL STATUS: ICD-10-CM

## 2024-01-23 DIAGNOSIS — R09.89 RIGHT CAROTID BRUIT: ICD-10-CM

## 2024-01-23 LAB
25(OH)D3 SERPL-MCNC: 66.8 NG/ML (ref 30–100)
ALBUMIN SERPL-MCNC: 4.4 G/DL (ref 3.5–5.2)
ALBUMIN UR-MCNC: <1.2 MG/DL
ALBUMIN/GLOB SERPL: 1.6 G/DL
ALP SERPL-CCNC: 86 U/L (ref 39–117)
ALT SERPL W P-5'-P-CCNC: 29 U/L (ref 1–33)
ANION GAP SERPL CALCULATED.3IONS-SCNC: 13 MMOL/L (ref 5–15)
AST SERPL-CCNC: 27 U/L (ref 1–32)
BASOPHILS # BLD AUTO: 0.03 10*3/MM3 (ref 0–0.2)
BASOPHILS NFR BLD AUTO: 0.4 % (ref 0–1.5)
BILIRUB SERPL-MCNC: 0.3 MG/DL (ref 0–1.2)
BUN SERPL-MCNC: 13 MG/DL (ref 8–23)
BUN/CREAT SERPL: 16.7 (ref 7–25)
CALCIUM SPEC-SCNC: 9.8 MG/DL (ref 8.6–10.5)
CHLORIDE SERPL-SCNC: 103 MMOL/L (ref 98–107)
CHOLEST SERPL-MCNC: 164 MG/DL (ref 0–200)
CO2 SERPL-SCNC: 25 MMOL/L (ref 22–29)
CREAT SERPL-MCNC: 0.78 MG/DL (ref 0.57–1)
CREAT UR-MCNC: 19.7 MG/DL
DEPRECATED RDW RBC AUTO: 39.4 FL (ref 37–54)
EGFRCR SERPLBLD CKD-EPI 2021: 79.3 ML/MIN/1.73
EOSINOPHIL # BLD AUTO: 0.07 10*3/MM3 (ref 0–0.4)
EOSINOPHIL NFR BLD AUTO: 1 % (ref 0.3–6.2)
ERYTHROCYTE [DISTWIDTH] IN BLOOD BY AUTOMATED COUNT: 12.7 % (ref 12.3–15.4)
GLOBULIN UR ELPH-MCNC: 2.7 GM/DL
GLUCOSE SERPL-MCNC: 169 MG/DL (ref 65–99)
HBA1C MFR BLD: 7.8 % (ref 4.8–5.6)
HCT VFR BLD AUTO: 38.2 % (ref 34–46.6)
HDLC SERPL-MCNC: 60 MG/DL (ref 40–60)
HGB BLD-MCNC: 12.7 G/DL (ref 12–15.9)
IMM GRANULOCYTES # BLD AUTO: 0.01 10*3/MM3 (ref 0–0.05)
IMM GRANULOCYTES NFR BLD AUTO: 0.1 % (ref 0–0.5)
LDLC SERPL CALC-MCNC: 93 MG/DL (ref 0–100)
LDLC/HDLC SERPL: 1.54 {RATIO}
LYMPHOCYTES # BLD AUTO: 1.9 10*3/MM3 (ref 0.7–3.1)
LYMPHOCYTES NFR BLD AUTO: 27.3 % (ref 19.6–45.3)
MCH RBC QN AUTO: 29 PG (ref 26.6–33)
MCHC RBC AUTO-ENTMCNC: 33.2 G/DL (ref 31.5–35.7)
MCV RBC AUTO: 87.2 FL (ref 79–97)
MICROALBUMIN/CREAT UR: NORMAL MG/G{CREAT}
MONOCYTES # BLD AUTO: 0.51 10*3/MM3 (ref 0.1–0.9)
MONOCYTES NFR BLD AUTO: 7.3 % (ref 5–12)
NEUTROPHILS NFR BLD AUTO: 4.45 10*3/MM3 (ref 1.7–7)
NEUTROPHILS NFR BLD AUTO: 63.9 % (ref 42.7–76)
NRBC BLD AUTO-RTO: 0 /100 WBC (ref 0–0.2)
PLATELET # BLD AUTO: 306 10*3/MM3 (ref 140–450)
PMV BLD AUTO: 11.4 FL (ref 6–12)
POTASSIUM SERPL-SCNC: 3.5 MMOL/L (ref 3.5–5.2)
PROT SERPL-MCNC: 7.1 G/DL (ref 6–8.5)
RBC # BLD AUTO: 4.38 10*6/MM3 (ref 3.77–5.28)
SODIUM SERPL-SCNC: 141 MMOL/L (ref 136–145)
TRIGL SERPL-MCNC: 57 MG/DL (ref 0–150)
TSH SERPL DL<=0.05 MIU/L-ACNC: 1.03 UIU/ML (ref 0.27–4.2)
VIT B12 BLD-MCNC: 894 PG/ML (ref 211–946)
VLDLC SERPL-MCNC: 11 MG/DL (ref 5–40)
WBC NRBC COR # BLD AUTO: 6.97 10*3/MM3 (ref 3.4–10.8)

## 2024-01-23 PROCEDURE — 80061 LIPID PANEL: CPT | Performed by: PREVENTIVE MEDICINE

## 2024-01-23 PROCEDURE — 84443 ASSAY THYROID STIM HORMONE: CPT | Performed by: PREVENTIVE MEDICINE

## 2024-01-23 PROCEDURE — 82607 VITAMIN B-12: CPT | Performed by: PREVENTIVE MEDICINE

## 2024-01-23 PROCEDURE — 1160F RVW MEDS BY RX/DR IN RCRD: CPT | Performed by: PREVENTIVE MEDICINE

## 2024-01-23 PROCEDURE — 3074F SYST BP LT 130 MM HG: CPT | Performed by: PREVENTIVE MEDICINE

## 2024-01-23 PROCEDURE — 82570 ASSAY OF URINE CREATININE: CPT | Performed by: PREVENTIVE MEDICINE

## 2024-01-23 PROCEDURE — 1170F FXNL STATUS ASSESSED: CPT | Performed by: PREVENTIVE MEDICINE

## 2024-01-23 PROCEDURE — 85025 COMPLETE CBC W/AUTO DIFF WBC: CPT | Performed by: PREVENTIVE MEDICINE

## 2024-01-23 PROCEDURE — 1159F MED LIST DOCD IN RCRD: CPT | Performed by: PREVENTIVE MEDICINE

## 2024-01-23 PROCEDURE — 99214 OFFICE O/P EST MOD 30 MIN: CPT | Performed by: PREVENTIVE MEDICINE

## 2024-01-23 PROCEDURE — 82306 VITAMIN D 25 HYDROXY: CPT | Performed by: PREVENTIVE MEDICINE

## 2024-01-23 PROCEDURE — 82043 UR ALBUMIN QUANTITATIVE: CPT | Performed by: PREVENTIVE MEDICINE

## 2024-01-23 PROCEDURE — 83036 HEMOGLOBIN GLYCOSYLATED A1C: CPT | Performed by: PREVENTIVE MEDICINE

## 2024-01-23 PROCEDURE — 80053 COMPREHEN METABOLIC PANEL: CPT | Performed by: PREVENTIVE MEDICINE

## 2024-01-23 PROCEDURE — 3078F DIAST BP <80 MM HG: CPT | Performed by: PREVENTIVE MEDICINE

## 2024-01-23 NOTE — PROGRESS NOTES
Subjective   Celsa Brown is a 73 y.o. female presents for   Chief Complaint   Patient presents with    Medicare Wellness-subsequent     Patient is fasting    Diabetes     3 month check up        There are no preventive care reminders to display for this patient.    Diabetes  Pertinent negatives for diabetes include no chest pain and no weight loss.      The patient is a 73-year-old female who is here today for her B12 deficiency, hyperlipidemia, hypertension, atypical mole, postmenopausal status, type 2 diabetes without complication without long-term use of insulin, vitamin D deficiency, pacemaker, right carotid bruit, eyelid abnormality. Body mass index, which is normal at 24.    She is watching her saturated fat intake.     She has a pacemaker. She had a couple of dizzy spells with the amlodipine. Her amlodipine was increased to 10 mg, and she felt dizzy for a couple of days.    She saw the dermatologist in 01/2023 for her atypical mole.  She last saw her dentist in 12/2023.Her bowels are working well as long as she takes medication. She does self-breast exams once a month.     She denies any low or extremely high blood glucose readings. She had an eye exam within the last year. She had a stye, which is almost fully resolved. She is not trying to lose weight. She trimmed her nails last night, but she did not get them done very good.    She is allergic to SULFA and MACROBID.    She is taking vitamin B12 and vitamin D. She is taking amlodipine 10 mg every day.    She received her COVID-19 vaccine on [facility]/05/20[facility] at [facility] and RSV vaccine on 10/14/2023. She is up to date with an influenza and pneumonia vaccine.  Vitals:    01/23/24 0821 01/23/24 0822 01/23/24 0823   BP: 149/74 136/77 127/72   BP Location: Right arm Left arm Left arm   Patient Position: Sitting Sitting Standing   Cuff Size: Adult Adult Adult   Pulse: 101 103 102   Temp: 98 °F (36.7 °C)     TempSrc: Temporal     SpO2: 98%     Weight: 72.8 kg (160 lb  "6.4 oz)     Height: 172.7 cm (68\")       Body mass index is 24.39 kg/m².    Current Outpatient Medications on File Prior to Visit   Medication Sig Dispense Refill    Accu-Chek Thelma Plus test strip TEST BLOOD SUGAR ONCE DAILY 100 each 0    amLODIPine (NORVASC) 10 MG tablet Take 1 tablet by mouth Daily. 90 tablet 3    aspirin 81 MG EC tablet Take 1 tablet by mouth Daily. 1 tablet every other day      cholecalciferol (VITAMIN D3) 1000 units tablet Take 1 tablet by mouth Every Other Day.      clotrimazole (LOTRIMIN) 1 % cream Apply 1 application  topically to the appropriate area as directed 2 (Two) Times a Day.      Cyanocobalamin (VITAMIN B12) 1000 MCG tablet controlled-release Take 1,000 mcg by mouth Every Other Day.      estradiol (ESTRACE) 0.1 MG/GM vaginal cream Insert 2 applicators into the vagina Daily.      glipizide (GLUCOTROL) 10 MG tablet TAKE 2 TABLETS TWICE A  tablet 3    hydrALAZINE (APRESOLINE) 100 MG tablet TAKE 1 TABLET 3 TIMES A  tablet 3    hydroCHLOROthiazide (HYDRODIURIL) 25 MG tablet TAKE 1 TABLET DAILY 90 tablet 0    Januvia 100 MG tablet TAKE 1 TABLET DAILY 90 tablet 3    lisinopril (PRINIVIL,ZESTRIL) 40 MG tablet TAKE 1 TABLET DAILY 90 tablet 0    loratadine (CLARITIN) 10 MG tablet Take 1 tablet by mouth Daily.      Melatonin 10 MG tablet Take 1 tablet by mouth Daily.      metFORMIN (GLUCOPHAGE) 500 MG tablet TAKE 2 TABLETS 2 TIMES     DAILY WITH MEALS 360 tablet 3    metoprolol succinate XL (TOPROL-XL) 100 MG 24 hr tablet TAKE 1/2 TABLET NIGHTLY 45 tablet 3    moxifloxacin (Vigamox) 0.5 % ophthalmic solution Administer 0.05 mL into the left eye 3 (Three) Times a Day. 1 each 0    Multiple Vitamins-Minerals (CENTRUM SILVER ULTRA WOMENS) tablet Take 1 tablet by mouth Daily.      Omega-3 Fatty Acids (OMEGA-3 FISH OIL) 500 MG capsule Take 1 capsule by mouth Daily.      pioglitazone (Actos) 15 MG tablet Take 1 tablet by mouth Daily. 90 tablet 1    pravastatin (PRAVACHOL) 80 MG tablet " TAKE 1 TABLET NIGHTLY AT   BEDTIME 90 tablet 0    [DISCONTINUED] cephalexin (Keflex) 500 MG capsule Take 1 capsule by mouth 3 (Three) Times a Day. 30 capsule 0     No current facility-administered medications on file prior to visit.       The following portions of the patient's history were reviewed and updated as appropriate: allergies, current medications, past family history, past medical history, past social history, past surgical history, and problem list.    Review of Systems   Constitutional:  Negative for chills, diaphoresis, fever and unexpected weight loss.   HENT:  Negative for hearing loss and trouble swallowing.    Respiratory:  Negative for cough and wheezing.    Cardiovascular:  Negative for chest pain and palpitations.   Gastrointestinal:  Negative for blood in stool and indigestion.   Genitourinary:  Negative for dysuria, hematuria and vaginal discharge.       Objective   Physical Exam  HENT:      Ears:      Comments: Left lid has still a little bit of erythema, but it is much improved compared to the last visit.  Neck:      Vascular: Carotid bruit present.      Comments: No thyromegaly, thyroid masses, cervical or suboccipital adenopathy. Right-sided carotid bruit.  Cardiovascular:      Pulses:           Dorsalis pedis pulses are 1+ on the right side and 1+ on the left side.        Posterior tibial pulses are 1+ on the right side and 1+ on the left side.   Pulmonary:      Breath sounds: No wheezing, rhonchi or rales.      Comments: Breath sounds are equal in all six lung fields.  Abdominal:      Comments: Bowel sounds are normoactive. No masses, tenderness, or organomegaly.   Feet:      Right foot:      Protective Sensation: 10 sites tested.  10 sites sensed.      Skin integrity: Skin integrity normal. Callus present.      Toenail Condition: Right toenails are abnormally thick.      Left foot:      Protective Sensation: 9 sites tested.  10 sites sensed.      Skin integrity: Skin integrity normal.  Callus present.      Toenail Condition: Left toenails are abnormally thick and ingrown.      Comments: Diabetic Foot Exam Performed and Monofilament Test Performed      PHQ-9 Total Score: 0    Assessment & Plan   Diagnoses and all orders for this visit:    1. B12 deficiency (Primary)  -     CBC Auto Differential  -     Vitamin B12    2. Hyperlipidemia, unspecified hyperlipidemia type  Comments:  She was advised to watch her saturated fats.  Orders:  -     Lipid Panel    3. Primary hypertension  Comments:  She was recommended to take amlodipine 5 mg 1 day and 10 mg the next day.  Orders:  -     Comprehensive Metabolic Panel    4. Atypical mole  Comments:  She will follow up with dermatology as needed.    5. Postmenopausal status    6. Type 2 diabetes mellitus without complication, without long-term current use of insulin  Comments:  Blood glucose is well controlled.  Orders:  -     Hemoglobin A1c  -     Microalbumin / Creatinine Urine Ratio - Urine, Clean Catch  -     TSH    7. Vitamin D deficiency  -     Vitamin D,25-Hydroxy    8. Pacemaker  Comments:  She will continue to follow with cardiology as needed.    9. Right carotid bruit    10. Eyelid abnormality    11. Body mass index (BMI) 24.0-24.9, adult  Assessment & Plan:  BMI is within normal parameters. No other follow-up for BMI required.           Patient Instructions   There are no preventive care reminders to display for this patient.        Transcribed from ambient dictation for Jaelyn Iverson MD by Jordyn Herrera.  01/23/24   09:17 EST    Patient or patient representative verbalized consent to the visit recording.  I have personally performed the services described in this document as transcribed by the above individual, and it is both accurate and complete.

## 2024-01-23 NOTE — PROGRESS NOTES
Venipuncture performed on Left Arm by Dari Goode MA  with good hemostasis. Patient tolerated well. 01/23/24

## 2024-01-24 ENCOUNTER — TELEPHONE (OUTPATIENT)
Dept: FAMILY MEDICINE CLINIC | Facility: CLINIC | Age: 74
End: 2024-01-24
Payer: MEDICARE

## 2024-01-29 ENCOUNTER — CLINICAL SUPPORT NO REQUIREMENTS (OUTPATIENT)
Dept: CARDIOLOGY | Facility: CLINIC | Age: 74
End: 2024-01-29
Payer: MEDICARE

## 2024-01-29 ENCOUNTER — OFFICE VISIT (OUTPATIENT)
Dept: CARDIOLOGY | Facility: CLINIC | Age: 74
End: 2024-01-29
Payer: MEDICARE

## 2024-01-29 VITALS
OXYGEN SATURATION: 98 % | DIASTOLIC BLOOD PRESSURE: 69 MMHG | SYSTOLIC BLOOD PRESSURE: 135 MMHG | BODY MASS INDEX: 24.25 KG/M2 | WEIGHT: 160 LBS | HEART RATE: 75 BPM | HEIGHT: 68 IN

## 2024-01-29 DIAGNOSIS — R42 DIZZINESS: ICD-10-CM

## 2024-01-29 DIAGNOSIS — Z95.0 PACEMAKER: Primary | ICD-10-CM

## 2024-01-29 DIAGNOSIS — I49.5 SICK SINUS SYNDROME: ICD-10-CM

## 2024-01-29 DIAGNOSIS — R06.02 SHORTNESS OF BREATH: ICD-10-CM

## 2024-01-29 DIAGNOSIS — R00.1 BRADYCARDIA: ICD-10-CM

## 2024-01-29 DIAGNOSIS — R55 NEAR SYNCOPE: ICD-10-CM

## 2024-01-29 DIAGNOSIS — E08.9 DIABETES MELLITUS DUE TO UNDERLYING CONDITION WITHOUT COMPLICATION, WITHOUT LONG-TERM CURRENT USE OF INSULIN: ICD-10-CM

## 2024-01-29 DIAGNOSIS — I44.1 AV BLOCK, 2ND DEGREE: ICD-10-CM

## 2024-01-29 DIAGNOSIS — I44.1 SECOND DEGREE AV BLOCK, MOBITZ TYPE II: ICD-10-CM

## 2024-01-29 DIAGNOSIS — E78.5 DYSLIPIDEMIA: ICD-10-CM

## 2024-01-29 DIAGNOSIS — I45.10 RIGHT BUNDLE BRANCH BLOCK: ICD-10-CM

## 2024-01-29 DIAGNOSIS — I10 ESSENTIAL HYPERTENSION: ICD-10-CM

## 2024-01-29 PROCEDURE — 93280 PM DEVICE PROGR EVAL DUAL: CPT | Performed by: INTERNAL MEDICINE

## 2024-02-01 DIAGNOSIS — I10 PRIMARY HYPERTENSION: ICD-10-CM

## 2024-02-01 NOTE — TELEPHONE ENCOUNTER
Caller: Celsa Brown    Relationship: Self    Best call back number: [phone]    Requested Prescriptions:   Requested Prescriptions     Pending Prescriptions Disp Refills    pravastatin (PRAVACHOL) 80 MG tablet 90 tablet 0     Sig: Take 1 tablet by mouth Every Night.    hydrALAZINE (APRESOLINE) 100 MG tablet 270 tablet 3     Sig: Take 1 tablet by mouth 3 (Three) Times a Day.    metFORMIN (GLUCOPHAGE) 500 MG tablet 360 tablet 3    metoprolol succinate XL (TOPROL-XL) 100 MG 24 hr tablet 45 tablet 3     Sig: TAKE 1/2 TABLET NIGHTLY    hydroCHLOROthiazide (HYDRODIURIL) 25 MG tablet 90 tablet 0     Sig: Take 1 tablet by mouth Daily.    lisinopril (PRINIVIL,ZESTRIL) 40 MG tablet 90 tablet 0     Sig: Take 1 tablet by mouth Daily.    Accu-Chek Thelma Plus test strip 100 each 0     Sig: TEST BLOOD SUGAR ONCE DAILY        Pharmacy where request should be sent: [facility], [address] [phone] [place] [phone] FX     Last office visit with prescribing clinician: 1/23/2024   Last telemedicine visit with prescribing clinician: Visit date not found   Next office visit with prescribing clinician: 4/23/2024       Lorraine Wilburn   02/01/24 11:16 EST

## 2024-02-01 NOTE — TELEPHONE ENCOUNTER
Please check with patient and see how she is taking these pills with them in so that I can sign them with a years worth of refills.

## 2024-02-01 NOTE — TELEPHONE ENCOUNTER
Rx Refill Note  Requested Prescriptions     Pending Prescriptions Disp Refills   • pravastatin (PRAVACHOL) 80 MG tablet 90 tablet 0     Sig: Take 1 tablet by mouth Every Night.   • hydrALAZINE (APRESOLINE) 100 MG tablet 270 tablet 3     Sig: Take 1 tablet by mouth 3 (Three) Times a Day.   • metFORMIN (GLUCOPHAGE) 500 MG tablet 360 tablet 3   • metoprolol succinate XL (TOPROL-XL) 100 MG 24 hr tablet 45 tablet 3     Sig: TAKE 1/2 TABLET NIGHTLY   • hydroCHLOROthiazide (HYDRODIURIL) 25 MG tablet 90 tablet 0     Sig: Take 1 tablet by mouth Daily.   • lisinopril (PRINIVIL,ZESTRIL) 40 MG tablet 90 tablet 0     Sig: Take 1 tablet by mouth Daily.   • Accu-Chek Thelma Plus test strip 100 each 0     Sig: TEST BLOOD SUGAR ONCE DAILY      Last office visit with prescribing clinician: 1/23/2024      Next office visit with prescribing clinician: 4/23/2024   3}  Krystal Marvin  02/01/24, 11:24 EST

## 2024-02-02 RX ORDER — BLOOD SUGAR DIAGNOSTIC
STRIP MISCELLANEOUS
Qty: 100 EACH | Refills: 3 | Status: SHIPPED | OUTPATIENT
Start: 2024-02-02

## 2024-02-02 RX ORDER — HYDRALAZINE HYDROCHLORIDE 100 MG/1
100 TABLET, FILM COATED ORAL 2 TIMES DAILY
Qty: 180 TABLET | Refills: 3 | Status: SHIPPED | OUTPATIENT
Start: 2024-02-02

## 2024-02-02 RX ORDER — METOPROLOL SUCCINATE 100 MG/1
50 TABLET, EXTENDED RELEASE ORAL NIGHTLY
Qty: 45 TABLET | Refills: 3 | Status: SHIPPED | OUTPATIENT
Start: 2024-02-02

## 2024-02-02 RX ORDER — HYDROCHLOROTHIAZIDE 25 MG/1
25 TABLET ORAL DAILY
Qty: 90 TABLET | Refills: 3 | Status: SHIPPED | OUTPATIENT
Start: 2024-02-02

## 2024-02-02 RX ORDER — LISINOPRIL 40 MG/1
40 TABLET ORAL DAILY
Qty: 90 TABLET | Refills: 3 | Status: SHIPPED | OUTPATIENT
Start: 2024-02-02

## 2024-02-02 RX ORDER — PRAVASTATIN SODIUM 80 MG/1
80 TABLET ORAL NIGHTLY
Qty: 90 TABLET | Refills: 3 | Status: SHIPPED | OUTPATIENT
Start: 2024-02-02

## 2024-04-21 PROBLEM — H02.831 DERMATOCHALASIS OF RIGHT UPPER EYELID: Status: ACTIVE | Noted: 2023-12-13

## 2024-04-21 PROBLEM — H00.024 HORDEOLUM INTERNUM OF LEFT UPPER EYELID: Status: ACTIVE | Noted: 2023-12-13

## 2024-04-21 PROBLEM — H02.834 DERMATOCHALASIS OF LEFT UPPER EYELID: Status: ACTIVE | Noted: 2023-12-13

## 2024-04-21 PROBLEM — R10.9 ABDOMINAL PAIN: Status: RESOLVED | Noted: 2022-05-21 | Resolved: 2024-04-21

## 2024-04-21 NOTE — PATIENT INSTRUCTIONS
Health Maintenance Due   Topic Date Due    ANNUAL WELLNESS VISIT  07/10/2024    Increase Metoprolol succinate to 100 mg(whole tablet) at night.    If blood sugars over 200 with Actose 30-should call us and we'll increase to Actose 45.

## 2024-04-21 NOTE — PROGRESS NOTES
The ABCs of the Annual Wellness Visit  Subsequent Medicare Wellness Visit    Subjective    History of Present Illness:  Celsa Brown is a 73 y.o. female who presents for a Subsequent Medicare Wellness Visit.    The following portions of the patient's history were reviewed and   updated as appropriate: allergies, current medications, past family history, past medical history, past social history, past surgical history, and problem list.    Compared to one year ago, the patient feels her physical   health is the same.    Compared to one year ago, the patient feels her mental   health is the same.    Recent Hospitalizations:  She was not admitted to the hospital during the last year.       Current Medical Providers:  Patient Care Team:  Jaelyn Iverson MD as PCP - General (Family Medicine)  Dung Lynch MD as Consulting Physician (Cardiology)  Jerzy Ignacio MD as Consulting Physician (Urology)    Outpatient Medications Prior to Visit   Medication Sig Dispense Refill    Accu-Chek Thelma Plus test strip TEST BLOOD SUGAR ONCE DAILY 100 each 3    amLODIPine (NORVASC) 10 MG tablet Take 1 tablet by mouth Daily. 90 tablet 3    aspirin 81 MG EC tablet Take 1 tablet by mouth Daily. 1 tablet every other day      cholecalciferol (VITAMIN D3) 1000 units tablet Take 1 tablet by mouth Every Other Day.      clotrimazole (LOTRIMIN) 1 % cream Apply 1 Application topically to the appropriate area as directed 2 (Two) Times a Day.      Cyanocobalamin (VITAMIN B12) 1000 MCG tablet controlled-release Take 1,000 mcg by mouth Every Other Day.      estradiol (ESTRACE) 0.1 MG/GM vaginal cream Insert 2 g into the vagina Daily.      glipizide (GLUCOTROL) 10 MG tablet TAKE 2 TABLETS TWICE A  tablet 3    hydrALAZINE (APRESOLINE) 100 MG tablet Take 1 tablet by mouth 2 (Two) Times a Day. 180 tablet 3    hydroCHLOROthiazide (HYDRODIURIL) 25 MG tablet Take 1 tablet by mouth Daily. 90 tablet 3    lisinopril (PRINIVIL,ZESTRIL) 40 MG  tablet Take 1 tablet by mouth Daily. 90 tablet 3    loratadine (CLARITIN) 10 MG tablet Take 1 tablet by mouth Daily.      Melatonin 10 MG tablet Take 1 tablet by mouth Daily.      metFORMIN (GLUCOPHAGE) 500 MG tablet Take 2 tablets by mouth 2 (Two) Times a Day With Meals. 360 tablet 3    metoprolol succinate XL (TOPROL-XL) 100 MG 24 hr tablet Take 0.5 tablets by mouth Every Night. TAKE 1/2 TABLET NIGHTLY 45 tablet 3    Multiple Vitamins-Minerals (CENTRUM SILVER ULTRA WOMENS) tablet Take 1 tablet by mouth Daily.      Omega-3 Fatty Acids (OMEGA-3 FISH OIL) 500 MG capsule Take 1 capsule by mouth Daily.      pravastatin (PRAVACHOL) 80 MG tablet Take 1 tablet by mouth Every Night. 90 tablet 3    Januvia 100 MG tablet TAKE 1 TABLET DAILY 90 tablet 3    pioglitazone (Actos) 15 MG tablet Take 1 tablet by mouth Daily. 90 tablet 1    moxifloxacin (Vigamox) 0.5 % ophthalmic solution Administer 0.05 mL into the left eye 3 (Three) Times a Day. (Patient not taking: Reported on 1/29/2024) 1 each 0     No facility-administered medications prior to visit.       No opioid medication identified on active medication list. I have reviewed chart for other potential  high risk medication/s and harmful drug interactions in the elderly.        Aspirin is on active medication list. Aspirin use is indicated based on review of current medical condition/s. Pros and cons of this therapy have been discussed today. Benefits of this medication outweigh potential harm.  Patient has been encouraged to continue taking this medication.  .    Patient Active Problem List   Diagnosis    B12 deficiency    Body mass index (BMI) 24.0-24.9, adult    Degenerative joint disease    Hyperlipidemia    Hypertension    Atypical mole    Postmenopausal status    Type 2 diabetes mellitus    Vitamin D deficiency    Pacemaker    Age-related nuclear cataract of both eyes    Macular degeneration of left eye    Nonexudative age-related macular degeneration    Presbyopia     "Ptosis of eyelid    Regular astigmatism of both eyes    Vitreous floaters    Nail fungus    Right carotid bruit    Hand numbness    Eyelid abnormality    Dermatochalasis of left upper eyelid    Dermatochalasis of right upper eyelid    Hordeolum internum of left upper eyelid     Advance Care Planning  Advance Directive is on file.  ACP discussion was held with the patient during this visit. Patient has an advance directive in EMR which is still valid.      Objective    Vitals:    24 0939 24 0940 24 0941   BP: 145/72 143/73 124/83   BP Location: Left arm Right arm Right arm   Patient Position: Sitting Sitting Standing   Cuff Size: Adult Adult Adult   Pulse: 88 87 88   Temp: 97.1 °F (36.2 °C)     TempSrc: Temporal     SpO2: 95%  97%   Weight: 74.2 kg (163 lb 9.6 oz)     Height: 172.7 cm (68\")     PainSc: 0-No pain       Estimated body mass index is 24.88 kg/m² as calculated from the following:    Height as of this encounter: 172.7 cm (68\").    Weight as of this encounter: 74.2 kg (163 lb 9.6 oz).    BMI is within normal parameters. No other follow-up for BMI required.      Does the patient have evidence of cognitive impairment? No            HEALTH RISK ASSESSMENT    Smoking Status:  Social History     Tobacco Use   Smoking Status Never    Passive exposure: Past (AS A CHILD)   Smokeless Tobacco Never     Alcohol Consumption:  Social History     Substance and Sexual Activity   Alcohol Use No     Fall Risk Screen:    CECYADI Fall Risk Assessment was completed, and patient is at LOW risk for falls.Assessment completed on:2024    Depression Screenin/23/2024     8:19 AM   PHQ-2/PHQ-9 Depression Screening   Little Interest or Pleasure in Doing Things 0-->not at all   Feeling Down, Depressed or Hopeless 0-->not at all   PHQ-9: Brief Depression Severity Measure Score 0       Health Habits and Functional and Cognitive Screenin/23/2024     8:00 AM   Functional & Cognitive Status   Do you " have difficulty preparing food and eating? No   Do you have difficulty bathing yourself, getting dressed or grooming yourself? No   Do you have difficulty using the toilet? No   Do you have difficulty moving around from place to place? No   Do you have trouble with steps or getting out of a bed or a chair? No   Current Diet Well Balanced Diet   Dental Exam Up to date   Eye Exam Up to date   Exercise (times per week) 3 times per week   Current Exercises Include Walking   Do you need help using the phone?  No   Are you deaf or do you have serious difficulty hearing?  No   Do you need help to go to places out of walking distance? No   Do you need help shopping? No   Do you need help preparing meals?  No   Do you need help with housework?  No   Do you need help with laundry? No   Do you need help taking your medications? No   Do you need help managing money? No   Do you ever drive or ride in a car without wearing a seat belt? No   Have you felt unusual stress, anger or loneliness in the last month? No   Who do you live with? Spouse   If you need help, do you have trouble finding someone available to you? No   Have you been bothered in the last four weeks by sexual problems? No   Do you have difficulty concentrating, remembering or making decisions? No       Age-appropriate Screening Schedule:  Refer to the list below for future screening recommendations based on patient's age, sex and/or medical conditions. Orders for these recommended tests are listed in the plan section. The patient has been provided with a written plan.    Health Maintenance   Topic Date Due    MAMMOGRAM  04/10/2024    HEMOGLOBIN A1C  07/23/2024    TDAP/TD VACCINES (2 - Tdap) 08/02/2027 (Originally 8/1/2027)    INFLUENZA VACCINE  08/01/2024    DIABETIC EYE EXAM  12/13/2024    LIPID PANEL  01/23/2025    URINE MICROALBUMIN  01/23/2025    ANNUAL WELLNESS VISIT  04/23/2025    DIABETIC FOOT EXAM  04/23/2025    DXA SCAN  10/24/2025    COLORECTAL CANCER  SCREENING  06/28/2026    HEPATITIS C SCREENING  Completed    COVID-19 Vaccine  Completed    RSV Vaccine - Adults  Completed    Pneumococcal Vaccine 65+  Completed    ZOSTER VACCINE  Completed                CMS Preventative Services Quick Reference  Risk Factors Identified During Encounter  None Identified  The above risks/problems have been discussed with the patient.  Follow up actions/plans if indicated are seen below in the Assessment/Plan Section.  Pertinent information has been shared with the patient in the After Visit Summary.  An After Visit Summary and PPPS were made available to the patient.    Follow Up:   Next Medicare Wellness visit to be scheduled in 1 year.         Additional E&M Note during same encounter follows:  Patient has multiple medical problems which are significant and separately identifiable that require additional work above and beyond the Medicare Wellness Visit.        Chief Complaint  Diabetes (3 month check up/Patient is fasting), Hyperlipidemia, and Hypertension (Brought bp log average 147/83 pulse 81  [id number])    Subjective        HPI  Celsa Brown also presents   Chief Complaint   Patient presents with    Diabetes     3 month check up  Patient is fasting    Hyperlipidemia    Hypertension     Brought bp log average 147/83 pulse 81  [id number]   .  Patient was advised to wear sunscreen and a seatbelt.  History of Present Illness  The patient is a 73-year-old female who is here today for her annual wellness exam for Medicare and also an age-specific physical. She is also here to follow up on type 2 diabetes without complication, without long-term use of insulin, right carotid bruit, pacemaker, hypertension, hyperlipidemia, B12 deficiency, atypical mole, and a body mass index of 24.    The patient acknowledges an increase in her sodium intake. Her current medication regimen includes amlodipine 10 mg, hydralazine 100 mg twice daily, metoprolol 50 mg at night, lisinopril 40 mg, and a  "diuretic 25 mg daily. She is uncertain if metoprolol 100 mg induces dizziness. She maintains regular intake of aspirin 81 mg.    The patient believes she is due for a mammogram, despite denying any abnormal lumps or bumps. She maintains regular dental check-ups and has an ophthalmology appointment scheduled for Thursday. She performs monthly breast self-examinations. She denies dysphagia, hemoptysis, headache, wheezing, chest pressure, or palpitations. She reports occasional constipation, managed with fiber supplementation. She denies dysuria, hematuria, or unusual vaginal discharge. She has been experiencing chills for the past few days, but denies fever. She sustained a bruise on her foot due to an incident where she dropped a wood stove on her foot.    The patient experienced shakiness when her blood glucose levels were 63, which she attributes to consuming a snack before bedtime. She reports good control of her blood glucose levels. She is currently on Januvia 100 mg and Actos 15 mg.    Supplemental Information  She had some atypical moles and saw a dermatologist who told her they were like birthmarks. She had some freezing on her nose and had some sun damage. She will be seeing her dermatologist in a year. She is watching her saturated fat intake. She has a pacemaker and is scheduled to see Dr. Hitchcock in 08/2024.   She is allergic to SULFA and MACROBID.    Review of Systems   Respiratory:  Negative for shortness of breath.    Cardiovascular:  Negative for chest pain, palpitations and leg swelling.   Endocrine: Negative for polydipsia, polyphagia and polyuria.   Musculoskeletal:  Positive for gait problem and joint swelling.       Objective   Vital Signs:  /83 (BP Location: Right arm, Patient Position: Standing, Cuff Size: Adult)   Pulse 88   Temp 97.1 °F (36.2 °C) (Temporal)   Ht 172.7 cm (68\")   Wt 74.2 kg (163 lb 9.6 oz)   SpO2 97%   BMI 24.88 kg/m²     Physical Exam  Vitals reviewed. "   Constitutional:       General: She is not in acute distress.     Appearance: Normal appearance. She is well-developed. She is not ill-appearing or toxic-appearing.   HENT:      Head: Normocephalic and atraumatic.      Right Ear: Tympanic membrane, ear canal and external ear normal.      Left Ear: Tympanic membrane, ear canal and external ear normal. There is impacted cerumen.      Nose: Nose normal.      Mouth/Throat:      Mouth: Mucous membranes are moist.      Pharynx: No posterior oropharyngeal erythema.   Eyes:      Extraocular Movements: Extraocular movements intact.      Conjunctiva/sclera: Conjunctivae normal.      Pupils: Pupils are equal, round, and reactive to light.   Cardiovascular:      Rate and Rhythm: Normal rate and regular rhythm.      Pulses:           Dorsalis pedis pulses are 1+ on the right side and 1+ on the left side.        Posterior tibial pulses are 1+ on the right side and 1+ on the left side.      Heart sounds: Normal heart sounds.   Pulmonary:      Effort: Pulmonary effort is normal.      Breath sounds: Normal breath sounds.   Abdominal:      General: Bowel sounds are normal. There is no distension.      Palpations: Abdomen is soft. There is no mass.      Tenderness: There is no abdominal tenderness.   Musculoskeletal:         General: Swelling and signs of injury present. Normal range of motion.      Cervical back: Neck supple.        Feet:    Feet:      Right foot:      Protective Sensation: 10 sites tested.  10 sites sensed.      Skin integrity: Skin integrity normal.      Toenail Condition: Right toenails are normal.      Left foot:      Protective Sensation: 10 sites tested.  10 sites sensed.      Skin integrity: Skin integrity normal. Callus present.      Toenail Condition: Left toenails are normal.      Comments: Diabetic Foot Exam Performed and Monofilament Test Performed  Contusion of the dorsum of the left foot  Skin:     General: Skin is warm.   Neurological:      General:  No focal deficit present.      Mental Status: She is alert and oriented to person, place, and time.   Psychiatric:         Mood and Affect: Mood normal.         Behavior: Behavior normal.        Physical Exam  Wax is present in the left ear, while the right ear is clear. Throat and teeth appear normal.  Trace of bruit is noted in the carotid arteries. No masses detected in the breasts.  Bruise is noted on the foot.    Vital Signs  Pulse rate is 81.          Results  Laboratory Studies  Blood sugar was 63.    Imaging  Ultrasound of carotid artery showed mild cloging up of the right one, but it was less than 50%. Mild plaquing of the left one was also less than 50%.             Assessment and Plan   Diagnoses and all orders for this visit:    1. Encounter for annual general medical examination with abnormal findings in adult (Primary)    2. Atypical mole    3. B12 deficiency  -     CBC Auto Differential  -     Vitamin B12    4. Hyperlipidemia, unspecified hyperlipidemia type  -     Lipid Panel    5. Primary hypertension  -     Comprehensive Metabolic Panel    6. Pacemaker    7. Right carotid bruit    8. Type 2 diabetes mellitus without complication, without long-term current use of insulin  -     Hemoglobin A1c  -     Microalbumin / Creatinine Urine Ratio - Urine, Clean Catch  -     TSH Rfx On Abnormal To Free T4    9. Encounter for screening mammogram for malignant neoplasm of breast  -     Mammo Screening Digital Tomosynthesis Bilateral With CAD; Future    Other orders  -     pioglitazone (Actos) 30 MG tablet; Take 1 tablet by mouth Daily.  Dispense: 90 tablet; Refill: 3      Assessment & Plan  1. Hypertension.  The patient's metoprolol succinate dosage will be escalated to 100 mg (a whole tablet at night) to aid in better blood pressure control.    2. Annual wellness exam for Medicare and age-specific physical.  The patient's last mammogram was conducted on 04/10/2024. A mammogram will be ordered. Laboratory tests  will be ordered.    3. Type 2 diabetes without complication, without long-term use of insulin.  The patient's blood glucose levels have been well-regulated. The patient's Actos dosage will be increased to 30 mg, while Januvia will be discontinued due to cost. Should the patient's blood glucose levels exceed 200 with Actos 30 mg, she is to contact us, and we will escalate the Actos dosage to 45 mg.    4. Right carotid bruit.  An ultrasound of her carotid artery was conducted in 07/20[facility], which revealed mild blockage of the right carotid artery, less than 50 percent, and mild plaquing of the left carotid artery. A repeat ultrasound of the carotid artery will be scheduled in 2025, in conjunction with a DEXA scan.    Follow-up  The patient is scheduled for a follow-up visit in 3 months.         Follow Up   Return in about 3 months (around 7/23/2024).  Patient was given instructions and counseling regarding her condition or for health maintenance advice. Please see specific information pulled into the AVS if appropriate.   Patient or patient representative verbalized consent for the use of Ambient Listening during the visit with  Jaelyn Iverson MD for chart documentation. 4/23/2024  15:27 EDT

## 2024-04-22 PROBLEM — E04.2 MULTIPLE THYROID NODULES: Status: ACTIVE | Noted: 2024-04-22

## 2024-04-22 PROBLEM — E04.2 MULTIPLE THYROID NODULES: Status: RESOLVED | Noted: 2024-04-22 | Resolved: 2024-04-22

## 2024-04-23 ENCOUNTER — OFFICE VISIT (OUTPATIENT)
Dept: FAMILY MEDICINE CLINIC | Facility: CLINIC | Age: 74
End: 2024-04-23
Payer: MEDICARE

## 2024-04-23 VITALS
BODY MASS INDEX: 24.8 KG/M2 | WEIGHT: 163.6 LBS | HEIGHT: 68 IN | SYSTOLIC BLOOD PRESSURE: 124 MMHG | HEART RATE: 88 BPM | DIASTOLIC BLOOD PRESSURE: 83 MMHG | OXYGEN SATURATION: 97 % | TEMPERATURE: 97.1 F

## 2024-04-23 DIAGNOSIS — E53.8 B12 DEFICIENCY: ICD-10-CM

## 2024-04-23 DIAGNOSIS — R09.89 RIGHT CAROTID BRUIT: ICD-10-CM

## 2024-04-23 DIAGNOSIS — Z12.31 ENCOUNTER FOR SCREENING MAMMOGRAM FOR MALIGNANT NEOPLASM OF BREAST: ICD-10-CM

## 2024-04-23 DIAGNOSIS — E78.5 HYPERLIPIDEMIA, UNSPECIFIED HYPERLIPIDEMIA TYPE: ICD-10-CM

## 2024-04-23 DIAGNOSIS — Z95.0 PACEMAKER: ICD-10-CM

## 2024-04-23 DIAGNOSIS — Z00.01 ENCOUNTER FOR ANNUAL GENERAL MEDICAL EXAMINATION WITH ABNORMAL FINDINGS IN ADULT: Primary | ICD-10-CM

## 2024-04-23 DIAGNOSIS — E11.9 TYPE 2 DIABETES MELLITUS WITHOUT COMPLICATION, WITHOUT LONG-TERM CURRENT USE OF INSULIN: ICD-10-CM

## 2024-04-23 DIAGNOSIS — D22.9 ATYPICAL MOLE: ICD-10-CM

## 2024-04-23 DIAGNOSIS — I10 PRIMARY HYPERTENSION: ICD-10-CM

## 2024-04-23 LAB
ALBUMIN SERPL-MCNC: 4.1 G/DL (ref 3.5–5.2)
ALBUMIN UR-MCNC: <1.2 MG/DL
ALBUMIN/GLOB SERPL: 1.4 G/DL
ALP SERPL-CCNC: 101 U/L (ref 39–117)
ALT SERPL W P-5'-P-CCNC: 28 U/L (ref 1–33)
ANION GAP SERPL CALCULATED.3IONS-SCNC: 14.6 MMOL/L (ref 5–15)
AST SERPL-CCNC: 27 U/L (ref 1–32)
BASOPHILS # BLD AUTO: 0.04 10*3/MM3 (ref 0–0.2)
BASOPHILS NFR BLD AUTO: 0.4 % (ref 0–1.5)
BILIRUB SERPL-MCNC: 0.3 MG/DL (ref 0–1.2)
BUN SERPL-MCNC: 15 MG/DL (ref 8–23)
BUN/CREAT SERPL: 24.6 (ref 7–25)
CALCIUM SPEC-SCNC: 9.8 MG/DL (ref 8.6–10.5)
CHLORIDE SERPL-SCNC: 101 MMOL/L (ref 98–107)
CHOLEST SERPL-MCNC: 153 MG/DL (ref 0–200)
CO2 SERPL-SCNC: 24.4 MMOL/L (ref 22–29)
CREAT SERPL-MCNC: 0.61 MG/DL (ref 0.57–1)
CREAT UR-MCNC: 14.5 MG/DL
DEPRECATED RDW RBC AUTO: 39.5 FL (ref 37–54)
EGFRCR SERPLBLD CKD-EPI 2021: 93.4 ML/MIN/1.73
EOSINOPHIL # BLD AUTO: 0.06 10*3/MM3 (ref 0–0.4)
EOSINOPHIL NFR BLD AUTO: 0.6 % (ref 0.3–6.2)
ERYTHROCYTE [DISTWIDTH] IN BLOOD BY AUTOMATED COUNT: 12.5 % (ref 12.3–15.4)
GLOBULIN UR ELPH-MCNC: 2.9 GM/DL
GLUCOSE SERPL-MCNC: 164 MG/DL (ref 65–99)
HBA1C MFR BLD: 8.1 % (ref 4.8–5.6)
HCT VFR BLD AUTO: 36.5 % (ref 34–46.6)
HDLC SERPL-MCNC: 54 MG/DL (ref 40–60)
HGB BLD-MCNC: 12.4 G/DL (ref 12–15.9)
IMM GRANULOCYTES # BLD AUTO: 0.04 10*3/MM3 (ref 0–0.05)
IMM GRANULOCYTES NFR BLD AUTO: 0.4 % (ref 0–0.5)
LDLC SERPL CALC-MCNC: 88 MG/DL (ref 0–100)
LDLC/HDLC SERPL: 1.63 {RATIO}
LYMPHOCYTES # BLD AUTO: 2.28 10*3/MM3 (ref 0.7–3.1)
LYMPHOCYTES NFR BLD AUTO: 23.9 % (ref 19.6–45.3)
MCH RBC QN AUTO: 29.9 PG (ref 26.6–33)
MCHC RBC AUTO-ENTMCNC: 34 G/DL (ref 31.5–35.7)
MCV RBC AUTO: 88 FL (ref 79–97)
MICROALBUMIN/CREAT UR: NORMAL MG/G{CREAT}
MONOCYTES # BLD AUTO: 0.53 10*3/MM3 (ref 0.1–0.9)
MONOCYTES NFR BLD AUTO: 5.6 % (ref 5–12)
NEUTROPHILS NFR BLD AUTO: 6.59 10*3/MM3 (ref 1.7–7)
NEUTROPHILS NFR BLD AUTO: 69.1 % (ref 42.7–76)
NRBC BLD AUTO-RTO: 0 /100 WBC (ref 0–0.2)
PLATELET # BLD AUTO: 269 10*3/MM3 (ref 140–450)
PMV BLD AUTO: 12.1 FL (ref 6–12)
POTASSIUM SERPL-SCNC: 3.9 MMOL/L (ref 3.5–5.2)
PROT SERPL-MCNC: 7 G/DL (ref 6–8.5)
RBC # BLD AUTO: 4.15 10*6/MM3 (ref 3.77–5.28)
SODIUM SERPL-SCNC: 140 MMOL/L (ref 136–145)
TRIGL SERPL-MCNC: 55 MG/DL (ref 0–150)
TSH SERPL DL<=0.05 MIU/L-ACNC: 0.93 UIU/ML (ref 0.27–4.2)
VIT B12 BLD-MCNC: 566 PG/ML (ref 211–946)
VLDLC SERPL-MCNC: 11 MG/DL (ref 5–40)
WBC NRBC COR # BLD AUTO: 9.54 10*3/MM3 (ref 3.4–10.8)

## 2024-04-23 PROCEDURE — 1159F MED LIST DOCD IN RCRD: CPT | Performed by: PREVENTIVE MEDICINE

## 2024-04-23 PROCEDURE — 3051F HG A1C>EQUAL 7.0%<8.0%: CPT | Performed by: PREVENTIVE MEDICINE

## 2024-04-23 PROCEDURE — 84443 ASSAY THYROID STIM HORMONE: CPT | Performed by: PREVENTIVE MEDICINE

## 2024-04-23 PROCEDURE — 1160F RVW MEDS BY RX/DR IN RCRD: CPT | Performed by: PREVENTIVE MEDICINE

## 2024-04-23 PROCEDURE — 3074F SYST BP LT 130 MM HG: CPT | Performed by: PREVENTIVE MEDICINE

## 2024-04-23 PROCEDURE — 36415 COLL VENOUS BLD VENIPUNCTURE: CPT | Performed by: PREVENTIVE MEDICINE

## 2024-04-23 PROCEDURE — 82607 VITAMIN B-12: CPT | Performed by: PREVENTIVE MEDICINE

## 2024-04-23 PROCEDURE — 83036 HEMOGLOBIN GLYCOSYLATED A1C: CPT | Performed by: PREVENTIVE MEDICINE

## 2024-04-23 PROCEDURE — 99397 PER PM REEVAL EST PAT 65+ YR: CPT | Performed by: PREVENTIVE MEDICINE

## 2024-04-23 PROCEDURE — 80061 LIPID PANEL: CPT | Performed by: PREVENTIVE MEDICINE

## 2024-04-23 PROCEDURE — 82570 ASSAY OF URINE CREATININE: CPT | Performed by: PREVENTIVE MEDICINE

## 2024-04-23 PROCEDURE — 85025 COMPLETE CBC W/AUTO DIFF WBC: CPT | Performed by: PREVENTIVE MEDICINE

## 2024-04-23 PROCEDURE — 80053 COMPREHEN METABOLIC PANEL: CPT | Performed by: PREVENTIVE MEDICINE

## 2024-04-23 PROCEDURE — 82043 UR ALBUMIN QUANTITATIVE: CPT | Performed by: PREVENTIVE MEDICINE

## 2024-04-23 PROCEDURE — G0439 PPPS, SUBSEQ VISIT: HCPCS | Performed by: PREVENTIVE MEDICINE

## 2024-04-23 PROCEDURE — 3078F DIAST BP <80 MM HG: CPT | Performed by: PREVENTIVE MEDICINE

## 2024-04-23 RX ORDER — PIOGLITAZONEHYDROCHLORIDE 30 MG/1
30 TABLET ORAL DAILY
Qty: 90 TABLET | Refills: 3 | Status: SHIPPED | OUTPATIENT
Start: 2024-04-23

## 2024-04-23 NOTE — PROGRESS NOTES
Venipuncture performed on Left Arm by Leia Gallardo MA  with good hemostasis. Patient tolerated well. 04/23/24

## 2024-04-24 ENCOUNTER — TELEPHONE (OUTPATIENT)
Dept: FAMILY MEDICINE CLINIC | Facility: CLINIC | Age: 74
End: 2024-04-24
Payer: MEDICARE

## 2024-04-24 NOTE — TELEPHONE ENCOUNTER
RELAY----- Message from Jaelyn Iverson MD sent at 4/24/2024 11:21 AM EDT -----  Glucose was 164 and A1c showed an increase up to 8.1 as you had thought.  The Actos increased to 30 may not be enough so lets continue to check your blood sugars as planned we may need to increase the Actos to 45.  The bad cholesterol is 88 and goal is to down to 50 so watch your saturated fats increase your walking continue taking your pravastatin and you could consider adding Zetia but at age 73 that has not been shown to increase the length or quality of your life but please let us know we would be glad to start call if any other questions or concerns

## 2024-04-24 NOTE — PROGRESS NOTES
Glucose was 164 and A1c showed an increase up to 8.1 as you had thought.  The Actos increased to 30 may not be enough so lets continue to check your blood sugars as planned we may need to increase the Actos to 45.  The bad cholesterol is 88 and goal is to down to 50 so watch your saturated fats increase your walking continue taking your pravastatin and you could consider adding Zetia but at age 73 that has not been shown to increase the length or quality of your life but please let us know we would be glad to start call if any other questions or concerns

## 2024-04-25 NOTE — TELEPHONE ENCOUNTER
Patient advised of results. Verbalized understanding. Will think about adding zetia. Will call back if she decides to.

## 2024-05-13 ENCOUNTER — HOSPITAL ENCOUNTER (OUTPATIENT)
Dept: MAMMOGRAPHY | Facility: HOSPITAL | Age: 74
Discharge: HOME | End: 2024-05-13
Admitting: PREVENTIVE MEDICINE
Payer: MEDICARE

## 2024-05-13 DIAGNOSIS — Z12.31 ENCOUNTER FOR SCREENING MAMMOGRAM FOR MALIGNANT NEOPLASM OF BREAST: ICD-10-CM

## 2024-05-13 DIAGNOSIS — I10 PRIMARY HYPERTENSION: ICD-10-CM

## 2024-05-13 PROCEDURE — 77063 BREAST TOMOSYNTHESIS BI: CPT

## 2024-05-13 PROCEDURE — 77067 SCR MAMMO BI INCL CAD: CPT

## 2024-05-13 RX ORDER — HYDRALAZINE HYDROCHLORIDE 100 MG/1
100 TABLET, FILM COATED ORAL 2 TIMES DAILY
Qty: 180 TABLET | Refills: 3 | Status: SHIPPED | OUTPATIENT
Start: 2024-05-13

## 2024-05-13 RX ORDER — HYDROCHLOROTHIAZIDE 25 MG/1
25 TABLET ORAL DAILY
Qty: 90 TABLET | Refills: 0 | Status: SHIPPED | OUTPATIENT
Start: 2024-05-13

## 2024-05-13 RX ORDER — GLIPIZIDE 10 MG/1
TABLET ORAL
Qty: 360 TABLET | Refills: 0 | Status: SHIPPED | OUTPATIENT
Start: 2024-05-13

## 2024-05-13 RX ORDER — METOPROLOL SUCCINATE 100 MG/1
50 TABLET, EXTENDED RELEASE ORAL NIGHTLY
Qty: 45 TABLET | Refills: 0 | Status: SHIPPED | OUTPATIENT
Start: 2024-05-13

## 2024-05-13 RX ORDER — LISINOPRIL 40 MG/1
40 TABLET ORAL DAILY
Qty: 90 TABLET | Refills: 0 | Status: SHIPPED | OUTPATIENT
Start: 2024-05-13

## 2024-05-13 RX ORDER — PRAVASTATIN SODIUM 80 MG/1
80 TABLET ORAL NIGHTLY
Qty: 90 TABLET | Refills: 0 | Status: SHIPPED | OUTPATIENT
Start: 2024-05-13

## 2024-05-13 NOTE — TELEPHONE ENCOUNTER
Caller: Celsa Brown    Relationship: Self    Best call back number: [phone]     Requested Prescriptions:   Requested Prescriptions     Pending Prescriptions Disp Refills    glipizide (GLUCOTROL) 10 MG tablet 360 tablet 3     Sig: TAKE 2 TABLETS TWICE A DAY    metoprolol succinate XL (TOPROL-XL) 100 MG 24 hr tablet 45 tablet 3     Sig: Take 0.5 tablets by mouth Every Night. TAKE 1/2 TABLET NIGHTLY    hydrALAZINE (APRESOLINE) 100 MG tablet 180 tablet 3     Sig: Take 1 tablet by mouth 2 (Two) Times a Day.    metFORMIN (GLUCOPHAGE) 500 MG tablet 360 tablet 3     Sig: Take 2 tablets by mouth 2 (Two) Times a Day With Meals.    pravastatin (PRAVACHOL) 80 MG tablet 90 tablet 3     Sig: Take 1 tablet by mouth Every Night.    lisinopril (PRINIVIL,ZESTRIL) 40 MG tablet 90 tablet 3     Sig: Take 1 tablet by mouth Daily.    hydroCHLOROthiazide 25 MG tablet 90 tablet 3     Sig: Take 1 tablet by mouth Daily.        Pharmacy where request should be sent: [facility], [address] [phone] [place] [phone] FX     Last office visit with prescribing clinician: 4/23/2024   Last telemedicine visit with prescribing clinician: Visit date not found   Next office visit with prescribing clinician: 7/25/2024     Additional details provided by patient: PATIENT WOULD LIKE A 90 DAY SUPPLY    Does the patient have less than a 3 day supply:  [] Yes  [x] No

## 2024-05-14 ENCOUNTER — TELEPHONE (OUTPATIENT)
Dept: FAMILY MEDICINE CLINIC | Facility: CLINIC | Age: 74
End: 2024-05-14
Payer: MEDICARE

## 2024-05-14 NOTE — TELEPHONE ENCOUNTER
"Relay     \"mammogram normal.  Ordered for one year unless change in symptoms or physical         \"                "

## 2024-07-24 NOTE — PATIENT INSTRUCTIONS
Health Maintenance Due   Topic Date Due    COVID-19 Vaccine (8 - 2023-24 season) 03/05/2024    HEMOGLOBIN A1C  10/23/2024    Check blood pressure cuff for accuracy and send 10 blood pressures over 2 weeks.  Watch sodium, alcohol and weight

## 2024-07-25 ENCOUNTER — OFFICE VISIT (OUTPATIENT)
Dept: FAMILY MEDICINE CLINIC | Facility: CLINIC | Age: 74
End: 2024-07-25
Payer: MEDICARE

## 2024-07-25 ENCOUNTER — CLINICAL SUPPORT (OUTPATIENT)
Dept: FAMILY MEDICINE CLINIC | Facility: CLINIC | Age: 74
End: 2024-07-25
Payer: MEDICARE

## 2024-07-25 VITALS
BODY MASS INDEX: 24.46 KG/M2 | RESPIRATION RATE: 16 BRPM | OXYGEN SATURATION: 98 % | WEIGHT: 161.38 LBS | HEART RATE: 94 BPM | TEMPERATURE: 98.7 F | DIASTOLIC BLOOD PRESSURE: 72 MMHG | HEIGHT: 68 IN | SYSTOLIC BLOOD PRESSURE: 148 MMHG

## 2024-07-25 DIAGNOSIS — H02.9 EYELID ABNORMALITY: ICD-10-CM

## 2024-07-25 DIAGNOSIS — E53.8 B12 DEFICIENCY: ICD-10-CM

## 2024-07-25 DIAGNOSIS — I10 PRIMARY HYPERTENSION: Primary | ICD-10-CM

## 2024-07-25 DIAGNOSIS — I10 PRIMARY HYPERTENSION: ICD-10-CM

## 2024-07-25 DIAGNOSIS — Z78.0 POSTMENOPAUSAL STATUS: ICD-10-CM

## 2024-07-25 DIAGNOSIS — R09.89 RIGHT CAROTID BRUIT: ICD-10-CM

## 2024-07-25 DIAGNOSIS — D22.9 ATYPICAL MOLE: ICD-10-CM

## 2024-07-25 DIAGNOSIS — E78.5 HYPERLIPIDEMIA, UNSPECIFIED HYPERLIPIDEMIA TYPE: ICD-10-CM

## 2024-07-25 DIAGNOSIS — E11.9 TYPE 2 DIABETES MELLITUS WITHOUT COMPLICATION, WITHOUT LONG-TERM CURRENT USE OF INSULIN: ICD-10-CM

## 2024-07-25 DIAGNOSIS — Z95.0 PACEMAKER: ICD-10-CM

## 2024-07-25 LAB
ALBUMIN SERPL-MCNC: 4.3 G/DL (ref 3.5–5.2)
ALBUMIN UR-MCNC: <1.2 MG/DL
ALBUMIN/GLOB SERPL: 1.3 G/DL
ALP SERPL-CCNC: 80 U/L (ref 39–117)
ALT SERPL W P-5'-P-CCNC: 21 U/L (ref 1–33)
ANION GAP SERPL CALCULATED.3IONS-SCNC: 14 MMOL/L (ref 5–15)
AST SERPL-CCNC: 19 U/L (ref 1–32)
BASOPHILS # BLD AUTO: 0.02 10*3/MM3 (ref 0–0.2)
BASOPHILS NFR BLD AUTO: 0.2 % (ref 0–1.5)
BILIRUB SERPL-MCNC: 0.3 MG/DL (ref 0–1.2)
BUN SERPL-MCNC: 15 MG/DL (ref 8–23)
BUN/CREAT SERPL: 22.7 (ref 7–25)
CALCIUM SPEC-SCNC: 9.7 MG/DL (ref 8.6–10.5)
CHLORIDE SERPL-SCNC: 103 MMOL/L (ref 98–107)
CHOLEST SERPL-MCNC: 152 MG/DL (ref 0–200)
CO2 SERPL-SCNC: 26 MMOL/L (ref 22–29)
CREAT SERPL-MCNC: 0.66 MG/DL (ref 0.57–1)
CREAT UR-MCNC: 10.3 MG/DL
DEPRECATED RDW RBC AUTO: 41.2 FL (ref 37–54)
EGFRCR SERPLBLD CKD-EPI 2021: 91 ML/MIN/1.73
EOSINOPHIL # BLD AUTO: 0.11 10*3/MM3 (ref 0–0.4)
EOSINOPHIL NFR BLD AUTO: 1.3 % (ref 0.3–6.2)
ERYTHROCYTE [DISTWIDTH] IN BLOOD BY AUTOMATED COUNT: 12.4 % (ref 12.3–15.4)
GLOBULIN UR ELPH-MCNC: 3.2 GM/DL
GLUCOSE SERPL-MCNC: 185 MG/DL (ref 65–99)
HBA1C MFR BLD: 9.5 % (ref 4.8–5.6)
HCT VFR BLD AUTO: 38.3 % (ref 34–46.6)
HDLC SERPL-MCNC: 63 MG/DL (ref 40–60)
HGB BLD-MCNC: 12.7 G/DL (ref 12–15.9)
IMM GRANULOCYTES # BLD AUTO: 0.03 10*3/MM3 (ref 0–0.05)
IMM GRANULOCYTES NFR BLD AUTO: 0.4 % (ref 0–0.5)
LDLC SERPL CALC-MCNC: 76 MG/DL (ref 0–100)
LDLC/HDLC SERPL: 1.2 {RATIO}
LYMPHOCYTES # BLD AUTO: 2.09 10*3/MM3 (ref 0.7–3.1)
LYMPHOCYTES NFR BLD AUTO: 24.9 % (ref 19.6–45.3)
MCH RBC QN AUTO: 30.8 PG (ref 26.6–33)
MCHC RBC AUTO-ENTMCNC: 33.2 G/DL (ref 31.5–35.7)
MCV RBC AUTO: 92.7 FL (ref 79–97)
MICROALBUMIN/CREAT UR: NORMAL MG/G{CREAT}
MONOCYTES # BLD AUTO: 0.53 10*3/MM3 (ref 0.1–0.9)
MONOCYTES NFR BLD AUTO: 6.3 % (ref 5–12)
NEUTROPHILS NFR BLD AUTO: 5.62 10*3/MM3 (ref 1.7–7)
NEUTROPHILS NFR BLD AUTO: 66.9 % (ref 42.7–76)
NRBC BLD AUTO-RTO: 0 /100 WBC (ref 0–0.2)
PLATELET # BLD AUTO: 287 10*3/MM3 (ref 140–450)
PMV BLD AUTO: 11.2 FL (ref 6–12)
POTASSIUM SERPL-SCNC: 3.4 MMOL/L (ref 3.5–5.2)
PROT SERPL-MCNC: 7.5 G/DL (ref 6–8.5)
RBC # BLD AUTO: 4.13 10*6/MM3 (ref 3.77–5.28)
SODIUM SERPL-SCNC: 143 MMOL/L (ref 136–145)
TRIGL SERPL-MCNC: 67 MG/DL (ref 0–150)
TSH SERPL DL<=0.05 MIU/L-ACNC: 1.06 UIU/ML (ref 0.27–4.2)
VIT B12 BLD-MCNC: 568 PG/ML (ref 211–946)
VLDLC SERPL-MCNC: 13 MG/DL (ref 5–40)
WBC NRBC COR # BLD AUTO: 8.4 10*3/MM3 (ref 3.4–10.8)

## 2024-07-25 PROCEDURE — G2211 COMPLEX E/M VISIT ADD ON: HCPCS | Performed by: PREVENTIVE MEDICINE

## 2024-07-25 PROCEDURE — 3078F DIAST BP <80 MM HG: CPT | Performed by: PREVENTIVE MEDICINE

## 2024-07-25 PROCEDURE — 84443 ASSAY THYROID STIM HORMONE: CPT | Performed by: PREVENTIVE MEDICINE

## 2024-07-25 PROCEDURE — 1160F RVW MEDS BY RX/DR IN RCRD: CPT | Performed by: PREVENTIVE MEDICINE

## 2024-07-25 PROCEDURE — 83036 HEMOGLOBIN GLYCOSYLATED A1C: CPT | Performed by: PREVENTIVE MEDICINE

## 2024-07-25 PROCEDURE — 80061 LIPID PANEL: CPT | Performed by: PREVENTIVE MEDICINE

## 2024-07-25 PROCEDURE — 1126F AMNT PAIN NOTED NONE PRSNT: CPT | Performed by: PREVENTIVE MEDICINE

## 2024-07-25 PROCEDURE — 82607 VITAMIN B-12: CPT | Performed by: PREVENTIVE MEDICINE

## 2024-07-25 PROCEDURE — 82043 UR ALBUMIN QUANTITATIVE: CPT | Performed by: PREVENTIVE MEDICINE

## 2024-07-25 PROCEDURE — 3077F SYST BP >= 140 MM HG: CPT | Performed by: PREVENTIVE MEDICINE

## 2024-07-25 PROCEDURE — 1159F MED LIST DOCD IN RCRD: CPT | Performed by: PREVENTIVE MEDICINE

## 2024-07-25 PROCEDURE — 82570 ASSAY OF URINE CREATININE: CPT | Performed by: PREVENTIVE MEDICINE

## 2024-07-25 PROCEDURE — 36415 COLL VENOUS BLD VENIPUNCTURE: CPT | Performed by: PREVENTIVE MEDICINE

## 2024-07-25 PROCEDURE — 85025 COMPLETE CBC W/AUTO DIFF WBC: CPT | Performed by: PREVENTIVE MEDICINE

## 2024-07-25 PROCEDURE — 80053 COMPREHEN METABOLIC PANEL: CPT | Performed by: PREVENTIVE MEDICINE

## 2024-07-25 PROCEDURE — 99214 OFFICE O/P EST MOD 30 MIN: CPT | Performed by: PREVENTIVE MEDICINE

## 2024-07-25 NOTE — PROGRESS NOTES
Subjective   The ABCs of the Annual Wellness Visit  Medicare Wellness Visit      Celsa Brown is a 74 y.o. patient who presents for a Medicare Wellness Visit.    The following portions of the patient's history were reviewed and   updated as appropriate: allergies, current medications, past family history, past medical history, past social history, past surgical history, and problem list.    Compared to one year ago, the patient's physical   health is the same.  Compared to one year ago, the patient's mental   health is the same.    Recent Hospitalizations:  She was not admitted to the hospital during the last year.     Current Medical Providers:  Patient Care Team:  Jaelyn Iverson MD as PCP - General (Family Medicine)  Dung Lynch MD as Consulting Physician (Cardiology)  Jerzy Ignacio MD as Consulting Physician (Urology)    Outpatient Medications Prior to Visit   Medication Sig Dispense Refill    Accu-Chek Thelma Plus test strip TEST BLOOD SUGAR ONCE DAILY 100 each 3    amLODIPine (NORVASC) 10 MG tablet Take 1 tablet by mouth Daily. 90 tablet 3    aspirin 81 MG EC tablet Take 1 tablet by mouth Daily. 1 tablet every other day      cholecalciferol (VITAMIN D3) 1000 units tablet Take 1 tablet by mouth Every Other Day.      clotrimazole (LOTRIMIN) 1 % cream Apply 1 Application topically to the appropriate area as directed 2 (Two) Times a Day.      Cyanocobalamin (VITAMIN B12) 1000 MCG tablet controlled-release Take 1,000 mcg by mouth Every Other Day.      estradiol (ESTRACE) 0.1 MG/GM vaginal cream Insert 2 g into the vagina Daily.      glipizide (GLUCOTROL) 10 MG tablet TAKE 2 TABLETS TWICE A  tablet 0    hydrALAZINE (APRESOLINE) 100 MG tablet Take 1 tablet by mouth 2 (Two) Times a Day. 180 tablet 3    hydroCHLOROthiazide 25 MG tablet Take 1 tablet by mouth Daily. 90 tablet 0    lisinopril (PRINIVIL,ZESTRIL) 40 MG tablet Take 1 tablet by mouth Daily. 90 tablet 0    loratadine (CLARITIN) 10 MG  tablet Take 1 tablet by mouth Daily.      Melatonin 10 MG tablet Take 1 tablet by mouth Daily.      metFORMIN (GLUCOPHAGE) 500 MG tablet Take 2 tablets by mouth 2 (Two) Times a Day With Meals. 360 tablet 0    metoprolol succinate XL (TOPROL-XL) 100 MG 24 hr tablet Take 0.5 tablets by mouth Every Night. TAKE 1/2 TABLET NIGHTLY 45 tablet 0    Multiple Vitamins-Minerals (CENTRUM SILVER ULTRA WOMENS) tablet Take 1 tablet by mouth Daily.      Omega-3 Fatty Acids (OMEGA-3 FISH OIL) 500 MG capsule Take 1 capsule by mouth Daily.      pioglitazone (Actos) 30 MG tablet Take 1 tablet by mouth Daily. 90 tablet 3    pravastatin (PRAVACHOL) 80 MG tablet Take 1 tablet by mouth Every Night. 90 tablet 0     No facility-administered medications prior to visit.     No opioid medication identified on active medication list. I have reviewed chart for other potential  high risk medication/s and harmful drug interactions in the elderly.      Aspirin is on active medication list. Aspirin use is indicated based on review of current medical condition/s. Pros and cons of this therapy have been discussed today. Benefits of this medication outweigh potential harm.  Patient has been encouraged to continue taking this medication.  .      Patient Active Problem List   Diagnosis    B12 deficiency    Body mass index (BMI) 24.0-24.9, adult    Degenerative joint disease    Hyperlipidemia    Hypertension    Atypical mole    Postmenopausal status    Type 2 diabetes mellitus    Vitamin D deficiency    Pacemaker    Age-related nuclear cataract of both eyes    Macular degeneration of left eye    Nonexudative age-related macular degeneration    Presbyopia    Ptosis of eyelid    Regular astigmatism of both eyes    Vitreous floaters    Nail fungus    Right carotid bruit    Hand numbness    Eyelid abnormality    Dermatochalasis of left upper eyelid    Dermatochalasis of right upper eyelid    Hordeolum internum of left upper eyelid     Advance Care Planning  "(Click this link to access ACP Navigator)  Advance Directive is on file.  ACP discussion was held with the patient during this visit. Patient has an advance directive in EMR which is still valid.         Objective   Vitals:    24 0939   Resp: 16   Weight: 73.2 kg (161 lb 6 oz)   Height: 172.7 cm (68\")       Estimated body mass index is 24.54 kg/m² as calculated from the following:    Height as of this encounter: 172.7 cm (68\").    Weight as of this encounter: 73.2 kg (161 lb 6 oz).    BMI is within normal parameters. No other follow-up for BMI required.        Does the patient have evidence of cognitive impairment? No                                                                                                Health  Risk Assessment    Smoking Status:  Social History     Tobacco Use   Smoking Status Never    Passive exposure: Past (AS A CHILD)   Smokeless Tobacco Never     Alcohol Consumption:  Social History     Substance and Sexual Activity   Alcohol Use No     Fall Risk Screen:  [facility] Fall Risk Assessment was completed, and patient is at LOW risk for falls.Assessment completed on:2024    Depression Screenin/25/2024     9:41 AM   PHQ-2/PHQ-9 Depression Screening   Little Interest or Pleasure in Doing Things 0-->not at all   Feeling Down, Depressed or Hopeless 0-->not at all   PHQ-9: Brief Depression Severity Measure Score 0     Health Habits and Functional and Cognitive Screenin/25/2024     9:41 AM   Functional & Cognitive Status   Do you have difficulty preparing food and eating? No   Do you have difficulty bathing yourself, getting dressed or grooming yourself? No   Do you have difficulty using the toilet? No   Do you have difficulty moving around from place to place? No   Do you have trouble with steps or getting out of a bed or a chair? No   Current Diet Well Balanced Diet   Dental Exam Up to date   Eye Exam Up to date   Exercise (times per week) 7 times per week   Current " Exercises Include Walking   Do you need help using the phone?  No   Are you deaf or do you have serious difficulty hearing?  No   Do you need help to go to places out of walking distance? No   Do you need help shopping? No   Do you need help preparing meals?  No   Do you need help with housework?  No   Do you need help with laundry? No   Do you need help taking your medications? No   Do you need help managing money? No   Do you ever drive or ride in a car without wearing a seat belt? No   Have you felt unusual stress, anger or loneliness in the last month? No   Who do you live with? Spouse   If you need help, do you have trouble finding someone available to you? No   Have you been bothered in the last four weeks by sexual problems? No   Do you have difficulty concentrating, remembering or making decisions? No             Age-appropriate Screening Schedule:  Refer to the list below for future screening recommendations based on patient's age, sex and/or medical conditions. Orders for these recommended tests are listed in the plan section. The patient has been provided with a written plan.    Health Maintenance List  Health Maintenance   Topic Date Due    COVID-19 Vaccine (8 - 2023-24 season) 03/05/2024    HEMOGLOBIN A1C  10/23/2024    TDAP/TD VACCINES (2 - Tdap) 08/02/2027 (Originally 8/1/2027)    INFLUENZA VACCINE  08/01/2024    ANNUAL WELLNESS VISIT  04/23/2025    LIPID PANEL  04/23/2025    URINE MICROALBUMIN  04/23/2025    DIABETIC EYE EXAM  04/25/2025    MAMMOGRAM  05/13/2025    DXA SCAN  10/24/2025    COLORECTAL CANCER SCREENING  06/28/2026    HEPATITIS C SCREENING  Completed    RSV Vaccine - Adults  Completed    Pneumococcal Vaccine 65+  Completed    ZOSTER VACCINE  Completed                                                                                                                                                CMS Preventative Services Quick Reference  Risk Factors Identified During Encounter  None  "Identified    The above risks/problems have been discussed with the patient.  Pertinent information has been shared with the patient in the After Visit Summary.  An After Visit Summary and PPPS were made available to the patient.    Follow Up:   Next Medicare Wellness visit to be scheduled in 1 year.         Additional E&M Note during same encounter follows:  Patient has additional, significant, and separately identifiable condition(s)/problem(s) that require work above and beyond the Medicare Wellness Visit     Chief Complaint  Medicare Wellness-subsequent    Subjective   HPI  Celsa is also being seen today for an annual adult preventative physical exam.                 Objective   Vital Signs:  Resp 16   Ht 172.7 cm (68\")   Wt 73.2 kg (161 lb 6 oz)   BMI 24.54 kg/m²   Physical Exam            Assessment and Plan                 No orders of the defined types were placed in this encounter.            Follow Up   No follow-ups on file.  Patient was given instructions and counseling regarding her condition or for health maintenance advice. Please see specific information pulled into the AVS if appropriate.  "

## 2024-07-25 NOTE — PROGRESS NOTES
Subjective   Celsa Brown is a 74 y.o. female presents for   Chief Complaint   Patient presents with    Medicare Wellness-subsequent       Health Maintenance Due   Topic Date Due    COVID-19 Vaccine (8 - 2023-24 season) 03/05/2024       History of Present Illness   History of Present Illness  The patient is a 74-year-old female who is here today for type 2 diabetes complication without long-term use of insulin, right carotid bruit, postmenopausal pacemaker, hypertension, hyperlipidemia, eyelid abnormality, body mass index of 24 and B12 deficiency, an atypical.    The patient reports no alterations in her auditory or visual capabilities since her last consultation. She has consulted her ophthalmologist and dentist within the past year. She denies experiencing any dysphagia or digestive issues. She also denies the presence of hemoptysis, sputum, or wheezing. She acknowledges the presence of a mole that has slightly increased in size. She denies experiencing chest pressure or palpitations. She has been experiencing mild diarrhea for the past 3 to 4 days, but denies any hematochezia. She acknowledges the consumption of tomatoes from her garden. She reports mild edema in her lower extremities, which is less severe upon waking in the morning. She denies dysuria or hematuria. She also denies any abnormal vaginal discharge. She also denies any systemic symptoms such as fever, chills, night sweats, or weight loss. She reports no blood glucose levels below 100 or over 200. She engages in weight-bearing exercises for 20 minutes daily and maintains regular follow-ups with her cardiologist, Dr. Hitchcock, with a follow-up appointment scheduled for 08/2024. She monitors her saturated fat intake. Her eyelid abnormality has resolved. She continues to take vitamin B12 supplements. She had an atypical mole, which was evaluated by a dermatologist and found to be normal. She is scheduled for a follow-up appointment with the dermatologist  "in 02/2024. She received a prescription at [facility], where she was informed that she is behind on 2 or 3 vaccinations.    The patient reports slightly elevated blood glucose levels in the mornings and is contemplating an increase in her Actos dosage. She takes her Actos and Glucotrol medications at 7:30 AM.    ALLERGIES  She is allergic to SULFA and MACROBID.    Vitals:    07/25/24 0939 07/25/24 0945   BP: 144/73 148/72   Pulse: 94    Resp: 16    Temp: 98.7 °F (37.1 °C)    SpO2: 98%    Weight: 73.2 kg (161 lb 6 oz)    Height: 172.7 cm (68\")      Body mass index is 24.54 kg/m².    Current Outpatient Medications on File Prior to Visit   Medication Sig Dispense Refill    Accu-Chek Thelma Plus test strip TEST BLOOD SUGAR ONCE DAILY 100 each 3    amLODIPine (NORVASC) 10 MG tablet Take 1 tablet by mouth Daily. 90 tablet 3    aspirin 81 MG EC tablet Take 1 tablet by mouth Daily. 1 tablet every other day      cholecalciferol (VITAMIN D3) 1000 units tablet Take 1 tablet by mouth Every Other Day.      clotrimazole (LOTRIMIN) 1 % cream Apply 1 Application topically to the appropriate area as directed 2 (Two) Times a Day.      Cyanocobalamin (VITAMIN B12) 1000 MCG tablet controlled-release Take 1,000 mcg by mouth Every Other Day.      estradiol (ESTRACE) 0.1 MG/GM vaginal cream Insert 2 g into the vagina Daily.      glipizide (GLUCOTROL) 10 MG tablet TAKE 2 TABLETS TWICE A  tablet 0    hydrALAZINE (APRESOLINE) 100 MG tablet Take 1 tablet by mouth 2 (Two) Times a Day. 180 tablet 3    hydroCHLOROthiazide 25 MG tablet Take 1 tablet by mouth Daily. 90 tablet 0    lisinopril (PRINIVIL,ZESTRIL) 40 MG tablet Take 1 tablet by mouth Daily. 90 tablet 0    loratadine (CLARITIN) 10 MG tablet Take 1 tablet by mouth Daily.      Melatonin 10 MG tablet Take 1 tablet by mouth Daily.      metFORMIN (GLUCOPHAGE) 500 MG tablet Take 2 tablets by mouth 2 (Two) Times a Day With Meals. 360 tablet 0    metoprolol succinate XL (TOPROL-XL) " 100 MG 24 hr tablet Take 0.5 tablets by mouth Every Night. TAKE 1/2 TABLET NIGHTLY 45 tablet 0    Multiple Vitamins-Minerals (CENTRUM SILVER ULTRA WOMENS) tablet Take 1 tablet by mouth Daily.      Omega-3 Fatty Acids (OMEGA-3 FISH OIL) 500 MG capsule Take 1 capsule by mouth Daily.      pioglitazone (Actos) 30 MG tablet Take 1 tablet by mouth Daily. 90 tablet 3    pravastatin (PRAVACHOL) 80 MG tablet Take 1 tablet by mouth Every Night. 90 tablet 0     No current facility-administered medications on file prior to visit.       The following portions of the patient's history were reviewed and updated as appropriate: allergies, current medications, past family history, past medical history, past social history, past surgical history, and problem list.    Review of Systems   Eyes:  Negative for visual disturbance.   Cardiovascular:  Negative for chest pain and palpitations.       Objective   Physical Exam  Vitals reviewed.   Constitutional:       General: She is not in acute distress.     Appearance: Normal appearance. She is well-developed. She is not ill-appearing or toxic-appearing.   HENT:      Head: Normocephalic and atraumatic.      Right Ear: Tympanic membrane, ear canal and external ear normal.      Left Ear: Tympanic membrane, ear canal and external ear normal.      Nose: Nose normal.      Mouth/Throat:      Mouth: Mucous membranes are moist.      Pharynx: No posterior oropharyngeal erythema.   Eyes:      Extraocular Movements: Extraocular movements intact.      Conjunctiva/sclera: Conjunctivae normal.      Pupils: Pupils are equal, round, and reactive to light.   Cardiovascular:      Rate and Rhythm: Normal rate and regular rhythm.      Pulses:           Dorsalis pedis pulses are 1+ on the right side and 1+ on the left side.        Posterior tibial pulses are 1+ on the right side and 1+ on the left side.      Heart sounds: Normal heart sounds.   Pulmonary:      Effort: Pulmonary effort is normal.      Breath  sounds: Normal breath sounds.   Abdominal:      General: Bowel sounds are normal. There is no distension.      Palpations: Abdomen is soft. There is no mass.      Tenderness: There is no abdominal tenderness.   Musculoskeletal:         General: Normal range of motion.      Cervical back: Neck supple.   Feet:      Right foot:      Protective Sensation: 10 sites tested.  10 sites sensed.      Skin integrity: Callus present.      Toenail Condition: Right toenails are abnormally thick.      Left foot:      Protective Sensation: 10 sites tested.  10 sites sensed.      Skin integrity: Callus present.      Toenail Condition: Left toenails are abnormally thick.      Comments:     Skin:     General: Skin is warm.   Neurological:      General: No focal deficit present.      Mental Status: She is alert and oriented to person, place, and time.   Psychiatric:         Mood and Affect: Mood normal.         Behavior: Behavior normal.       Physical Exam  Throat appears normal.    PHQ-9 Total Score: 0  Results  Imaging  Ultrasound showed mild plaquing within the right side, less than 50%. Left side showed less than 50% blockage.         Assessment & Plan   Diagnoses and all orders for this visit:    1. Type 2 diabetes mellitus without complication, without long-term current use of insulin  -     Comprehensive Metabolic Panel  -     Hemoglobin A1c  -     Microalbumin / Creatinine Urine Ratio - Urine, Clean Catch  -     TSH Rfx On Abnormal To Free T4    2. Right carotid bruit    3. Postmenopausal status    4. Pacemaker    5. Primary hypertension  -     CBC Auto Differential    6. Hyperlipidemia, unspecified hyperlipidemia type  -     Lipid Panel    7. Eyelid abnormality    8. Body mass index (BMI) 24.0-24.9, adult    9. B12 deficiency  -     Vitamin B12    10. Atypical mole      Assessment & Plan  1. Diarrhea.  The patient was advised to reduce her consumption of tomatoes to observe if this alleviates her diarrhea. She was also  advised to avoid spicy and greasy foods and to limit dairy intake. Should the diarrhea persist, she is to contact the clinic.    2. Lower extremity edema.  She was advised to reduce her sodium intake and elevate her legs.    3. Type 2 diabetes.  A regular blood count, kidney and liver function tests, A1c, cholesterol, urine, vitamin B12, and thyroid tests were ordered.    Follow-up  A follow-up appointment is scheduled for 3 months from now.    Patient Instructions     Health Maintenance Due   Topic Date Due    COVID-19 Vaccine (8 - 2023-24 season) 03/05/2024    HEMOGLOBIN A1C  10/23/2024    Check blood pressure cuff for accuracy and send 10 blood pressures over 2 weeks.  Watch sodium, alcohol and weight        Patient or patient representative verbalized consent for the use of Ambient Listening during the visit with  Jaelyn Iverson MD for chart documentation. 7/26/2024  19:46 EDT

## 2024-07-26 ENCOUNTER — TELEPHONE (OUTPATIENT)
Dept: FAMILY MEDICINE CLINIC | Facility: CLINIC | Age: 74
End: 2024-07-26
Payer: MEDICARE

## 2024-07-26 RX ORDER — DAPAGLIFLOZIN 5 MG/1
5 TABLET, FILM COATED ORAL DAILY
Qty: 30 TABLET | Refills: 1 | Status: SHIPPED | OUTPATIENT
Start: 2024-07-26

## 2024-07-26 RX ORDER — POTASSIUM CHLORIDE 750 MG/1
10 CAPSULE, EXTENDED RELEASE ORAL DAILY
Qty: 90 CAPSULE | Refills: 3 | Status: SHIPPED | OUTPATIENT
Start: 2024-07-26

## 2024-07-26 NOTE — TELEPHONE ENCOUNTER
Name: KevinCelsa S      Relationship: Self      Best Callback Number:     [phone] (Home)         HUB PROVIDED THE RELAY MESSAGE FROM THE OFFICE      PATIENT: VOICED UNDERSTANDING AND HAS NO FURTHER QUESTIONS AT THIS TIME    ADDITIONAL INFORMATION:

## 2024-07-26 NOTE — TELEPHONE ENCOUNTER
"Relay     \"Blood sugar was 185 with A1c climbing to 9.5 you are not spilling any protein in your urine but I think we should add another medicine called Farxiga to see if we can lower your blood sugar preserve your kidney function and decrease all cause mortality.  If it is too expensive we can try to substitute Jardiance.  Please do not pick it up if it is expensive and let me know.  Potassium is slightly low so I am going to add potassium 10 mg a day just to make sure since you are on a water pill that your potassium stays regulated.  Bad cholesterol is 76 and goal is below 70 you are on maximum dose of pravastatin so try to cut back a little bit on your saturated fats.  Call if any other questions or concerns and let us know about the above\"                "

## 2024-07-26 NOTE — TELEPHONE ENCOUNTER
Caller: Celsa Brown    Relationship: Self    Best call back number: [phone]    What medication are you requesting:   BRENZAVVY    Have you had these symptoms before:    [x] Yes  [] No    Have you been treated for these symptoms before:   [x] Yes  [] No    If a prescription is needed, what is your preferred pharmacy and phone number: [facility] [address] [place], [place] - [address] [phone] [place] [phone]      Additional notes:    STATES JARDIANCE IS TOO EXPENSIVE.

## 2024-07-26 NOTE — TELEPHONE ENCOUNTER
Please call the patient back and see what medication she is requesting.  I do not see a medicine that is close to this on her med list.  She may mean breads tree but I cannot see that this is on the list

## 2024-07-26 NOTE — PROGRESS NOTES
Blood sugar was 185 with A1c climbing to 9.5 you are not spilling any protein in your urine but I think we should add another medicine called Farxiga to see if we can lower your blood sugar preserve your kidney function and decrease all cause mortality.  If it is too expensive we can try to substitute Jardiance.  Please do not pick it up if it is expensive and let me know.  Potassium is slightly low so I am going to add potassium 10 mg a day just to make sure since you are on a water pill that your potassium stays regulated.  Bad cholesterol is 76 and goal is below 70 you are on maximum dose of pravastatin so try to cut back a little bit on your saturated fats.  Call if any other questions or concerns and let us know about the above

## 2024-08-05 NOTE — PROGRESS NOTES
Encounter Date:08/12/2024  Last seen 1/29/2024      Patient ID: Celsa Brown is a 74 y.o. female.    Chief Complaint:     Status post pacemaker  Palpitations  Diabetes  Dyslipidemia  Hypertension        History of Present Illness  Patient recently was started on Farxiga by primary care.  Since I have last seen, the patient has been without any chest discomfort ,shortness of breath, palpitations, dizziness or syncope.  Denies having any headache ,abdominal pain ,nausea, vomiting , diarrhea constipation, loss of weight or loss of appetite.  Denies having any excessive bruising ,hematuria or blood in the stool.    Review of all systems negative except as indicated.    Reviewed ROS.  Assessment and Plan         ]]]]]]]]]]]]]]]]]]]  History  ==========     -Status post permanent dual-chamber pacemaker implantation ([employer] MRI compatible).  7/13/2021      -Palpitations probably due to PMT and SVT.  Suspect strongly patient is having episodes of PMT.  Recently PVARP was increased     -Second-degree Mobitz type II AV block.  Right bundle branch block (prior to pacemaker implantation)     -Chest discomfort and shortness of breath with or without exertion.     -Dizziness and near syncope-improved since patient had pacemaker implantation     Echocardiogram-normal 7/12/2021  Stress Cardiolite test-normal except patient has second-degree 2-1 AV block and right bundle branch block.  Very limited exercise tolerance.     -Right bundle branch block     -Sinus bradycardia    -Paroxysmal atrial fibrillation.    -Anticoagulation  Patient was started on Eliquis 5 mg twice daily.  Observe for toxic effects.    -Diabetes dyslipidemia hypertension     -Status post tonsillectomy abdominal tubal ligation     -Family history of coronary artery disease     -Non-smoker     -Allergic to sulfa  =============  Plan  ==========     Status post permanent dual-chamber pacemaker implantation 7/13/2021 ([employer] MRI  compatible).  Pacemaker site and function is normal.  Interrogation of the pacemaker revealed excellent pacing parameters.-8/12/2024.  Battery status was 5.5 years.     History of PMT and SVT.  Patient was asked to take extra metoprolol as needed and twice a day if needed.  EKG showed atrial sensed ventricular paced rhythm.-12/29/2022  EKG 7/17/2023-atrial sensed ventricular paced rhythm.  EKG 8/12/2024-atrial sensed ventricular paced rhythm.     Paroxysmal atrial fibrillation.  Patient was started on Eliquis 5 mg twice daily.  Observe for toxic effects.    Hypertension-stable  Well-controlled  Borderline elevation of systolic blood pressure.  Maintain blood pressure log.  140/75     Medications were reviewed and updated.     Patient is on amlodipine hydralazine hydrochlorothiazide lisinopril 40 mg a day.  Patient is on metoprolol 100 mg tablet half a tablet a day.  Patient is on Farxiga.  Patient is on Eliquis     Follow-up in the office in 6 months with pacemaker interrogation.     Further plan will depend on patient's progress.     Reviewed and updated-8/12/2024.  ]]]]]]]]]]]]]]               Diagnosis Plan   1. Pacemaker        2. Second degree AV block, Mobitz type II        3. Shortness of breath        4. Dyslipidemia        5. Right bundle branch block        6. Bradycardia        7. AV block, 2nd degree        8. Sick sinus syndrome        9. Essential hypertension        10. Diabetes mellitus due to underlying condition without complication, without long-term current use of insulin        LAB RESULTS (LAST 7 DAYS)    CBC        BMP        CMP         BNP        TROPONIN        CoAg        Creatinine Clearance  Estimated Creatinine Clearance: 82.5 mL/min (by C-G formula based on SCr of 0.66 mg/dL).    ABG        Radiology  No radiology results for the last day                The following portions of the patient's history were reviewed and updated as appropriate: allergies, current medications, past family  history, past medical history, past social history, past surgical history, and problem list.    Review of Systems   Constitutional: Positive for malaise/fatigue.   Cardiovascular:  Negative for chest pain, leg swelling, palpitations and syncope.   Respiratory:  Negative for shortness of breath.    Skin:  Negative for rash.   Gastrointestinal:  Negative for nausea and vomiting.   Neurological:  Negative for dizziness, light-headedness and numbness.   All other systems reviewed and are negative.        Current Outpatient Medications:     Accu-Chek Thelma Plus test strip, TEST BLOOD SUGAR ONCE DAILY, Disp: 100 each, Rfl: 3    amLODIPine (NORVASC) 10 MG tablet, Take 1 tablet by mouth Daily., Disp: 90 tablet, Rfl: 3    aspirin 81 MG EC tablet, Take 1 tablet by mouth Daily. 1 tablet every other day, Disp: , Rfl:     cholecalciferol (VITAMIN D3) 1000 units tablet, Take 1 tablet by mouth Every Other Day., Disp: , Rfl:     clotrimazole (LOTRIMIN) 1 % cream, Apply 1 Application topically to the appropriate area as directed 2 (Two) Times a Day., Disp: , Rfl:     Cyanocobalamin (VITAMIN B12) 1000 MCG tablet controlled-release, Take 1,000 mcg by mouth Every Other Day., Disp: , Rfl:     dapagliflozin (Farxiga) 5 MG tablet tablet, Take 1 tablet by mouth Daily., Disp: 30 tablet, Rfl: 1    estradiol (ESTRACE) 0.1 MG/GM vaginal cream, Insert 2 g into the vagina Daily., Disp: , Rfl:     glipizide (GLUCOTROL) 10 MG tablet, TAKE TWO TABLETS BY MOUTH TWICE A DAY, Disp: 360 tablet, Rfl: 0    hydrALAZINE (APRESOLINE) 100 MG tablet, Take 1 tablet by mouth 2 (Two) Times a Day., Disp: 180 tablet, Rfl: 3    hydroCHLOROthiazide 25 MG tablet, Take 1 tablet by mouth Daily., Disp: 90 tablet, Rfl: 0    lisinopril (PRINIVIL,ZESTRIL) 40 MG tablet, Take 1 tablet by mouth Daily., Disp: 90 tablet, Rfl: 0    loratadine (CLARITIN) 10 MG tablet, Take 1 tablet by mouth Daily., Disp: , Rfl:     Melatonin 10 MG tablet, Take 1 tablet by mouth Daily., Disp: ,  Rfl:     metFORMIN (GLUCOPHAGE) 500 MG tablet, Take 2 tablets by mouth 2 (Two) Times a Day With Meals., Disp: 360 tablet, Rfl: 0    metoprolol succinate XL (TOPROL-XL) 100 MG 24 hr tablet, Take 0.5 tablets by mouth Every Night. TAKE 1/2 TABLET NIGHTLY, Disp: 45 tablet, Rfl: 0    Multiple Vitamins-Minerals (CENTRUM SILVER ULTRA WOMENS) tablet, Take 1 tablet by mouth Daily., Disp: , Rfl:     Omega-3 Fatty Acids (OMEGA-3 FISH OIL) 500 MG capsule, Take 1 capsule by mouth Daily., Disp: , Rfl:     pioglitazone (Actos) 30 MG tablet, Take 1 tablet by mouth Daily., Disp: 90 tablet, Rfl: 3    potassium chloride (MICRO-K) 10 MEQ CR capsule, Take 1 capsule by mouth Daily., Disp: 90 capsule, Rfl: 3    pravastatin (PRAVACHOL) 80 MG tablet, Take 1 tablet by mouth Every Night., Disp: 90 tablet, Rfl: 0    Allergies   Allergen Reactions    Sulfa Antibiotics Hives    Macrobid [Nitrofurantoin] Rash       Family History   Problem Relation Age of Onset    Heart failure Mother     Diabetes Mother     Breast cancer Neg Hx     Ovarian cancer Neg Hx        Past Surgical History:   Procedure Laterality Date    BREAST BIOPSY Right     CARDIAC CATHETERIZATION      CARDIAC ELECTROPHYSIOLOGY PROCEDURE N/A 07/13/2021    Procedure: Pacemaker DC new;  Surgeon: Dung Lynch MD;  Location: [facility] INVASIVE LOCATION;  Service: Cardiovascular;  Laterality: N/A;    COLONOSCOPY      TONSILLECTOMY      TUBAL ABDOMINAL LIGATION         Past Medical History:   Diagnosis Date    Abdominal pain 05/21/2022    B12 deficiency     Hyperlipidemia     Hypertension     Tachycardia     Vitamin D deficiency        Family History   Problem Relation Age of Onset    Heart failure Mother     Diabetes Mother     Breast cancer Neg Hx     Ovarian cancer Neg Hx        Social History     Socioeconomic History    Marital status:    Tobacco Use    Smoking status: Never     Passive exposure: Past (AS A CHILD)    Smokeless tobacco: Never   Vaping Use    Vaping  "status: Never Used   Substance and Sexual Activity    Alcohol use: No    Drug use: No    Sexual activity: Yes     Partners: Male     Birth control/protection: None           ECG 12 Lead    Date/Time: 8/12/2024 10:13 AM  Performed by: Dung Lynch MD    Authorized by: Dung Lynch MD  Comparison: compared with previous ECG   Similar to previous ECG  Comparison to previous ECG: Atrial sensed ventricular paced rhythm 88/min.  No ectopy no significant change from previous EKG.        Objective:       Physical Exam    /75 (BP Location: Right arm, Patient Position: Sitting, Cuff Size: Adult)   Pulse 88   Ht 172.7 cm (68\")   Wt 72.1 kg (159 lb)   LMP  (LMP Unknown)   SpO2 97% Comment: RA  BMI 24.18 kg/m²   The patient is alert, oriented and in no distress.    Vital signs as noted above.    Head and neck revealed no carotid bruits or jugular venous distension.  No thyromegaly or lymphadenopathy is present.    Lungs clear.  No wheezing.  Breath sounds are normal bilaterally.    Heart normal first and second heart sounds.  No murmur..  No pericardial rub is present.  No gallop is present.    Abdomen soft and nontender.  No organomegaly is present.    Extremities revealed good peripheral pulses without any pedal edema.    Skin warm and dry.  Pacemaker site looks normal.    Musculoskeletal system is grossly normal.    CNS grossly normal.    Reviewed and updated        "

## 2024-08-09 RX ORDER — GLIPIZIDE 10 MG/1
TABLET ORAL
Qty: 360 TABLET | Refills: 0 | Status: SHIPPED | OUTPATIENT
Start: 2024-08-09

## 2024-08-12 ENCOUNTER — OFFICE VISIT (OUTPATIENT)
Dept: CARDIOLOGY | Facility: CLINIC | Age: 74
End: 2024-08-12
Payer: MEDICARE

## 2024-08-12 ENCOUNTER — CLINICAL SUPPORT NO REQUIREMENTS (OUTPATIENT)
Dept: CARDIOLOGY | Facility: CLINIC | Age: 74
End: 2024-08-12
Payer: MEDICARE

## 2024-08-12 VITALS
DIASTOLIC BLOOD PRESSURE: 75 MMHG | WEIGHT: 159 LBS | HEART RATE: 88 BPM | HEIGHT: 68 IN | OXYGEN SATURATION: 97 % | SYSTOLIC BLOOD PRESSURE: 140 MMHG | BODY MASS INDEX: 24.1 KG/M2

## 2024-08-12 DIAGNOSIS — Z95.0 PACEMAKER: Primary | ICD-10-CM

## 2024-08-12 DIAGNOSIS — I10 ESSENTIAL HYPERTENSION: ICD-10-CM

## 2024-08-12 DIAGNOSIS — E78.5 DYSLIPIDEMIA: ICD-10-CM

## 2024-08-12 DIAGNOSIS — I49.5 SICK SINUS SYNDROME: ICD-10-CM

## 2024-08-12 DIAGNOSIS — I44.1 SECOND DEGREE AV BLOCK, MOBITZ TYPE II: ICD-10-CM

## 2024-08-12 DIAGNOSIS — R00.1 BRADYCARDIA: ICD-10-CM

## 2024-08-12 DIAGNOSIS — I45.10 RIGHT BUNDLE BRANCH BLOCK: ICD-10-CM

## 2024-08-12 DIAGNOSIS — I44.1 AV BLOCK, 2ND DEGREE: ICD-10-CM

## 2024-08-12 DIAGNOSIS — R06.02 SHORTNESS OF BREATH: ICD-10-CM

## 2024-08-12 DIAGNOSIS — E08.9 DIABETES MELLITUS DUE TO UNDERLYING CONDITION WITHOUT COMPLICATION, WITHOUT LONG-TERM CURRENT USE OF INSULIN: ICD-10-CM

## 2024-08-12 PROCEDURE — 93280 PM DEVICE PROGR EVAL DUAL: CPT | Performed by: INTERNAL MEDICINE

## 2024-08-12 PROCEDURE — 3077F SYST BP >= 140 MM HG: CPT | Performed by: INTERNAL MEDICINE

## 2024-08-12 PROCEDURE — 93000 ELECTROCARDIOGRAM COMPLETE: CPT | Performed by: INTERNAL MEDICINE

## 2024-08-12 PROCEDURE — 3078F DIAST BP <80 MM HG: CPT | Performed by: INTERNAL MEDICINE

## 2024-08-12 PROCEDURE — 1160F RVW MEDS BY RX/DR IN RCRD: CPT | Performed by: INTERNAL MEDICINE

## 2024-08-12 PROCEDURE — 1159F MED LIST DOCD IN RCRD: CPT | Performed by: INTERNAL MEDICINE

## 2024-08-12 PROCEDURE — 99214 OFFICE O/P EST MOD 30 MIN: CPT | Performed by: INTERNAL MEDICINE

## 2024-09-03 ENCOUNTER — TELEPHONE (OUTPATIENT)
Dept: FAMILY MEDICINE CLINIC | Facility: CLINIC | Age: 74
End: 2024-09-03

## 2024-09-03 ENCOUNTER — CLINICAL SUPPORT (OUTPATIENT)
Dept: FAMILY MEDICINE CLINIC | Facility: CLINIC | Age: 74
End: 2024-09-03
Payer: MEDICARE

## 2024-09-03 ENCOUNTER — TELEPHONE (OUTPATIENT)
Dept: FAMILY MEDICINE CLINIC | Facility: CLINIC | Age: 74
End: 2024-09-03
Payer: MEDICARE

## 2024-09-03 DIAGNOSIS — R30.9 PAINFUL URINATION: Primary | ICD-10-CM

## 2024-09-03 DIAGNOSIS — R30.0 BURNING WITH URINATION: Primary | ICD-10-CM

## 2024-09-03 LAB
BILIRUB BLD-MCNC: NEGATIVE MG/DL
CLARITY, POC: CLEAR
COLOR UR: YELLOW
EXPIRATION DATE: ABNORMAL
GLUCOSE UR STRIP-MCNC: ABNORMAL MG/DL
KETONES UR QL: NEGATIVE
LEUKOCYTE EST, POC: NEGATIVE
Lab: ABNORMAL
NITRITE UR-MCNC: NEGATIVE MG/ML
PH UR: 5 [PH] (ref 5–8)
PROT UR STRIP-MCNC: NEGATIVE MG/DL
RBC # UR STRIP: NEGATIVE /UL
SP GR UR: 1 (ref 1–1.03)
UROBILINOGEN UR QL: ABNORMAL

## 2024-09-03 PROCEDURE — 81003 URINALYSIS AUTO W/O SCOPE: CPT | Performed by: PREVENTIVE MEDICINE

## 2024-09-03 NOTE — TELEPHONE ENCOUNTER
Caller: Celsa Brown    Relationship: Self    Best call back number: [phone]     What orders are you requesting (i.e. lab or imaging): URINE SAMPLE TO TEST FOR UTI    In what timeframe would the patient need to come in: ASAP 09-    Where will you receive your lab/imaging services: IN OFFICE    Additional notes: PATIENT IS REQUESTING TO KNOW IF SHE MAY LEAVE A URINE SAMPLE AND BE PRESCRIBED A MEDICATION FOR UTI.    PATIENT STATED SHE IS EXPERIENCING BURNING AND ITCHING WHEN URINATING.    PATIENT STATED SHE CONTACTED HER UROLOGIST FOR MEDICATION AND WAS ADVISED SHE WOULD HAVE TO LEAVE A URINE SAMPLE FOR ANYTHING TO BE PRESCRIBED.    PLEASE CALL TO DISCUSS AND ADVISE.

## 2024-09-03 NOTE — TELEPHONE ENCOUNTER
RELAY----- Message from Jaelyn Iverson sent at 9/3/2024  1:35 PM EDT -----  Urine shows sugar but no other abnormality.  Call if you are having any symptoms.

## 2024-09-23 ENCOUNTER — TELEPHONE (OUTPATIENT)
Dept: FAMILY MEDICINE CLINIC | Facility: CLINIC | Age: 74
End: 2024-09-23
Payer: MEDICARE

## 2024-09-23 RX ORDER — PIOGLITAZONEHYDROCHLORIDE 30 MG/1
30 TABLET ORAL DAILY
Qty: 90 TABLET | Refills: 0 | Status: SHIPPED | OUTPATIENT
Start: 2024-09-23 | End: 2024-09-23 | Stop reason: SDUPTHER

## 2024-09-23 RX ORDER — DAPAGLIFLOZIN 5 MG/1
5 TABLET, FILM COATED ORAL DAILY
Qty: 30 TABLET | Refills: 0 | Status: SHIPPED | OUTPATIENT
Start: 2024-09-23

## 2024-09-24 RX ORDER — PIOGLITAZONEHYDROCHLORIDE 30 MG/1
30 TABLET ORAL DAILY
Qty: 90 TABLET | Refills: 0 | Status: SHIPPED | OUTPATIENT
Start: 2024-09-24

## 2024-09-24 RX ORDER — PIOGLITAZONEHYDROCHLORIDE 15 MG/1
15 TABLET ORAL DAILY
Qty: 90 TABLET | Refills: 0 | OUTPATIENT
Start: 2024-09-24

## 2024-10-07 RX ORDER — PRAVASTATIN SODIUM 80 MG/1
80 TABLET ORAL
Qty: 90 TABLET | Refills: 0 | Status: SHIPPED | OUTPATIENT
Start: 2024-10-07

## 2024-10-14 PROCEDURE — 93296 REM INTERROG EVL PM/IDS: CPT | Performed by: INTERNAL MEDICINE

## 2024-10-14 PROCEDURE — 93294 REM INTERROG EVL PM/LDLS PM: CPT | Performed by: INTERNAL MEDICINE

## 2024-10-24 RX ORDER — DAPAGLIFLOZIN 5 MG/1
5 TABLET, FILM COATED ORAL DAILY
Qty: 30 TABLET | Refills: 0 | Status: SHIPPED | OUTPATIENT
Start: 2024-10-24

## 2024-10-24 NOTE — TELEPHONE ENCOUNTER
Caller: Kevin Celsa S    Relationship: Self    Best call back number: [phone]    Requested Prescriptions:   Requested Prescriptions     Pending Prescriptions Disp Refills    dapagliflozin (Farxiga) 5 MG tablet tablet 30 tablet 0     Sig: Take 1 tablet by mouth Daily.        Pharmacy where request should be sent: [facility] [address] [place], [place] [address] [facility] [phone] [place] [phone] FX     Last office visit with prescribing clinician: 7/25/2024   Last telemedicine visit with prescribing clinician: Visit date not found   Next office visit with prescribing clinician: 10/31/2024     Additional details provided by patient:     Does the patient have less than a 3 day supply:  [x] Yes  [] No    Would you like a call back once the refill request has been completed: [] Yes [] No    If the office needs to give you a call back, can they leave a voicemail: [] Yes [] No    Lorraine Bronson Rep   10/24/24 08:53 EDT

## 2024-10-30 PROBLEM — H00.024 HORDEOLUM INTERNUM OF LEFT UPPER EYELID: Status: RESOLVED | Noted: 2023-12-13 | Resolved: 2024-10-30

## 2024-10-30 PROBLEM — I44.1 AV BLOCK, 2ND DEGREE: Status: RESOLVED | Noted: 2021-07-12 | Resolved: 2024-10-30

## 2024-10-30 NOTE — PATIENT INSTRUCTIONS
Health Maintenance Due   Topic Date Due    HEMOGLOBIN A1C  01/25/2025    Advise taking BP in right arm as well.Check blood pressure cuff for accuracy and send 10 blood pressures over 2 weeks.  Watch sodium, alcohol and weight     Increase Metoprolol(Toprolol) to 100 mg daily.    Patient to contact Dr. Lynch and advise wants to switch to Warfarin.

## 2024-10-31 ENCOUNTER — OFFICE VISIT (OUTPATIENT)
Dept: FAMILY MEDICINE CLINIC | Facility: CLINIC | Age: 74
End: 2024-10-31
Payer: MEDICARE

## 2024-10-31 ENCOUNTER — LAB (OUTPATIENT)
Dept: FAMILY MEDICINE CLINIC | Facility: CLINIC | Age: 74
End: 2024-10-31
Payer: MEDICARE

## 2024-10-31 VITALS
HEIGHT: 68 IN | WEIGHT: 159.4 LBS | BODY MASS INDEX: 24.16 KG/M2 | TEMPERATURE: 96.9 F | DIASTOLIC BLOOD PRESSURE: 77 MMHG | SYSTOLIC BLOOD PRESSURE: 128 MMHG | HEART RATE: 110 BPM

## 2024-10-31 DIAGNOSIS — E11.9 TYPE 2 DIABETES MELLITUS WITHOUT COMPLICATION, WITHOUT LONG-TERM CURRENT USE OF INSULIN: Primary | ICD-10-CM

## 2024-10-31 DIAGNOSIS — E53.8 B12 DEFICIENCY: ICD-10-CM

## 2024-10-31 DIAGNOSIS — I10 PRIMARY HYPERTENSION: ICD-10-CM

## 2024-10-31 DIAGNOSIS — R20.0 HAND NUMBNESS: ICD-10-CM

## 2024-10-31 DIAGNOSIS — E11.9 TYPE 2 DIABETES MELLITUS WITHOUT COMPLICATION, WITHOUT LONG-TERM CURRENT USE OF INSULIN: ICD-10-CM

## 2024-10-31 DIAGNOSIS — I49.9 PMT (PACEMAKER-MEDIATED TACHYCARDIA): ICD-10-CM

## 2024-10-31 DIAGNOSIS — Z95.0 PACEMAKER: ICD-10-CM

## 2024-10-31 DIAGNOSIS — Z78.0 POSTMENOPAUSAL STATUS: ICD-10-CM

## 2024-10-31 DIAGNOSIS — E78.5 HYPERLIPIDEMIA, UNSPECIFIED HYPERLIPIDEMIA TYPE: ICD-10-CM

## 2024-10-31 DIAGNOSIS — D22.9 ATYPICAL MOLE: ICD-10-CM

## 2024-10-31 LAB
ALBUMIN UR-MCNC: <1.2 MG/DL
BASOPHILS # BLD AUTO: 0.05 10*3/MM3 (ref 0–0.2)
BASOPHILS NFR BLD AUTO: 0.7 % (ref 0–1.5)
CREAT UR-MCNC: 10.6 MG/DL
DEPRECATED RDW RBC AUTO: 41.3 FL (ref 37–54)
EOSINOPHIL # BLD AUTO: 0.06 10*3/MM3 (ref 0–0.4)
EOSINOPHIL NFR BLD AUTO: 0.8 % (ref 0.3–6.2)
ERYTHROCYTE [DISTWIDTH] IN BLOOD BY AUTOMATED COUNT: 12.1 % (ref 12.3–15.4)
HCT VFR BLD AUTO: 41.1 % (ref 34–46.6)
HGB BLD-MCNC: 13.7 G/DL (ref 12–15.9)
IMM GRANULOCYTES # BLD AUTO: 0.03 10*3/MM3 (ref 0–0.05)
IMM GRANULOCYTES NFR BLD AUTO: 0.4 % (ref 0–0.5)
LYMPHOCYTES # BLD AUTO: 2.59 10*3/MM3 (ref 0.7–3.1)
LYMPHOCYTES NFR BLD AUTO: 35.4 % (ref 19.6–45.3)
MCH RBC QN AUTO: 30.9 PG (ref 26.6–33)
MCHC RBC AUTO-ENTMCNC: 33.3 G/DL (ref 31.5–35.7)
MCV RBC AUTO: 92.6 FL (ref 79–97)
MICROALBUMIN/CREAT UR: NORMAL MG/G{CREAT}
MONOCYTES # BLD AUTO: 0.55 10*3/MM3 (ref 0.1–0.9)
MONOCYTES NFR BLD AUTO: 7.5 % (ref 5–12)
NEUTROPHILS NFR BLD AUTO: 4.04 10*3/MM3 (ref 1.7–7)
NEUTROPHILS NFR BLD AUTO: 55.2 % (ref 42.7–76)
NRBC BLD AUTO-RTO: 0 /100 WBC (ref 0–0.2)
PLATELET # BLD AUTO: 295 10*3/MM3 (ref 140–450)
PMV BLD AUTO: 11 FL (ref 6–12)
RBC # BLD AUTO: 4.44 10*6/MM3 (ref 3.77–5.28)
TSH SERPL DL<=0.05 MIU/L-ACNC: 1.3 UIU/ML (ref 0.27–4.2)
WBC NRBC COR # BLD AUTO: 7.32 10*3/MM3 (ref 3.4–10.8)

## 2024-10-31 PROCEDURE — 82043 UR ALBUMIN QUANTITATIVE: CPT | Performed by: PREVENTIVE MEDICINE

## 2024-10-31 PROCEDURE — 80053 COMPREHEN METABOLIC PANEL: CPT | Performed by: PREVENTIVE MEDICINE

## 2024-10-31 PROCEDURE — 82607 VITAMIN B-12: CPT | Performed by: PREVENTIVE MEDICINE

## 2024-10-31 PROCEDURE — 85025 COMPLETE CBC W/AUTO DIFF WBC: CPT | Performed by: PREVENTIVE MEDICINE

## 2024-10-31 PROCEDURE — 84443 ASSAY THYROID STIM HORMONE: CPT | Performed by: PREVENTIVE MEDICINE

## 2024-10-31 PROCEDURE — 82570 ASSAY OF URINE CREATININE: CPT | Performed by: PREVENTIVE MEDICINE

## 2024-10-31 PROCEDURE — 80061 LIPID PANEL: CPT | Performed by: PREVENTIVE MEDICINE

## 2024-10-31 PROCEDURE — 36415 COLL VENOUS BLD VENIPUNCTURE: CPT

## 2024-10-31 PROCEDURE — 83036 HEMOGLOBIN GLYCOSYLATED A1C: CPT | Performed by: PREVENTIVE MEDICINE

## 2024-10-31 RX ORDER — METOPROLOL SUCCINATE 100 MG/1
100 TABLET, EXTENDED RELEASE ORAL DAILY
Start: 2024-10-31

## 2024-10-31 RX ORDER — APIXABAN 5 MG/1
1 TABLET, FILM COATED ORAL EVERY 12 HOURS SCHEDULED
COMMUNITY
Start: 2024-08-12 | End: 2024-11-01 | Stop reason: ALTCHOICE

## 2024-10-31 NOTE — PROGRESS NOTES
Subjective   Celsa Brown is a 74 y.o. female presents for   Chief Complaint   Patient presents with    Diabetes     3 month check up    Hypertension     Brought BP log with her  Cardio started her on eliquis going to be to expensive advised she speak to cardio about meds.       Health Maintenance Due   Topic Date Due    HEMOGLOBIN A1C  01/25/2025       Diabetes  Pertinent negatives for diabetes include no chest pain.   Hypertension  Associated symptoms include palpitations. Pertinent negatives include no chest pain or shortness of breath.      History of Present Illness  The patient is a 74-year-old female who is here today to follow up on type 2 diabetes without complications, pacemaker with pacemaker-mediated tachycardia, postmenopausal status, primary hypertension, hyperlipidemia, body mass index of 24, B12 deficiency, atypical mole, and hand numbness.    She reports that her blood pressure readings are taken from her left arm at home. She was prescribed blood thinners by Dr. Lynch in 08/2024 due to a mild case of atrial fibrillation and the risk of blood clots. She reports no bleeding. She occasionally experiences shortness of breath and heart palpitations but reports no chest pressure. She is unsure if her pulse rate was elevated during her last visit with Dr. Hitchcock. She is currently taking half a tablet of metoprolol and has been walking for at least 20 minutes daily.    Her vision and hearing remain unchanged since her last visit. She has had dental and eye examinations in the past year. She reports no coughing up blood or experiencing wheezing. She reports no difficulty swallowing or digesting food. Her bowel movements are regular, and she reports no burning sensation during urination or blood in her urine.    She has experienced a few instances of low blood sugar but reports no feelings of weakness or shakiness. She is sleeping well. She is taking vitamin B12 supplements and has noticed a decrease in the  "numbness in her left hand. She reports no decrease in  strength.    She had her moles checked out by the dermatologist and will go back in 02/2025.    IMMUNIZATIONS  She had her influenza and COVID-19 vaccines on 09/15/2024.    Vitals:    10/31/24 0845 10/31/24 0846 10/31/24 0847   BP: 160/82 145/82 128/77   BP Location: Right arm Left arm Left arm   Patient Position: Sitting Sitting Standing   Cuff Size: Adult Adult Adult   Pulse: 106 107 110   Temp: 96.9 °F (36.1 °C)     TempSrc: Temporal     Weight: 72.3 kg (159 lb 6.4 oz)     Height: 172.7 cm (68\")       Body mass index is 24.24 kg/m².    Current Outpatient Medications on File Prior to Visit   Medication Sig Dispense Refill    Accu-Chek Thelma Plus test strip TEST BLOOD SUGAR ONCE DAILY 100 each 3    amLODIPine (NORVASC) 10 MG tablet Take 1 tablet by mouth Daily. 90 tablet 3    aspirin 81 MG EC tablet Take 1 tablet by mouth Daily. 1 tablet every other day      cholecalciferol (VITAMIN D3) 1000 units tablet Take 1 tablet by mouth Every Other Day.      clotrimazole (LOTRIMIN) 1 % cream Apply 1 Application topically to the appropriate area as directed 2 (Two) Times a Day.      Cyanocobalamin (VITAMIN B12) 1000 MCG tablet controlled-release Take 1,000 mcg by mouth Every Other Day.      dapagliflozin (Farxiga) 5 MG tablet tablet Take 1 tablet by mouth Daily. 30 tablet 0    Eliquis 5 MG tablet tablet Take 1 tablet by mouth Every 12 (Twelve) Hours.      estradiol (ESTRACE) 0.1 MG/GM vaginal cream Insert 2 g into the vagina Daily.      glipizide (GLUCOTROL) 10 MG tablet TAKE TWO TABLETS BY MOUTH TWICE A  tablet 0    hydrALAZINE (APRESOLINE) 100 MG tablet Take 1 tablet by mouth 2 (Two) Times a Day. 180 tablet 3    hydroCHLOROthiazide 25 MG tablet Take 1 tablet by mouth Daily. 90 tablet 0    lisinopril (PRINIVIL,ZESTRIL) 40 MG tablet Take 1 tablet by mouth Daily. 90 tablet 0    loratadine (CLARITIN) 10 MG tablet Take 1 tablet by mouth Daily.      Melatonin 10 MG " tablet Take 1 tablet by mouth Daily.      metFORMIN (GLUCOPHAGE) 500 MG tablet TAKE 2 TABLETS BY MOUTH 2 (TWO) TIMES A DAY WITH MEALS. 360 tablet 0    Multiple Vitamins-Minerals (CENTRUM SILVER ULTRA WOMENS) tablet Take 1 tablet by mouth Daily.      Omega-3 Fatty Acids (OMEGA-3 FISH OIL) 500 MG capsule Take 1 capsule by mouth Daily.      pioglitazone (Actos) 30 MG tablet Take 1 tablet by mouth Daily. 90 tablet 0    potassium chloride (MICRO-K) 10 MEQ CR capsule Take 1 capsule by mouth Daily. 90 capsule 3    pravastatin (PRAVACHOL) 80 MG tablet TAKE ONE TABLET BY MOUTH EVERY NIGHT 90 tablet 0    [DISCONTINUED] metoprolol succinate XL (TOPROL-XL) 100 MG 24 hr tablet Take 0.5 tablets by mouth Every Night. TAKE 1/2 TABLET NIGHTLY 45 tablet 0     No current facility-administered medications on file prior to visit.       The following portions of the patient's history were reviewed and updated as appropriate: allergies, current medications, past family history, past medical history, past social history, past surgical history, and problem list.    Review of Systems   Respiratory:  Negative for shortness of breath.    Cardiovascular:  Positive for palpitations. Negative for chest pain and leg swelling.   Skin:  Positive for skin lesions.       Objective   Physical Exam  Vitals reviewed.   Constitutional:       General: She is not in acute distress.     Appearance: Normal appearance. She is well-developed. She is not ill-appearing or toxic-appearing.   HENT:      Head: Normocephalic and atraumatic.      Right Ear: Tympanic membrane, ear canal and external ear normal.      Left Ear: Tympanic membrane, ear canal and external ear normal.      Nose: Nose normal.      Mouth/Throat:      Mouth: Mucous membranes are moist.      Pharynx: No posterior oropharyngeal erythema.   Eyes:      Extraocular Movements: Extraocular movements intact.      Conjunctiva/sclera: Conjunctivae normal.      Pupils: Pupils are equal, round, and  reactive to light.   Neck:      Vascular: No carotid bruit.   Cardiovascular:      Rate and Rhythm: Normal rate and regular rhythm.      Pulses:           Dorsalis pedis pulses are 1+ on the right side and 1+ on the left side.        Posterior tibial pulses are 1+ on the right side and 1+ on the left side.      Heart sounds: Normal heart sounds.   Pulmonary:      Effort: Pulmonary effort is normal.      Breath sounds: Normal breath sounds.   Abdominal:      General: Bowel sounds are normal. There is no distension.      Palpations: Abdomen is soft. There is no mass.      Tenderness: There is no abdominal tenderness. There is no right CVA tenderness or left CVA tenderness.   Musculoskeletal:         General: Normal range of motion.      Cervical back: Neck supple. No tenderness.      Right lower leg: No edema.      Left lower leg: No edema.   Feet:      Right foot:      Protective Sensation: 10 sites tested.  10 sites sensed.      Skin integrity: Skin integrity normal. Callus present.      Toenail Condition: Right toenails are abnormally thick.      Left foot:      Protective Sensation: 10 sites tested.  10 sites sensed.      Skin integrity: Skin integrity normal. Callus present.      Toenail Condition: Left toenails are abnormally thick.      Comments: Diabetic Foot Exam Performed and Monofilament Test Performed    Lymphadenopathy:      Cervical: No cervical adenopathy.   Skin:     General: Skin is warm.   Neurological:      General: No focal deficit present.      Mental Status: She is alert and oriented to person, place, and time.   Psychiatric:         Mood and Affect: Mood normal.         Behavior: Behavior normal.       Physical Exam      PHQ-9 Total Score:    Results           Assessment & Plan   Diagnoses and all orders for this visit:    1. Type 2 diabetes mellitus without complication, without long-term current use of insulin (Primary)  -     Comprehensive Metabolic Panel; Future  -     Hemoglobin A1c;  Future  -     Microalbumin / Creatinine Urine Ratio - Urine, Clean Catch; Future  -     TSH Rfx On Abnormal To Free T4; Future    2. Pacemaker    3. Postmenopausal status    4. Primary hypertension  -     CBC Auto Differential; Future    5. Hyperlipidemia, unspecified hyperlipidemia type  -     Lipid Panel; Future    6. Body mass index (BMI) 24.0-24.9, adult    7. B12 deficiency  -     Vitamin B12; Future    8. Atypical mole    9. Hand numbness    10. PMT (pacemaker-mediated tachycardia)    Other orders  -     metoprolol succinate XL (Toprol XL) 100 MG 24 hr tablet; Take 1 tablet by mouth Daily.      Assessment & Plan  1. Type 2 Diabetes Mellitus.  Blood sugar levels have been stable, with occasional lows but no significant symptoms like weakness or shakiness. Continue current medication regimen including Glipizide, Actos, and Metformin. Farxiga dosage may be increased to 10 mg pending kidney function test results.     2. Pacemaker Mediated Tachycardia.  Heart rate was elevated during today's visit. The dosage of Metoprolol was increased to 100 mg daily to help control the pulse rate and alleviate shortness of breath.     3. Primary Hypertension.  Blood pressure was elevated during today's visit. She was advised to monitor blood pressure in the right arm as well. If blood pressure goes too low, adjustments to Hydralazine or Lisinopril may be needed.    4. Atrial Fibrillation.  Currently on Eliquis. Discussed the high cost of Eliquis and the potential switch to Warfarin. She was advised to contact Dr. Hitchcock regarding this switch.     5. Hand Numbness.  Hand numbness has improved but persists. A wrist splint was suggested for nighttime use. If numbness persists, a referral to a hand specialist will be considered.    6. Hyperlipidemia.  Continue current medication regimen including Pravastatin.    7. B12 Deficiency.  Continue taking Vitamin B12 supplements.    8. Health Maintenance.  She received her flu and COVID-19  vaccines on September 15. She has been to the dentist and eye doctor in the past year.     Follow-up  Return in 3 months for follow-up.    Patient Instructions     Health Maintenance Due   Topic Date Due    HEMOGLOBIN A1C  01/25/2025    Advise taking BP in right arm as well.Check blood pressure cuff for accuracy and send 10 blood pressures over 2 weeks.  Watch sodium, alcohol and weight     Increase Metoprolol(Toprolol) to 100 mg daily.    Patient to contact Dr. Lynch and advise wants to switch to Warfarin.       Patient or patient representative verbalized consent for the use of Ambient Listening during the visit with  Jaeyln Iverson MD for chart documentation. 10/31/2024  12:19 EDT

## 2024-11-01 ENCOUNTER — TELEPHONE (OUTPATIENT)
Dept: CARDIOLOGY | Facility: CLINIC | Age: 74
End: 2024-11-01
Payer: MEDICARE

## 2024-11-01 ENCOUNTER — TELEPHONE (OUTPATIENT)
Dept: FAMILY MEDICINE CLINIC | Facility: CLINIC | Age: 74
End: 2024-11-01
Payer: MEDICARE

## 2024-11-01 LAB
ALBUMIN SERPL-MCNC: 4.4 G/DL (ref 3.5–5.2)
ALBUMIN/GLOB SERPL: 1.4 G/DL
ALP SERPL-CCNC: 81 U/L (ref 39–117)
ALT SERPL W P-5'-P-CCNC: 19 U/L (ref 1–33)
ANION GAP SERPL CALCULATED.3IONS-SCNC: 12 MMOL/L (ref 5–15)
AST SERPL-CCNC: 19 U/L (ref 1–32)
BILIRUB SERPL-MCNC: 0.4 MG/DL (ref 0–1.2)
BUN SERPL-MCNC: 14 MG/DL (ref 8–23)
BUN/CREAT SERPL: 18.9 (ref 7–25)
CALCIUM SPEC-SCNC: 9.7 MG/DL (ref 8.6–10.5)
CHLORIDE SERPL-SCNC: 102 MMOL/L (ref 98–107)
CHOLEST SERPL-MCNC: 169 MG/DL (ref 0–200)
CO2 SERPL-SCNC: 27 MMOL/L (ref 22–29)
CREAT SERPL-MCNC: 0.74 MG/DL (ref 0.57–1)
EGFRCR SERPLBLD CKD-EPI 2021: 84 ML/MIN/1.73
GLOBULIN UR ELPH-MCNC: 3.1 GM/DL
GLUCOSE SERPL-MCNC: 148 MG/DL (ref 65–99)
HBA1C MFR BLD: 7.5 % (ref 4.8–5.6)
HDLC SERPL-MCNC: 64 MG/DL (ref 40–60)
LDLC SERPL CALC-MCNC: 92 MG/DL (ref 0–100)
LDLC/HDLC SERPL: 1.43 {RATIO}
POTASSIUM SERPL-SCNC: 3.7 MMOL/L (ref 3.5–5.2)
PROT SERPL-MCNC: 7.5 G/DL (ref 6–8.5)
SODIUM SERPL-SCNC: 141 MMOL/L (ref 136–145)
TRIGL SERPL-MCNC: 66 MG/DL (ref 0–150)
VIT B12 BLD-MCNC: 539 PG/ML (ref 211–946)
VLDLC SERPL-MCNC: 13 MG/DL (ref 5–40)

## 2024-11-01 RX ORDER — DAPAGLIFLOZIN 10 MG/1
10 TABLET, FILM COATED ORAL DAILY
Qty: 90 TABLET | Refills: 3 | Status: SHIPPED | OUTPATIENT
Start: 2024-11-01

## 2024-11-01 RX ORDER — WARFARIN SODIUM 5 MG/1
TABLET ORAL
Qty: 30 TABLET | Refills: 0 | Status: SHIPPED | OUTPATIENT
Start: 2024-11-01

## 2024-11-01 NOTE — PROGRESS NOTES
Blood sugar was 148 and A1c is improved at 7.5 but not yet quite to goal.  Lets increase your Farxiga to 10 and see if we can get you to goal in regards to your sugar.  Cholesterol was okay but the bad cholesterol is 92 and goal is below 70 can you do more with diet and exercise or since you are on the highest dose allowable of pravastatin would you consider Zetia please let me know.  Call if any other questions or concerns in Farxiga 10 has been sent.

## 2024-11-01 NOTE — TELEPHONE ENCOUNTER
"HUB TO RELAY    \"Blood sugar was 148 and A1c is improved at 7.5 but not yet quite to goal.  Lets increase your Farxiga to 10 and see if we can get you to goal in regards to your sugar.  Cholesterol was okay but the bad cholesterol is 92 and goal is below 70 can you do more with diet and exercise or since you are on the highest dose allowable of pravastatin would you consider Zetia please let me know.  Call if any other questions or concerns in Farxiga 10 has been sent.\"  "

## 2024-11-01 NOTE — TELEPHONE ENCOUNTER
Okay to discontinue Eliquis.  Coumadin 5 mg a day starting 2 days after stopping Eliquis.  PT/INR 3 to 4 days after starting Coumadin.

## 2024-11-01 NOTE — TELEPHONE ENCOUNTER
Caller: Celsa Brown    Relationship to patient: Self    Best call back number: [phone]    Patient is needing: PT CALLED SAYS DR. MCKEON TOLD HER HE COULD SWITCH HER FROM ELIQUIS TO WARFARIN. SHE WANTS TO MOVE FORWARD WITH THIS BECAUSE OF THE COST.

## 2024-11-01 NOTE — TELEPHONE ENCOUNTER
Rx sent to pharmacy. Pt counseled on how to take medication and to wait the 2 days. Appt scheduled for INR in office on 11/18/24. Pt has 12 tablets of eliquis left so she will start warfarin on the 14th.

## 2024-11-18 ENCOUNTER — ANTICOAGULATION VISIT (OUTPATIENT)
Dept: CARDIOLOGY | Facility: CLINIC | Age: 74
End: 2024-11-18
Payer: MEDICARE

## 2024-11-18 VITALS
BODY MASS INDEX: 23.54 KG/M2 | SYSTOLIC BLOOD PRESSURE: 137 MMHG | DIASTOLIC BLOOD PRESSURE: 65 MMHG | WEIGHT: 154.8 LBS | HEART RATE: 96 BPM | OXYGEN SATURATION: 97 %

## 2024-11-18 DIAGNOSIS — I48.91 ATRIAL FIBRILLATION, UNSPECIFIED TYPE: ICD-10-CM

## 2024-11-18 DIAGNOSIS — I48.0 PAROXYSMAL ATRIAL FIBRILLATION: Primary | ICD-10-CM

## 2024-11-18 LAB — INR PPP: 2.1 (ref 2–3)

## 2024-11-18 PROCEDURE — 36416 COLLJ CAPILLARY BLOOD SPEC: CPT | Performed by: INTERNAL MEDICINE

## 2024-11-18 PROCEDURE — 85610 PROTHROMBIN TIME: CPT | Performed by: INTERNAL MEDICINE

## 2024-11-25 ENCOUNTER — ANTICOAGULATION VISIT (OUTPATIENT)
Dept: CARDIOLOGY | Facility: CLINIC | Age: 74
End: 2024-11-25
Payer: MEDICARE

## 2024-11-25 ENCOUNTER — ANTICOAGULATION VISIT (OUTPATIENT)
Dept: FAMILY MEDICINE CLINIC | Facility: CLINIC | Age: 74
End: 2024-11-25
Payer: MEDICARE

## 2024-11-25 DIAGNOSIS — I48.91 ATRIAL FIBRILLATION, UNSPECIFIED TYPE: Primary | ICD-10-CM

## 2024-11-25 LAB — INR PPP: 2.7 (ref 0.9–1.1)

## 2024-11-25 PROCEDURE — 36416 COLLJ CAPILLARY BLOOD SPEC: CPT | Performed by: INTERNAL MEDICINE

## 2024-11-25 PROCEDURE — 85610 PROTHROMBIN TIME: CPT | Performed by: INTERNAL MEDICINE

## 2024-11-25 PROCEDURE — 36416 COLLJ CAPILLARY BLOOD SPEC: CPT | Performed by: PREVENTIVE MEDICINE

## 2024-12-09 ENCOUNTER — ANTICOAGULATION VISIT (OUTPATIENT)
Dept: CARDIOLOGY | Facility: CLINIC | Age: 74
End: 2024-12-09
Payer: MEDICARE

## 2024-12-09 ENCOUNTER — ANTICOAGULATION VISIT (OUTPATIENT)
Dept: FAMILY MEDICINE CLINIC | Facility: CLINIC | Age: 74
End: 2024-12-09
Payer: COMMERCIAL

## 2024-12-09 DIAGNOSIS — I48.91 ATRIAL FIBRILLATION, UNSPECIFIED TYPE: Primary | ICD-10-CM

## 2024-12-09 LAB — INR PPP: 2.6 (ref 0.9–1.1)

## 2024-12-09 PROCEDURE — 85610 PROTHROMBIN TIME: CPT | Performed by: INTERNAL MEDICINE

## 2024-12-09 PROCEDURE — 36416 COLLJ CAPILLARY BLOOD SPEC: CPT | Performed by: INTERNAL MEDICINE

## 2024-12-09 PROCEDURE — 36416 COLLJ CAPILLARY BLOOD SPEC: CPT | Performed by: PREVENTIVE MEDICINE

## 2024-12-12 ENCOUNTER — TELEPHONE (OUTPATIENT)
Dept: CARDIOLOGY | Facility: CLINIC | Age: 74
End: 2024-12-12
Payer: MEDICARE

## 2024-12-12 DIAGNOSIS — Z79.01 LONG TERM (CURRENT) USE OF ANTICOAGULANTS: ICD-10-CM

## 2024-12-12 DIAGNOSIS — I48.91 ATRIAL FIBRILLATION, UNSPECIFIED TYPE: Primary | ICD-10-CM

## 2024-12-12 RX ORDER — WARFARIN SODIUM 5 MG/1
TABLET ORAL
Qty: 90 TABLET | Refills: 0 | Status: SHIPPED | OUTPATIENT
Start: 2024-12-12

## 2024-12-12 RX ORDER — HYDROCHLOROTHIAZIDE 25 MG/1
25 TABLET ORAL DAILY
Qty: 90 TABLET | Refills: 0 | Status: SHIPPED | OUTPATIENT
Start: 2024-12-12

## 2024-12-12 NOTE — TELEPHONE ENCOUNTER
Incoming Refill Request      Medication requested (name and dose): warfarin    Pharmacy where request should be sent: [facility]    Additional details provided by patient:     Best call back number: [phone]    Does the patient have less than a 3 day supply:  [x] Yes  [] No    Lorraine Perry Rep  12/12/24, 09:39 EST

## 2024-12-12 NOTE — TELEPHONE ENCOUNTER
Rx Refill Note  Requested Prescriptions     Pending Prescriptions Disp Refills    warfarin (COUMADIN) 5 MG tablet 90 tablet 0     Sig: Take 1 tab, by mouth, daily except 1/2 tab on Mon and Fri or as directed.    Last INR 12/9/24  Last office visit with prescribing clinician: 8/12/2024   Last telemedicine visit with prescribing clinician: Visit date not found   Next office visit with prescribing clinician: 2/24/2025                         Would you like a call back once the refill request has been completed: [] Yes [] No    If the office needs to give you a call back, can they leave a voicemail: [] Yes [] No    Yelena Westbrook RN  12/12/24, 10:06 EST

## 2024-12-18 RX ORDER — AMLODIPINE BESYLATE 10 MG/1
10 TABLET ORAL DAILY
Qty: 90 TABLET | Refills: 0 | Status: SHIPPED | OUTPATIENT
Start: 2024-12-18

## 2025-01-07 ENCOUNTER — TELEPHONE (OUTPATIENT)
Dept: FAMILY MEDICINE CLINIC | Facility: CLINIC | Age: 75
End: 2025-01-07

## 2025-01-07 NOTE — TELEPHONE ENCOUNTER
Caller: Celsa Brown    Relationship: Self    Best call back number: [phone]        What was the call regarding: PATIENT CALLING WANTED TO SEE IF SHE CAN CHANGE INR APPOINTMENT FOR 1/8/25 TO LATER THAT AFTERNOON AROUND 1 PM     PLEASE GIVE CALLBACK

## 2025-01-09 ENCOUNTER — ANTICOAGULATION VISIT (OUTPATIENT)
Dept: FAMILY MEDICINE CLINIC | Facility: CLINIC | Age: 75
End: 2025-01-09
Payer: MEDICARE

## 2025-01-09 DIAGNOSIS — I48.91 ATRIAL FIBRILLATION, UNSPECIFIED TYPE: Primary | ICD-10-CM

## 2025-01-09 LAB — INR PPP: 1.8 (ref 0.9–1.1)

## 2025-01-09 PROCEDURE — 36416 COLLJ CAPILLARY BLOOD SPEC: CPT | Performed by: INTERNAL MEDICINE

## 2025-01-09 PROCEDURE — 85610 PROTHROMBIN TIME: CPT | Performed by: INTERNAL MEDICINE

## 2025-01-10 ENCOUNTER — ANTICOAGULATION VISIT (OUTPATIENT)
Dept: CARDIOLOGY | Facility: CLINIC | Age: 75
End: 2025-01-10
Payer: MEDICARE

## 2025-01-10 NOTE — PROGRESS NOTES
Spoke to patient, take extra 2.5 mg today and resume schedule. She has had no medication changes or diet changes.

## 2025-01-13 PROCEDURE — 93296 REM INTERROG EVL PM/IDS: CPT | Performed by: INTERNAL MEDICINE

## 2025-01-13 PROCEDURE — 93294 REM INTERROG EVL PM/LDLS PM: CPT | Performed by: INTERNAL MEDICINE

## 2025-01-14 RX ORDER — PIOGLITAZONE 30 MG/1
30 TABLET ORAL DAILY
Qty: 90 TABLET | Refills: 0 | Status: SHIPPED | OUTPATIENT
Start: 2025-01-14

## 2025-01-14 RX ORDER — GLIPIZIDE 10 MG/1
TABLET ORAL
Qty: 360 TABLET | Refills: 0 | Status: SHIPPED | OUTPATIENT
Start: 2025-01-14

## 2025-01-14 NOTE — TELEPHONE ENCOUNTER
Caller: Celsa Brown    Relationship: Self    Best call back number: [phone]    Requested Prescriptions:   Requested Prescriptions     Pending Prescriptions Disp Refills    pioglitazone (Actos) 30 MG tablet 90 tablet 0     Sig: Take 1 tablet by mouth Daily.        Pharmacy where request should be sent: [facility] [address] [place] [address] [id number] [phone] [place] [phone] FX     Last office visit with prescribing clinician: 10/31/2024   Last telemedicine visit with prescribing clinician: Visit date not found   Next office visit with prescribing clinician: 2/18/2025     Does the patient have less than a 3 day supply:  [] Yes  [x] No    Lorraine Alanis Rep   01/14/25 09:35 EST

## 2025-02-16 NOTE — PATIENT INSTRUCTIONS
Health Maintenance Due   Topic Date Due    URINE MICROALBUMIN-CREATININE RATIO (uACR)  01/04/2023    ANNUAL WELLNESS VISIT  04/23/2025    HEMOGLOBIN A1C  04/30/2025    MAMMOGRAM  05/13/2025    Patient to ask Dr. Lynch if should be on 81 mg ASA with coumADIN

## 2025-02-16 NOTE — PROGRESS NOTES
Subjective   The ABCs of the Annual Wellness Visit  Medicare Wellness Visit      Celsa Brown is a 74 y.o. patient who presents for a Medicare Wellness Visit.    The following portions of the patient's history were reviewed and   updated as appropriate: allergies, current medications, past family history, past medical history, past social history, past surgical history, and problem list.    Compared to one year ago, the patient's physical   health is the same.  Compared to one year ago, the patient's mental   health is the same.    Recent Hospitalizations:  She was not admitted to the hospital during the last year.     Current Medical Providers:  Patient Care Team:  Jaelyn Iverson MD as PCP - General (Family Medicine)  Dung Lynch MD as Consulting Physician (Cardiology)  Jerzy Ignacio MD as Consulting Physician (Urology)    Outpatient Medications Prior to Visit   Medication Sig Dispense Refill    Accu-Chek Thelma Plus test strip TEST BLOOD SUGAR ONCE DAILY 100 each 3    amLODIPine (NORVASC) 10 MG tablet Take 1 tablet by mouth once daily 90 tablet 0    cholecalciferol (VITAMIN D3) 1000 units tablet Take 1 tablet by mouth Every Other Day.      clotrimazole (LOTRIMIN) 1 % cream Apply 1 Application topically to the appropriate area as directed 2 (Two) Times a Day.      Cyanocobalamin (VITAMIN B12) 1000 MCG tablet controlled-release Take 1,000 mcg by mouth Every Other Day.      estradiol (ESTRACE) 0.1 MG/GM vaginal cream Insert 2 g into the vagina Daily.      Farxiga 10 MG tablet Take 10 mg by mouth Daily. 90 tablet 3    glipizide (GLUCOTROL) 10 MG tablet TAKE TWO TABLETS BY MOUTH TWICE A  tablet 0    hydrALAZINE (APRESOLINE) 100 MG tablet Take 1 tablet by mouth 2 (Two) Times a Day. 180 tablet 3    lisinopril (PRINIVIL,ZESTRIL) 40 MG tablet Take 1 tablet by mouth Daily. 90 tablet 0    loratadine (CLARITIN) 10 MG tablet Take 1 tablet by mouth Daily.      Melatonin 10 MG tablet Take 1 tablet by  mouth Daily.      Multiple Vitamins-Minerals (CENTRUM SILVER ULTRA WOMENS) tablet Take 1 tablet by mouth Daily.      Omega-3 Fatty Acids (OMEGA-3 FISH OIL) 500 MG capsule Take 1 capsule by mouth Daily.      pioglitazone (Actos) 30 MG tablet Take 1 tablet by mouth Daily. 90 tablet 0    potassium chloride (MICRO-K) 10 MEQ CR capsule Take 1 capsule by mouth Daily. 90 capsule 3    warfarin (COUMADIN) 5 MG tablet Take 1 tab, by mouth, daily except 1/2 tab on Mon and Fri or as directed. 90 tablet 0    hydroCHLOROthiazide 25 MG tablet TAKE ONE TABLET BY MOUTH EVERY DAY 90 tablet 0    metFORMIN (GLUCOPHAGE) 500 MG tablet TAKE 2 TABLETS BY MOUTH 2 (TWO) TIMES A DAY WITH MEALS. 360 tablet 0    metoprolol succinate XL (Toprol XL) 100 MG 24 hr tablet Take 1 tablet by mouth Daily.      pravastatin (PRAVACHOL) 80 MG tablet TAKE ONE TABLET BY MOUTH EVERY NIGHT 90 tablet 0    aspirin 81 MG EC tablet Take 1 tablet by mouth Daily. 1 tablet every other day (Patient not taking: Reported on 2/18/2025)       No facility-administered medications prior to visit.     No opioid medication identified on active medication list. I have reviewed chart for other potential  high risk medication/s and harmful drug interactions in the elderly.      Aspirin is on active medication list. Aspirin use is indicated based on review of current medical condition/s. Pros and cons of this therapy have been discussed today. Benefits of this medication outweigh potential harm.  Patient has been encouraged to continue taking this medication.  .      Patient Active Problem List   Diagnosis    B12 deficiency    Body mass index (BMI) of 23.0-23.9 in adult    Degenerative joint disease    Hyperlipidemia    Hypertension    Atypical mole    Postmenopausal status    Type 2 diabetes mellitus    Vitamin D deficiency    Pacemaker    Age-related nuclear cataract of both eyes    Macular degeneration of left eye    Nonexudative age-related macular degeneration    Presbyopia  "   Ptosis of eyelid    Regular astigmatism of both eyes    Vitreous floaters    Nail fungus    Right carotid bruit    Dermatochalasis of left upper eyelid    Dermatochalasis of right upper eyelid    Atrial fibrillation     Advance Care Planning Advance Directive is on file.  ACP discussion was held with the patient during this visit. Patient has an advance directive in EMR which is still valid.             Objective   Vitals:    25 0812 25 0815   BP: 148/68 131/76   BP Location: Right arm Left arm   Patient Position: Sitting Sitting   Cuff Size: Adult Adult   Pulse: 76    Resp: 18    Temp: 97.1 °F (36.2 °C)    TempSrc: Temporal    SpO2: 96%    Weight: 70.6 kg (155 lb 9.6 oz)    Height: 172.7 cm (68\")    PainSc: 0-No pain        Estimated body mass index is 23.66 kg/m² as calculated from the following:    Height as of this encounter: 172.7 cm (68\").    Weight as of this encounter: 70.6 kg (155 lb 9.6 oz).    BMI is within normal parameters. No other follow-up for BMI required.           Does the patient have evidence of cognitive impairment? No  Lab Results   Component Value Date    TRIG 72 2025    HDL 59 2025    LDL 81 2025    VLDL 14 2025    HGBA1C 7.40 (H) 2025                                                                                                Health  Risk Assessment    Smoking Status:  Social History     Tobacco Use   Smoking Status Never    Passive exposure: Past (AS A CHILD)   Smokeless Tobacco Never     Alcohol Consumption:  Social History     Substance and Sexual Activity   Alcohol Use No       Fall Risk Screen  STEADI Fall Risk Assessment was completed, and patient is at MODERATE risk for falls. Assessment completed on:2025    Depression Screening   Little interest or pleasure in doing things? Not at all   Feeling down, depressed, or hopeless? Not at all   PHQ-2 Total Score 0      Health Habits and Functional and Cognitive Screenin/18/2025    "  8:00 AM   Functional & Cognitive Status   Do you have difficulty preparing food and eating? No   Do you have difficulty bathing yourself, getting dressed or grooming yourself? No   Do you have difficulty using the toilet? No   Do you have difficulty moving around from place to place? No   Do you have trouble with steps or getting out of a bed or a chair? No   Current Diet Well Balanced Diet   Dental Exam Up to date   Eye Exam Up to date   Exercise (times per week) 2 times per week   Current Exercises Include Walking;House Cleaning   Do you need help using the phone?  No   Are you deaf or do you have serious difficulty hearing?  No   Do you need help to go to places out of walking distance? Yes   Do you need help shopping? No   Do you need help preparing meals?  No   Do you need help with housework?  No   Do you need help with laundry? No   Do you need help taking your medications? No   Do you need help managing money? No   Do you ever drive or ride in a car without wearing a seat belt? No   Have you felt unusual stress, anger or loneliness in the last month? No   Who do you live with? Spouse   If you need help, do you have trouble finding someone available to you? No   Have you been bothered in the last four weeks by sexual problems? No   Do you have difficulty concentrating, remembering or making decisions? No           Age-appropriate Screening Schedule:  Refer to the list below for future screening recommendations based on patient's age, sex and/or medical conditions. Orders for these recommended tests are listed in the plan section. The patient has been provided with a written plan.    Health Maintenance List  Health Maintenance   Topic Date Due    URINE MICROALBUMIN-CREATININE RATIO (uACR)  01/04/2023    MAMMOGRAM  05/13/2025    TDAP/TD VACCINES (2 - Tdap) 08/02/2027 (Originally 8/1/2027)    DIABETIC EYE EXAM  04/25/2025    HEMOGLOBIN A1C  08/18/2025    DXA SCAN  10/24/2025    ANNUAL WELLNESS VISIT  02/18/2026     DIABETIC FOOT EXAM  02/18/2026    LIPID PANEL  02/18/2026    COLORECTAL CANCER SCREENING  06/28/2026    HEPATITIS C SCREENING  Completed    COVID-19 Vaccine  Completed    RSV Vaccine - Adults  Completed    INFLUENZA VACCINE  Completed    Pneumococcal Vaccine 50+  Completed    ZOSTER VACCINE  Completed                                                                                                                                                CMS Preventative Services Quick Reference  Risk Factors Identified During Encounter  Fall Risk-High or Moderate: Discussed Fall Prevention in the home    The above risks/problems have been discussed with the patient.  Pertinent information has been shared with the patient in the After Visit Summary.  An After Visit Summary and PPPS were made available to the patient.    Follow Up:   Next Medicare Wellness visit to be scheduled in 1 year.         Additional E&M Note during same encounter follows:  Patient has additional, significant, and separately identifiable condition(s)/problem(s) that require work above and beyond the Medicare Wellness Visit     Chief Complaint  Medicare Wellness-subsequent, Diabetes, Hyperlipidemia, and Hypertension    Subjective    HPI    Patient was advised to wear sunscreen and a seatbelt.     The patient is a 74-year-old female who is here today for her age-specific annual wellness exam for Medicare. She has a history of B12 deficiency, hyperlipidemia, hypertension, atypical mole, type 2 diabetes without complication, vitamin D deficiency, pacemaker, right carotid bruit, atrial fibrillation, and hand numbness.    She reports no changes in her auditory or visual capabilities since her last visit. She also does not experience any dysphagia or digestive issues. Her blood glucose levels occasionally drop to 100 when she is fasting. She has experienced unintentional weight loss, which she suspects may be a side effect of metformin. She occasionally  experiences diarrhea but does not consider it a significant issue. She performs monthly self-breast examinations in various positions and reports no tenderness. She does not experience hemoptysis, sputum production, wheezing, chest pressure, or palpitations. She also does not experience dysuria, hematuria, abnormal vaginal discharge, or melena. She has not been hospitalized within the past year. She has an advanced directive in place and reports no changes to it. She does not require additional information on alcohol misuse, chronic pain, depression, drug use, fall risk, glaucoma, hearing problems, immunizations, loneliness and activity, polypharmacy, high-risk sexual behavior, tobacco use, or urinary incontinence. She is aware of the need to use handrails when entering and exiting her home.    She has a history of B12 deficiency. She is currently on B12 supplements.    She has a history of hyperlipidemia. She is currently on pravastatin 80 mg and is making efforts to monitor her intake of saturated fats.    She has a history of hypertension. She is currently on metoprolol and hydrochlorothiazide.    She has a history of vitamin D deficiency. She is currently on vitamin D supplements.    She has a pacemaker and is under the care of Dr. Hitchcock, with whom she has an appointment scheduled for 02/24/2025.    She has a history of right carotid bruit.    She has a history of atrial fibrillation. She has discontinued aspirin due to her current warfarin therapy, a decision she made independently after conducting personal research. She has been off aspirin for approximately 3 months.    She has a history of hand numbness, which has shown improvement and is not causing her significant discomfort.    Supplemental Information  She had a spot removed from her nose, which was sent for biopsy. The results are pending.    ALLERGIES  She is allergic to SULFA and MACROBID.    MEDICATIONS  Current: Metformin, metoprolol,  "hydrochlorothiazide, pravastatin, warfarin, vitamin D, B12    IMMUNIZATIONS  She received the Prevnar 23 vaccine on 03/05/2019. She has received the RSV vaccine.  Review of Systems   Respiratory:  Negative for shortness of breath.    Cardiovascular:  Negative for chest pain.   Neurological:  Negative for numbness.          Objective   Vital Signs:  /76 (BP Location: Left arm, Patient Position: Sitting, Cuff Size: Adult)   Pulse 76   Temp 97.1 °F (36.2 °C) (Temporal)   Resp 18   Ht 172.7 cm (68\")   Wt 70.6 kg (155 lb 9.6 oz)   SpO2 96%   BMI 23.66 kg/m²   Physical Exam  Vitals reviewed.   Constitutional:       General: She is not in acute distress.     Appearance: Normal appearance. She is well-developed. She is not ill-appearing or toxic-appearing.   HENT:      Head: Normocephalic and atraumatic.      Right Ear: Tympanic membrane, ear canal and external ear normal.      Left Ear: Tympanic membrane, ear canal and external ear normal.      Nose: Nose normal.      Mouth/Throat:      Mouth: Mucous membranes are moist.      Pharynx: No posterior oropharyngeal erythema.   Eyes:      Extraocular Movements: Extraocular movements intact.      Conjunctiva/sclera: Conjunctivae normal.      Pupils: Pupils are equal, round, and reactive to light.   Neck:      Vascular: No carotid bruit.   Cardiovascular:      Rate and Rhythm: Normal rate and regular rhythm.      Pulses:           Dorsalis pedis pulses are 1+ on the right side and 1+ on the left side.        Posterior tibial pulses are 1+ on the right side and 1+ on the left side.      Heart sounds: Normal heart sounds.   Pulmonary:      Effort: Pulmonary effort is normal.      Breath sounds: Normal breath sounds.   Abdominal:      General: Bowel sounds are normal. There is no distension.      Palpations: Abdomen is soft. There is no mass.      Tenderness: There is no abdominal tenderness. There is no right CVA tenderness or left CVA tenderness.   Musculoskeletal:    "      General: Normal range of motion.      Cervical back: Neck supple. No tenderness.      Right lower leg: No edema.      Left lower leg: No edema.   Feet:      Right foot:      Protective Sensation: 10 sites tested.  10 sites sensed.      Skin integrity: Skin integrity normal. Dry skin present.      Toenail Condition: Right toenails are abnormally thick.      Left foot:      Protective Sensation: 10 sites tested.  10 sites sensed.      Skin integrity: Skin integrity normal. Dry skin present.      Toenail Condition: Left toenails are abnormally thick.      Comments: Diabetic Foot Exam Performed and Monofilament Test Performed    Lymphadenopathy:      Cervical: No cervical adenopathy.   Skin:     General: Skin is warm.   Neurological:      General: No focal deficit present.      Mental Status: She is alert and oriented to person, place, and time.   Psychiatric:         Mood and Affect: Mood normal.         Behavior: Behavior normal.           Throat appears normal.  Lungs were auscultated.  Heart rhythm is regular.    Vital Signs  Pulse is 82. Body mass index is 23.            Results                Assessment and Plan        1. Annual wellness examination.  Her weight remains within the normal range, despite a slight decrease. She has demonstrated good management of her toenails. Her blood pressure, although slightly elevated during this visit, is well-controlled at home. She does not exhibit any signs of cognitive dysfunction. She has been advised to incorporate more protein into her diet, specifically sugar-free peanut butter, cheese, cottage cheese, and yogurt. She has been counseled to consult with Dr. Hitchcock regarding the potential addition of 81 mg ASA to her current Coumadin regimen. A mammogram has been scheduled for 05/14/2024. Routine blood work, including a complete blood count, vitamin D, and B12 levels, will be conducted today. She is due for her pneumonia vaccine, which she prefers to receive at  [facility]. She has been informed about the upcoming COVID-19 vaccine, expected to be available in late winter.    2. B12 deficiency.  She is currently taking B12 supplements. Her B12 levels will be checked with today's blood work.    3. Hyperlipidemia.  She is on pravastatin 80 mg. Three refills for pravastatin have been provided. She is advised to continue monitoring her saturated fat intake.    4. Hypertension.  Her blood pressure was slightly elevated today but is fine at home. She is on metoprolol and hydrochlorothiazide. Three refills for metoprolol and hydrochlorothiazide have been provided.    5. Type 2 diabetes without complication.  She is on metformin. Three refills for metformin have been provided. She is advised to continue monitoring her blood sugar levels and incorporate more protein into her diet.    6. Vitamin D deficiency.  She is currently taking vitamin D supplements. Her vitamin D levels will be checked with today's blood work.    7. Pacemaker.  She follows up with Dr. Hitchcock on 02/24/2025, for her pacemaker. She is advised to discuss the possibility of adding 81 mg ASA to her Coumadin regimen with Dr. Hitchcock.    8. Right carotid bruit.  Her last carotid check was in 07/2023, showing less than 50% blockage. She is advised to wait until summer for the next evaluation.    9. Atrial fibrillation.  She is currently on warfarin. She is advised to discuss the possibility of adding 81 mg ASA to her Coumadin regimen with Dr. Hitchcock.    10. Hand numbness.  Her hand numbness has improved and does not bother her much anymore. No further action is required at this time.    Follow-up  The patient will follow up in 3 months.    PROCEDURE  The patient has a pacemaker.            Follow Up   Return in about 3 months (around 5/18/2025).  Patient was given instructions and counseling regarding her condition or for health maintenance advice. Please see specific information pulled into the AVS if appropriate.  Patient  or patient representative verbalized consent for the use of Ambient Listening during the visit with  Jaelyn Iverson MD for chart documentation. 2/19/2025  09:52 EST

## 2025-02-18 ENCOUNTER — TELEPHONE (OUTPATIENT)
Dept: FAMILY MEDICINE CLINIC | Facility: CLINIC | Age: 75
End: 2025-02-18

## 2025-02-18 ENCOUNTER — LAB (OUTPATIENT)
Dept: FAMILY MEDICINE CLINIC | Facility: CLINIC | Age: 75
End: 2025-02-18
Payer: MEDICARE

## 2025-02-18 ENCOUNTER — ANTICOAGULATION VISIT (OUTPATIENT)
Dept: CARDIOLOGY | Facility: CLINIC | Age: 75
End: 2025-02-18
Payer: MEDICARE

## 2025-02-18 ENCOUNTER — ANTICOAGULATION VISIT (OUTPATIENT)
Dept: FAMILY MEDICINE CLINIC | Facility: CLINIC | Age: 75
End: 2025-02-18
Payer: MEDICARE

## 2025-02-18 ENCOUNTER — OFFICE VISIT (OUTPATIENT)
Dept: FAMILY MEDICINE CLINIC | Facility: CLINIC | Age: 75
End: 2025-02-18
Payer: MEDICARE

## 2025-02-18 VITALS
HEART RATE: 76 BPM | HEIGHT: 68 IN | DIASTOLIC BLOOD PRESSURE: 76 MMHG | BODY MASS INDEX: 23.58 KG/M2 | SYSTOLIC BLOOD PRESSURE: 131 MMHG | RESPIRATION RATE: 18 BRPM | WEIGHT: 155.6 LBS | OXYGEN SATURATION: 96 % | TEMPERATURE: 97.1 F

## 2025-02-18 DIAGNOSIS — E11.9 TYPE 2 DIABETES MELLITUS WITHOUT COMPLICATION, WITHOUT LONG-TERM CURRENT USE OF INSULIN: ICD-10-CM

## 2025-02-18 DIAGNOSIS — E55.9 VITAMIN D DEFICIENCY: ICD-10-CM

## 2025-02-18 DIAGNOSIS — Z12.31 ENCOUNTER FOR SCREENING MAMMOGRAM FOR MALIGNANT NEOPLASM OF BREAST: ICD-10-CM

## 2025-02-18 DIAGNOSIS — R20.0 HAND NUMBNESS: ICD-10-CM

## 2025-02-18 DIAGNOSIS — E78.5 HYPERLIPIDEMIA, UNSPECIFIED HYPERLIPIDEMIA TYPE: ICD-10-CM

## 2025-02-18 DIAGNOSIS — I48.91 ATRIAL FIBRILLATION, UNSPECIFIED TYPE: ICD-10-CM

## 2025-02-18 DIAGNOSIS — Z95.0 PACEMAKER: ICD-10-CM

## 2025-02-18 DIAGNOSIS — E53.8 B12 DEFICIENCY: ICD-10-CM

## 2025-02-18 DIAGNOSIS — I48.91 ATRIAL FIBRILLATION, UNSPECIFIED TYPE: Primary | ICD-10-CM

## 2025-02-18 DIAGNOSIS — R09.89 RIGHT CAROTID BRUIT: ICD-10-CM

## 2025-02-18 DIAGNOSIS — D22.9 ATYPICAL MOLE: ICD-10-CM

## 2025-02-18 DIAGNOSIS — I10 PRIMARY HYPERTENSION: ICD-10-CM

## 2025-02-18 DIAGNOSIS — Z00.01 ENCOUNTER FOR ANNUAL GENERAL MEDICAL EXAMINATION WITH ABNORMAL FINDINGS IN ADULT: Primary | ICD-10-CM

## 2025-02-18 LAB
25(OH)D3 SERPL-MCNC: 74 NG/ML (ref 30–100)
ALBUMIN SERPL-MCNC: 4.2 G/DL (ref 3.5–5.2)
ALBUMIN UR-MCNC: <1.2 MG/DL
ALBUMIN/GLOB SERPL: 1.2 G/DL
ALP SERPL-CCNC: 80 U/L (ref 39–117)
ALT SERPL W P-5'-P-CCNC: 18 U/L (ref 1–33)
ANION GAP SERPL CALCULATED.3IONS-SCNC: 13.6 MMOL/L (ref 5–15)
AST SERPL-CCNC: 24 U/L (ref 1–32)
BASOPHILS # BLD AUTO: 0.04 10*3/MM3 (ref 0–0.2)
BASOPHILS NFR BLD AUTO: 0.6 % (ref 0–1.5)
BILIRUB SERPL-MCNC: 0.5 MG/DL (ref 0–1.2)
BUN SERPL-MCNC: 15 MG/DL (ref 8–23)
BUN/CREAT SERPL: 19.7 (ref 7–25)
CALCIUM SPEC-SCNC: 9.9 MG/DL (ref 8.6–10.5)
CHLORIDE SERPL-SCNC: 101 MMOL/L (ref 98–107)
CHOLEST SERPL-MCNC: 154 MG/DL (ref 0–200)
CO2 SERPL-SCNC: 27.4 MMOL/L (ref 22–29)
CREAT SERPL-MCNC: 0.76 MG/DL (ref 0.57–1)
CREAT UR-MCNC: 19.4 MG/DL
DEPRECATED RDW RBC AUTO: 40.5 FL (ref 37–54)
EGFRCR SERPLBLD CKD-EPI 2021: 81.3 ML/MIN/1.73
EOSINOPHIL # BLD AUTO: 0.06 10*3/MM3 (ref 0–0.4)
EOSINOPHIL NFR BLD AUTO: 0.8 % (ref 0.3–6.2)
ERYTHROCYTE [DISTWIDTH] IN BLOOD BY AUTOMATED COUNT: 12.4 % (ref 12.3–15.4)
GLOBULIN UR ELPH-MCNC: 3.6 GM/DL
GLUCOSE SERPL-MCNC: 152 MG/DL (ref 65–99)
HBA1C MFR BLD: 7.4 % (ref 4.8–5.6)
HCT VFR BLD AUTO: 40.3 % (ref 34–46.6)
HDLC SERPL-MCNC: 59 MG/DL (ref 40–60)
HGB BLD-MCNC: 13.5 G/DL (ref 12–15.9)
IMM GRANULOCYTES # BLD AUTO: 0.01 10*3/MM3 (ref 0–0.05)
IMM GRANULOCYTES NFR BLD AUTO: 0.1 % (ref 0–0.5)
INR PPP: 1.9 (ref 0.9–1.1)
LDLC SERPL CALC-MCNC: 81 MG/DL (ref 0–100)
LDLC/HDLC SERPL: 1.37 {RATIO}
LYMPHOCYTES # BLD AUTO: 2.35 10*3/MM3 (ref 0.7–3.1)
LYMPHOCYTES NFR BLD AUTO: 33 % (ref 19.6–45.3)
MAGNESIUM SERPL-MCNC: 1.9 MG/DL (ref 1.6–2.4)
MCH RBC QN AUTO: 30.3 PG (ref 26.6–33)
MCHC RBC AUTO-ENTMCNC: 33.5 G/DL (ref 31.5–35.7)
MCV RBC AUTO: 90.6 FL (ref 79–97)
MICROALBUMIN/CREAT UR: NORMAL MG/G{CREAT}
MONOCYTES # BLD AUTO: 0.6 10*3/MM3 (ref 0.1–0.9)
MONOCYTES NFR BLD AUTO: 8.4 % (ref 5–12)
NEUTROPHILS NFR BLD AUTO: 4.07 10*3/MM3 (ref 1.7–7)
NEUTROPHILS NFR BLD AUTO: 57.1 % (ref 42.7–76)
NRBC BLD AUTO-RTO: 0 /100 WBC (ref 0–0.2)
PLATELET # BLD AUTO: 293 10*3/MM3 (ref 140–450)
PMV BLD AUTO: 11.2 FL (ref 6–12)
POTASSIUM SERPL-SCNC: 3.4 MMOL/L (ref 3.5–5.2)
PROT SERPL-MCNC: 7.8 G/DL (ref 6–8.5)
RBC # BLD AUTO: 4.45 10*6/MM3 (ref 3.77–5.28)
SODIUM SERPL-SCNC: 142 MMOL/L (ref 136–145)
TRIGL SERPL-MCNC: 72 MG/DL (ref 0–150)
TSH SERPL DL<=0.05 MIU/L-ACNC: 1.5 UIU/ML (ref 0.27–4.2)
VIT B12 BLD-MCNC: 572 PG/ML (ref 211–946)
VLDLC SERPL-MCNC: 14 MG/DL (ref 5–40)
WBC NRBC COR # BLD AUTO: 7.13 10*3/MM3 (ref 3.4–10.8)

## 2025-02-18 PROCEDURE — 1170F FXNL STATUS ASSESSED: CPT | Performed by: PREVENTIVE MEDICINE

## 2025-02-18 PROCEDURE — 83735 ASSAY OF MAGNESIUM: CPT | Performed by: PREVENTIVE MEDICINE

## 2025-02-18 PROCEDURE — 84443 ASSAY THYROID STIM HORMONE: CPT | Performed by: PREVENTIVE MEDICINE

## 2025-02-18 PROCEDURE — G0439 PPPS, SUBSEQ VISIT: HCPCS | Performed by: PREVENTIVE MEDICINE

## 2025-02-18 PROCEDURE — 36415 COLL VENOUS BLD VENIPUNCTURE: CPT

## 2025-02-18 PROCEDURE — 3078F DIAST BP <80 MM HG: CPT | Performed by: PREVENTIVE MEDICINE

## 2025-02-18 PROCEDURE — 82043 UR ALBUMIN QUANTITATIVE: CPT | Performed by: PREVENTIVE MEDICINE

## 2025-02-18 PROCEDURE — 85610 PROTHROMBIN TIME: CPT | Performed by: INTERNAL MEDICINE

## 2025-02-18 PROCEDURE — 3075F SYST BP GE 130 - 139MM HG: CPT | Performed by: PREVENTIVE MEDICINE

## 2025-02-18 PROCEDURE — 82570 ASSAY OF URINE CREATININE: CPT | Performed by: PREVENTIVE MEDICINE

## 2025-02-18 PROCEDURE — 1126F AMNT PAIN NOTED NONE PRSNT: CPT | Performed by: PREVENTIVE MEDICINE

## 2025-02-18 PROCEDURE — 82306 VITAMIN D 25 HYDROXY: CPT | Performed by: PREVENTIVE MEDICINE

## 2025-02-18 PROCEDURE — 36416 COLLJ CAPILLARY BLOOD SPEC: CPT | Performed by: PREVENTIVE MEDICINE

## 2025-02-18 PROCEDURE — 82607 VITAMIN B-12: CPT | Performed by: PREVENTIVE MEDICINE

## 2025-02-18 PROCEDURE — 85025 COMPLETE CBC W/AUTO DIFF WBC: CPT | Performed by: PREVENTIVE MEDICINE

## 2025-02-18 PROCEDURE — 99397 PER PM REEVAL EST PAT 65+ YR: CPT | Performed by: PREVENTIVE MEDICINE

## 2025-02-18 PROCEDURE — 80053 COMPREHEN METABOLIC PANEL: CPT | Performed by: PREVENTIVE MEDICINE

## 2025-02-18 PROCEDURE — 36416 COLLJ CAPILLARY BLOOD SPEC: CPT | Performed by: INTERNAL MEDICINE

## 2025-02-18 PROCEDURE — 80061 LIPID PANEL: CPT | Performed by: PREVENTIVE MEDICINE

## 2025-02-18 PROCEDURE — 83036 HEMOGLOBIN GLYCOSYLATED A1C: CPT | Performed by: PREVENTIVE MEDICINE

## 2025-02-18 RX ORDER — METOPROLOL SUCCINATE 100 MG/1
100 TABLET, EXTENDED RELEASE ORAL DAILY
Qty: 90 TABLET | Refills: 3 | Status: SHIPPED | OUTPATIENT
Start: 2025-02-18

## 2025-02-18 RX ORDER — PRAVASTATIN SODIUM 80 MG/1
80 TABLET ORAL
Qty: 90 TABLET | Refills: 3 | Status: SHIPPED | OUTPATIENT
Start: 2025-02-18

## 2025-02-18 RX ORDER — HYDROCHLOROTHIAZIDE 25 MG/1
25 TABLET ORAL DAILY
Qty: 90 TABLET | Refills: 3 | Status: SHIPPED | OUTPATIENT
Start: 2025-02-18

## 2025-02-18 NOTE — PROGRESS NOTES
Capillary Blood Specimen Collection  Capillary blood collection performed in left middle finger by Dari Goode MA. Patient tolerated the procedure well without complications.  INR   02/18/25   Dari Goode MA

## 2025-02-18 NOTE — PROGRESS NOTES
Venipuncture performed on Left Arm by Dari Goode MA  with good hemostasis. Patient tolerated well. 7/25/24

## 2025-02-19 ENCOUNTER — TELEPHONE (OUTPATIENT)
Dept: FAMILY MEDICINE CLINIC | Facility: CLINIC | Age: 75
End: 2025-02-19
Payer: MEDICARE

## 2025-02-19 RX ORDER — PIOGLITAZONE 45 MG/1
45 TABLET ORAL DAILY
Qty: 90 TABLET | Refills: 3 | Status: SHIPPED | OUTPATIENT
Start: 2025-02-19

## 2025-02-19 RX ORDER — EZETIMIBE 10 MG/1
10 TABLET ORAL DAILY
Qty: 90 TABLET | Refills: 3 | Status: SHIPPED | OUTPATIENT
Start: 2025-02-19

## 2025-02-19 NOTE — TELEPHONE ENCOUNTER
Celsa Brown notified and voiced comprehension and understanding.    Patient is agreeable to increasing Actos and adding Zetia.

## 2025-02-19 NOTE — PROGRESS NOTES
Glucose was 152 and A1c has decreased slightly to 7.4 but it is still not normal continue to watch her carbs and keep up your walking.  We can increase the Actos to 45 if you think you have done all you can with diet and exercise please let me know.  Potassium was slightly low so you might increase a dose or 2/week.  Bad cholesterol is 81 and goal is below 70 and some would say 55 so I would try to decrease your saturated fats and we could consider adding Zetia to the maximum dose of pravastatin that you are on please let us know.  Remember to check with your cardiologist about whether or not you should stop the aspirin 81 since you are on the warfarin call if any other questions or concerns and let us know about the above

## 2025-02-19 NOTE — TELEPHONE ENCOUNTER
HUB TO RELAY ----- Message from Jaelyn Iverson sent at 2/19/2025  6:48 AM EST -----  Glucose was 152 and A1c has decreased slightly to 7.4 but it is still not normal continue to watch her carbs and keep up your walking.  We can increase the Actos to 45 if you think you have done all you can with diet and exercise please let me know.  Potassium was slightly low so you might increase a dose or 2/week.  Bad cholesterol is 81 and goal is below 70 and some would say 55 so I would try to decrease your saturated fats and we could consider adding Zetia to the maximum dose of pravastatin that you are on please let us know.  Remember to check with your cardiologist about whether or not you should stop the aspirin 81 since you are on the warfarin call if any other questions or concerns and let us know about the above

## 2025-02-24 ENCOUNTER — CLINICAL SUPPORT NO REQUIREMENTS (OUTPATIENT)
Dept: CARDIOLOGY | Facility: CLINIC | Age: 75
End: 2025-02-24
Payer: MEDICARE

## 2025-02-24 ENCOUNTER — OFFICE VISIT (OUTPATIENT)
Dept: CARDIOLOGY | Facility: CLINIC | Age: 75
End: 2025-02-24
Payer: MEDICARE

## 2025-02-24 VITALS
HEIGHT: 68 IN | OXYGEN SATURATION: 98 % | BODY MASS INDEX: 23.64 KG/M2 | HEART RATE: 83 BPM | WEIGHT: 156 LBS | DIASTOLIC BLOOD PRESSURE: 78 MMHG | SYSTOLIC BLOOD PRESSURE: 140 MMHG

## 2025-02-24 DIAGNOSIS — I48.0 PAROXYSMAL ATRIAL FIBRILLATION: ICD-10-CM

## 2025-02-24 DIAGNOSIS — Z95.0 PACEMAKER: Primary | ICD-10-CM

## 2025-02-24 DIAGNOSIS — E78.5 DYSLIPIDEMIA: ICD-10-CM

## 2025-02-24 DIAGNOSIS — R00.1 BRADYCARDIA: ICD-10-CM

## 2025-02-24 DIAGNOSIS — I44.1 SECOND DEGREE AV BLOCK, MOBITZ TYPE II: ICD-10-CM

## 2025-02-24 DIAGNOSIS — Z95.0 PACEMAKER: ICD-10-CM

## 2025-02-24 DIAGNOSIS — I48.91 ATRIAL FIBRILLATION, UNSPECIFIED TYPE: Primary | ICD-10-CM

## 2025-02-24 DIAGNOSIS — Z79.01 LONG TERM (CURRENT) USE OF ANTICOAGULANTS: ICD-10-CM

## 2025-02-24 DIAGNOSIS — I45.10 RIGHT BUNDLE BRANCH BLOCK: ICD-10-CM

## 2025-02-24 DIAGNOSIS — R06.02 SHORTNESS OF BREATH: ICD-10-CM

## 2025-02-24 DIAGNOSIS — I44.1 AV BLOCK, 2ND DEGREE: ICD-10-CM

## 2025-02-24 PROCEDURE — 3077F SYST BP >= 140 MM HG: CPT | Performed by: INTERNAL MEDICINE

## 2025-02-24 PROCEDURE — 1160F RVW MEDS BY RX/DR IN RCRD: CPT | Performed by: INTERNAL MEDICINE

## 2025-02-24 PROCEDURE — 1159F MED LIST DOCD IN RCRD: CPT | Performed by: INTERNAL MEDICINE

## 2025-02-24 PROCEDURE — 93280 PM DEVICE PROGR EVAL DUAL: CPT | Performed by: INTERNAL MEDICINE

## 2025-02-24 PROCEDURE — 99214 OFFICE O/P EST MOD 30 MIN: CPT | Performed by: INTERNAL MEDICINE

## 2025-02-24 PROCEDURE — 3078F DIAST BP <80 MM HG: CPT | Performed by: INTERNAL MEDICINE

## 2025-02-24 NOTE — PROGRESS NOTES
Encounter Date:02/24/2025    Last seen-8/12/2024      Patient ID: Celsa Brown is a 74 y.o. female.    Chief Complaint:  Status post pacemaker  Palpitations  Diabetes  Dyslipidemia  Hypertension    Assessment and plan.  Since I have last seen, the patient has been without any chest discomfort ,shortness of breath, palpitations, dizziness or syncope.  Denies having any headache ,abdominal pain ,nausea, vomiting , diarrhea constipation, loss of weight or loss of appetite.  Denies having any excessive bruising ,hematuria or blood in the stool.    Review of all systems negative except as indicated.    Reviewed ROS.      Assessment and Plan         ]]]]]]]]]]]]]]]]]]]  History  ==========     -Status post permanent dual-chamber pacemaker implantation ([employer] MRI compatible).  7/13/2021      -Palpitations probably due to PMT and SVT.  Suspect strongly patient is having episodes of PMT.  Recently PVARP was increased     -Second-degree Mobitz type II AV block.  Right bundle branch block (prior to pacemaker implantation)     -Chest discomfort and shortness of breath with or without exertion.     -Dizziness and near syncope-improved since patient had pacemaker implantation     Echocardiogram-normal 7/12/2021  Stress Cardiolite test-normal except patient has second-degree 2-1 AV block and right bundle branch block.  Very limited exercise tolerance.     -Right bundle branch block     -Sinus bradycardia     -Paroxysmal atrial fibrillation.     -Anticoagulation  Patient is on Coumadin.  Followed through primary care office    -Diabetes dyslipidemia hypertension     -Status post tonsillectomy abdominal tubal ligation     -Family history of coronary artery disease     -Non-smoker     -Allergic to sulfa  =============  Plan  ==========  Status post pacemaker ([employer])-7/13/2021.  Pacemaker site and function is normal.  Interrogation of the pacemaker revealed excellent pacing parameters.  Battery status is 5  years.    History of PMT and SVT.  Palpitations well-controlled.  Patient was asked to take extra metoprolol as needed and twice a day if needed.  EKG showed atrial sensed ventricular paced rhythm.-12/29/2022  EKG 7/17/2023-atrial sensed ventricular paced rhythm.  EKG 8/12/2024-atrial sensed ventricular paced rhythm.  EKG was not performed today 2/24/2025.    Paroxysmal atrial fibrillation  Rate is well-controlled.  Patient is maintaining sinus rhythm    Anticoagulation status reviewed.  Patient is on Coumadin.  PT/INR on a monthly basis.  Patient to have PT/INR performed through primary care office    Hypertension-well-controlled  140/78.     Medications were reviewed and updated.     Patient is on amlodipine hydralazine hydrochlorothiazide lisinopril 40 mg a day.  Patient is on metoprolol 100 mg tablet half a tablet a day.  Patient is on Farxiga.    Follow-up in the office in 6 months with pacemaker interrogation.    Further plan will depend on patient's progress.    Reviewed and updated 2/24/2025.  ]]]]]]]]]]]]]]          Diagnosis Plan   1. Atrial fibrillation, unspecified type        2. Pacemaker        3. Dyslipidemia        4. Long term (current) use of anticoagulants        5. Right bundle branch block        6. Paroxysmal atrial fibrillation        7. Second degree AV block, Mobitz type II        8. Shortness of breath        9. Bradycardia        LAB RESULTS (LAST 7 DAYS)    CBC  Results from last 7 days   Lab Units 02/18/25  0900   WBC 10*3/mm3 7.13   RBC 10*6/mm3 4.45   HEMOGLOBIN g/dL 13.5   HEMATOCRIT % 40.3   MCV fL 90.6   PLATELETS 10*3/mm3 293       BMP  Results from last 7 days   Lab Units 02/18/25  0900   SODIUM mmol/L 142   POTASSIUM mmol/L 3.4*   CHLORIDE mmol/L 101   CO2 mmol/L 27.4   BUN mg/dL 15   CREATININE mg/dL 0.76   GLUCOSE mg/dL 152*   MAGNESIUM mg/dL 1.9       CMP   Results from last 7 days   Lab Units 02/18/25  0900   SODIUM mmol/L 142   POTASSIUM mmol/L 3.4*   CHLORIDE mmol/L 101    CO2 mmol/L 27.4   BUN mg/dL 15   CREATININE mg/dL 0.76   GLUCOSE mg/dL 152*   ALBUMIN g/dL 4.2   BILIRUBIN mg/dL 0.5   ALK PHOS U/L 80   AST (SGOT) U/L 24   ALT (SGPT) U/L 18         BNP        TROPONIN        CoAg  Results from last 7 days   Lab Units 02/18/25  0805   INR  1.90*       Creatinine Clearance  Estimated Creatinine Clearance: 70.4 mL/min (by C-G formula based on SCr of 0.76 mg/dL).    ABG        Radiology  No radiology results for the last day                The following portions of the patient's history were reviewed and updated as appropriate: allergies, current medications, past family history, past medical history, past social history, past surgical history, and problem list.    Review of Systems   Constitutional: Negative for malaise/fatigue.   Cardiovascular:  Negative for chest pain, leg swelling, palpitations and syncope.   Respiratory:  Negative for shortness of breath.    Skin:  Negative for rash.   Gastrointestinal:  Negative for nausea and vomiting.   Neurological:  Negative for dizziness, light-headedness and numbness.   All other systems reviewed and are negative.      Current Outpatient Medications:     Accu-Chek Thelma Plus test strip, TEST BLOOD SUGAR ONCE DAILY, Disp: 100 each, Rfl: 3    amLODIPine (NORVASC) 10 MG tablet, Take 1 tablet by mouth once daily, Disp: 90 tablet, Rfl: 0    cholecalciferol (VITAMIN D3) 1000 units tablet, Take 1 tablet by mouth Every Other Day., Disp: , Rfl:     clotrimazole (LOTRIMIN) 1 % cream, Apply 1 Application topically to the appropriate area as directed 2 (Two) Times a Day., Disp: , Rfl:     Cyanocobalamin (VITAMIN B12) 1000 MCG tablet controlled-release, Take 1,000 mcg by mouth Every Other Day., Disp: , Rfl:     estradiol (ESTRACE) 0.1 MG/GM vaginal cream, Insert 2 g into the vagina Daily., Disp: , Rfl:     ezetimibe (Zetia) 10 MG tablet, Take 1 tablet by mouth Daily., Disp: 90 tablet, Rfl: 3    Farxiga 10 MG tablet, Take 10 mg by mouth Daily., Disp:  90 tablet, Rfl: 3    glipizide (GLUCOTROL) 10 MG tablet, TAKE TWO TABLETS BY MOUTH TWICE A DAY, Disp: 360 tablet, Rfl: 0    hydrALAZINE (APRESOLINE) 100 MG tablet, Take 1 tablet by mouth 2 (Two) Times a Day., Disp: 180 tablet, Rfl: 3    hydroCHLOROthiazide 25 MG tablet, Take 1 tablet by mouth Daily., Disp: 90 tablet, Rfl: 3    lisinopril (PRINIVIL,ZESTRIL) 40 MG tablet, Take 1 tablet by mouth Daily., Disp: 90 tablet, Rfl: 0    loratadine (CLARITIN) 10 MG tablet, Take 1 tablet by mouth Daily., Disp: , Rfl:     Melatonin 10 MG tablet, Take 1 tablet by mouth Daily., Disp: , Rfl:     metFORMIN (GLUCOPHAGE) 500 MG tablet, Take 2 tablets by mouth 2 (Two) Times a Day With Meals., Disp: 360 tablet, Rfl: 3    metoprolol succinate XL (Toprol XL) 100 MG 24 hr tablet, Take 1 tablet by mouth Daily., Disp: 90 tablet, Rfl: 3    Multiple Vitamins-Minerals (CENTRUM SILVER ULTRA WOMENS) tablet, Take 1 tablet by mouth Daily., Disp: , Rfl:     Omega-3 Fatty Acids (OMEGA-3 FISH OIL) 500 MG capsule, Take 1 capsule by mouth Daily., Disp: , Rfl:     pioglitazone (Actos) 45 MG tablet, Take 1 tablet by mouth Daily., Disp: 90 tablet, Rfl: 3    potassium chloride (MICRO-K) 10 MEQ CR capsule, Take 1 capsule by mouth Daily., Disp: 90 capsule, Rfl: 3    pravastatin (PRAVACHOL) 80 MG tablet, Take 1 tablet by mouth every night at bedtime., Disp: 90 tablet, Rfl: 3    warfarin (COUMADIN) 5 MG tablet, Take 1 tab, by mouth, daily except 1/2 tab on Mon and Fri or as directed., Disp: 90 tablet, Rfl: 0    aspirin 81 MG EC tablet, Take 1 tablet by mouth Daily. 1 tablet every other day (Patient not taking: Reported on 2/24/2025), Disp: , Rfl:     Allergies   Allergen Reactions    Sulfa Antibiotics Hives    Macrobid [Nitrofurantoin] Rash       Family History   Problem Relation Age of Onset    Heart failure Mother     Diabetes Mother     Breast cancer Neg Hx     Ovarian cancer Neg Hx        Past Surgical History:   Procedure Laterality Date    BREAST BIOPSY  "Right     CARDIAC CATHETERIZATION      CARDIAC ELECTROPHYSIOLOGY PROCEDURE N/A 07/13/2021    Procedure: Pacemaker DC new;  Surgeon: Dung Lynch MD;  Location: [facility] CATH INVASIVE LOCATION;  Service: Cardiovascular;  Laterality: N/A;    COLONOSCOPY      TONSILLECTOMY      TUBAL ABDOMINAL LIGATION         Past Medical History:   Diagnosis Date    Abdominal pain 05/21/2022    B12 deficiency     Hyperlipidemia     Hypertension     Tachycardia     Vitamin D deficiency        Family History   Problem Relation Age of Onset    Heart failure Mother     Diabetes Mother     Breast cancer Neg Hx     Ovarian cancer Neg Hx        Social History     Socioeconomic History    Marital status:    Tobacco Use    Smoking status: Never     Passive exposure: Past (AS A CHILD)    Smokeless tobacco: Never   Vaping Use    Vaping status: Never Used   Substance and Sexual Activity    Alcohol use: No    Drug use: No    Sexual activity: Yes     Partners: Male     Birth control/protection: None         Procedures      Objective:       Physical Exam    /78 (BP Location: Right arm, Patient Position: Sitting, Cuff Size: Adult)   Pulse 83   Ht 172.7 cm (68\")   Wt 70.8 kg (156 lb)   LMP  (LMP Unknown)   SpO2 98%   BMI 23.72 kg/m²   The patient is alert, oriented and in no distress.    Vital signs as noted above.    Head and neck revealed no carotid bruits or jugular venous distension.  No thyromegaly or lymphadenopathy is present.    Lungs clear.  No wheezing.  Breath sounds are normal bilaterally.    Heart normal first and second heart sounds.  No murmur..  No pericardial rub is present.  No gallop is present.    Abdomen soft and nontender.  No organomegaly is present.    Extremities revealed good peripheral pulses without any pedal edema.    Skin warm and dry.  Pacemaker site looks normal.    Musculoskeletal system is grossly normal.    CNS grossly normal.    Reviewed and updated.          "

## 2025-02-28 ENCOUNTER — TELEPHONE (OUTPATIENT)
Dept: FAMILY MEDICINE CLINIC | Facility: CLINIC | Age: 75
End: 2025-02-28
Payer: MEDICARE

## 2025-02-28 NOTE — TELEPHONE ENCOUNTER
Caller: Celsa Brown    Relationship to patient: Self    Best call back number: [phone]        Type of visit: INR CHECK    Requested date: 04/01/25 @ 10:30       ”

## 2025-03-12 DIAGNOSIS — I48.91 ATRIAL FIBRILLATION, UNSPECIFIED TYPE: ICD-10-CM

## 2025-03-12 DIAGNOSIS — Z79.01 LONG TERM (CURRENT) USE OF ANTICOAGULANTS: ICD-10-CM

## 2025-03-12 RX ORDER — WARFARIN SODIUM 5 MG/1
TABLET ORAL
Qty: 90 TABLET | Refills: 0 | Status: SHIPPED | OUTPATIENT
Start: 2025-03-12

## 2025-03-21 DIAGNOSIS — E87.6 HYPOKALEMIA: Primary | ICD-10-CM

## 2025-03-21 RX ORDER — AMLODIPINE BESYLATE 10 MG/1
10 TABLET ORAL DAILY
Qty: 90 TABLET | Refills: 0 | Status: SHIPPED | OUTPATIENT
Start: 2025-03-21

## 2025-03-21 NOTE — TELEPHONE ENCOUNTER
Please call patient and make sure that she comes in to get her potassium rechecked again medications have been refilled

## 2025-03-21 NOTE — TELEPHONE ENCOUNTER
Rx Refill Note  Requested Prescriptions     Pending Prescriptions Disp Refills    amLODIPine (NORVASC) 10 MG tablet [Pharmacy Med Name: amLODIPine Besylate 10 MG Oral Tablet] 90 tablet 0     Sig: Take 1 tablet by mouth once daily      Last office visit with prescribing clinician: 2/18/2025   Last telemedicine visit with prescribing clinician: Visit date not found   Next office visit with prescribing clinician: 5/15/2025                         Would you like a call back once the refill request has been completed: [] Yes [] No    If the office needs to give you a call back, can they leave a voicemail: [] Yes [] No    Laly Ovalle MA  03/21/25, 08:55 EDT

## 2025-03-21 NOTE — TELEPHONE ENCOUNTER
S/w patient and she said she has Appt 04/01/25 for INR. She will remind them to check potassium at that time.

## 2025-04-01 ENCOUNTER — LAB (OUTPATIENT)
Dept: FAMILY MEDICINE CLINIC | Facility: CLINIC | Age: 75
End: 2025-04-01
Payer: MEDICARE

## 2025-04-01 ENCOUNTER — ANTICOAGULATION VISIT (OUTPATIENT)
Dept: CARDIOLOGY | Facility: CLINIC | Age: 75
End: 2025-04-01
Payer: MEDICARE

## 2025-04-01 ENCOUNTER — ANTICOAGULATION VISIT (OUTPATIENT)
Dept: FAMILY MEDICINE CLINIC | Facility: CLINIC | Age: 75
End: 2025-04-01
Payer: MEDICARE

## 2025-04-01 DIAGNOSIS — I48.91 ATRIAL FIBRILLATION, UNSPECIFIED TYPE: Primary | ICD-10-CM

## 2025-04-01 DIAGNOSIS — E87.6 HYPOKALEMIA: ICD-10-CM

## 2025-04-01 LAB
INR PPP: 1.9
INR PPP: 1.9 (ref 0.9–1.1)
POTASSIUM SERPL-SCNC: 3.9 MMOL/L (ref 3.5–5.2)

## 2025-04-01 PROCEDURE — 85610 PROTHROMBIN TIME: CPT | Performed by: INTERNAL MEDICINE

## 2025-04-01 PROCEDURE — 36416 COLLJ CAPILLARY BLOOD SPEC: CPT | Performed by: PREVENTIVE MEDICINE

## 2025-04-01 PROCEDURE — 36415 COLL VENOUS BLD VENIPUNCTURE: CPT

## 2025-04-01 PROCEDURE — 36416 COLLJ CAPILLARY BLOOD SPEC: CPT | Performed by: INTERNAL MEDICINE

## 2025-04-01 PROCEDURE — 84132 ASSAY OF SERUM POTASSIUM: CPT | Performed by: PREVENTIVE MEDICINE

## 2025-04-01 NOTE — PROGRESS NOTES
Patient's INR- 1.8, slightly outside of therapeutic range. Continue current dose and recheck in 1 month. dvLPN

## 2025-04-01 NOTE — PROGRESS NOTES
Capillary Blood Specimen Collection  Capillary blood collection performed in left middle finger by Dari Goode MA. Patient tolerated the procedure well without complications. INR   04/01/25   Dari Goode MA

## 2025-04-03 ENCOUNTER — TELEPHONE (OUTPATIENT)
Dept: FAMILY MEDICINE CLINIC | Facility: CLINIC | Age: 75
End: 2025-04-03
Payer: MEDICARE

## 2025-04-03 NOTE — TELEPHONE ENCOUNTER
Caller: Celsa Brown    Relationship to patient: Self    Best call back number: [phone]          Patient is needing: PATIENT NEEDS TO SCHEDULE INR APPOINTMENT. PLEASE CALL BACK

## 2025-05-02 RX ORDER — GLIPIZIDE 10 MG/1
20 TABLET ORAL EVERY 12 HOURS SCHEDULED
Qty: 360 TABLET | Refills: 0 | Status: SHIPPED | OUTPATIENT
Start: 2025-05-02

## 2025-05-08 ENCOUNTER — ANTICOAGULATION VISIT (OUTPATIENT)
Dept: FAMILY MEDICINE CLINIC | Facility: CLINIC | Age: 75
End: 2025-05-08
Payer: MEDICARE

## 2025-05-08 ENCOUNTER — ANTICOAGULATION VISIT (OUTPATIENT)
Dept: CARDIOLOGY | Facility: CLINIC | Age: 75
End: 2025-05-08
Payer: MEDICARE

## 2025-05-08 DIAGNOSIS — I48.91 ATRIAL FIBRILLATION, UNSPECIFIED TYPE: Primary | ICD-10-CM

## 2025-05-08 LAB
INR PPP: 2.3
INR PPP: 2.3 (ref 0.9–1.1)

## 2025-05-08 PROCEDURE — 36416 COLLJ CAPILLARY BLOOD SPEC: CPT | Performed by: PREVENTIVE MEDICINE

## 2025-05-08 PROCEDURE — 85610 PROTHROMBIN TIME: CPT | Performed by: INTERNAL MEDICINE

## 2025-05-08 PROCEDURE — 36416 COLLJ CAPILLARY BLOOD SPEC: CPT | Performed by: INTERNAL MEDICINE

## 2025-05-08 NOTE — PROGRESS NOTES
Patient's INR- 2.3, within therapeutic range. Continue current dosage and recheck in 1 month. [email or web address]

## 2025-05-08 NOTE — PROGRESS NOTES
Capillary Blood Specimen Collection  Capillary blood collection performed in left middle finger by Dari Goode MA. Patient tolerated the procedure well without complications. INR   05/08/25   Dari Goode MA

## 2025-05-14 DIAGNOSIS — I10 PRIMARY HYPERTENSION: ICD-10-CM

## 2025-05-14 PROBLEM — I44.1 AV BLOCK, 2ND DEGREE: Status: RESOLVED | Noted: 2021-07-12 | Resolved: 2025-05-14

## 2025-05-14 RX ORDER — HYDRALAZINE HYDROCHLORIDE 100 MG/1
100 TABLET, FILM COATED ORAL 2 TIMES DAILY
Qty: 180 TABLET | Refills: 3 | Status: SHIPPED | OUTPATIENT
Start: 2025-05-14 | End: 2025-05-15

## 2025-05-14 NOTE — PATIENT INSTRUCTIONS
Health Maintenance Due   Topic Date Due    COVID-19 Vaccine (9 - 2024-25 season) 03/15/2025    MAMMOGRAM  05/13/2025    12 hour fast for labs

## 2025-05-15 ENCOUNTER — OFFICE VISIT (OUTPATIENT)
Dept: FAMILY MEDICINE CLINIC | Facility: CLINIC | Age: 75
End: 2025-05-15
Payer: MEDICARE

## 2025-05-15 ENCOUNTER — HOSPITAL ENCOUNTER (OUTPATIENT)
Dept: MAMMOGRAPHY | Facility: HOSPITAL | Age: 75
Discharge: HOME | End: 2025-05-15
Admitting: PREVENTIVE MEDICINE
Payer: MEDICARE

## 2025-05-15 ENCOUNTER — TELEPHONE (OUTPATIENT)
Dept: FAMILY MEDICINE CLINIC | Facility: CLINIC | Age: 75
End: 2025-05-15

## 2025-05-15 VITALS
WEIGHT: 158.8 LBS | TEMPERATURE: 96.9 F | SYSTOLIC BLOOD PRESSURE: 108 MMHG | HEART RATE: 91 BPM | BODY MASS INDEX: 24.07 KG/M2 | HEIGHT: 68 IN | OXYGEN SATURATION: 95 % | DIASTOLIC BLOOD PRESSURE: 66 MMHG

## 2025-05-15 DIAGNOSIS — E78.5 HYPERLIPIDEMIA, UNSPECIFIED HYPERLIPIDEMIA TYPE: ICD-10-CM

## 2025-05-15 DIAGNOSIS — E55.9 VITAMIN D DEFICIENCY: ICD-10-CM

## 2025-05-15 DIAGNOSIS — Z12.31 ENCOUNTER FOR SCREENING MAMMOGRAM FOR MALIGNANT NEOPLASM OF BREAST: ICD-10-CM

## 2025-05-15 DIAGNOSIS — E11.9 TYPE 2 DIABETES MELLITUS WITHOUT COMPLICATION, WITHOUT LONG-TERM CURRENT USE OF INSULIN: Primary | ICD-10-CM

## 2025-05-15 DIAGNOSIS — I10 PRIMARY HYPERTENSION: ICD-10-CM

## 2025-05-15 DIAGNOSIS — I48.91 ATRIAL FIBRILLATION, UNSPECIFIED TYPE: ICD-10-CM

## 2025-05-15 DIAGNOSIS — R42 DIZZINESS: ICD-10-CM

## 2025-05-15 DIAGNOSIS — R09.89 RIGHT CAROTID BRUIT: ICD-10-CM

## 2025-05-15 DIAGNOSIS — Z95.0 PACEMAKER: ICD-10-CM

## 2025-05-15 DIAGNOSIS — E53.8 B12 DEFICIENCY: ICD-10-CM

## 2025-05-15 DIAGNOSIS — D22.9 ATYPICAL MOLE: ICD-10-CM

## 2025-05-15 DIAGNOSIS — Z78.0 POSTMENOPAUSAL STATUS: ICD-10-CM

## 2025-05-15 PROCEDURE — 77063 BREAST TOMOSYNTHESIS BI: CPT

## 2025-05-15 PROCEDURE — 77067 SCR MAMMO BI INCL CAD: CPT

## 2025-05-15 RX ORDER — HYDRALAZINE HYDROCHLORIDE 100 MG/1
50 TABLET, FILM COATED ORAL 2 TIMES DAILY
Qty: 180 TABLET | Refills: 3 | Status: SHIPPED | OUTPATIENT
Start: 2025-05-15

## 2025-05-15 NOTE — PROGRESS NOTES
Subjective   Celsa Brown is a 74 y.o. female presents for   Chief Complaint   Patient presents with    Primary Care Follow-Up    Diabetes    Hypertension     Been having some dizzy spells, thinks it could be BP meds or from AFIB.       Health Maintenance Due   Topic Date Due    MAMMOGRAM  05/13/2025       Primary Care Follow-UpAssociated symptoms include: dizziness, palpitations and blurred vision. Pertinent negatives include no chest pain and no diarrhea.   Diabetes  Associated symptoms:     blurred vision      no chest pain    Hypoglycemia symptoms:     dizziness    Hypertension  Associated symptoms: blurred vision, palpitations and dizziness    Associated symptoms: no chest pain       History of Present Illness  The patient is a 74-year-old female who presents today for her annual and age-specific physical for Medicare, B12 deficiency, hyperlipidemia, hypertension, atypical mole, postmenopausal status, type 2 diabetes without complication and without insulin, vitamin D deficiency, pacemaker, right carotid bruit, atrial fibrillation, and dizziness.    She reports experiencing dizziness in the morning after engaging in physical activities around the house or outside. This is accompanied by blurred vision and hypotension, with systolic blood pressure readings typically below 100 and diastolic readings around 65. Her blood pressure normalizes after a period of rest. She has not experienced syncope but reports near-syncope episodes. She also reports tachycardia during these episodes. She has been self-administering half a tablet of hydralazine in the morning for the past 6 weeks, which appears to alleviate her symptoms unless she engages in strenuous activity. She does not experience any associated chest pain. Her last EKG was conducted approximately 6 months ago, prior to the onset of these symptoms.    She suspects that her dizzy spells may be related to atrial fibrillation, although this has not been confirmed  "recently. She recalls an episode of atrial fibrillation at home about a month ago but did not undergo an EKG at that time. She does not believe she is currently in atrial fibrillation. She is currently on warfarin therapy.    She has been monitoring her blood glucose levels, which have remained within the range of 100 to 200. She has been under the care of an ophthalmologist and dentist over the past year.    She has been taking vitamin D supplements.    She has been taking vitamin B12 supplements.    She has been mindful of her saturated fat intake.    She has consulted a dermatologist for her moles and has an upcoming appointment for the removal of a basal cell carcinoma on her nose.    She reports no changes in her auditory or visual acuity since her last visit. She also reports no dysphagia, digestive issues, dysuria, hematuria, or diarrhea. She experiences mild leg edema. She manages constipation with fiber supplements and reports normal urinary function. She does not report any abnormal vaginal discharge.    Vitals:    05/15/25 1515 05/15/25 1524 05/15/25 1525   BP: 116/64 128/73 108/66   BP Location: Right arm Left arm Right arm   Patient Position: Sitting Sitting Standing   Cuff Size: Large Adult Adult Adult   Pulse: 92 91 91   Temp: 96.9 °F (36.1 °C)     TempSrc: Infrared     SpO2: 95%     Weight: 72 kg (158 lb 12.8 oz)     Height: 172.7 cm (67.99\")       Body mass index is 24.15 kg/m².    Current Outpatient Medications on File Prior to Visit   Medication Sig Dispense Refill    amLODIPine (NORVASC) 10 MG tablet Take 1 tablet by mouth once daily 90 tablet 0    aspirin 81 MG EC tablet Take 1 tablet by mouth Daily. 1 tablet every other day      cholecalciferol (VITAMIN D3) 1000 units tablet Take 1 tablet by mouth Every Other Day.      clotrimazole (LOTRIMIN) 1 % cream Apply 1 Application topically to the appropriate area as directed 2 (Two) Times a Day.      Cyanocobalamin (VITAMIN B12) 1000 MCG tablet " controlled-release Take 1,000 mcg by mouth Every Other Day.      estradiol (ESTRACE) 0.1 MG/GM vaginal cream Insert 2 g into the vagina Daily.      ezetimibe (Zetia) 10 MG tablet Take 1 tablet by mouth Daily. 90 tablet 3    Farxiga 10 MG tablet Take 10 mg by mouth Daily. 90 tablet 3    glipizide (GLUCOTROL) 10 MG tablet TAKE TWO TABLETS BY MOUTH TWICE A  tablet 0    hydroCHLOROthiazide 25 MG tablet Take 1 tablet by mouth Daily. 90 tablet 3    lisinopril (PRINIVIL,ZESTRIL) 40 MG tablet Take 1 tablet by mouth Daily. 90 tablet 0    loratadine (CLARITIN) 10 MG tablet Take 1 tablet by mouth Daily.      Melatonin 10 MG tablet Take 1 tablet by mouth Daily.      metFORMIN (GLUCOPHAGE) 500 MG tablet Take 2 tablets by mouth 2 (Two) Times a Day With Meals. 360 tablet 3    metoprolol succinate XL (Toprol XL) 100 MG 24 hr tablet Take 1 tablet by mouth Daily. 90 tablet 3    Multiple Vitamins-Minerals (CENTRUM SILVER ULTRA WOMENS) tablet Take 1 tablet by mouth Daily.      pioglitazone (Actos) 45 MG tablet Take 1 tablet by mouth Daily. 90 tablet 3    potassium chloride (MICRO-K) 10 MEQ CR capsule Take 1 capsule by mouth Daily. 90 capsule 3    pravastatin (PRAVACHOL) 80 MG tablet Take 1 tablet by mouth every night at bedtime. 90 tablet 3    warfarin (COUMADIN) 5 MG tablet TAKE 1 TABLET BY MOUTH ONCE DAILY EXCEPT  1/2  (ONE  HALF)  TABLET  ON  MONDAY  AND  FRIDAY OR AS DIRECTED 90 tablet 0    [DISCONTINUED] hydrALAZINE (APRESOLINE) 100 MG tablet TAKE ONE TABLET BY MOUTH TWICE A  tablet 3    Accu-Chek Thelma Plus test strip TEST BLOOD SUGAR ONCE DAILY 100 each 3    Omega-3 Fatty Acids (OMEGA-3 FISH OIL) 500 MG capsule Take 1 capsule by mouth Daily. (Patient not taking: Reported on 5/15/2025)       No current facility-administered medications on file prior to visit.       The following portions of the patient's history were reviewed and updated as appropriate: allergies, current medications, past family history, past  medical history, past social history, past surgical history, and problem list.    Review of Systems   Constitutional:  Negative for fever.   Eyes:  Positive for blurred vision.   Cardiovascular:  Positive for palpitations. Negative for chest pain and leg swelling.   Gastrointestinal:  Negative for diarrhea.   Genitourinary:  Negative for flank pain.   Neurological:  Positive for dizziness.       Objective   Physical Exam  Vitals reviewed.   Constitutional:       General: She is not in acute distress.     Appearance: Normal appearance. She is well-developed. She is not ill-appearing or toxic-appearing.   HENT:      Head: Normocephalic and atraumatic.      Right Ear: Tympanic membrane, ear canal and external ear normal.      Left Ear: Tympanic membrane, ear canal and external ear normal.      Nose: Nose normal.      Mouth/Throat:      Mouth: Mucous membranes are moist.      Pharynx: No posterior oropharyngeal erythema.   Eyes:      Extraocular Movements: Extraocular movements intact.      Conjunctiva/sclera: Conjunctivae normal.      Pupils: Pupils are equal, round, and reactive to light.   Cardiovascular:      Rate and Rhythm: Tachycardia present. Rhythm irregular.      Pulses:           Dorsalis pedis pulses are 1+ on the right side and 1+ on the left side.        Posterior tibial pulses are 1+ on the right side and 1+ on the left side.   Pulmonary:      Effort: Pulmonary effort is normal.      Breath sounds: Normal breath sounds.   Abdominal:      General: Bowel sounds are normal. There is no distension.      Palpations: Abdomen is soft. There is no mass.      Tenderness: There is no abdominal tenderness.   Musculoskeletal:         General: Normal range of motion.      Cervical back: Neck supple.   Feet:      Right foot:      Skin integrity: Callus present.      Toenail Condition: Right toenails are normal.      Left foot:      Skin integrity: Callus present.      Toenail Condition: Left toenails are normal.       Comments:     Skin:     General: Skin is warm.   Neurological:      General: No focal deficit present.      Mental Status: She is alert and oriented to person, place, and time.   Psychiatric:         Mood and Affect: Mood normal.         Behavior: Behavior normal.       Physical Exam  Ears: External ear canals and tympanic membranes intact  Mouth/Throat: Mucous membranes moist, no erythema, no exudate  Neck: Supple, no abnormalities  Respiratory: Clear to auscultation, no wheezing, rales or rhonchi  Cardiovascular: Regular rate and rhythm, no murmurs, rubs, or gallops  Extremities: Mild swelling in the legs, feet appear normal    PHQ-9 Total Score:    Results           Assessment & Plan   Diagnoses and all orders for this visit:    1. Type 2 diabetes mellitus without complication, without long-term current use of insulin (Primary)  -     Comprehensive Metabolic Panel; Future  -     Hemoglobin A1c; Future  -     Microalbumin / Creatinine Urine Ratio - Urine, Clean Catch; Future  -     TSH Rfx On Abnormal To Free T4; Future    2. Encounter for screening mammogram for malignant neoplasm of breast    3. B12 deficiency  -     CBC Auto Differential; Future  -     Vitamin B12; Future    4. Body mass index (BMI) of 24.0 to 24.9 in adult    5. Hyperlipidemia, unspecified hyperlipidemia type  -     Lipid Panel; Future    6. Primary hypertension  -     hydrALAZINE (APRESOLINE) 100 MG tablet; Take 0.5 tablets by mouth 2 (Two) Times a Day.  Dispense: 180 tablet; Refill: 3    7. Atypical mole    8. Postmenopausal status    9. Vitamin D deficiency    10. Pacemaker    11. Right carotid bruit    12. Atrial fibrillation, unspecified type  -     Magnesium; Future  -     ECG 12 Lead    13. Dizziness  -     US Carotid Bilateral; Future  -     ECG 12 Lead    14. Primary hypertension  Comments:  Controlled at home 132/78.  Patient did stop hydralazine dose at night and blood pressure is controlled with this.  She was getting dizzy with  100 mg 3 times a   Orders:  -     hydrALAZINE (APRESOLINE) 100 MG tablet; Take 0.5 tablets by mouth 2 (Two) Times a Day.  Dispense: 180 tablet; Refill: 3    Other orders  -     COVID-19 ([employer]) 12yrs+ (COMIRNATY)      Assessment & Plan  1. Dizziness.  - Dizziness occurs in the morning and after physical activity, accompanied by blurry vision and low blood pressure (<100/50).  - Physical examination reveals a fast pulse rate and slight swelling in the legs.  - A carotid ultrasound will be ordered to rule out any blockages that might be causing the dizziness. An electrocardiogram (EKG) will be conducted today to assess her cardiac function.  - She has started taking half of the hydralazine in the morning, which has helped reduce dizziness unless engaging in very physical activities.    2. Atrial Fibrillation.  - Reports experiencing dizziness and a fast heart rate during physical activity, which may be related to her atrial fibrillation.  - Physical examination reveals a fast pulse rate.  - An EKG will be performed today to evaluate her current cardiac status.  - She is currently on warfarin to prevent stroke.    3. Type 2 Diabetes Mellitus without complication and without insulin.  - Blood sugar levels have been stable, with no readings below 100 or over 200.  - No complications reported.  - Advised to continue her current management plan.  - Regular monitoring of blood sugar levels recommended.    4. Vitamin D Deficiency.  - Currently taking vitamin D supplements.  - No new symptoms reported.  - Advised to continue her current supplementation regimen.  - Regular monitoring of vitamin D levels recommended.    5. B12 Deficiency.  - Currently taking vitamin B12 supplements.  - No new symptoms reported.  - Advised to continue her current supplementation regimen.  - Regular monitoring of vitamin B12 levels recommended.    6. Hyperlipidemia.  - Advised to continue monitoring her saturated fat intake to manage her  cholesterol levels.  - No new symptoms reported.  - Regular lipid profile testing recommended.  - Emphasis on dietary modifications to manage cholesterol levels.    7. Basal Cell Carcinoma.  - Has an appointment scheduled to have a basal cell carcinoma removed from her nose.  - Physical examination reveals the carcinoma is located in a fold on her nose.  - Procedure planned to remove the carcinoma.  - Follow-up after the procedure to monitor healing and ensure complete removal.    Patient Instructions     Health Maintenance Due   Topic Date Due    COVID-19 Vaccine (9 - 2024-25 season) 03/15/2025    MAMMOGRAM  05/13/2025    12 hour fast for labs       Patient or patient representative verbalized consent for the use of Ambient Listening during the visit with  Jaelyn Iverson MD for chart documentation. 5/15/2025  17:42 EDT

## 2025-05-15 NOTE — PROGRESS NOTES
Procedure     ECG 12 Lead    Date/Time: 5/15/2025 5:44 PM  Performed by: Jaelyn Iverson MD    Authorized by: Jaelyn Iverson MD  Comparison: compared with previous ECG from 8/12/2024  Similar to previous ECG  Pacing: ventricular paced rhythm  Clinical impression: abnormal EKG  Comments: unchanged

## 2025-05-16 ENCOUNTER — RESULTS FOLLOW-UP (OUTPATIENT)
Dept: FAMILY MEDICINE CLINIC | Facility: CLINIC | Age: 75
End: 2025-05-16
Payer: MEDICARE

## 2025-05-16 RX ORDER — LISINOPRIL 40 MG/1
40 TABLET ORAL DAILY
Qty: 90 TABLET | Refills: 3 | Status: SHIPPED | OUTPATIENT
Start: 2025-05-16

## 2025-05-16 NOTE — TELEPHONE ENCOUNTER
Celsa Brown notified and voiced comprehension and understanding.    Mammo Screening Digital Tomosynthesis Bilateral With CAD

## 2025-05-22 ENCOUNTER — TELEPHONE (OUTPATIENT)
Dept: FAMILY MEDICINE CLINIC | Facility: CLINIC | Age: 75
End: 2025-05-22
Payer: MEDICARE

## 2025-05-22 NOTE — TELEPHONE ENCOUNTER
Caller: Celsa Brown    Relationship to patient: Self    Best call back number:   Telephone Information:   Mobile [phone]         Patient is needing: PATIENT CALLING TO ADVISE THAT [facility] HAS NOT RECEIVED ORDERS FOR US CAROTID. PLEASE SEND AND CALL PATIENT ONCE SENT SO SHE CAN SCHEDULE

## 2025-05-22 NOTE — TELEPHONE ENCOUNTER
Spoke w/ pt and prior authorization had to be completed. It was approved and has been changed in the system and faxed. Pt verbalized understanding.

## 2025-05-29 ENCOUNTER — RESULTS FOLLOW-UP (OUTPATIENT)
Dept: FAMILY MEDICINE CLINIC | Facility: CLINIC | Age: 75
End: 2025-05-29
Payer: MEDICARE

## 2025-05-29 NOTE — PROGRESS NOTES
Less than 50% carotid blockage and no change from prior study was noted.  We will continue to monitor over the next several years.

## 2025-05-30 NOTE — TELEPHONE ENCOUNTER
"HUB TO RELAY    \"  Less than 50% carotid blockage and no change from prior study was noted.  We will continue to monitor over the next several years.         US Carotid Bilateral\"  "

## 2025-06-09 ENCOUNTER — ANTICOAGULATION VISIT (OUTPATIENT)
Dept: FAMILY MEDICINE CLINIC | Facility: CLINIC | Age: 75
End: 2025-06-09
Payer: MEDICARE

## 2025-06-09 ENCOUNTER — LAB (OUTPATIENT)
Dept: FAMILY MEDICINE CLINIC | Facility: CLINIC | Age: 75
End: 2025-06-09
Payer: MEDICARE

## 2025-06-09 DIAGNOSIS — E53.8 B12 DEFICIENCY: ICD-10-CM

## 2025-06-09 DIAGNOSIS — I48.91 ATRIAL FIBRILLATION, UNSPECIFIED TYPE: Primary | ICD-10-CM

## 2025-06-09 DIAGNOSIS — E11.9 TYPE 2 DIABETES MELLITUS WITHOUT COMPLICATION, WITHOUT LONG-TERM CURRENT USE OF INSULIN: ICD-10-CM

## 2025-06-09 DIAGNOSIS — E78.5 HYPERLIPIDEMIA, UNSPECIFIED HYPERLIPIDEMIA TYPE: ICD-10-CM

## 2025-06-09 DIAGNOSIS — I48.91 ATRIAL FIBRILLATION, UNSPECIFIED TYPE: ICD-10-CM

## 2025-06-09 LAB
ALBUMIN SERPL-MCNC: 4.1 G/DL (ref 3.5–5.2)
ALBUMIN/GLOB SERPL: 1.4 G/DL
ALP SERPL-CCNC: 77 U/L (ref 39–117)
ALT SERPL W P-5'-P-CCNC: 23 U/L (ref 1–33)
ANION GAP SERPL CALCULATED.3IONS-SCNC: 10.4 MMOL/L (ref 5–15)
AST SERPL-CCNC: 26 U/L (ref 1–32)
BASOPHILS # BLD AUTO: 0.05 10*3/MM3 (ref 0–0.2)
BASOPHILS NFR BLD AUTO: 0.8 % (ref 0–1.5)
BILIRUB SERPL-MCNC: 0.4 MG/DL (ref 0–1.2)
BUN SERPL-MCNC: 18 MG/DL (ref 8–23)
BUN/CREAT SERPL: 22.5 (ref 7–25)
CALCIUM SPEC-SCNC: 6.3 MG/DL (ref 8.6–10.5)
CHLORIDE SERPL-SCNC: 101 MMOL/L (ref 98–107)
CHOLEST SERPL-MCNC: 119 MG/DL (ref 0–200)
CO2 SERPL-SCNC: 28.6 MMOL/L (ref 22–29)
CREAT SERPL-MCNC: 0.8 MG/DL (ref 0.57–1)
DEPRECATED RDW RBC AUTO: 46.7 FL (ref 37–54)
EGFRCR SERPLBLD CKD-EPI 2021: 76 ML/MIN/1.73
EOSINOPHIL # BLD AUTO: 0.06 10*3/MM3 (ref 0–0.4)
EOSINOPHIL NFR BLD AUTO: 1 % (ref 0.3–6.2)
ERYTHROCYTE [DISTWIDTH] IN BLOOD BY AUTOMATED COUNT: 13.1 % (ref 12.3–15.4)
GLOBULIN UR ELPH-MCNC: 3 GM/DL
GLUCOSE SERPL-MCNC: 118 MG/DL (ref 65–99)
HBA1C MFR BLD: 7.7 % (ref 4.8–5.6)
HCT VFR BLD AUTO: 42.2 % (ref 34–46.6)
HDLC SERPL-MCNC: 54 MG/DL (ref 40–60)
HGB BLD-MCNC: 13.1 G/DL (ref 12–15.9)
IMM GRANULOCYTES # BLD AUTO: 0.02 10*3/MM3 (ref 0–0.05)
IMM GRANULOCYTES NFR BLD AUTO: 0.3 % (ref 0–0.5)
INR PPP: 2.5 (ref 0.9–1.1)
LDLC SERPL CALC-MCNC: 52 MG/DL (ref 0–100)
LDLC/HDLC SERPL: 0.99 {RATIO}
LYMPHOCYTES # BLD AUTO: 2.14 10*3/MM3 (ref 0.7–3.1)
LYMPHOCYTES NFR BLD AUTO: 34.5 % (ref 19.6–45.3)
MAGNESIUM SERPL-MCNC: 2.2 MG/DL (ref 1.6–2.4)
MCH RBC QN AUTO: 30 PG (ref 26.6–33)
MCHC RBC AUTO-ENTMCNC: 31 G/DL (ref 31.5–35.7)
MCV RBC AUTO: 96.6 FL (ref 79–97)
MONOCYTES # BLD AUTO: 0.58 10*3/MM3 (ref 0.1–0.9)
MONOCYTES NFR BLD AUTO: 9.3 % (ref 5–12)
NEUTROPHILS NFR BLD AUTO: 3.36 10*3/MM3 (ref 1.7–7)
NEUTROPHILS NFR BLD AUTO: 54.1 % (ref 42.7–76)
NRBC BLD AUTO-RTO: 0 /100 WBC (ref 0–0.2)
PLATELET # BLD AUTO: 265 10*3/MM3 (ref 140–450)
PMV BLD AUTO: 11.4 FL (ref 6–12)
POTASSIUM SERPL-SCNC: 3.8 MMOL/L (ref 3.5–5.2)
PROT SERPL-MCNC: 7.1 G/DL (ref 6–8.5)
RBC # BLD AUTO: 4.37 10*6/MM3 (ref 3.77–5.28)
SODIUM SERPL-SCNC: 140 MMOL/L (ref 136–145)
TRIGL SERPL-MCNC: 59 MG/DL (ref 0–150)
TSH SERPL DL<=0.05 MIU/L-ACNC: 1.28 UIU/ML (ref 0.27–4.2)
VIT B12 BLD-MCNC: 1808 PG/ML (ref 211–946)
VLDLC SERPL-MCNC: 13 MG/DL (ref 5–40)
WBC NRBC COR # BLD AUTO: 6.21 10*3/MM3 (ref 3.4–10.8)

## 2025-06-09 PROCEDURE — 85025 COMPLETE CBC W/AUTO DIFF WBC: CPT | Performed by: PREVENTIVE MEDICINE

## 2025-06-09 PROCEDURE — 82607 VITAMIN B-12: CPT | Performed by: PREVENTIVE MEDICINE

## 2025-06-09 PROCEDURE — 85610 PROTHROMBIN TIME: CPT | Performed by: INTERNAL MEDICINE

## 2025-06-09 PROCEDURE — 82043 UR ALBUMIN QUANTITATIVE: CPT | Performed by: PREVENTIVE MEDICINE

## 2025-06-09 PROCEDURE — 36416 COLLJ CAPILLARY BLOOD SPEC: CPT | Performed by: INTERNAL MEDICINE

## 2025-06-09 PROCEDURE — 83735 ASSAY OF MAGNESIUM: CPT | Performed by: PREVENTIVE MEDICINE

## 2025-06-09 PROCEDURE — 36415 COLL VENOUS BLD VENIPUNCTURE: CPT

## 2025-06-09 PROCEDURE — 82570 ASSAY OF URINE CREATININE: CPT | Performed by: PREVENTIVE MEDICINE

## 2025-06-09 PROCEDURE — 36416 COLLJ CAPILLARY BLOOD SPEC: CPT | Performed by: PREVENTIVE MEDICINE

## 2025-06-09 PROCEDURE — 83036 HEMOGLOBIN GLYCOSYLATED A1C: CPT | Performed by: PREVENTIVE MEDICINE

## 2025-06-09 PROCEDURE — 80061 LIPID PANEL: CPT | Performed by: PREVENTIVE MEDICINE

## 2025-06-09 PROCEDURE — 80053 COMPREHEN METABOLIC PANEL: CPT | Performed by: PREVENTIVE MEDICINE

## 2025-06-09 PROCEDURE — 84443 ASSAY THYROID STIM HORMONE: CPT | Performed by: PREVENTIVE MEDICINE

## 2025-06-09 NOTE — PROGRESS NOTES
Left voicemail for pt to Continue current dosage of Warfarin and recheck in a month. Call back if any further questions. [email or web address]

## 2025-06-09 NOTE — PROGRESS NOTES
Capillary Blood Specimen Collection  Capillary blood collection performed in left middle finger by Dari Goode MA. Patient tolerated the procedure well without complications. INR   06/09/25   Dari Goode MA

## 2025-06-10 LAB
ALBUMIN UR-MCNC: <1.2 MG/DL
CREAT UR-MCNC: 15.8 MG/DL
MICROALBUMIN/CREAT UR: NORMAL MG/G{CREAT}

## 2025-06-11 DIAGNOSIS — I48.91 ATRIAL FIBRILLATION, UNSPECIFIED TYPE: ICD-10-CM

## 2025-06-11 DIAGNOSIS — Z79.01 LONG TERM (CURRENT) USE OF ANTICOAGULANTS: ICD-10-CM

## 2025-06-11 RX ORDER — WARFARIN SODIUM 5 MG/1
TABLET ORAL
Qty: 90 TABLET | Refills: 0 | Status: SHIPPED | OUTPATIENT
Start: 2025-06-11

## 2025-06-11 NOTE — TELEPHONE ENCOUNTER
Rx Refill Note  Requested Prescriptions     Pending Prescriptions Disp Refills    warfarin (COUMADIN) 5 MG tablet [Pharmacy Med Name: Warfarin Sodium 5 MG Oral Tablet] 90 tablet 0     Sig: TAKE 1 TABLET BY MOUTH ONCE DAILY EXCEPT 1/2 (ONE-HALF) TABLET ON MONDAY AND FRIDAY OR AS DIRECTED   Last INR 6/9/25   Last office visit with prescribing clinician: 2/24/2025   Last telemedicine visit with prescribing clinician: Visit date not found   Next office visit with prescribing clinician: 9/8/2025                         Would you like a call back once the refill request has been completed: [] Yes [] No    If the office needs to give you a call back, can they leave a voicemail: [] Yes [] No    Yelena Westbrook RN  06/11/25, 15:45 EDT

## 2025-06-16 RX ORDER — AMLODIPINE BESYLATE 10 MG/1
10 TABLET ORAL DAILY
Qty: 90 TABLET | Refills: 0 | Status: SHIPPED | OUTPATIENT
Start: 2025-06-16

## 2025-07-14 PROCEDURE — 93296 REM INTERROG EVL PM/IDS: CPT | Performed by: INTERNAL MEDICINE

## 2025-07-14 PROCEDURE — 93294 REM INTERROG EVL PM/LDLS PM: CPT | Performed by: INTERNAL MEDICINE

## 2025-07-16 ENCOUNTER — ANTICOAGULATION VISIT (OUTPATIENT)
Dept: CARDIOLOGY | Facility: CLINIC | Age: 75
End: 2025-07-16
Payer: MEDICARE

## 2025-07-16 ENCOUNTER — ANTICOAGULATION VISIT (OUTPATIENT)
Dept: FAMILY MEDICINE CLINIC | Facility: CLINIC | Age: 75
End: 2025-07-16
Payer: MEDICARE

## 2025-07-16 DIAGNOSIS — I48.91 ATRIAL FIBRILLATION, UNSPECIFIED TYPE: Primary | ICD-10-CM

## 2025-07-16 LAB
INR PPP: 2
INR PPP: 2 (ref 0.9–1.1)

## 2025-07-16 PROCEDURE — 85610 PROTHROMBIN TIME: CPT | Performed by: INTERNAL MEDICINE

## 2025-07-16 PROCEDURE — 36416 COLLJ CAPILLARY BLOOD SPEC: CPT | Performed by: INTERNAL MEDICINE

## 2025-07-16 PROCEDURE — 36416 COLLJ CAPILLARY BLOOD SPEC: CPT | Performed by: PREVENTIVE MEDICINE

## 2025-07-16 NOTE — PROGRESS NOTES
Capillary Blood Specimen Collection  Capillary blood collection performed in left middle finger by Dari Goode MA. Patient tolerated the procedure well without complications. INR   07/16/25   Dari Goode MA

## 2025-07-17 LAB
MC_CV_MDC_IDC_RATE_1: 160
MC_CV_MDC_IDC_ZONE_ID: 1
MDC_IDC_MSMT_BATTERY_REMAINING_LONGEVITY: 54 MO
MDC_IDC_MSMT_BATTERY_REMAINING_PERCENTAGE: 90 %
MDC_IDC_MSMT_BATTERY_STATUS: NORMAL
MDC_IDC_MSMT_LEADCHNL_RA_DTM: NORMAL
MDC_IDC_MSMT_LEADCHNL_RA_IMPEDANCE_VALUE: 541
MDC_IDC_MSMT_LEADCHNL_RA_PACING_THRESHOLD_AMPLITUDE: 0.5
MDC_IDC_MSMT_LEADCHNL_RA_PACING_THRESHOLD_POLARITY: NORMAL
MDC_IDC_MSMT_LEADCHNL_RA_PACING_THRESHOLD_PULSEWIDTH: 0.4
MDC_IDC_MSMT_LEADCHNL_RA_SENSING_INTR_AMPL: 6.5
MDC_IDC_MSMT_LEADCHNL_RV_DTM: NORMAL
MDC_IDC_MSMT_LEADCHNL_RV_IMPEDANCE_VALUE: 508
MDC_IDC_MSMT_LEADCHNL_RV_PACING_THRESHOLD_AMPLITUDE: 1
MDC_IDC_MSMT_LEADCHNL_RV_PACING_THRESHOLD_POLARITY: NORMAL
MDC_IDC_MSMT_LEADCHNL_RV_PACING_THRESHOLD_PULSEWIDTH: 0.4
MDC_IDC_PG_IMPLANT_DTM: NORMAL
MDC_IDC_PG_MFG: NORMAL
MDC_IDC_PG_MODEL: NORMAL
MDC_IDC_PG_SERIAL: NORMAL
MDC_IDC_PG_TYPE: NORMAL
MDC_IDC_SESS_DTM: NORMAL
MDC_IDC_SESS_TYPE: NORMAL
MDC_IDC_SET_BRADY_AT_MODE_SWITCH_RATE: 150
MDC_IDC_SET_BRADY_LOWRATE: 60
MDC_IDC_SET_BRADY_MAX_SENSOR_RATE: 130
MDC_IDC_SET_BRADY_MAX_TRACKING_RATE: 130
MDC_IDC_SET_BRADY_MODE: NORMAL
MDC_IDC_SET_BRADY_PAV_DELAY: 180
MDC_IDC_SET_BRADY_SAV_DELAY: 180
MDC_IDC_SET_LEADCHNL_RA_PACING_AMPLITUDE: 2
MDC_IDC_SET_LEADCHNL_RA_PACING_POLARITY: NORMAL
MDC_IDC_SET_LEADCHNL_RA_PACING_PULSEWIDTH: 0.4
MDC_IDC_SET_LEADCHNL_RA_SENSING_POLARITY: NORMAL
MDC_IDC_SET_LEADCHNL_RA_SENSING_SENSITIVITY: 0.5
MDC_IDC_SET_LEADCHNL_RV_PACING_AMPLITUDE: 2.5
MDC_IDC_SET_LEADCHNL_RV_PACING_POLARITY: NORMAL
MDC_IDC_SET_LEADCHNL_RV_PACING_PULSEWIDTH: 0.4
MDC_IDC_SET_LEADCHNL_RV_SENSING_POLARITY: NORMAL
MDC_IDC_SET_LEADCHNL_RV_SENSING_SENSITIVITY: 2.5
MDC_IDC_SET_ZONE_STATUS: NORMAL
MDC_IDC_SET_ZONE_TYPE: NORMAL
MDC_IDC_STAT_AT_BURDEN_PERCENT: 1
MDC_IDC_STAT_BRADY_RA_PERCENT_PACED: 1
MDC_IDC_STAT_BRADY_RV_PERCENT_PACED: 100

## 2025-07-30 RX ORDER — GLIPIZIDE 10 MG/1
20 TABLET ORAL EVERY 12 HOURS SCHEDULED
Qty: 360 TABLET | Refills: 0 | Status: SHIPPED | OUTPATIENT
Start: 2025-07-30

## 2025-07-30 NOTE — TELEPHONE ENCOUNTER
Rx Refill Note  Requested Prescriptions     Pending Prescriptions Disp Refills    pravastatin (PRAVACHOL) 80 MG tablet 90 tablet 3     Sig: Take 1 tablet by mouth every night at bedtime.      Last office visit with prescribing clinician: 5/15/2025   Last telemedicine visit with prescribing clinician: Visit date not found   Next office visit with prescribing clinician: 8/18/2025                         Would you like a call back once the refill request has been completed: [] Yes [] No    If the office needs to give you a call back, can they leave a voicemail: [] Yes [] No    Marly Solis MA  07/30/25, 14:45 EDT

## 2025-07-31 RX ORDER — PRAVASTATIN SODIUM 80 MG/1
80 TABLET ORAL
Qty: 90 TABLET | Refills: 3 | Status: SHIPPED | OUTPATIENT
Start: 2025-07-31

## 2025-08-04 RX ORDER — POTASSIUM CHLORIDE 750 MG/1
10 CAPSULE, EXTENDED RELEASE ORAL DAILY
Qty: 90 CAPSULE | Refills: 0 | Status: SHIPPED | OUTPATIENT
Start: 2025-08-04

## 2025-08-07 ENCOUNTER — TELEPHONE (OUTPATIENT)
Dept: CARDIOLOGY | Facility: CLINIC | Age: 75
End: 2025-08-07
Payer: MEDICARE

## 2025-08-08 RX ORDER — GLIPIZIDE 10 MG/1
20 TABLET ORAL EVERY 12 HOURS SCHEDULED
Qty: 360 TABLET | Refills: 0 | Status: SHIPPED | OUTPATIENT
Start: 2025-08-08

## 2025-08-14 ENCOUNTER — ANTICOAGULATION VISIT (OUTPATIENT)
Dept: FAMILY MEDICINE CLINIC | Facility: CLINIC | Age: 75
End: 2025-08-14
Payer: MEDICARE

## 2025-08-14 DIAGNOSIS — I48.91 ATRIAL FIBRILLATION, UNSPECIFIED TYPE: Primary | ICD-10-CM

## 2025-08-14 LAB — INR PPP: 1.3 (ref 0.9–1.1)

## 2025-08-14 PROCEDURE — 85610 PROTHROMBIN TIME: CPT | Performed by: INTERNAL MEDICINE

## 2025-08-14 PROCEDURE — 36416 COLLJ CAPILLARY BLOOD SPEC: CPT | Performed by: INTERNAL MEDICINE

## 2025-08-14 PROCEDURE — 36416 COLLJ CAPILLARY BLOOD SPEC: CPT | Performed by: PREVENTIVE MEDICINE

## 2025-08-18 ENCOUNTER — LAB (OUTPATIENT)
Dept: FAMILY MEDICINE CLINIC | Facility: CLINIC | Age: 75
End: 2025-08-18
Payer: MEDICARE

## 2025-08-18 ENCOUNTER — TELEPHONE (OUTPATIENT)
Dept: FAMILY MEDICINE CLINIC | Facility: CLINIC | Age: 75
End: 2025-08-18

## 2025-08-18 ENCOUNTER — OFFICE VISIT (OUTPATIENT)
Dept: FAMILY MEDICINE CLINIC | Facility: CLINIC | Age: 75
End: 2025-08-18
Payer: MEDICARE

## 2025-08-18 VITALS
DIASTOLIC BLOOD PRESSURE: 78 MMHG | HEART RATE: 94 BPM | WEIGHT: 158.4 LBS | HEIGHT: 68 IN | BODY MASS INDEX: 24.01 KG/M2 | SYSTOLIC BLOOD PRESSURE: 134 MMHG | OXYGEN SATURATION: 97 % | TEMPERATURE: 98.2 F

## 2025-08-18 DIAGNOSIS — I48.91 ATRIAL FIBRILLATION, UNSPECIFIED TYPE: ICD-10-CM

## 2025-08-18 DIAGNOSIS — E78.5 HYPERLIPIDEMIA, UNSPECIFIED HYPERLIPIDEMIA TYPE: ICD-10-CM

## 2025-08-18 DIAGNOSIS — E53.8 B12 DEFICIENCY: ICD-10-CM

## 2025-08-18 DIAGNOSIS — Z78.0 POSTMENOPAUSE: ICD-10-CM

## 2025-08-18 DIAGNOSIS — E11.9 TYPE 2 DIABETES MELLITUS WITHOUT COMPLICATION, WITHOUT LONG-TERM CURRENT USE OF INSULIN: ICD-10-CM

## 2025-08-18 DIAGNOSIS — Z78.0 POSTMENOPAUSAL STATUS: ICD-10-CM

## 2025-08-18 DIAGNOSIS — E11.9 TYPE 2 DIABETES MELLITUS WITHOUT COMPLICATION, WITHOUT LONG-TERM CURRENT USE OF INSULIN: Primary | ICD-10-CM

## 2025-08-18 DIAGNOSIS — D22.9 ATYPICAL MOLE: ICD-10-CM

## 2025-08-18 DIAGNOSIS — I10 PRIMARY HYPERTENSION: ICD-10-CM

## 2025-08-18 DIAGNOSIS — Z95.0 PACEMAKER: ICD-10-CM

## 2025-08-18 LAB
ALBUMIN UR-MCNC: <1.2 MG/DL
BASOPHILS # BLD AUTO: 0.03 10*3/MM3 (ref 0–0.2)
BASOPHILS NFR BLD AUTO: 0.5 % (ref 0–1.5)
CREAT UR-MCNC: 63.6 MG/DL
DEPRECATED RDW RBC AUTO: 40.2 FL (ref 37–54)
EOSINOPHIL # BLD AUTO: 0.1 10*3/MM3 (ref 0–0.4)
EOSINOPHIL NFR BLD AUTO: 1.6 % (ref 0.3–6.2)
ERYTHROCYTE [DISTWIDTH] IN BLOOD BY AUTOMATED COUNT: 12.6 % (ref 12.3–15.4)
HBA1C MFR BLD: 8.4 % (ref 4.8–5.6)
HCT VFR BLD AUTO: 38.3 % (ref 34–46.6)
HGB BLD-MCNC: 12.4 G/DL (ref 12–15.9)
IMM GRANULOCYTES # BLD AUTO: 0.04 10*3/MM3 (ref 0–0.05)
IMM GRANULOCYTES NFR BLD AUTO: 0.6 % (ref 0–0.5)
LYMPHOCYTES # BLD AUTO: 2.09 10*3/MM3 (ref 0.7–3.1)
LYMPHOCYTES NFR BLD AUTO: 33.5 % (ref 19.6–45.3)
MCH RBC QN AUTO: 29.5 PG (ref 26.6–33)
MCHC RBC AUTO-ENTMCNC: 32.4 G/DL (ref 31.5–35.7)
MCV RBC AUTO: 91 FL (ref 79–97)
MICROALBUMIN/CREAT UR: NORMAL MG/G{CREAT}
MONOCYTES # BLD AUTO: 0.59 10*3/MM3 (ref 0.1–0.9)
MONOCYTES NFR BLD AUTO: 9.5 % (ref 5–12)
NEUTROPHILS NFR BLD AUTO: 3.39 10*3/MM3 (ref 1.7–7)
NEUTROPHILS NFR BLD AUTO: 54.3 % (ref 42.7–76)
NRBC BLD AUTO-RTO: 0 /100 WBC (ref 0–0.2)
PLATELET # BLD AUTO: 281 10*3/MM3 (ref 140–450)
PMV BLD AUTO: 11.2 FL (ref 6–12)
RBC # BLD AUTO: 4.21 10*6/MM3 (ref 3.77–5.28)
WBC NRBC COR # BLD AUTO: 6.24 10*3/MM3 (ref 3.4–10.8)

## 2025-08-18 PROCEDURE — 82570 ASSAY OF URINE CREATININE: CPT | Performed by: PREVENTIVE MEDICINE

## 2025-08-18 PROCEDURE — 80053 COMPREHEN METABOLIC PANEL: CPT | Performed by: PREVENTIVE MEDICINE

## 2025-08-18 PROCEDURE — 80061 LIPID PANEL: CPT | Performed by: PREVENTIVE MEDICINE

## 2025-08-18 PROCEDURE — 3078F DIAST BP <80 MM HG: CPT | Performed by: PREVENTIVE MEDICINE

## 2025-08-18 PROCEDURE — 82607 VITAMIN B-12: CPT | Performed by: PREVENTIVE MEDICINE

## 2025-08-18 PROCEDURE — 1126F AMNT PAIN NOTED NONE PRSNT: CPT | Performed by: PREVENTIVE MEDICINE

## 2025-08-18 PROCEDURE — 1160F RVW MEDS BY RX/DR IN RCRD: CPT | Performed by: PREVENTIVE MEDICINE

## 2025-08-18 PROCEDURE — 82043 UR ALBUMIN QUANTITATIVE: CPT | Performed by: PREVENTIVE MEDICINE

## 2025-08-18 PROCEDURE — 36415 COLL VENOUS BLD VENIPUNCTURE: CPT

## 2025-08-18 PROCEDURE — 3074F SYST BP LT 130 MM HG: CPT | Performed by: PREVENTIVE MEDICINE

## 2025-08-18 PROCEDURE — 84443 ASSAY THYROID STIM HORMONE: CPT | Performed by: PREVENTIVE MEDICINE

## 2025-08-18 PROCEDURE — G2211 COMPLEX E/M VISIT ADD ON: HCPCS | Performed by: PREVENTIVE MEDICINE

## 2025-08-18 PROCEDURE — 83036 HEMOGLOBIN GLYCOSYLATED A1C: CPT | Performed by: PREVENTIVE MEDICINE

## 2025-08-18 PROCEDURE — 99214 OFFICE O/P EST MOD 30 MIN: CPT | Performed by: PREVENTIVE MEDICINE

## 2025-08-18 PROCEDURE — 83735 ASSAY OF MAGNESIUM: CPT | Performed by: PREVENTIVE MEDICINE

## 2025-08-18 PROCEDURE — 1159F MED LIST DOCD IN RCRD: CPT | Performed by: PREVENTIVE MEDICINE

## 2025-08-18 PROCEDURE — 85025 COMPLETE CBC W/AUTO DIFF WBC: CPT | Performed by: PREVENTIVE MEDICINE

## 2025-08-18 RX ORDER — GLIPIZIDE 10 MG/1
20 TABLET ORAL EVERY 12 HOURS SCHEDULED
Qty: 360 TABLET | Refills: 3 | Status: SHIPPED | OUTPATIENT
Start: 2025-08-18 | End: 2025-08-18 | Stop reason: SDUPTHER

## 2025-08-18 RX ORDER — GLIPIZIDE 10 MG/1
20 TABLET ORAL EVERY 12 HOURS SCHEDULED
Qty: 360 TABLET | Refills: 3 | Status: SHIPPED | OUTPATIENT
Start: 2025-08-18

## 2025-08-19 ENCOUNTER — RESULTS FOLLOW-UP (OUTPATIENT)
Dept: FAMILY MEDICINE CLINIC | Facility: CLINIC | Age: 75
End: 2025-08-19

## 2025-08-19 LAB
ALBUMIN SERPL-MCNC: 3.8 G/DL (ref 3.5–5.2)
ALBUMIN/GLOB SERPL: 1.3 G/DL
ALP SERPL-CCNC: 73 U/L (ref 39–117)
ALT SERPL W P-5'-P-CCNC: 20 U/L (ref 1–33)
ANION GAP SERPL CALCULATED.3IONS-SCNC: 11.6 MMOL/L (ref 5–15)
AST SERPL-CCNC: 20 U/L (ref 1–32)
BILIRUB SERPL-MCNC: 0.3 MG/DL (ref 0–1.2)
BUN SERPL-MCNC: 19 MG/DL (ref 8–23)
BUN/CREAT SERPL: 23.2 (ref 7–25)
CALCIUM SPEC-SCNC: 9.6 MG/DL (ref 8.6–10.5)
CHLORIDE SERPL-SCNC: 106 MMOL/L (ref 98–107)
CHOLEST SERPL-MCNC: 118 MG/DL (ref 0–200)
CO2 SERPL-SCNC: 26.4 MMOL/L (ref 22–29)
CREAT SERPL-MCNC: 0.82 MG/DL (ref 0.57–1)
EGFRCR SERPLBLD CKD-EPI 2021: 73.8 ML/MIN/1.73
GLOBULIN UR ELPH-MCNC: 3 GM/DL
GLUCOSE SERPL-MCNC: 136 MG/DL (ref 65–99)
HDLC SERPL-MCNC: 51 MG/DL (ref 40–60)
LDLC SERPL CALC-MCNC: 54 MG/DL (ref 0–100)
LDLC/HDLC SERPL: 1.09 {RATIO}
MAGNESIUM SERPL-MCNC: 2 MG/DL (ref 1.6–2.4)
POTASSIUM SERPL-SCNC: 3.9 MMOL/L (ref 3.5–5.2)
PROT SERPL-MCNC: 6.8 G/DL (ref 6–8.5)
SODIUM SERPL-SCNC: 144 MMOL/L (ref 136–145)
TRIGL SERPL-MCNC: 57 MG/DL (ref 0–150)
TSH SERPL DL<=0.05 MIU/L-ACNC: 1.4 UIU/ML (ref 0.27–4.2)
VIT B12 BLD-MCNC: 837 PG/ML (ref 211–946)
VLDLC SERPL-MCNC: 13 MG/DL (ref 5–40)

## 2025-08-28 ENCOUNTER — ANTICOAGULATION VISIT (OUTPATIENT)
Dept: FAMILY MEDICINE CLINIC | Facility: CLINIC | Age: 75
End: 2025-08-28
Payer: MEDICARE

## 2025-08-28 DIAGNOSIS — I48.91 ATRIAL FIBRILLATION, UNSPECIFIED TYPE: Primary | ICD-10-CM

## 2025-08-28 LAB — INR PPP: 2 (ref 0.9–1.1)

## 2025-08-28 PROCEDURE — 85610 PROTHROMBIN TIME: CPT | Performed by: INTERNAL MEDICINE

## 2025-08-28 PROCEDURE — 36416 COLLJ CAPILLARY BLOOD SPEC: CPT | Performed by: INTERNAL MEDICINE

## 2025-08-28 PROCEDURE — 36415 COLL VENOUS BLD VENIPUNCTURE: CPT | Performed by: PREVENTIVE MEDICINE

## (undated) DEVICE — ELECTRD DEFIB M/FUNC PROPADZ RADIOL 2PK

## (undated) DEVICE — PACEMAKER CDS: Brand: MEDLINE INDUSTRIES, INC.

## (undated) DEVICE — ADHS LIQ MASTISOL 2/3ML

## (undated) DEVICE — USE OF THE SMARTNEEDLE DEVICE IS INDICATED WHEN BLOOD FLOW MUST BE DETECTED FOR PERCUTANEOUS VESSEL CANNULATION. THE VESSEL MUST BE OF A CALIBER WHICH WOULD NORMALLY BE PUNCTURED WITH A NEEDLE AND/OR CATHETER OF THIS SIZE OR LARGER.: Brand: SMARTNEEDLE® VASCULAR ACCESS SYSTEM

## (undated) DEVICE — VIOLET BRAIDED (POLYGLACTIN 910), SYNTHETIC ABSORBABLE SUTURE: Brand: COATED VICRYL

## (undated) DEVICE — SUT SILK 2/0 FS BLK 18IN 685G

## (undated) DEVICE — PENCL HND ROCKRSWTCH HOLSTR EZ CLEAN TP CRD 10FT

## (undated) DEVICE — INTRO SHEATH PRELUDE SNAP .038 9F 13CM W/SDPRT BLK

## (undated) DEVICE — INTRO SHEATH PRELUDE SNAP .038 6F 13CM W/SDPRT

## (undated) DEVICE — 3M™ PATIENT PLATE, CORDED, SPLIT, LARGE, 40 PER CASE, 1179: Brand: 3M™

## (undated) DEVICE — CABL BIPOL W/ALLGTR CLIP/SM 12FT

## (undated) DEVICE — UNDYED BRAIDED (POLYGLACTIN 910), SYNTHETIC ABSORBABLE SUTURE: Brand: COATED VICRYL